# Patient Record
Sex: FEMALE | Race: WHITE | NOT HISPANIC OR LATINO | Employment: FULL TIME | URBAN - METROPOLITAN AREA
[De-identification: names, ages, dates, MRNs, and addresses within clinical notes are randomized per-mention and may not be internally consistent; named-entity substitution may affect disease eponyms.]

---

## 2017-04-24 DIAGNOSIS — F41.9 ANXIETY: ICD-10-CM

## 2017-04-24 RX ORDER — FLUOXETINE 40 MG/1
40 CAPSULE ORAL DAILY
Qty: 90 CAPSULE | Refills: 3 | Status: SHIPPED | OUTPATIENT
Start: 2017-04-24 | End: 2018-02-23

## 2017-04-24 NOTE — TELEPHONE ENCOUNTER
fluoxetin  Last Written Prescription Date:  4/8/16  Last Fill Quantity: 90,   # refills: 3  Last Office Visit : 12/23/16  Future Office visit:  4/28/17    Routing refill request to provider for review/approval because:  Drug not on the [facility], [facility] or [facility] refill protocol

## 2017-04-28 ENCOUNTER — OFFICE VISIT (OUTPATIENT)
Dept: ENDOCRINOLOGY | Facility: CLINIC | Age: 42
End: 2017-04-28

## 2017-04-28 VITALS
BODY MASS INDEX: 29.48 KG/M2 | SYSTOLIC BLOOD PRESSURE: 125 MMHG | DIASTOLIC BLOOD PRESSURE: 81 MMHG | HEIGHT: 62 IN | HEART RATE: 76 BPM | WEIGHT: 160.2 LBS

## 2017-04-28 DIAGNOSIS — E10.9 TYPE 1 DIABETES MELLITUS WITHOUT COMPLICATION (H): ICD-10-CM

## 2017-04-28 LAB — HBA1C MFR BLD: 6.8 % (ref 4.3–6)

## 2017-04-28 RX ORDER — INSULIN GLARGINE 100 [IU]/ML
INJECTION, SOLUTION SUBCUTANEOUS
Qty: 10 ML | Refills: 1 | Status: SHIPPED | OUTPATIENT
Start: 2017-04-28 | End: 2019-03-29

## 2017-04-28 ASSESSMENT — PAIN SCALES - GENERAL: PAINLEVEL: NO PAIN (0)

## 2017-04-28 NOTE — LETTER
4/28/2017     RE: Shannon Sow  [address] [address]     Dear Colleague,    Thank you for referring your patient, Shannon Sow, to the [facility] ENDOCRINOLOGY at [facility]. Please see a copy of my visit note below.    #1 type 1 DM  Diagnosed in 1992.. On lantus and humalog since approximately 2000. Lantus split to b.i.d. dosing in 2008.. The patient has started the insulin pump since May of 2009. April 2008 HgbA1c is 7.5. July 2008 is 8.3. October 2008 HgbA1c of 7.0. February 2009 HgBa1c of 7.8. May 2009 hemoglobin A1c of 7.5 Her A1C 4 days ago was 6.8%, and was 7.5% 5/09, 7.8% 2/09, 7.0%. January 2010 hemoglobin A1c of 6.8. March 2010 hemoglobin A1c of 6.0. July 2010 hemoglobin A1c of 6.7. December 2010 hemoglobin A1c of 7.2. April 2011 hemoglobin A1c of 6.7. October 2011 hemoglobin A1c is 6.9. February 2012 hemoglobin A1c of 7.1. May 2012 hemoglobin A1c of 6.9. August 2012 hemoglobin A1c of 7.9. October 2012 hemoglobin A1c of 7.3. November 2012 hemoglobin A1c of 7.7. February 2013 hemoglobin A1c of 7.4. May 2013 hemoglobin A1c is 7.4. July 2013 hemoglobin A1c is 7.5. November 2013 hemoglobin A1c of 7.5. March 2014 hemoglobin A1c of 7.4.June 2014 hemoglobin A1c of 7.0. September 2014 hemoglobin A1c of 7.0. Feb 2015 Hgba1c of 6.8. November 2015 hemoglobin A1c of 6.4.August 2016 hemoglobin A1c of 7.4.december 2016 hemoglobin A1c of 6.4.    She was started on insulin pump in May 2009.  As of November 2013, she is on the new Medtronic pump with CGMS and low glucose suspend.       Review of systems related to diabetes  CV: none  Eye:  Has seen ophthalmology in July 2016, mild nonproliferative diabetic retinopathy appreciated bilaterally   Neuro: none  Neph: negative in April 2016  Infections:none  Immunization: flu shot in 2014, tetanus within 10 years, pneumovax 2 to 3 yars ago, patient declined flu shot today in 2016  The patient has a  "Lantus prescription as of 2017 April 2016 TSH, ferritin, TTG were normal    Interval history:    April 2017 hemoglobin A1c of 6.8. She does not check her blood sugar regularly.  She reports that she has been doing reasonably well.  Reviewing her CGMS which she wore for three days prior to return visit, she does have some drops in her blood sugar overnight as well as higher blood sugars in the late morning to early afternoon as well as early evening.    Bolus: 1 unit per 8.0 g carbohydrate from 8 AM to 9:30 PM, 1 unit per 12 g carbohydrate otherwise     Basal:  11:30 PM to 8 AM at 0.75 units per hour  8 AM to 1 PM increased to 1.1 units per hour  1 PM to 3:30 PM increased to 1.6units per hour  3:30 PM to 7 PM decreased to 0.7units per hour  7 PM to 11:30 PM increased to 1.5 units per hour     Insulin sensitivity:  1 per 50 above goal 120 from 6 AM to 9 PM, 1 unit per 60 g carbohydrate from 9 PM to 6 AM  Active insulin time to 4 hours  #2 Considering pregnancy  Not interested in pregnancy currently     Interval history: Does not anticipate having future pregnancy    #3 history of vitamin D deficiency  In February 2015, vitamin D was slightly low at 25.  She continues on vitamin D intermittently.April 2016 vitamin D was reasonable at 21.  Patient continues on vitamin D at 1000 international units daily     Interval history: not discuss the current visit    #4 weight gain   The patient reports that her lifestyle continues to be quite busy.  Her weight has been stable    Past medical history  Type 1 diabetes  Delivery of child in July 2010    Family history  Mother was diagnosed with primary biliary cirrhosis    Social history  Continues to be busy at work and at home,     Physical Exam   Vital signs:  Vital signs:   /81  Pulse 76  Ht 1.575 m (5' 2\")  Wt 72.7 kg (160 lb 3.2 oz)  BMI 29.3 kg/m2  Estimated body mass index is 29.3 kg/(m^2) as calculated from the following:    Height as of this encounter: 1.575 m " "(5' 2\").    Weight as of this encounter: 72.7 kg (160 lb 3.2 oz).    General appearance:    GENERAL APPEARANCE: Alert and no distress  NECK: No lymphadenopathy appreciated  Thyroid: No obvious nodules palpated , eevaluation with floor ultrasound in April 2017 does not demonstrate any nodules.  There is a smooth heterogeneous appearance.  CV: RRR without M/R/G  Lungs: CTA bilaterally  Abdomen: Soft, Nontender, non distended, positive bowel sounds   Neuro: no focal deficits, intact to monofilament bilaterally  Skin: No infection in feet   Mood: Normal   Lymph: neg in neck and supraclavicular area    No visits with results within 1 Week(s) from this visit.  Latest known visit with results is:    Office Visit on 12/23/2016   Component Date Value Ref Range Status     Hemoglobin A1C 12/23/2016 6.4* 4.3 - 6 % Final         Assessment   #1 type 1 DM with retinopathy noted in July 2016  Control continues to remain reasonable at 6.8.  She realistically does not check her blood sugars.  Reviewing her sensor results, she does have some low blood sugars overnight and in the later afternoon.  She also tends to have higher blood sugar in the late morning/early afternoon as well as during the early evening hours.  I will adjust her basal accordingly.    Bolus: 1 unit per 8.0 g carbohydrate from 8 AM to 9:30 PM, 1 unit per 12 g carbohydrate otherwise    Basal:  11:30 PM to 8 AM at 0.60 units per hour  8 AM to 11 AM increased to 1.1 units per hour  11 AM to 3:30 PM increased to 1.7 nits per hour  3:30 PM to 7 PM decreased to 0.6units per hour  7 PM to 11:30 PM increased to 1.6 units per hour    Insulin sensitivity:  1 per 50 above goal 120 from 6 AM to 9 PM, 1 unit per 60 g carbohydrate from 9 PM to 6 AM  Active insulin time to 4 hours    The patient is very interested in the possibility of the closed loop pump with Medtronic.  In the meantime, she will continue to wear her sensor intermittently.  Although she is vaguely interested " in the DEXCOM  sensor, she has many Medtronic sensors that she would like to use first.  This is reasonable.    The patient would prefer to recheck her annual labs when she returns in the summer.  She also understands the importance of checking her blood sugars more frequently if she does not wear the CGMS. we will also consider repeat examination at a return visit in July 2017    Prescription given for Lantus and glucagon case of pump failure    #2 considering pregnancy  Not interested in pregnancy currently.  Patient aware of need for closer diabetes management if pregnant.    #3 recent weight gain  Continues with slight weight gain, although she's been realistically stable for roughly 2 years.    Return visit in 4 months, sooner if needed.     25 minutes spent with patient of which 20 minutes was spent in face-to-face discussion, assessment, and coordination of care    Again, thank you for allowing me to participate in the care of your patient.      Sincerely,    Jemma Anderson MD

## 2017-04-28 NOTE — NURSING NOTE
Chief Complaint   Patient presents with     RECHECK     F/U TYPE I DM     Arminda Wilson, CMA  Endocrinology & Diabetes 3G    Capillary Fingerstick performed for an Hemoglobin A1C test

## 2017-04-28 NOTE — PROGRESS NOTES
#1 type 1 DM  Diagnosed in 1992.. On lantus and humalog since approximately 2000. Lantus split to b.i.d. dosing in 2008.. The patient has started the insulin pump since May of 2009. April 2008 HgbA1c is 7.5. July 2008 is 8.3. October 2008 HgbA1c of 7.0. February 2009 HgBa1c of 7.8. May 2009 hemoglobin A1c of 7.5 Her A1C 4 days ago was 6.8%, and was 7.5% 5/09, 7.8% 2/09, 7.0%. January 2010 hemoglobin A1c of 6.8. March 2010 hemoglobin A1c of 6.0. July 2010 hemoglobin A1c of 6.7. December 2010 hemoglobin A1c of 7.2. April 2011 hemoglobin A1c of 6.7. October 2011 hemoglobin A1c is 6.9. February 2012 hemoglobin A1c of 7.1. May 2012 hemoglobin A1c of 6.9. August 2012 hemoglobin A1c of 7.9. October 2012 hemoglobin A1c of 7.3. November 2012 hemoglobin A1c of 7.7. February 2013 hemoglobin A1c of 7.4. May 2013 hemoglobin A1c is 7.4. July 2013 hemoglobin A1c is 7.5. November 2013 hemoglobin A1c of 7.5. March 2014 hemoglobin A1c of 7.4.June 2014 hemoglobin A1c of 7.0. September 2014 hemoglobin A1c of 7.0. Feb 2015 Hgba1c of 6.8. November 2015 hemoglobin A1c of 6.4.August 2016 hemoglobin A1c of 7.4.december 2016 hemoglobin A1c of 6.4.    She was started on insulin pump in May 2009.  As of November 2013, she is on the new Medtronic pump with CGMS and low glucose suspend.       Review of systems related to diabetes  CV: none  Eye:  Has seen ophthalmology in July 2016, mild nonproliferative diabetic retinopathy appreciated bilaterally   Neuro: none  Neph: negative in April 2016  Infections:none  Immunization: flu shot in 2014, tetanus within 10 years, pneumovax 2 to 3 yars ago, patient declined flu shot today in 2016  The patient has a Lantus prescription as of 2017 April 2016 TSH, ferritin, TTG were normal    Interval history:    April 2017 hemoglobin A1c of 6.8. She does not check her blood sugar regularly.  She reports that she has been doing reasonably well.  Reviewing her CGMS which she wore for three days prior to return  "visit, she does have some drops in her blood sugar overnight as well as higher blood sugars in the late morning to early afternoon as well as early evening.    Bolus: 1 unit per 8.0 g carbohydrate from 8 AM to 9:30 PM, 1 unit per 12 g carbohydrate otherwise     Basal:  11:30 PM to 8 AM at 0.75 units per hour  8 AM to 1 PM increased to 1.1 units per hour  1 PM to 3:30 PM increased to 1.6units per hour  3:30 PM to 7 PM decreased to 0.7units per hour  7 PM to 11:30 PM increased to 1.5 units per hour     Insulin sensitivity:  1 per 50 above goal 120 from 6 AM to 9 PM, 1 unit per 60 g carbohydrate from 9 PM to 6 AM  Active insulin time to 4 hours  #2 Considering pregnancy  Not interested in pregnancy currently     Interval history: Does not anticipate having future pregnancy    #3 history of vitamin D deficiency  In February 2015, vitamin D was slightly low at 25.  She continues on vitamin D intermittently.April 2016 vitamin D was reasonable at 21.  Patient continues on vitamin D at 1000 international units daily     Interval history: not discuss the current visit    #4 weight gain   The patient reports that her lifestyle continues to be quite busy.  Her weight has been stable    Past medical history  Type 1 diabetes  Delivery of child in July 2010    Family history  Mother was diagnosed with primary biliary cirrhosis    Social history  Continues to be busy at work and at home,     Physical Exam   Vital signs:  Vital signs:   /81  Pulse 76  Ht 1.575 m (5' 2\")  Wt 72.7 kg (160 lb 3.2 oz)  BMI 29.3 kg/m2  Estimated body mass index is 29.3 kg/(m^2) as calculated from the following:    Height as of this encounter: 1.575 m (5' 2\").    Weight as of this encounter: 72.7 kg (160 lb 3.2 oz).    General appearance:    GENERAL APPEARANCE: Alert and no distress  NECK: No lymphadenopathy appreciated  Thyroid: No obvious nodules palpated , eevaluation with floor ultrasound in April 2017 does not demonstrate any nodules.  " There is a smooth heterogeneous appearance.  CV: RRR without M/R/G  Lungs: CTA bilaterally  Abdomen: Soft, Nontender, non distended, positive bowel sounds   Neuro: no focal deficits, intact to monofilament bilaterally  Skin: No infection in feet   Mood: Normal   Lymph: neg in neck and supraclavicular area    No visits with results within 1 Week(s) from this visit.  Latest known visit with results is:    Office Visit on 12/23/2016   Component Date Value Ref Range Status     Hemoglobin A1C 12/23/2016 6.4* 4.3 - 6 % Final         Assessment   #1 type 1 DM with retinopathy noted in July 2016  Control continues to remain reasonable at 6.8.  She realistically does not check her blood sugars.  Reviewing her sensor results, she does have some low blood sugars overnight and in the later afternoon.  She also tends to have higher blood sugar in the late morning/early afternoon as well as during the early evening hours.  I will adjust her basal accordingly.    Bolus: 1 unit per 8.0 g carbohydrate from 8 AM to 9:30 PM, 1 unit per 12 g carbohydrate otherwise    Basal:  11:30 PM to 8 AM at 0.60 units per hour  8 AM to 11 AM increased to 1.1 units per hour  11 AM to 3:30 PM increased to 1.7 nits per hour  3:30 PM to 7 PM decreased to 0.6units per hour  7 PM to 11:30 PM increased to 1.6 units per hour    Insulin sensitivity:  1 per 50 above goal 120 from 6 AM to 9 PM, 1 unit per 60 g carbohydrate from 9 PM to 6 AM  Active insulin time to 4 hours    The patient is very interested in the possibility of the closed loop pump with Medtronic.  In the meantime, she will continue to wear her sensor intermittently.  Although she is vaguely interested in the DEXCOM  sensor, she has many Medtronic sensors that she would like to use first.  This is reasonable.    The patient would prefer to recheck her annual labs when she returns in the summer.  She also understands the importance of checking her blood sugars more frequently if she does not  wear the CGMS. we will also consider repeat examination at a return visit in July 2017    Prescription given for Lantus and glucagon case of pump failure    #2 considering pregnancy  Not interested in pregnancy currently.  Patient aware of need for closer diabetes management if pregnant.    #3 recent weight gain  Continues with slight weight gain, although she's been realistically stable for roughly 2 years.    Return visit in 4 months, sooner if needed.     25 minutes spent with patient of which 20 minutes was spent in face-to-face discussion, assessment, and coordination of care

## 2017-04-28 NOTE — MR AVS SNAPSHOT
After Visit Summary   4/28/2017    Shannon Sow    MRN: [id number]           Patient Information     Date Of Birth          1975        Visit Information        Provider Department      4/28/2017 9:30 AM Jemma Anderson MD [facility]        Today's Diagnoses     Type 1 diabetes mellitus without complication (H)          Care Instructions    Consider circus juventus and adventures in cardboard        Follow-ups after your visit        Follow-up notes from your care team     Return in about 4 months (around 8/28/2017).      Your next 10 appointments already scheduled     Aug 25, 2017  9:30 AM CDT   (Arrive by 9:15 AM)   RETURN DIABETES with [facility] ([facility] [place])    [address] [place] [id number] [phone]              Who to contact     Please call your clinic at [phone] to:    Ask questions about your health    Make or cancel appointments    Discuss your medicines    Learn about your test results    Speak to your doctor   If you have compliments or concerns about an experience at your clinic, or if you wish to file a complaint, please contact [facility] Patient Relations at [phone] or email us at [email or web address]         Additional Information About Your Visit        MyChart Information     [employer] is an electronic gateway that provides easy, online access to your medical records. With [employer], you can request a clinic appointment, read your test results, renew a prescription or communicate with your care team.     To sign up for [employer] visit the website at [email or web address]   You will be asked to enter the access code listed below, as well as some personal information. Please follow the directions to create your username and password.     Your access code is: [email or web address]  Expires: 7/13/2017  6:31 AM     Your access  "code will  in 90 days. If you need help or a new code, please contact your [facility] or call [phone] for assistance.        Care EveryWhere ID     This is your Care EveryWhere ID. This could be used by other organizations to access your [place] medical records  [id number]        Your Vitals Were     Pulse Height BMI (Body Mass Index)             76 1.575 m (5' 2\") 29.3 kg/m2          Blood Pressure from Last 3 Encounters:   17 125/81   16 109/71   16 115/82    Weight from Last 3 Encounters:   17 72.7 kg (160 lb 3.2 oz)   16 71.3 kg (157 lb 3.2 oz)   16 72.6 kg (160 lb)              Today, you had the following     No orders found for display         Today's Medication Changes          These changes are accurate as of: 17 10:29 AM.  If you have any questions, ask your nurse or doctor.               Start taking these medicines.        Dose/Directions    glucagon 1 MG kit   Commonly known as:  GLUCAGON EMERGENCY   Used for:  Type 1 diabetes mellitus without complication (H)   Started by:  Jemma Anderson MD        Dose:  1 mg   Inject 1 mg into the muscle once for 1 dose   Quantity:  1 mg   Refills:  0            Where to get your medicines      Some of these will need a paper prescription and others can be bought over the counter.  Ask your nurse if you have questions.     Bring a paper prescription for each of these medications     glucagon 1 MG kit    insulin glargine 100 UNIT/ML injection                Primary Care Provider Office Phone # Fax #    Jemma Anderson [phone]       [address] [address]        Thank you!     Thank you for choosing [facility] ENDOCRINOLOGY  for your care. Our goal is always to provide you with excellent care. Hearing back from our patients is one way we can continue to improve our services. Please take a few minutes to complete the written " survey that you may receive in the mail after your visit with us. Thank you!             Your Updated Medication List - Protect others around you: Learn how to safely use, store and throw away your medicines at www.disposemymeds.org.          This list is accurate as of: 4/28/17 10:29 AM.  Always use your most recent med list.                   Brand Name Dispense Instructions for use    blood glucose monitoring test strip    no brand specified    6 Box    Test up to 6 times daily       cholecalciferol 1000 UNIT tablet    vitamin D    90 tablet    Take 1 tablet (1,000 Units) by mouth daily       FLUoxetine 40 MG capsule    PROZAC    90 capsule    Take 1 capsule (40 mg) by mouth daily       glucagon 1 MG kit    GLUCAGON EMERGENCY    1 mg    Inject 1 mg into the muscle once for 1 dose       insulin glargine 100 UNIT/ML injection    LANTUS VIAL    10 mL    Pt to take 20 units daily in case of pump failure       insulin lispro 100 UNIT/ML injection    humaLOG    80 mL    Pt takes 80 units daily       ipratropium 0.03 % spray    ATROVENT    2 Box    Spray 2 sprays into both nostrils every 12 hours       MULTIVITAMIN PO          triamcinolone 0.1 % ointment    KENALOG    250 g    Use as needed for rash - take twice daily       ZYRTEC ALLERGY 10 MG Caps   Generic drug:  cetirizine HCl

## 2017-07-29 ENCOUNTER — HEALTH MAINTENANCE LETTER (OUTPATIENT)
Age: 42
End: 2017-07-29

## 2017-08-25 ENCOUNTER — OFFICE VISIT (OUTPATIENT)
Dept: ENDOCRINOLOGY | Facility: CLINIC | Age: 42
End: 2017-08-25

## 2017-08-25 VITALS
HEIGHT: 62 IN | DIASTOLIC BLOOD PRESSURE: 74 MMHG | SYSTOLIC BLOOD PRESSURE: 126 MMHG | WEIGHT: 166.5 LBS | BODY MASS INDEX: 30.64 KG/M2 | HEART RATE: 83 BPM

## 2017-08-25 DIAGNOSIS — E10.9 TYPE 1 DIABETES MELLITUS WITHOUT COMPLICATION (H): ICD-10-CM

## 2017-08-25 DIAGNOSIS — E10.9 TYPE 1 DIABETES MELLITUS WITHOUT COMPLICATION (H): Primary | ICD-10-CM

## 2017-08-25 LAB
ANION GAP SERPL CALCULATED.3IONS-SCNC: 6 MMOL/L (ref 3–14)
BUN SERPL-MCNC: 12 MG/DL (ref 7–30)
CALCIUM SERPL-MCNC: 8.6 MG/DL (ref 8.5–10.1)
CHLORIDE SERPL-SCNC: 101 MMOL/L (ref 94–109)
CHOLEST SERPL-MCNC: 172 MG/DL
CO2 SERPL-SCNC: 29 MMOL/L (ref 20–32)
CREAT SERPL-MCNC: 0.62 MG/DL (ref 0.52–1.04)
CREAT UR-MCNC: 55 MG/DL
GFR SERPL CREATININE-BSD FRML MDRD: >90 ML/MIN/1.7M2
GLUCOSE SERPL-MCNC: 164 MG/DL (ref 70–99)
HBA1C MFR BLD: 7.2 % (ref 4.3–6)
HDLC SERPL-MCNC: 94 MG/DL
LDLC SERPL CALC-MCNC: 72 MG/DL
MICROALBUMIN UR-MCNC: <5 MG/L
MICROALBUMIN/CREAT UR: NORMAL MG/G CR (ref 0–25)
NONHDLC SERPL-MCNC: 79 MG/DL
POTASSIUM SERPL-SCNC: 4.1 MMOL/L (ref 3.4–5.3)
SODIUM SERPL-SCNC: 136 MMOL/L (ref 133–144)
TRIGL SERPL-MCNC: 35 MG/DL
TSH SERPL DL<=0.005 MIU/L-ACNC: 0.76 MU/L (ref 0.4–4)

## 2017-08-25 ASSESSMENT — PAIN SCALES - GENERAL: PAINLEVEL: NO PAIN (0)

## 2017-08-25 NOTE — PROGRESS NOTES
#1 type 1 DM  Diagnosed in 1992.. On lantus and humalog since approximately 2000. Lantus split to b.i.d. dosing in 2008.. The patient has started the insulin pump since May of 2009. April 2008 HgbA1c is 7.5. July 2008 is 8.3. October 2008 HgbA1c of 7.0. February 2009 HgBa1c of 7.8. May 2009 hemoglobin A1c of 7.5 Her A1C 4 days ago was 6.8%, and was 7.5% 5/09, 7.8% 2/09, 7.0%. January 2010 hemoglobin A1c of 6.8. March 2010 hemoglobin A1c of 6.0. July 2010 hemoglobin A1c of 6.7. December 2010 hemoglobin A1c of 7.2. April 2011 hemoglobin A1c of 6.7. October 2011 hemoglobin A1c is 6.9. February 2012 hemoglobin A1c of 7.1. May 2012 hemoglobin A1c of 6.9. August 2012 hemoglobin A1c of 7.9. October 2012 hemoglobin A1c of 7.3. November 2012 hemoglobin A1c of 7.7. February 2013 hemoglobin A1c of 7.4. May 2013 hemoglobin A1c is 7.4. July 2013 hemoglobin A1c is 7.5. November 2013 hemoglobin A1c of 7.5. March 2014 hemoglobin A1c of 7.4.June 2014 hemoglobin A1c of 7.0. September 2014 hemoglobin A1c of 7.0. Feb 2015 Hgba1c of 6.8. November 2015 hemoglobin A1c of 6.4.August 2016 hemoglobin A1c of 7.4.december 2016 hemoglobin A1c of 6.4.  April 2017 hemoglobin A1c of 6.8.    She was started on insulin pump in May 2009.  As of November 2013, she is on the new Medtronic pump with CGMS and low glucose suspend.       Review of systems related to diabetes  CV: none  Eye:  Has seen ophthalmology in July 2016, mild nonproliferative diabetic retinopathy appreciated bilaterally  Neuro: none  Neph: negative in April 2016  Infections: none  Immunization: flu shot in 2014, tetanus within 10 years, pneumovax 2 to 3 yars ago, patient declined flu shot today in 2016  The patient has a Lantus prescription as of 2017 April 2016 TSH, ferritin, TTG were normal    Interval history:    August 2017 hemoglobin A1c of 7.2. She does have a CGM but is not currently using it and so we were unable to review her any of her blood glucose history since her  last visit.  She reports that she has been doing well.  She states that she usually feels 3 to 4 hypoglycemic events per week at around 4 to 5:30 PM with blood sugar levels from 40 to 55.  During these events she feels very tired at which point she eats something and feels better.  She does not use her prescribed carbohydrate coverage for meals but rather approximates how much insulin she will need when she eats something.  She has adjusted her basal dose to try and limit her hypoglycemic events.       Bolus: 1 unit per 8.0 g carbohydrate from 8 AM to 9:30 PM, 1 unit per 12 g carbohydrate otherwise     Basal:  10 PM to 8 AM increased to 0.9 units per hour  11:30 AM to 3:30 PM decreased to 1.4 units per hourr  3:30 PM to 7 PM decreased to 0.6 units per hour  7 PM to 10 PM at 1.6 units per hour     Insulin sensitivity:  1 per 50 above goal 120 from 6 AM to 9 PM, 1 unit per 60 g carbohydrate from 9 PM to 6 AM  Active insulin time to 4 hours    #2 Considering pregnancy  Not interested in pregnancy currently.  No form of birth control     Interval history: Does not anticipate having future pregnancy    #3 history of vitamin D deficiency  In February 2015, vitamin D was slightly low at 25.  She continues on vitamin D intermittently.April 2016 vitamin D was reasonable at 21.  Patient continues on vitamin D at 1000 international units daily     Interval history: Continuing to take her prescribed dose of Vitamin D 1000 IU    #4 weight gain   The patient reports that her lifestyle continues to be quite busy.  Her weight has been stable    Interval history: She states that she is surprised to see her weight continue to increase as she has not markedly changed her diet and as increased her level of exercise.       Past medical history  Type 1 diabetes  Delivery of child in July 2010    Family history  Mother was diagnosed with primary biliary cirrhosis    Social history  Continues to be busy at work and at home,     Physical  "Exam   Vital signs:  Vital signs:   /74  Pulse 83  Ht 1.575 m (5' 2\")  Wt 75.5 kg (166 lb 8 oz)  BMI 30.45 kg/m2  Estimated body mass index is 30.45 kg/(m^2) as calculated from the following:    Height as of this encounter: 1.575 m (5' 2\").    Weight as of this encounter: 75.5 kg (166 lb 8 oz).    General appearance:    GENERAL APPEARANCE: Alert and no distress  NECK: No lymphadenopathy appreciated  Thyroid: No obvious nodules palpated , eevaluation with floor ultrasound in April 2017 does not demonstrate any nodules.  There is a smooth heterogeneous appearance.  CV: RRR without M/R/G  Lungs: CTA bilaterally  Abdomen: Soft, Nontender, non distended, positive bowel sounds   Neuro: no focal deficits, intact to monofilament bilaterally  Skin: No infection in feet   Mood: Normal   Lymph: neg in neck and supraclavicular area    No visits with results within 1 Week(s) from this visit.  Latest known visit with results is:    Office Visit on 12/23/2016   Component Date Value Ref Range Status     Hemoglobin A1C 12/23/2016 6.4* 4.3 - 6 % Final         Assessment   #1 type 1 DM with retinopathy noted in July 2016  Control worsening at 7.2.  She does not check her blood sugars and does not use her CGM.  Due to her late afternoon/ early evening hypoglycemic events, we have changed to timing of her afternoon dose and decreased the basal rate.  Because she has not record of her blood glucose levels, we are hesitant to make and drastic changes to her current regimen.  In order to get these blood glucose levels, she agreed to use a FreeStyle Azam for 14 days with a return date to clinic scheduled for 9/8 at which time we can more fully determine if any changes are needed to help her better manage her diabetes.  We discussed with her the possibility of using Metformin as this would make her more sensitive to the insulin and would help to reduce her weight.       Bolus: 1 unit per 8.0 g carbohydrate from 8 AM to 9:30 PM, 1 " unit per 12 g carbohydrate otherwise    Basal:  10 PM to 8 AM at 0.9 units per hour  11:30 AM to 2:00 PM at 1.4 units per hourr  2:00 PM to 7 PM decreased to 0.5 units per hour  7 PM to 10 PM at 1.6 units per hour    Insulin sensitivity:  1 per 50 above goal 120 from 6 AM to 9 PM, 1 unit per 60 g carbohydrate from 9 PM to 6 AM  Active insulin time to 4 hours      The patient will recheck her annual labs today which includes vitamin D, TTG , lipid profile, and random urine albumin.  We have also referred the patient to opthalmology for an eye exam.       #2 considering pregnancy  Not interested in pregnancy currently.  Patient aware of need for closer diabetes management if pregnant.    #3 recent weight gain  Discussed with the patient possibly checking a 24-hour urine cortisol level but she declined and we agree that the clinical suspicion for Cushing's Disease is very small.  Will check a TSH.    Return visit in 4 months, sooner if needed.     25 minutes spent with patient of which 20 minutes was spent in face-to-face discussion, assessment, and coordination of care    Avinash Schofield MS 4Scribed for Dr. Jemma Anderson. I, Dr. Jemma Anderson reviewed and edited the aforementioned note. Jemma BLACKWELL MD, I personally performed the entire clinical encounter documented in this note.

## 2017-08-25 NOTE — NURSING NOTE
Placed Azam Sensor per . Patient identified by name and . Patient tolerated placement well.  Sensor was placed on patient's RIGHT upper back part of arm. Patient was given an appointment to come back in 2 weeks for download and given instructions on how to care for the sensor.    AZAM PRO:  CODE: H76   SN:[id number]  EXP: 18  LOT: [id number]  NDC: [address]-22      Lyly Milner CMA

## 2017-08-25 NOTE — LETTER
8/25/2017       RE: Shannon Sow  [address] [facility] [address]     Dear Colleague,    Thank you for referring your patient, Shannon Sow, to the [facility] ENDOCRINOLOGY at [facility]. Please see a copy of my visit note below.    #1 type 1 DM  Diagnosed in 1992.. On lantus and humalog since approximately 2000. Lantus split to b.i.d. dosing in 2008.. The patient has started the insulin pump since May of 2009. April 2008 HgbA1c is 7.5. July 2008 is 8.3. October 2008 HgbA1c of 7.0. February 2009 HgBa1c of 7.8. May 2009 hemoglobin A1c of 7.5 Her A1C 4 days ago was 6.8%, and was 7.5% 5/09, 7.8% 2/09, 7.0%. January 2010 hemoglobin A1c of 6.8. March 2010 hemoglobin A1c of 6.0. July 2010 hemoglobin A1c of 6.7. December 2010 hemoglobin A1c of 7.2. April 2011 hemoglobin A1c of 6.7. October 2011 hemoglobin A1c is 6.9. February 2012 hemoglobin A1c of 7.1. May 2012 hemoglobin A1c of 6.9. August 2012 hemoglobin A1c of 7.9. October 2012 hemoglobin A1c of 7.3. November 2012 hemoglobin A1c of 7.7. February 2013 hemoglobin A1c of 7.4. May 2013 hemoglobin A1c is 7.4. July 2013 hemoglobin A1c is 7.5. November 2013 hemoglobin A1c of 7.5. March 2014 hemoglobin A1c of 7.4.June 2014 hemoglobin A1c of 7.0. September 2014 hemoglobin A1c of 7.0. Feb 2015 Hgba1c of 6.8. November 2015 hemoglobin A1c of 6.4.August 2016 hemoglobin A1c of 7.4.december 2016 hemoglobin A1c of 6.4.  April 2017 hemoglobin A1c of 6.8.    She was started on insulin pump in May 2009.  As of November 2013, she is on the new Medtronic pump with CGMS and low glucose suspend.       Review of systems related to diabetes  CV: none  Eye:  Has seen ophthalmology in July 2016, mild nonproliferative diabetic retinopathy appreciated bilaterally  Neuro: none  Neph: negative in April 2016  Infections: none  Immunization: flu shot in 2014, tetanus within 10 years, pneumovax 2 to 3 yars ago, patient declined flu shot  today in 2016  The patient has a Lantus prescription as of 2017 April 2016 TSH, ferritin, TTG were normal    Interval history:    August 2017 hemoglobin A1c of 7.2. She does have a CGM but is not currently using it and so we were unable to review her any of her blood glucose history since her last visit.  She reports that she has been doing well.  She states that she usually feels 3 to 4 hypoglycemic events per week at around 4 to 5:30 PM with blood sugar levels from 40 to 55.  During these events she feels very tired at which point she eats something and feels better.  She does not use her prescribed carbohydrate coverage for meals but rather approximates how much insulin she will need when she eats something.  She has adjusted her basal dose to try and limit her hypoglycemic events.       Bolus: 1 unit per 8.0 g carbohydrate from 8 AM to 9:30 PM, 1 unit per 12 g carbohydrate otherwise     Basal:  10 PM to 8 AM increased to 0.9 units per hour  11:30 AM to 3:30 PM decreased to 1.4 units per hourr  3:30 PM to 7 PM decreased to 0.6 units per hour  7 PM to 10 PM at 1.6 units per hour     Insulin sensitivity:  1 per 50 above goal 120 from 6 AM to 9 PM, 1 unit per 60 g carbohydrate from 9 PM to 6 AM  Active insulin time to 4 hours    #2 Considering pregnancy  Not interested in pregnancy currently.  No form of birth control     Interval history: Does not anticipate having future pregnancy    #3 history of vitamin D deficiency  In February 2015, vitamin D was slightly low at 25.  She continues on vitamin D intermittently.April 2016 vitamin D was reasonable at 21.  Patient continues on vitamin D at 1000 international units daily     Interval history: Continuing to take her prescribed dose of Vitamin D 1000 IU    #4 weight gain   The patient reports that her lifestyle continues to be quite busy.  Her weight has been stable    Interval history: She states that she is surprised to see her weight continue to increase as she  "has not markedly changed her diet and as increased her level of exercise.       Past medical history  Type 1 diabetes  Delivery of child in July 2010    Family history  Mother was diagnosed with primary biliary cirrhosis    Social history  Continues to be busy at work and at home,     Physical Exam   Vital signs:  Vital signs:   /74  Pulse 83  Ht 1.575 m (5' 2\")  Wt 75.5 kg (166 lb 8 oz)  BMI 30.45 kg/m2  Estimated body mass index is 30.45 kg/(m^2) as calculated from the following:    Height as of this encounter: 1.575 m (5' 2\").    Weight as of this encounter: 75.5 kg (166 lb 8 oz).    General appearance:    GENERAL APPEARANCE: Alert and no distress  NECK: No lymphadenopathy appreciated  Thyroid: No obvious nodules palpated , eevaluation with floor ultrasound in April 2017 does not demonstrate any nodules.  There is a smooth heterogeneous appearance.  CV: RRR without M/R/G  Lungs: CTA bilaterally  Abdomen: Soft, Nontender, non distended, positive bowel sounds   Neuro: no focal deficits, intact to monofilament bilaterally  Skin: No infection in feet   Mood: Normal   Lymph: neg in neck and supraclavicular area    No visits with results within 1 Week(s) from this visit.  Latest known visit with results is:    Office Visit on 12/23/2016   Component Date Value Ref Range Status     Hemoglobin A1C 12/23/2016 6.4* 4.3 - 6 % Final         Assessment   #1 type 1 DM with retinopathy noted in July 2016  Control worsening at 7.2.  She does not check her blood sugars and does not use her CGM.  Due to her late afternoon/ early evening hypoglycemic events, we have changed to timing of her afternoon dose and decreased the basal rate.  Because she has not record of her blood glucose levels, we are hesitant to make and drastic changes to her current regimen.  In order to get these blood glucose levels, she agreed to use a FreeStyle Azam for 14 days with a return date to clinic scheduled for 9/8 at which time we can more " fully determine if any changes are needed to help her better manage her diabetes.  We discussed with her the possibility of using Metformin as this would make her more sensitive to the insulin and would help to reduce her weight.       Bolus: 1 unit per 8.0 g carbohydrate from 8 AM to 9:30 PM, 1 unit per 12 g carbohydrate otherwise    Basal:  10 PM to 8 AM at 0.9 units per hour  11:30 AM to 2:00 PM at 1.4 units per hourr  2:00 PM to 7 PM decreased to 0.5 units per hour  7 PM to 10 PM at 1.6 units per hour    Insulin sensitivity:  1 per 50 above goal 120 from 6 AM to 9 PM, 1 unit per 60 g carbohydrate from 9 PM to 6 AM  Active insulin time to 4 hours      The patient will recheck her annual labs today which includes vitamin D, TTG , lipid profile, and random urine albumin.  We have also referred the patient to opthalmology for an eye exam.       #2 considering pregnancy  Not interested in pregnancy currently.  Patient aware of need for closer diabetes management if pregnant.    #3 recent weight gain  Discussed with the patient possibly checking a 24-hour urine cortisol level but she declined and we agree that the clinical suspicion for Cushing's Disease is very small.  Will check a TSH.    Return visit in 4 months, sooner if needed.     25 minutes spent with patient of which 20 minutes was spent in face-to-face discussion, assessment, and coordination of care    Avinash Schofield, MS 4Scribed for Dr. Jemma Anderson. I, Dr. Jemma Anderson reviewed and edited the aforementioned note. Jemma BLACKWELL MD, I personally performed the entire clinical encounter documented in this note.

## 2017-08-25 NOTE — MR AVS SNAPSHOT
After Visit Summary   8/25/2017    Shannon Sow    MRN: [id number]           Patient Information     Date Of Birth          1975        Visit Information        Provider Department      8/25/2017 9:30 AM Jemma Anderson MD [facility]        Today's Diagnoses     Type 1 diabetes mellitus without complication (H)    -  1       Follow-ups after your visit        Additional Services     OPHTHALMOLOGY ADULT REFERRAL       Pt has hx of diabetes                  Your next 10 appointments already scheduled     Aug 25, 2017 11:00 AM CDT   LAB with  LAB   [facility] ([facility])    [address] [place] [address]  1st [facility] [id number] [phone]           Patient must bring picture ID. Patient should be prepared to give a urine specimen  Please do not eat 10-12 hours before your appointment if you are coming in fasting for labs on lipids, cholesterol, or glucose (sugar). Pregnant women should follow their Care Team instructions. Water with medications is okay. Do not drink coffee or other fluids. If you have concerns about taking  your medications, please ask at office or if scheduling via [employer], send a message by clicking on Secure Messaging, Message Your Care Team.            Sep 08, 2017  2:30 PM CDT   (Arrive by 2:15 PM)   RETURN DIABETES with Jemma Anderson MD   [facility] Endocrinology ([facility])    [address] [place] [id number] [phone]            Dec 05, 2017  3:15 PM CST   RETURN RETINA with Angela Wolf MD   [facility] Clinic ([facility])    [facility]  [address] [facility]  [address]  [facility] [id number] [phone]              Future tests that were ordered for you today     Open Future Orders        Priority Expected Expires Ordered    25 Hydroxyvitamin D2 and D3 Routine  8/25/2018 8/25/2017    Basic metabolic panel Routine   "2018    Albumin Random Urine Quantitative with Creat Ratio Routine  2018    TSH Routine  2018    Tissue transglutaminase payal IgA and IgG Routine  2018    Lipid Profile Routine  2018            Who to contact     Please call your clinic at [phone] to:    Ask questions about your health    Make or cancel appointments    Discuss your medicines    Learn about your test results    Speak to your doctor   If you have compliments or concerns about an experience at your clinic, or if you wish to file a complaint, please contact [facility] Patient Relations at [phone] or email us at [email or web address]         Additional Information About Your Visit        [employer] Information     ISC8 is an electronic gateway that provides easy, online access to your medical records. With ISC8, you can request a clinic appointment, read your test results, renew a prescription or communicate with your care team.     To sign up for ISC8 visit the website at [email or web address]   You will be asked to enter the access code listed below, as well as some personal information. Please follow the directions to create your username and password.     Your access code is: [email or web address]  Expires: 2017  6:31 AM     Your access code will  in 90 days. If you need help or a new code, please contact your [facility] or call [phone] for assistance.        Care EveryWhere ID     This is your Care EveryWhere ID. This could be used by other organizations to access your [place] medical records  [id number]        Your Vitals Were     Pulse Height BMI (Body Mass Index)             83 1.575 m (5' 2\") 30.45 kg/m2          Blood Pressure from Last 3 Encounters:   17 126/74   17 125/81   16 109/71    Weight from Last 3 Encounters:   17 75.5 kg (166 lb 8 oz) "   04/28/17 72.7 kg (160 lb 3.2 oz)   12/23/16 71.3 kg (157 lb 3.2 oz)              We Performed the Following     OPHTHALMOLOGY ADULT REFERRAL        Primary Care Provider Office Phone # Fax #    Jemma Anderson [phone]       [address] [place] [id number]        Equal Access to Services     [facility]: Hadii aad ku hadasho Soomaali, waaxda luqadaha, qaybta kaalmada adeegyada, waxay idiin hayaan adeeg kharash la'aan ah. So [facility] [phone].    ATENCIÓN: Si habla español, tiene a abebe disposición servicios gratuitos de asistencia lingüística. Llame al [phone].    We comply with applicable federal civil rights laws and [place] laws. We do not discriminate on the basis of race, color, national origin, age, disability sex, sexual orientation or gender identity.            Thank you!     Thank you for choosing [facility] ENDOCRIN[facility]  for your care. Our goal is always to provide you with excellent care. Hearing back from our patients is one way we can continue to improve our services. Please take a few minutes to complete the written survey that you may receive in the mail after your visit with us. Thank you!             Your Updated Medication List - Protect others around you: Learn how to safely use, store and throw away your medicines at www.disposemymeds.org.          This list is accurate as of: 8/25/17 10:51 AM.  Always use your most recent med list.                   Brand Name Dispense Instructions for use Diagnosis    blood glucose monitoring test strip    no brand specified    6 Box    Test up to 6 times daily    DM type 1 (diabetes mellitus, type 1) (H)       cholecalciferol 1000 UNIT tablet    vitamin D    90 tablet    Take 1 tablet (1,000 Units) by mouth daily    Unspecified vitamin deficiency       FLUoxetine 40 MG capsule    PROZAC    90 capsule    Take 1 capsule (40 mg) by mouth daily    Anxiety       glucagon 1 MG kit    GLUCAGON EMERGENCY    1 mg    Inject 1  mg into the muscle once for 1 dose    Type 1 diabetes mellitus without complication (H)       insulin glargine 100 UNIT/ML injection    LANTUS VIAL    10 mL    Pt to take 20 units daily in case of pump failure    Type 1 diabetes mellitus without complication (H)       insulin lispro 100 UNIT/ML injection    humaLOG    80 mL    Pt takes 80 units daily    Type 1 diabetes mellitus without complication (H)       ipratropium 0.03 % spray    ATROVENT    2 Box    Spray 2 sprays into both nostrils every 12 hours    Seasonal allergic rhinitis       MULTIVITAMIN PO           triamcinolone 0.1 % ointment    KENALOG    250 g    Use as needed for rash - take twice daily    DM type 1 (diabetes mellitus, type 1) (H)       ZYRTEC ALLERGY 10 MG Caps   Generic drug:  cetirizine HCl

## 2017-08-25 NOTE — LETTER
Patient:  Shannon Sow  :   1975  MRN:     [id number]        Ms.Jessica Abby Sow  [address] [address]        2017    Dear Shannon    Here are your labs which look great. This includes a normal thyroid level, urine for protein, cholesterol, measure for celiac disease, and vitamin D.    If you have any questions, please feel free to contact my nurse at [phone] select option #3 for triage nurse  or  option #1 for scheduling related questions.    Regards    Jemma Anderson MD      Resulted Orders   25 Hydroxyvitamin D2 and D3   Result Value Ref Range    25 OH Vit D2 <5 ug/L    25 OH Vit D3 29 ug/L    25 OH Vit D total <34 20 - 75 ug/L      Comment:      Season, race, dietary intake, and treatment affect the concentration of   25-hydroxy-Vitamin D. Values may decrease during winter months and increase   during summer months. Values 20-29 ug/L may indicate Vitamin D insufficiency   and values <20 ug/L may indicate Vitamin D deficiency.  This test was developed and its performance characteristics determined by the   [facility],  Special Chemistry Laboratory. It has   not been cleared or approved by the FDA. The laboratory is regulated under   CLIA as qualified to perform high-complexity testing. This test is used for   clinical purposes. It should not be regarded as investigational or for   research.     Basic metabolic panel   Result Value Ref Range    Sodium 136 133 - 144 mmol/L    Potassium 4.1 3.4 - 5.3 mmol/L    Chloride 101 94 - 109 mmol/L    Carbon Dioxide 29 20 - 32 mmol/L    Anion Gap 6 3 - 14 mmol/L    Glucose 164 (H) 70 - 99 mg/dL    Urea Nitrogen 12 7 - 30 mg/dL    Creatinine 0.62 0.52 - 1.04 mg/dL    GFR Estimate >90 >60 mL/min/1.7m2      Comment:      Non  GFR Calc    GFR Estimate If Black >90 >60 mL/min/1.7m2      Comment:       GFR Calc    Calcium 8.6 8.5 - 10.1 mg/dL   Albumin Random Urine  Quantitative with Creat Ratio   Result Value Ref Range    Creatinine Urine 55 mg/dL    Albumin Urine mg/L <5 mg/L    Albumin Urine mg/g Cr Unable to calculate due to low value 0 - 25 mg/g Cr   TSH   Result Value Ref Range    TSH 0.76 0.40 - 4.00 mU/L   Tissue transglutaminase payal IgA and IgG   Result Value Ref Range    Tissue Transglutaminase Antibody IgA <1 <7 U/mL      Comment:      Negative  The tTG-IgA assay has limited utility for patients with decreased levels of   IgA. Screening for celiac disease should include IgA testing to rule out   selective IgA deficiency and to guide selection and interpretation of   serological testing. tTG-IgG testing may be positive in celiac disease   patients with IgA deficiency.      Tissue Transglutaminase Payal IgG <1 <7 U/mL      Comment:      Negative   Lipid Profile   Result Value Ref Range    Cholesterol 172 <200 mg/dL    Triglycerides 35 <150 mg/dL    HDL Cholesterol 94 >49 mg/dL    LDL Cholesterol Calculated 72 <100 mg/dL      Comment:      Desirable:       <100 mg/dl    Non HDL Cholesterol 79 <130 mg/dL       Lab

## 2017-08-28 ENCOUNTER — ALLIED HEALTH/NURSE VISIT (OUTPATIENT)
Dept: ENDOCRINOLOGY | Facility: CLINIC | Age: 42
End: 2017-08-28

## 2017-08-28 ENCOUNTER — TELEPHONE (OUTPATIENT)
Dept: ENDOCRINOLOGY | Facility: CLINIC | Age: 42
End: 2017-08-28

## 2017-08-28 DIAGNOSIS — E10.9 TYPE 1 DIABETES MELLITUS WITHOUT COMPLICATION (H): Primary | ICD-10-CM

## 2017-08-28 LAB
TTG IGA SER-ACNC: <1 U/ML
TTG IGG SER-ACNC: <1 U/ML

## 2017-08-28 NOTE — TELEPHONE ENCOUNTER
Pt says CGM fell off arm 8/27/17 afternoon, was on for several days, asking what she be done. Please advise at [phone]. Sent to [facility].

## 2017-08-28 NOTE — PATIENT INSTRUCTIONS
Today we placed the Azam sensor.  Getting the Azam wet in the shower or from sweat while working out is fine, however, please refrain from swimming or taking a bath (or any submerging of water) for the next two weeks.      Please use caution while dressing or doing any activity.      Should the Azam fall off before your return to clinic for removal and download, call us at [phone] to let us know in case we need to put on a new one.  Should it fall off, please keep the sensor as we can still download the data via bluetooth even if it is no longer on your body.    Please bring your Glucometer and Insulin pump (if applicable) to your sensor removal appointment so we can compare all sets of data.    Please don't hesitate to call with any questions!

## 2017-08-28 NOTE — NURSING NOTE
Placed Azam Sensor per Dr. BAINS. Patient identified by name and . Patient tolerated placement well.  Sensor was placed on patient's LEFT upper back part of arm. Patient was given an appointment to come back in 2 weeks for download and given instructions on how to care for the sensor.    AZAM PRO:  CODE: [id number]  SN:[id number]  EXP:18  LOT:[id number]  NDC: [address]-81

## 2017-08-28 NOTE — MR AVS SNAPSHOT
After Visit Summary   8/28/2017    Shannon Sow    MRN: [id number]           Patient Information     Date Of Birth          1975        Visit Information        Provider Department      8/28/2017 4:00 PM Nurse, [facility] Endocrinology        Care Instructions    Today we placed the Azam sensor.  Getting the Azam wet in the shower or from sweat while working out is fine, however, please refrain from swimming or taking a bath (or any submerging of water) for the next two weeks.      Please use caution while dressing or doing any activity.      Should the Azam fall off before your return to clinic for removal and download, call us at [phone] to let us know in case we need to put on a new one.  Should it fall off, please keep the sensor as we can still download the data via bluetooth even if it is no longer on your body.    Please bring your Glucometer and Insulin pump (if applicable) to your sensor removal appointment so we can compare all sets of data.    Please don't hesitate to call with any questions!          Follow-ups after your visit        Your next 10 appointments already scheduled     Aug 28, 2017  4:00 PM CDT   Nurse Visit with [facility] Nurse   [facility] Endocrinology ([facility])    [address] [address] [phone]            Sep 08, 2017  2:30 PM CDT   (Arrive by 2:15 PM)   RETURN DIABETES with Jemma Anderson MD   [facility] Endocrinology ([facility])    [address] [id number] [phone]            Dec 05, 2017  3:15 PM CST   RETURN RETINA with Angela Wolf MD   [facility] ([facility])    [address]  [address]  [facility] [id number] [phone]              Who to contact     Please call your clinic at [phone] to:    Ask questions about your health    Make or cancel  appointments    Discuss your medicines    Learn about your test results    Speak to your doctor   If you have compliments or concerns about an experience at your clinic, or if you wish to file a complaint, please contact [facility] Patient Relations at [phone] or email us at [email or web address]         Additional Information About Your Visit        [employer] Information     [employer] is an electronic gateway that provides easy, online access to your medical records. With [facility], you can request a clinic appointment, read your test results, renew a prescription or communicate with your care team.     To sign up for [facility] visit the website at [email or web address]   You will be asked to enter the access code listed below, as well as some personal information. Please follow the directions to create your username and password.     Your access code is: [email or web address]  Expires: 2017  6:31 AM     Your access code will  in 90 days. If you need help or a new code, please contact your [facility] or call [phone] for assistance.        Care EveryWhere ID     This is your Care EveryWhere ID. This could be used by other organizations to access your [place] medical records  [id number]         Blood Pressure from Last 3 Encounters:   17 126/74   17 125/81   16 109/71    Weight from Last 3 Encounters:   17 75.5 kg (166 lb 8 oz)   17 72.7 kg (160 lb 3.2 oz)   16 71.3 kg (157 lb 3.2 oz)              Today, you had the following     No orders found for display       Primary Care Provider Office Phone # Fax #    Jemma Anderson [phone]       [address] [place] [address]        Equal Access to Services     BESSY ROSAS : Alfa Arambula, shahla soliman, qaeboni arnoldaljeremías moreno. So [facility]  [phone].    ATENCIÓN: Si arnaud miramontes, tiene a abebe disposición servicios gratuitos de asistencia lingüística. Halina jones [phone].    We comply with applicable federal civil rights laws and [place] laws. We do not discriminate on the basis of race, color, national origin, age, disability sex, sexual orientation or gender identity.            Thank you!     Thank you for choosing [facility] ENDOCRINOLOGY  for your care. Our goal is always to provide you with excellent care. Hearing back from our patients is one way we can continue to improve our services. Please take a few minutes to complete the written survey that you may receive in the mail after your visit with us. Thank you!             Your Updated Medication List - Protect others around you: Learn how to safely use, store and throw away your medicines at www.disposemymeds.org.          This list is accurate as of: 8/28/17  3:52 PM.  Always use your most recent med list.                   Brand Name Dispense Instructions for use Diagnosis    blood glucose monitoring test strip    no brand specified    6 Box    Test up to 6 times daily    DM type 1 (diabetes mellitus, type 1) (H)       cholecalciferol 1000 UNIT tablet    vitamin D    90 tablet    Take 1 tablet (1,000 Units) by mouth daily    Unspecified vitamin deficiency       FLUoxetine 40 MG capsule    PROZAC    90 capsule    Take 1 capsule (40 mg) by mouth daily    Anxiety       glucagon 1 MG kit    GLUCAGON EMERGENCY    1 mg    Inject 1 mg into the muscle once for 1 dose    Type 1 diabetes mellitus without complication (H)       insulin glargine 100 UNIT/ML injection    LANTUS VIAL    10 mL    Pt to take 20 units daily in case of pump failure    Type 1 diabetes mellitus without complication (H)       insulin lispro 100 UNIT/ML injection    humaLOG    80 mL    Pt takes 80 units daily    Type 1 diabetes mellitus without complication (H)       ipratropium 0.03 % spray    ATROVENT    2 Box    Spray 2  sprays into both nostrils every 12 hours    Seasonal allergic rhinitis       MULTIVITAMIN PO           triamcinolone 0.1 % ointment    KENALOG    250 g    Use as needed for rash - take twice daily    DM type 1 (diabetes mellitus, type 1) (H)       ZYRTEC ALLERGY 10 MG Caps   Generic drug:  cetirizine HCl

## 2017-08-29 LAB
DEPRECATED CALCIDIOL+CALCIFEROL SERPL-MC: <34 UG/L (ref 20–75)
VITAMIN D2 SERPL-MCNC: <5 UG/L
VITAMIN D3 SERPL-MCNC: 29 UG/L

## 2017-08-30 NOTE — PROGRESS NOTES
Dear Shannon    Here are your labs which look great. This includes a normal thyroid level, urine for protein, cholesterol, measure for celiac disease, and vitamin D.    If you have any questions, please feel free to contact my nurse at [phone] select option #3 for triage nurse  or  option #1 for scheduling related questions.    Regards    Jemma Anderson MD

## 2017-09-08 ENCOUNTER — OFFICE VISIT (OUTPATIENT)
Dept: ENDOCRINOLOGY | Facility: CLINIC | Age: 42
End: 2017-09-08

## 2017-09-08 VITALS
HEIGHT: 62 IN | HEART RATE: 85 BPM | WEIGHT: 164.4 LBS | BODY MASS INDEX: 30.25 KG/M2 | SYSTOLIC BLOOD PRESSURE: 129 MMHG | DIASTOLIC BLOOD PRESSURE: 78 MMHG

## 2017-09-08 DIAGNOSIS — E10.9 TYPE 1 DIABETES MELLITUS WITHOUT COMPLICATION (H): Primary | ICD-10-CM

## 2017-09-08 NOTE — NURSING NOTE
"Chief Complaint   Patient presents with     RECHECK     DIABETES TYPE 1 F/U        Initial /78 (BP Location: Right arm, Patient Position: Sitting, Cuff Size: Adult Regular)  Pulse 85  Ht 1.575 m (5' 2\")  Wt 74.6 kg (164 lb 6.4 oz)  BMI 30.07 kg/m2 Estimated body mass index is 30.07 kg/(m^2) as calculated from the following:    Height as of this encounter: 1.575 m (5' 2\").    Weight as of this encounter: 74.6 kg (164 lb 6.4 oz).  Medication Reconciliation: complete     DOWNLOADED 2 RADHA SENSORS TODAY- PT. DID NOT HAVE GLUCOMETER AND DENIED PUMP DOWNLOAD DUE TO A PUMP MALFUNCTION.      Lyly Milner CMA     "

## 2017-09-08 NOTE — PROGRESS NOTES
#1 type 1 DM  Diagnosed in 1992.. On lantus and humalog since approximately 2000. Lantus split to b.i.d. dosing in 2008.. The patient has started the insulin pump since May of 2009. April 2008 HgbA1c is 7.5. July 2008 is 8.3. October 2008 HgbA1c of 7.0. February 2009 HgBa1c of 7.8. May 2009 hemoglobin A1c of 7.5 Her A1C 4 days ago was 6.8%, and was 7.5% 5/09, 7.8% 2/09, 7.0%. January 2010 hemoglobin A1c of 6.8. March 2010 hemoglobin A1c of 6.0. July 2010 hemoglobin A1c of 6.7. December 2010 hemoglobin A1c of 7.2. April 2011 hemoglobin A1c of 6.7. October 2011 hemoglobin A1c is 6.9. February 2012 hemoglobin A1c of 7.1. May 2012 hemoglobin A1c of 6.9. August 2012 hemoglobin A1c of 7.9. October 2012 hemoglobin A1c of 7.3. November 2012 hemoglobin A1c of 7.7. February 2013 hemoglobin A1c of 7.4. May 2013 hemoglobin A1c is 7.4. July 2013 hemoglobin A1c is 7.5. November 2013 hemoglobin A1c of 7.5. March 2014 hemoglobin A1c of 7.4.June 2014 hemoglobin A1c of 7.0. September 2014 hemoglobin A1c of 7.0. Feb 2015 Hgba1c of 6.8. November 2015 hemoglobin A1c of 6.4.August 2016 hemoglobin A1c of 7.4.december 2016 hemoglobin A1c of 6.4.  April 2017 hemoglobin A1c of 6.8.   August 2017 hemoglobin A1c of 7.2.    She was started on insulin pump in May 2009.  As of November 2013, she is on the new Medtronic pump with CGMS and low glucose suspend.       Review of systems related to diabetes  CV: none  Eye:  Has seen ophthalmology in July 2016, mild nonproliferative diabetic retinopathy appreciated bilaterally  Neuro: none  Neph: negative in August 2017  Infections: none  Immunization: flu shot in 2014, tetanus within 10 years, pneumovax 2 to 3 yars ago, patient declined flu shot today in 2016  The patient has a Lantus prescription as of 2017 August 2017 TSH and TTG were normal    Interval history:  The patient wore the FreeStyle flores.  She tolerated this device and wore it for approximately one week.  Review of her results shows  "prandial hyperglycemia as well as some hypoglycemia particularly in the evenings which she attributes to activity as well as alcohol use.  In addition, her blood sugars are fairly stable overnight.    Bolus: 1 unit per 8.0  carbohydrate from 8 AM to 9:30 PM (new dose) , 1 unit per 12 g carbohydrate otherwise    Basal:  Midnight to 8 AM at 0.6 units per hour  8 AM to 11:30 AM at 0.9 units per hour  11:30 AM to 2:00 PM at 1.4 units per hourr  2:00 PM to 7 PM decreased to 0.5 units per hour  7 PM to 10 PM at 1.6 units per hour  10 PM to midnight at 0.6 units per hour    Insulin sensitivity:  1 per 50 above goal 120 from 6 AM to 9 PM, 1 unit per 60 g carbohydrate from 9 PM to 6 AM  Active insulin time to 4 hours    #2 Considering pregnancy  Not interested in pregnancy currently.  No form of birth control     Interval history: Does not anticipate having future pregnancy    #3 history of vitamin D deficiency  In February 2015, vitamin D was slightly low at 25.  She continues on vitamin D intermittently.April 2016 vitamin D was reasonable at 21.  Patient continues on vitamin D at 1000 international units daily     Interval history: August 2017 vitamin D was normal    #4 weight gain   The patient reports that her lifestyle continues to be quite busy.  Her weight has been stable    Interval history: 2017 TSH was normal.  Dietary lifestyle changes recommended.      Past medical history  Type 1 diabetes  Delivery of child in July 2010    Family history  Mother was diagnosed with primary biliary cirrhosis    Social history  Continues to be busy at work and at home,     Physical Exam   Vital signs:  Vital signs:   /78 (BP Location: Right arm, Patient Position: Sitting, Cuff Size: Adult Regular)  Pulse 85  Ht 1.575 m (5' 2\")  Wt 74.6 kg (164 lb 6.4 oz)  BMI 30.07 kg/m2  Estimated body mass index is 30.07 kg/(m^2) as calculated from the following:    Height as of this encounter: 1.575 m (5' 2\").    Weight as of this " encounter: 74.6 kg (164 lb 6.4 oz).    General appearance:    GENERAL APPEARANCE: Alert and no distress    No visits with results within 1 Week(s) from this visit.  Latest known visit with results is:    Orders Only on 08/25/2017   Component Date Value Ref Range Status     25 OH Vit D2 08/25/2017 <5  ug/L Final     25 OH Vit D3 08/25/2017 29  ug/L Final     25 OH Vit D total 08/25/2017 <34  20 - 75 ug/L Final    Comment: Season, race, dietary intake, and treatment affect the concentration of   25-hydroxy-Vitamin D. Values may decrease during winter months and increase   during summer months. Values 20-29 ug/L may indicate Vitamin D insufficiency   and values <20 ug/L may indicate Vitamin D deficiency.  This test was developed and its performance characteristics determined by the   [facility],  Special Chemistry Laboratory. It has   not been cleared or approved by the FDA. The laboratory is regulated under   CLIA as qualified to perform high-complexity testing. This test is used for   clinical purposes. It should not be regarded as investigational or for   research.       Sodium 08/25/2017 136  133 - 144 mmol/L Final     Potassium 08/25/2017 4.1  3.4 - 5.3 mmol/L Final     Chloride 08/25/2017 101  94 - 109 mmol/L Final     Carbon Dioxide 08/25/2017 29  20 - 32 mmol/L Final     Anion Gap 08/25/2017 6  3 - 14 mmol/L Final     Glucose 08/25/2017 164* 70 - 99 mg/dL Final     Urea Nitrogen 08/25/2017 12  7 - 30 mg/dL Final     Creatinine 08/25/2017 0.62  0.52 - 1.04 mg/dL Final     GFR Estimate 08/25/2017 >90  >60 mL/min/1.7m2 Final    Non  GFR Calc     GFR Estimate If Black 08/25/2017 >90  >60 mL/min/1.7m2 Final    African American GFR Calc     Calcium 08/25/2017 8.6  8.5 - 10.1 mg/dL Final     Creatinine Urine 08/25/2017 55  mg/dL Final     Albumin Urine mg/L 08/25/2017 <5  mg/L Final     Albumin Urine mg/g Cr 08/25/2017 Unable to calculate due to low value  0 - 25 mg/g Cr  Final     TSH 08/25/2017 0.76  0.40 - 4.00 mU/L Final     Tissue Transglutaminase Antibody I* 08/25/2017 <1  <7 U/mL Final    Comment: Negative  The tTG-IgA assay has limited utility for patients with decreased levels of   IgA. Screening for celiac disease should include IgA testing to rule out   selective IgA deficiency and to guide selection and interpretation of   serological testing. tTG-IgG testing may be positive in celiac disease   patients with IgA deficiency.       Tissue Transglutaminase Britt IgG 08/25/2017 <1  <7 U/mL Final    Negative     Cholesterol 08/25/2017 172  <200 mg/dL Final     Triglycerides 08/25/2017 35  <150 mg/dL Final     HDL Cholesterol 08/25/2017 94  >49 mg/dL Final     LDL Cholesterol Calculated 08/25/2017 72  <100 mg/dL Final    Desirable:       <100 mg/dl     Non HDL Cholesterol 08/25/2017 79  <130 mg/dL Final       Assessment   #1 type 1 DM with retinopathy noted in July 2016 August 2017 worsening at 7.2.  Upon review of her FreeStyle flores, I think she needs more prandial coverage.  In addition, I think she has some hypoglycemia related to activity as well as alcohol use.  Recommended that the patient reduce her bolus program    Bolus: 1 unit per 7.0 g carbohydrate from 8 AM to 9:30 PM (new dose) , 1 unit per 12 g carbohydrate otherwise    Basal  Midnight to 8 AM at 0.65 units per hour  8 AM to 11:30 AM at 0.9 units per hour  11:30 AM to 2:00 PM at 1.4 units per hourr  2:00 PM to 7 PM decreased to 0.5 units per hour  7 PM to 10 PM at 1.7  units per hour  10 PM to midnight at 0.65  units per hour    Insulin pump changes noted in bold    Insulin sensitivity:  1 per 50 above goal 120 from 6 AM to 9 PM, 1 unit per 60 g carbohydrate from 9 PM to 6 AM  Active insulin time to 4 hours    Patient pending eye exam    #2 considering pregnancy  Not interested in pregnancy currently.  Patient aware of need for closer diabetes management if pregnant.    #3 recent weight gain  Patient had  previously declined 24 urine for cortisol.  August 2017 TSH is normal.    Return visit in 4 months, sooner if needed.  We will have patient to [facility] for two weeks prior to her visit.    I spent 15 minutes with this patient face to face and explained the conditions and plans (more than 50% of time was spent in discussion of diabetes management . The patient understood and is satisfied with today's visit.

## 2017-09-08 NOTE — LETTER
9/8/2017       RE: Shannon Sow  [address] [facility] [address]     Dear Colleague,    Thank you for referring your patient, Shannon Sow, to the [facility] ENDOCRINOLOGY at [facility]. Please see a copy of my visit note below.    #1 type 1 DM  Diagnosed in 1992.. On lantus and humalog since approximately 2000. Lantus split to b.i.d. dosing in 2008.. The patient has started the insulin pump since May of 2009. April 2008 HgbA1c is 7.5. July 2008 is 8.3. October 2008 HgbA1c of 7.0. February 2009 HgBa1c of 7.8. May 2009 hemoglobin A1c of 7.5 Her A1C 4 days ago was 6.8%, and was 7.5% 5/09, 7.8% 2/09, 7.0%. January 2010 hemoglobin A1c of 6.8. March 2010 hemoglobin A1c of 6.0. July 2010 hemoglobin A1c of 6.7. December 2010 hemoglobin A1c of 7.2. April 2011 hemoglobin A1c of 6.7. October 2011 hemoglobin A1c is 6.9. February 2012 hemoglobin A1c of 7.1. May 2012 hemoglobin A1c of 6.9. August 2012 hemoglobin A1c of 7.9. October 2012 hemoglobin A1c of 7.3. November 2012 hemoglobin A1c of 7.7. February 2013 hemoglobin A1c of 7.4. May 2013 hemoglobin A1c is 7.4. July 2013 hemoglobin A1c is 7.5. November 2013 hemoglobin A1c of 7.5. March 2014 hemoglobin A1c of 7.4.June 2014 hemoglobin A1c of 7.0. September 2014 hemoglobin A1c of 7.0. Feb 2015 Hgba1c of 6.8. November 2015 hemoglobin A1c of 6.4.August 2016 hemoglobin A1c of 7.4.december 2016 hemoglobin A1c of 6.4.  April 2017 hemoglobin A1c of 6.8.   August 2017 hemoglobin A1c of 7.2.    She was started on insulin pump in May 2009.  As of November 2013, she is on the new Medtronic pump with CGMS and low glucose suspend.       Review of systems related to diabetes  CV: none  Eye:  Has seen ophthalmology in July 2016, mild nonproliferative diabetic retinopathy appreciated bilaterally  Neuro: none  Neph: negative in August 2017  Infections: none  Immunization: flu shot in 2014, tetanus within 10 years, pneumovax 2 to 3  yars ago, patient declined flu shot today in 2016  The patient has a Lantus prescription as of 2017 August 2017 TSH and TTG were normal    Interval history:  The patient wore the FreeStyle flores.  She tolerated this device and wore it for approximately one week.  Review of her results shows prandial hyperglycemia as well as some hypoglycemia particularly in the evenings which she attributes to activity as well as alcohol use.  In addition, her blood sugars are fairly stable overnight.    Bolus: 1 unit per 8.0  carbohydrate from 8 AM to 9:30 PM (new dose) , 1 unit per 12 g carbohydrate otherwise    Basal:  Midnight to 8 AM at 0.6 units per hour  8 AM to 11:30 AM at 0.9 units per hour  11:30 AM to 2:00 PM at 1.4 units per hourr  2:00 PM to 7 PM decreased to 0.5 units per hour  7 PM to 10 PM at 1.6 units per hour  10 PM to midnight at 0.6 units per hour    Insulin sensitivity:  1 per 50 above goal 120 from 6 AM to 9 PM, 1 unit per 60 g carbohydrate from 9 PM to 6 AM  Active insulin time to 4 hours    #2 Considering pregnancy  Not interested in pregnancy currently.  No form of birth control     Interval history: Does not anticipate having future pregnancy    #3 history of vitamin D deficiency  In February 2015, vitamin D was slightly low at 25.  She continues on vitamin D intermittently.April 2016 vitamin D was reasonable at 21.  Patient continues on vitamin D at 1000 international units daily     Interval history: August 2017 vitamin D was normal    #4 weight gain   The patient reports that her lifestyle continues to be quite busy.  Her weight has been stable    Interval history: 2017 TSH was normal.  Dietary lifestyle changes recommended.      Past medical history  Type 1 diabetes  Delivery of child in July 2010    Family history  Mother was diagnosed with primary biliary cirrhosis    Social history  Continues to be busy at work and at home,     Physical Exam   Vital signs:  Vital signs:   /78 (BP Location:  "Right arm, Patient Position: Sitting, Cuff Size: Adult Regular)  Pulse 85  Ht 1.575 m (5' 2\")  Wt 74.6 kg (164 lb 6.4 oz)  BMI 30.07 kg/m2  Estimated body mass index is 30.07 kg/(m^2) as calculated from the following:    Height as of this encounter: 1.575 m (5' 2\").    Weight as of this encounter: 74.6 kg (164 lb 6.4 oz).    General appearance:    GENERAL APPEARANCE: Alert and no distress    No visits with results within 1 Week(s) from this visit.  Latest known visit with results is:    Orders Only on 08/25/2017   Component Date Value Ref Range Status     25 OH Vit D2 08/25/2017 <5  ug/L Final     25 OH Vit D3 08/25/2017 29  ug/L Final     25 OH Vit D total 08/25/2017 <34  20 - 75 ug/L Final    Comment: Season, race, dietary intake, and treatment affect the concentration of   25-hydroxy-Vitamin D. Values may decrease during winter months and increase   during summer months. Values 20-29 ug/L may indicate Vitamin D insufficiency   and values <20 ug/L may indicate Vitamin D deficiency.  This test was developed and its performance characteristics determined by the   [facility],  Special Chemistry Laboratory. It has   not been cleared or approved by the FDA. The laboratory is regulated under   CLIA as qualified to perform high-complexity testing. This test is used for   clinical purposes. It should not be regarded as investigational or for   research.       Sodium 08/25/2017 136  133 - 144 mmol/L Final     Potassium 08/25/2017 4.1  3.4 - 5.3 mmol/L Final     Chloride 08/25/2017 101  94 - 109 mmol/L Final     Carbon Dioxide 08/25/2017 29  20 - 32 mmol/L Final     Anion Gap 08/25/2017 6  3 - 14 mmol/L Final     Glucose 08/25/2017 164* 70 - 99 mg/dL Final     Urea Nitrogen 08/25/2017 12  7 - 30 mg/dL Final     Creatinine 08/25/2017 0.62  0.52 - 1.04 mg/dL Final     GFR Estimate 08/25/2017 >90  >60 mL/min/1.7m2 Final    Non  GFR Calc     GFR Estimate If Black 08/25/2017 >90  " >60 mL/min/1.7m2 Final    African American GFR Calc     Calcium 08/25/2017 8.6  8.5 - 10.1 mg/dL Final     Creatinine Urine 08/25/2017 55  mg/dL Final     Albumin Urine mg/L 08/25/2017 <5  mg/L Final     Albumin Urine mg/g Cr 08/25/2017 Unable to calculate due to low value  0 - 25 mg/g Cr Final     TSH 08/25/2017 0.76  0.40 - 4.00 mU/L Final     Tissue Transglutaminase Antibody I* 08/25/2017 <1  <7 U/mL Final    Comment: Negative  The tTG-IgA assay has limited utility for patients with decreased levels of   IgA. Screening for celiac disease should include IgA testing to rule out   selective IgA deficiency and to guide selection and interpretation of   serological testing. tTG-IgG testing may be positive in celiac disease   patients with IgA deficiency.       Tissue Transglutaminase Britt IgG 08/25/2017 <1  <7 U/mL Final    Negative     Cholesterol 08/25/2017 172  <200 mg/dL Final     Triglycerides 08/25/2017 35  <150 mg/dL Final     HDL Cholesterol 08/25/2017 94  >49 mg/dL Final     LDL Cholesterol Calculated 08/25/2017 72  <100 mg/dL Final    Desirable:       <100 mg/dl     Non HDL Cholesterol 08/25/2017 79  <130 mg/dL Final       Assessment   #1 type 1 DM with retinopathy noted in July 2016 August 2017 worsening at 7.2.  Upon review of her FreeStyle flores, I think she needs more prandial coverage.  In addition, I think she has some hypoglycemia related to activity as well as alcohol use.  Recommended that the patient reduce her bolus program    Bolus: 1 unit per 7.0 g carbohydrate from 8 AM to 9:30 PM (new dose) , 1 unit per 12 g carbohydrate otherwise    Basal  Midnight to 8 AM at 0.65 units per hour  8 AM to 11:30 AM at 0.9 units per hour  11:30 AM to 2:00 PM at 1.4 units per hourr  2:00 PM to 7 PM decreased to 0.5 units per hour  7 PM to 10 PM at 1.7  units per hour  10 PM to midnight at 0.65  units per hour    Insulin pump changes noted in bold    Insulin sensitivity:  1 per 50 above goal 120 from 6 AM to 9 PM,  1 unit per 60 g carbohydrate from 9 PM to 6 AM  Active insulin time to 4 hours    Patient pending eye exam    #2 considering pregnancy  Not interested in pregnancy currently.  Patient aware of need for closer diabetes management if pregnant.    #3 recent weight gain  Patient had previously declined 24 urine for cortisol.  August 2017 TSH is normal.    Return visit in 4 months, sooner if needed.  We will have patient to [employer] for two weeks prior to her visit.    I spent 15 minutes with this patient face to face and explained the conditions and plans (more than 50% of time was spent in discussion of diabetes management . The patient understood and is satisfied with today's visit.       Jemma Anderson MD

## 2017-09-08 NOTE — MR AVS SNAPSHOT
After Visit Summary   9/8/2017    Shannon Sow    MRN: [id number]           Patient Information     Date Of Birth          1975        Visit Information        Provider Department      9/8/2017 2:30 PM Jemma Anderson MD [facility]        Care Instructions    When exercise or drinking wine, reduce basal by 50% for the duration and continue for 1 hr after          Follow-ups after your visit        Follow-up notes from your care team     Return in about 4 months (around 1/8/2018).      Your next 10 appointments already scheduled     Dec 05, 2017  3:15 PM CST   RETURN RETINA with Angela Wolf MD   Eye Clinic ([facility])    [address]  [address] [facility]  [address] [address]  [facility] [id number] [phone]            Jan 19, 2018  9:30 AM CST   (Arrive by 9:15 AM)   RETURN DIABETES with Jemma Anderson MD   [facility] Endocrinology ([facility])    [address] [facility]  [address] [facility] [address] [phone]              Who to contact     Please call your clinic at [phone] to:    Ask questions about your health    Make or cancel appointments    Discuss your medicines    Learn about your test results    Speak to your doctor   If you have compliments or concerns about an experience at your clinic, or if you wish to file a complaint, please contact [facility] Patient Relations at [phone] or email us at [email or web address]         Additional Information About Your Visit        MyChart Information     Your Style Unzipped is an electronic gateway that provides easy, online access to your medical records. With Your Style Unzipped, you can request a clinic appointment, read your test results, renew a prescription or communicate with your care team.     To sign up for Kutendat visit the website at [email or web address]   You will be asked to enter the access code listed below, as  "well as some personal information. Please follow the directions to create your username and password.     Your access code is: [email or web address]  Expires: 2017  6:31 AM     Your access code will  in 90 days. If you need help or a new code, please contact your [facility] or call [phone] for assistance.        Care EveryWhere ID     This is your Care EveryWhere ID. This could be used by other organizations to access your [place] medical records  [id number]        Your Vitals Were     Pulse Height BMI (Body Mass Index)             85 1.575 m (5' 2\") 30.07 kg/m2          Blood Pressure from Last 3 Encounters:   17 129/78   17 126/74   17 125/81    Weight from Last 3 Encounters:   17 74.6 kg (164 lb 6.4 oz)   17 75.5 kg (166 lb 8 oz)   17 72.7 kg (160 lb 3.2 oz)              Today, you had the following     No orders found for display       Primary Care Provider Office Phone # Fax #    Jemma Anderson [phone]       [address] [place] [address]        Equal Access to Services     BESSY ROSAS : Hadii aad ku hadasho Soomaali, waaxda luqadaha, qaybta kaalmada adeegyada, waxay idiin haytaylorn erik garcia lajaneth . So [facility] [phone].    ATENCIÓN: Si habla español, tiene a abebe disposición servicios gratuitos de asistencia lingüística. Llame al [phone].    We comply with applicable federal civil rights laws and [place] laws. We do not discriminate on the basis of race, color, national origin, age, disability sex, sexual orientation or gender identity.            Thank you!     Thank you for choosing [facility]  for your care. Our goal is always to provide you with excellent care. Hearing back from our patients is one way we can continue to improve our services. Please take a few minutes to complete the written survey that you may receive in the mail after your visit with us. Thank you!      "        Your Updated Medication List - Protect others around you: Learn how to safely use, store and throw away your medicines at www.disposemymeds.org.          This list is accurate as of: 9/8/17  3:04 PM.  Always use your most recent med list.                   Brand Name Dispense Instructions for use Diagnosis    blood glucose monitoring test strip    no brand specified    6 Box    Test up to 6 times daily    DM type 1 (diabetes mellitus, type 1) (H)       cholecalciferol 1000 UNIT tablet    vitamin D    90 tablet    Take 1 tablet (1,000 Units) by mouth daily    Unspecified vitamin deficiency       FLUoxetine 40 MG capsule    PROZAC    90 capsule    Take 1 capsule (40 mg) by mouth daily    Anxiety       glucagon 1 MG kit    GLUCAGON EMERGENCY    1 mg    Inject 1 mg into the muscle once for 1 dose    Type 1 diabetes mellitus without complication (H)       insulin glargine 100 UNIT/ML injection    LANTUS VIAL    10 mL    Pt to take 20 units daily in case of pump failure    Type 1 diabetes mellitus without complication (H)       insulin lispro 100 UNIT/ML injection    humaLOG    80 mL    Pt takes 80 units daily    Type 1 diabetes mellitus without complication (H)       ipratropium 0.03 % spray    ATROVENT    2 Box    Spray 2 sprays into both nostrils every 12 hours    Seasonal allergic rhinitis       MULTIVITAMIN PO           triamcinolone 0.1 % ointment    KENALOG    250 g    Use as needed for rash - take twice daily    DM type 1 (diabetes mellitus, type 1) (H)       ZYRTEC ALLERGY 10 MG Caps   Generic drug:  cetirizine HCl

## 2017-09-19 RX ORDER — INSULIN ASPART 100 [IU]/ML
INJECTION, SOLUTION INTRAVENOUS; SUBCUTANEOUS
Status: CANCELLED | OUTPATIENT
Start: 2017-09-19

## 2017-12-05 ENCOUNTER — OFFICE VISIT (OUTPATIENT)
Dept: OPHTHALMOLOGY | Facility: CLINIC | Age: 42
End: 2017-12-05
Attending: OPHTHALMOLOGY
Payer: COMMERCIAL

## 2017-12-05 DIAGNOSIS — H25.013 CORTICAL AGE-RELATED CATARACT OF BOTH EYES: ICD-10-CM

## 2017-12-05 DIAGNOSIS — E10.3393 MODERATE NONPROLIFERATIVE DIABETIC RETINOPATHY OF BOTH EYES WITHOUT MACULAR EDEMA ASSOCIATED WITH TYPE 1 DIABETES MELLITUS (H): ICD-10-CM

## 2017-12-05 PROCEDURE — 92235 FLUORESCEIN ANGRPH MLTIFRAME: CPT | Mod: ZF | Performed by: OPHTHALMOLOGY

## 2017-12-05 PROCEDURE — 99213 OFFICE O/P EST LOW 20 MIN: CPT | Mod: 25,ZF

## 2017-12-05 PROCEDURE — 92134 CPTRZ OPH DX IMG PST SGM RTA: CPT | Mod: ZF | Performed by: OPHTHALMOLOGY

## 2017-12-05 PROCEDURE — 92015 DETERMINE REFRACTIVE STATE: CPT | Mod: ZF

## 2017-12-05 ASSESSMENT — SLIT LAMP EXAM - LIDS
COMMENTS: NORMAL
COMMENTS: NORMAL

## 2017-12-05 ASSESSMENT — CUP TO DISC RATIO
OD_RATIO: 0.3
OS_RATIO: 0.3

## 2017-12-05 ASSESSMENT — EXTERNAL EXAM - RIGHT EYE: OD_EXAM: NORMAL

## 2017-12-05 ASSESSMENT — TONOMETRY
OD_IOP_MMHG: 18
OS_IOP_MMHG: 17
IOP_METHOD: TONOPEN

## 2017-12-05 ASSESSMENT — CONF VISUAL FIELD
OD_NORMAL: 1
OS_NORMAL: 1
METHOD: COUNTING FINGERS

## 2017-12-05 ASSESSMENT — REFRACTION_WEARINGRX
OS_CYLINDER: SPHERE
SPECS_TYPE: SVL
OD_SPHERE: -1.25
OS_SPHERE: -1.00
OD_CYLINDER: SPHERE

## 2017-12-05 ASSESSMENT — VISUAL ACUITY
OS_CC+: -1
CORRECTION_TYPE: GLASSES
OS_CC: J1
OS_CC: 20/20
OD_CC: J1
METHOD: SNELLEN - LINEAR
OD_CC+: -1
OD_CC: 20/20

## 2017-12-05 ASSESSMENT — REFRACTION_MANIFEST
OS_ADD: +1.25
OS_SPHERE: -0.75
OD_ADD: +1.25
OD_SPHERE: -1.00

## 2017-12-05 ASSESSMENT — EXTERNAL EXAM - LEFT EYE: OS_EXAM: NORMAL

## 2017-12-05 NOTE — NURSING NOTE
Chief Complaints and History of Present Illnesses   Patient presents with     Follow Up For     Type 1 diabetes mellitus with background diabetic retinopathy (H) - Both Eyes      HPI    Last Eye Exam:  7/29/16   Affected eye(s):  Both   Symptoms:        Unknown duration    Frequency:  Constant       Do you have eye pain now?:  No      Comments:  She is here today for a follow up of Type 1 diabetes mellitus with background diabetic retinopathy (H) - Both Eyes   She says small print is getting harder to read. She also says her eyes get dry from time to time.   Denies floaters or flashes.   HX of diabetes.     Last A1C 7.3 in Sept  BS today 110    Kemar Mike COT 3:45 PM December 5, 2017

## 2017-12-05 NOTE — PROGRESS NOTES
CC: NPDR    INTERVAL HISTORY -   No visual changes since OZ. Eyes are comfortable without pain. Good glycemic control with insulin.       HPI: 43 YO F here for DM eye exam. Diagnosed with DMI 1993. Last hgA1c 7.3 (9/2017). Last eye exam here in 7/8/2016.    PAST OCULAR SURGERY  None      RETINAL IMAGING  OCT 12-5-17  OD - Retina normal, no fluid, PHF attached  OS - large microaneurysm parafoveal, mild IR cysts with mild exudates      FA 12/05/17:  OD - 1+ macular MAs, 1-2+ increased MARY, 2+ peripheral MAs and mild peripheral ischemia  OS -  1+ macular MAs, single larege MA parafoveal,  1-2+ increased MARY, 2+ peripheral MAs and mild peripheral ischemia      ASSESSMENT & PLAN:      1) Mild/mod NPDR OU with DM I mild DME   - Type I DM x 25 years (age 17)   - mild ischemia on FA 12/2017   - plan to repeat FA in 3-5 years      2.  Trace DME OS > OD   - very prominent microaneurysm OS   - observe    3.  Mild cataracts, both eyes   - observe      4.   Dry eyes, both eyes   - AT PRN.     RTC in 1 year or earlier if needed. ,  OCT OU    Merly Quiroga MD  Ophthalmology PGY3    ATTESTATION     Attending Physician Attestation:      Complete documentation of historical and exam elements from today's encounter can be found in the full encounter summary report (not reduplicated in this progress note).  I personally obtained the chief complaint(s) and history of present illness.  I confirmed and edited as necessary the review of systems, past medical/surgical history, family history, social history, and examination findings as documented by others; and I examined the patient myself.  I personally reviewed the relevant tests, images, and reports as documented above.  I personally reviewed the ophthalmic test(s) associated with this encounter, agree with the interpretation(s) as documented by the resident/fellow, and have edited the corresponding report(s) as necessary.   I formulated and edited as necessary the assessment and plan and  discussed the findings and management plan with the patient and family    Angela Wolf MD, PhD  , Vitreoretinal Surgery  Department of Ophthalmology  [facility]

## 2017-12-05 NOTE — MR AVS SNAPSHOT
After Visit Summary   12/5/2017    Shannon Sow    MRN: [id number]           Patient Information     Date Of Birth          1975        Visit Information        Provider Department      12/5/2017 3:15 PM Angela Wolf MD [facility]        Today's Diagnoses     Moderate nonproliferative diabetic retinopathy of both eyes without macular edema associated with type 1 diabetes mellitus (H)        Cortical age-related cataract of both eyes           Follow-ups after your visit        Follow-up notes from your care team     Return in about 1 year (around 12/5/2018) for OCT OU.      Your next 10 appointments already scheduled     Feb 23, 2018  9:00 AM CST   (Arrive by 8:45 AM)   RETURN DIABETES with Jemma Anderson MD   [facility] Endocrinology ([facility])    [address] [place] [address] [phone]              Future tests that were ordered for you today     Open Future Orders        Priority Expected Expires Ordered    OCT Retina Spectralis OU (both eyes) Routine  6/8/2019 12/5/2017            Who to contact     Please call your clinic at [phone] to:    Ask questions about your health    Make or cancel appointments    Discuss your medicines    Learn about your test results    Speak to your doctor   If you have compliments or concerns about an experience at your clinic, or if you wish to file a complaint, please contact [facility] Patient Relations at [phone] or email us at [email or web address]         Additional Information About Your Visit        MyChart Information     Soevolvedt is an electronic gateway that provides easy, online access to your medical records. With Opez, you can request a clinic appointment, read your test results, renew a prescription or communicate with your care team.     To sign up for Soevolvedt visit the website at [email or web address]   You  will be asked to enter the access code listed below, as well as some personal information. Please follow the directions to create your username and password.     Your access code is: [email or web address]  Expires: 2018  6:31 AM     Your access code will  in 90 days. If you need help or a new code, please contact your [facility] or call [phone] for assistance.        Care EveryWhere ID     This is your Care EveryWhere ID. This could be used by other organizations to access your [place] medical records  [id number]         Blood Pressure from Last 3 Encounters:   17 129/78   17 126/74   17 125/81    Weight from Last 3 Encounters:   17 74.6 kg (164 lb 6.4 oz)   17 75.5 kg (166 lb 8 oz)   17 72.7 kg (160 lb 3.2 oz)              We Performed the Following     Fluorescein Angiography OU (both eyes)     OCT Retina Spectralis OU (both eyes)          Today's Medication Changes          These changes are accurate as of: 17  8:06 PM.  If you have any questions, ask your nurse or doctor.               Stop taking these medicines if you haven't already. Please contact your care team if you have questions.     ZYRTEC ALLERGY 10 MG Caps   Generic drug:  cetirizine HCl                    Primary Care Provider Office Phone # Fax #    Jemma Lorraine Anderson [phone]       [address] [place] [id number]        Equal Access to Services     EBSSY ROSAS AH: Hadii aad ku hadasho Soomaali, waaxda luqadaha, qaybta kaalmada elmer, jeremías wade. So [facility] [phone].    ATENCIÓN: Si habla español, tiene a abebe disposición servicios gratuitos de asistencia lingüística. Llame al [phone].    We comply with applicable federal civil rights laws and [place] laws. We do not discriminate on the basis of race, color, national origin, age, disability, sex, sexual orientation, or gender identity.             Thank you!     Thank you for choosing EYE CLINIC  for your care. Our goal is always to provide you with excellent care. Hearing back from our patients is one way we can continue to improve our services. Please take a few minutes to complete the written survey that you may receive in the mail after your visit with us. Thank you!             Your Updated Medication List - Protect others around you: Learn how to safely use, store and throw away your medicines at www.disposemymeds.org.          This list is accurate as of: 12/5/17  8:06 PM.  Always use your most recent med list.                   Brand Name Dispense Instructions for use Diagnosis    blood glucose monitoring test strip    no brand specified    6 Box    Test up to 6 times daily    DM type 1 (diabetes mellitus, type 1) (H)       cholecalciferol 1000 UNIT tablet    vitamin D3    90 tablet    Take 1 tablet (1,000 Units) by mouth daily    Unspecified vitamin deficiency       FLUoxetine 40 MG capsule    PROZAC    90 capsule    Take 1 capsule (40 mg) by mouth daily    Anxiety       glucagon 1 MG kit    GLUCAGON EMERGENCY    1 mg    Inject 1 mg into the muscle once for 1 dose    Type 1 diabetes mellitus without complication (H)       insulin glargine 100 UNIT/ML injection    LANTUS VIAL    10 mL    Pt to take 20 units daily in case of pump failure    Type 1 diabetes mellitus without complication (H)       insulin lispro 100 UNIT/ML injection    humaLOG    80 mL    Pt takes 80 units daily    Type 1 diabetes mellitus without complication (H)       ipratropium 0.03 % spray    ATROVENT    2 Box    Spray 2 sprays into both nostrils every 12 hours    Seasonal allergic rhinitis       MULTIVITAMIN PO           triamcinolone 0.1 % ointment    KENALOG    250 g    Use as needed for rash - take twice daily    DM type 1 (diabetes mellitus, type 1) (H)

## 2018-02-14 ENCOUNTER — ALLIED HEALTH/NURSE VISIT (OUTPATIENT)
Dept: ENDOCRINOLOGY | Facility: CLINIC | Age: 43
End: 2018-02-14
Payer: COMMERCIAL

## 2018-02-14 DIAGNOSIS — E10.9 TYPE 1 DIABETES MELLITUS WITHOUT COMPLICATION (H): Primary | ICD-10-CM

## 2018-02-14 NOTE — MR AVS SNAPSHOT
After Visit Summary   2018    Shannon Sow    MRN: [id number]           Patient Information     Date Of Birth          1975        Visit Information        Provider Department      2018 9:20 AM Nurse, Ayala GUTIERREZ [facility]        Today's Diagnoses     Type 1 diabetes mellitus without complication (H)    -  1       Follow-ups after your visit        Your next 10 appointments already scheduled     Ja 15, 2018  9:00 AM CDT   (Arrive by 8:45 AM)   RETURN DIABETES with MD MATT Jacobo [facility] ([facility])    [address] [place] [id number] [phone]              Who to contact     Please call your clinic at [phone] to:    Ask questions about your health    Make or cancel appointments    Discuss your medicines    Learn about your test results    Speak to your doctor            Additional Information About Your Visit        MyChart Information     Elephanti is an electronic gateway that provides easy, online access to your medical records. With Elephanti, you can request a clinic appointment, read your test results, renew a prescription or communicate with your care team.     To sign up for Pett[employer] visit the website at [email or web address]   You will be asked to enter the access code listed below, as well as some personal information. Please follow the directions to create your username and password.     Your access code is: [email or web address]  Expires: 2018 11:07 AM     Your access code will  in 90 days. If you need help or a new code, please contact your [facility] or call [phone] for assistance.        Care EveryWhere ID     This is your Care EveryWhere ID. This could be used by other organizations to access your [place] medical records  [id number]         Blood Pressure from Last 3 Encounters:   No data found for BP    Weight from Last 3  Encounters:   No data found for Wt              Today, you had the following     No orders found for display       Primary Care Provider Office Phone # Fax #    Jemma Anderson [phone]       [address] [place] [id number]        Equal Access to Services     JULIO CKERMIT RALPH : Hadii aad ku hadeddyo Soomaali, waaxda luqadaha, qaybta kaalmada adeegyada, waxluis gilbertin portern adeoscar garcia torri wade. So [facility] [phone].    ATENCIÓN: Si habla español, tiene a abebe disposición servicios gratuitos de asistencia lingüística. Llame al [phone].    We comply with applicable federal civil rights laws and [place] laws. We do not discriminate on the basis of race, color, national origin, age, disability, sex, sexual orientation, or gender identity.            Thank you!     Thank you for choosing [facility] ENDOCRINOLOGY  for your care. Our goal is always to provide you with excellent care. Hearing back from our patients is one way we can continue to improve our services. Please take a few minutes to complete the written survey that you may receive in the mail after your visit with us. Thank you!             Your Updated Medication List - Protect others around you: Learn how to safely use, store and throw away your medicines at www.disposemymeds.org.          This list is accurate as of 2/14/18 11:59 PM.  Always use your most recent med list.                   Brand Name Dispense Instructions for use Diagnosis    blood glucose monitoring test strip    no brand specified    6 Box    Test up to 6 times daily    DM type 1 (diabetes mellitus, type 1) (H)       cholecalciferol 1000 UNIT tablet    vitamin D3    90 tablet    Take 1 tablet (1,000 Units) by mouth daily    Unspecified vitamin deficiency       insulin glargine 100 UNIT/ML injection    LANTUS VIAL    10 mL    Pt to take 20 units daily in case of pump failure    Type 1 diabetes mellitus without complication (H)       ipratropium 0.03 % spray     ATROVENT    2 Box    Spray 2 sprays into both nostrils every 12 hours    Seasonal allergic rhinitis       MULTIVITAMIN PO           triamcinolone 0.1 % ointment    KENALOG    250 g    Use as needed for rash - take twice daily    DM type 1 (diabetes mellitus, type 1) (H)

## 2018-02-23 ENCOUNTER — OFFICE VISIT (OUTPATIENT)
Dept: ENDOCRINOLOGY | Facility: CLINIC | Age: 43
End: 2018-02-23
Payer: COMMERCIAL

## 2018-02-23 VITALS
WEIGHT: 161.6 LBS | DIASTOLIC BLOOD PRESSURE: 60 MMHG | HEIGHT: 62 IN | HEART RATE: 80 BPM | BODY MASS INDEX: 29.74 KG/M2 | SYSTOLIC BLOOD PRESSURE: 116 MMHG

## 2018-02-23 DIAGNOSIS — E10.9 TYPE 1 DIABETES MELLITUS WITHOUT COMPLICATION (H): ICD-10-CM

## 2018-02-23 DIAGNOSIS — E10.8 TYPE 1 DIABETES MELLITUS WITH COMPLICATIONS (H): Primary | ICD-10-CM

## 2018-02-23 DIAGNOSIS — E10.9 DIABETES MELLITUS TYPE 1 (H): Primary | ICD-10-CM

## 2018-02-23 DIAGNOSIS — F41.9 ANXIETY: ICD-10-CM

## 2018-02-23 LAB — HBA1C MFR BLD: 6.8 % (ref 4.3–6)

## 2018-02-23 RX ORDER — INSULIN LISPRO 100 [IU]/ML
INJECTION, SOLUTION INTRAVENOUS; SUBCUTANEOUS
Qty: 15 ML | Refills: 0 | Status: SHIPPED | OUTPATIENT
Start: 2018-02-23 | End: 2018-03-01

## 2018-02-23 RX ORDER — INSULIN GLARGINE 100 [IU]/ML
INJECTION, SOLUTION SUBCUTANEOUS
Qty: 15 ML | Refills: 0 | Status: SHIPPED | OUTPATIENT
Start: 2018-02-23 | End: 2018-05-06

## 2018-02-23 RX ORDER — FLASH GLUCOSE SENSOR
1 KIT MISCELLANEOUS PRN
Qty: 1 DEVICE | Refills: 1 | Status: SHIPPED | OUTPATIENT
Start: 2018-02-23 | End: 2019-11-15

## 2018-02-23 RX ORDER — FLASH GLUCOSE SENSOR
KIT MISCELLANEOUS
Qty: 3 EACH | Refills: 11 | Status: SHIPPED | OUTPATIENT
Start: 2018-02-23 | End: 2019-11-15

## 2018-02-23 RX ORDER — FLUOXETINE 40 MG/1
40 CAPSULE ORAL DAILY
Qty: 90 CAPSULE | Refills: 3 | Status: SHIPPED | OUTPATIENT
Start: 2018-02-23 | End: 2019-03-29

## 2018-02-23 RX ORDER — INSULIN ASPART 100 [IU]/ML
INJECTION, SOLUTION INTRAVENOUS; SUBCUTANEOUS
Qty: 15 ML | Refills: 3 | Status: SHIPPED | OUTPATIENT
Start: 2018-02-23 | End: 2019-07-12

## 2018-02-23 NOTE — LETTER
Patient:  Shannon Sow  :   1975  MRN:     [id number]        Ms.Jessica Abby Sow  [address] [place] [address]        2018    To Whom It May Concern    I am the endocrinologist caring for this patient.  This patient has diabetes and has to carry insulin (including pen needles, pump and supplies) which is medically indicated. Please call [phone] if you have any questions.     Regards,   Jemma Anderson MD

## 2018-02-23 NOTE — LETTER
2/23/2018       RE: Shannon Sow  [address] [place] [address]     Dear Colleague,    Thank you for referring your patient, Shannon Sow, to the [facility] ENDOCRINOLOGY at [facility]. Please see a copy of my visit note below.    #1 type 1 DM  Diagnosed in 1992.. On lantus and humalog since approximately 2000. Lantus split to b.i.d. dosing in 2008.. The patient has started the insulin pump since May of 2009. April 2008 HgbA1c is 7.5. July 2008 is 8.3. October 2008 HgbA1c of 7.0. February 2009 HgBa1c of 7.8. May 2009 hemoglobin A1c of 7.5 Her A1C 4 days ago was 6.8%, and was 7.5% 5/09, 7.8% 2/09, 7.0%. January 2010 hemoglobin A1c of 6.8. March 2010 hemoglobin A1c of 6.0. July 2010 hemoglobin A1c of 6.7. December 2010 hemoglobin A1c of 7.2. April 2011 hemoglobin A1c of 6.7. October 2011 hemoglobin A1c is 6.9. February 2012 hemoglobin A1c of 7.1. May 2012 hemoglobin A1c of 6.9. August 2012 hemoglobin A1c of 7.9. October 2012 hemoglobin A1c of 7.3. November 2012 hemoglobin A1c of 7.7. February 2013 hemoglobin A1c of 7.4. May 2013 hemoglobin A1c is 7.4. July 2013 hemoglobin A1c is 7.5. November 2013 hemoglobin A1c of 7.5. March 2014 hemoglobin A1c of 7.4.June 2014 hemoglobin A1c of 7.0. September 2014 hemoglobin A1c of 7.0. Feb 2015 Hgba1c of 6.8. November 2015 hemoglobin A1c of 6.4.August 2016 hemoglobin A1c of 7.4.december 2016 hemoglobin A1c of 6.4.  April 2017 hemoglobin A1c of 6.8.   August 2017 hemoglobin A1c of 7.2.    She was started on insulin pump in May 2009.  As of November 2013, she is on the new Medtronic pump with CGMS and low glucose suspend.       Review of systems related to diabetes  CV: none  Eye:  Has seen ophthalmology in July 2016, mild nonproliferative diabetic retinopathy appreciated bilaterally.  Per patient report, she was seen in October 2017 and was still noted to have persistent, however unchanged, retinopathy.  Neuro:  "none  Neph: negative in August 2017  Infections: none  Immunization: flu shot in 2014, tetanus within 10 years, pneumovax 2 to 3 yars ago, patient declined flu shot today in 2016  The patient has a Lantus prescription as of 2017 August 2017 TSH and TTG were normal    Interval history:  The patient wore the FreeStyle flores.  She tolerated this device and wore it for approximately one week.  Review of her flores results still show prandial hyperglycemia.  She also tends to have some hypoglycemia in the evening when her pump rate is around 1.7 U/h. In addition, she plans on an upcoming international trip in roughly 2 weeks.    Bolus: 1 unit per 7.0 carbohydrate from 8 AM to 9:30 PM (new dose) , 1 unit per 12 g carbohydrate otherwise    Basal  Midnight to 8 AM at 0.65 units per hour  8 AM to 11:30 AM at 0.9 units per hour  11:30 AM to 2:00 PM at 1.4 units per hourr  2:00 PM to 7 PM decreased to 0.85 units per hour  7 PM to 10 PM at 1.7  units per hour  10 PM to midnight at 0.65  units per hour    Insulin sensitivity:  1 per 50 above goal 120  Active insulin time at 2 hours    #2 Considering pregnancy  Not interested in pregnancy currently.  No form of birth control     Interval history: Does not anticipate having future pregnancy    #3 history of vitamin D deficiency  In February 2015, vitamin D was slightly low at 25.  She continues on vitamin D intermittently.April 2016 vitamin D was reasonable at 21.  Patient continues on vitamin D at 1000 international units daily     Interval history: August 2017 vitamin D was normal    #4 weight gain   The patient reports that her lifestyle continues to be quite busy.     Interval history: 2017 TSH was normal.  Dietary lifestyle changes recommended.  Per patient report, her weight has not changed but her clothes \"fit better.\"  She has been active with dietary changes.  She is actually lost 3 pounds compared with September 2017.      Past medical history  Type 1 diabetes  Delivery " "of child in July 2010    Family history  Mother was diagnosed with primary biliary cirrhosis and is  in 2018, with breast cancer.    Social history  Continues to be busy at work and at home,     Physical Exam   Vital signs:  Vital signs:   /60 (BP Location: Right arm, Patient Position: Sitting, Cuff Size: Adult Regular)  Pulse 80  Ht 1.585 m (5' 2.4\")  Wt 73.3 kg (161 lb 9.6 oz)  BMI 29.18 kg/m2  Estimated body mass index is 29.18 kg/(m^2) as calculated from the following:    Height as of this encounter: 1.585 m (5' 2.4\").    Weight as of this encounter: 73.3 kg (161 lb 9.6 oz).    General appearance:    GENERAL APPEARANCE: Alert and no distress    Office Visit on 02/23/2018   Component Date Value Ref Range Status     Hemoglobin A1C 02/23/2018 6.8* 4.3 - 6 % Final           Assessment   #1 type 1 DM with retinopathy noted in July 2016  Hemoglobin A1c has somewhat improved to 6.8.  Reviewing her Freestyle flores, I think we need to reduce her basal in the evening, increase her basal overnight, and increase her bolus coverage.  She is also interested in a personal Freestyle flores -  prescription has been sent.  Also going on an international trip.  Prescription given for glargine, Humalog pens as well as instructions on travel with  pump    Bolus: 1 unit per 6.0  carbohydrate from 8 AM to 9:30 PM (new dose) , 1 unit per 12 g carbohydrate otherwise    Basal  Midnight to 8 AM at 0.70 units per hour  8 AM to 11:30 AM at 0.9 units per hour  11:30 AM to 2:00 PM at 1.4 units per hourr  2:00 PM to 7 PM at 0.85 units per hour  7 PM to 10 PM at 1.5  units per hour  10 PM to midnight at 0.65  units per hour    Insulin sensitivity:  1 per 50 above goal 120  Active insulin time at 4 hours    #2 considering pregnancy  Not interested in pregnancy currently.     #3 recent weight gain  Patient had previously declined 24 urine for cortisol.  August 2017 TSH is normal.  She has made dietary changes.  She has lost 3 pounds in " September 2017.  Congratulated patient.\    #4  general health maintenance  Refills given for prozac-patient understands potential interaction of Prozac with pregnancy..  She does not feel that she will be pregnant in the near future.      Return visit in 4 months, sooner if needed.      I spent 25 minutes with this patient face to face and explained the conditions and plans (more than 50% of time was spent in discussion and management of diabetes) . The patient understood and is satisfied with today's visit.     Sincerely,    Jemma Anderson MD

## 2018-02-23 NOTE — PATIENT INSTRUCTIONS
Please read notes below...Thanks       Ernestina MARS   Pt to take basalglar at 22 units daily if pump fails      Let me know if cost of flores is high    Hgba1c is 6.8

## 2018-02-23 NOTE — LETTER
"Patient:  Shannon Sow  :   1975  MRN:     [id number]        Ms.Jessica Abby Sow  [address] [place] [address]        2018    For your situation, I would recommend that you do a temp basal to reduce the rate by 10% (ie. You will be at 90% of your usual basal rate) while traveling. Once you get to your final destination, update the time on your pump and resume your regular program.     As you travel through different time zones, you should remember to update the time on your insulin pump and blood glucose (BG) meter.    You will want to pay extra attention to updating the time on your insulin pump: Make sure to check your BG more frequently while traveling, especially when a time change is involved.    It is important that you test your blood glucose (BG) more frequently while you are traveling. The routine hassle of travel, including stress, changes in time zones, schedules and activity levels, meal times and types of food, can all affect your diabetes control. Be extra attentive to monitoring your BG frequently, and be prepared to respond if needed.      Insulin Pumps and Blood Glucose Meters  When on an airplane, you should go to Utilities > Connect Devices > Meters pump screen, select OFF, and press ACT to unlink your meter from your insulin pump. Manually test your glucose levels using a blood glucose meter.    Personal CGM  If you wear a CGM device, it is safe for use on [place] commercial airlines. If questioned by airline personnel about the use of your device, please show them your Airport Information Card. If they still request that you turn off your CGM device, you must comply.  If you are asked to turn off your CGM device, you will have a \"data gap\" when uploading data into [employer], where information is missing from the period of time when your CGM system was turned off.      Travel Checklist  Note: Note: Make sure you have more than " enough insulin pump supplies for your trip. Keep in mind that depending on your insurance and quantity of supplies, it may take up to 14 days to get your supplies refilled. Place an order when you have your trip planned to make sure you get your supplies in time to pack.    Use the following checklist as a guideline to remind you of important items to take on your trip. All of these items may not apply.    Extra insulin with a current prescription  Insulin pump reservoirs  Insulin pump infusion sets  Insertion device for infusion sets  CGM transmitter  CGM   Glucose sensors  Insertion device for sensors  Tapes and adhesives  AAA batteries (Energizer  for optimal performance)  Blood glucose meter  Test strips and lancets  Glucose tablets or fast-acting sugar  Snacks  Ketone strips  Medical ID  Airport Information Card ([email or web address]/customer-support/traveling-with-an-insulin-pump-or-device)  Document with current pump settings  Insulin syringes for emergency injections and dosing instructions from your doctor    It is important that you test your blood glucose (BG) more frequently while you are traveling. The routine hassle of travel, including stress, changes in time zones, schedules and activity levels, meal times and types of food, can all affect your diabetes control. Be extra attentive to monitoring your BG frequently, and be prepared to respond if needed.    -Med[employer]onic Support Outside the [place]  Be prepared when you travel internationally with the following information:  For calls from outside the [place]: [phone] (also on the back of your pump)  A list of [employer]wide Sales Offices may be able to help you source extra insulin pump supplies or CGM supplies should something unexpected happen.

## 2018-02-23 NOTE — MR AVS SNAPSHOT
After Visit Summary   2018    Shannon Sow    MRN: [id number]           Patient Information     Date Of Birth          1975        Visit Information        Provider Department      2018 9:00 AM Jemma Anderson MD [facility]        Today's Diagnoses     Type 1 diabetes mellitus with complications (H)    -  1    Anxiety        Type 1 diabetes mellitus without complication (H)          Care Instructions    Pt to take basalglar at 22 units daily if pump fails      Let me know if cost of flores is high    Hgba1c is 6.8          Follow-ups after your visit        Follow-up notes from your care team     Return in about 4 months (around 2018).      Your next 10 appointments already scheduled     Ja 15, 2018  9:00 AM CDT   (Arrive by 8:45 AM)   RETURN DIABETES with MD MATT [facility] ([facility])    [address] [place] [id number] [phone]              Who to contact     Please call your clinic at [phone] to:    Ask questions about your health    Make or cancel appointments    Discuss your medicines    Learn about your test results    Speak to your doctor            Additional Information About Your Visit        MyChart Information     Wave - Private Location Appt is an electronic gateway that provides easy, online access to your medical records. With Letsdecco, you can request a clinic appointment, read your test results, renew a prescription or communicate with your care team.     To sign up for Wave - Private Location Appt visit the website at [email or web address]   You will be asked to enter the access code listed below, as well as some personal information. Please follow the directions to create your username and password.     Your access code is: [email or web address]  Expires: 2018 10:07 AM     Your access code will  in 90 days. If you need help or a new code, please contact your [facility] or call [phone] for assistance.        Care EveryWhere ID     This is your Care EveryWhere ID. This could be used by other organizations to access your [place] medical records  [id number]        Your Vitals Were     Pulse Height BMI (Body Mass Index)             80 1.585 m (5' 2.4\") 29.18 kg/m2          Blood Pressure from Last 3 Encounters:   02/23/18 116/60   09/08/17 129/78   08/25/17 126/74    Weight from Last 3 Encounters:   02/23/18 73.3 kg (161 lb 9.6 oz)   09/08/17 74.6 kg (164 lb 6.4 oz)   08/25/17 75.5 kg (166 lb 8 oz)              We Performed the Following     Hemoglobin A1c POCT          Today's Medication Changes          These changes are accurate as of 2/23/18 10:07 AM.  If you have any questions, ask your nurse or doctor.               Start taking these medicines.        Dose/Directions    continuous blood glucose monitoring sensor   Used for:  Type 1 diabetes mellitus with complications (H)   Started by:  Jemma Anderson MD        For use with Freestyle Azam Flash  for continuous monitioring of blood glucose levels. Replace sensor every 10 days.   Quantity:  3 each   Refills:  11       FREESTYLE AZAM READER Atiya   Used for:  Type 1 diabetes mellitus with complications (H)   Started by:  Jemma Anderson MD        Dose:  1 Application   1 Application as needed   Quantity:  1 Device   Refills:  1         These medicines have changed or have updated prescriptions.        Dose/Directions    * HumaLOG KWIKpen 100 UNIT/ML injection   This may have changed:  You were already taking a medication with the same name, and this prescription was added. Make sure you understand how and when to take each.   Used for:  Type 1 diabetes mellitus with complications (H)   Generic drug:  insulin lispro   Changed by:  Jemma Anderson MD        Use as needed - takes about 30 units per day   Quantity:  15 mL   Refills:  0       * insulin lispro 100 UNIT/ML injection   Commonly " known as:  humaLOG   This may have changed:  additional instructions   Used for:  Type 1 diabetes mellitus without complication (H)   Changed by:  Jemma Anderson MD        Pt takes 80 units daily in pump - pt also needs humalog pens for travel   Quantity:  80 mL   Refills:  3       * insulin glargine 100 UNIT/ML injection   Commonly known as:  LANTUS VIAL   This may have changed:  Another medication with the same name was added. Make sure you understand how and when to take each.   Used for:  Type 1 diabetes mellitus without complication (H)   Changed by:  Jemma Anderson MD        Pt to take 20 units daily in case of pump failure   Quantity:  10 mL   Refills:  1       * BASAGLAR 100 UNIT/ML injection   This may have changed:  You were already taking a medication with the same name, and this prescription was added. Make sure you understand how and when to take each.   Used for:  Type 1 diabetes mellitus with complications (H)   Changed by:  Jemma Anderson MD        Pt to take 22 units daily   Quantity:  15 mL   Refills:  0       * Notice:  This list has 4 medication(s) that are the same as other medications prescribed for you. Read the directions carefully, and ask your doctor or other care provider to review them with you.         Where to get your medicines      These medications were sent to [place] [address] [place] [address] [address]     Phone:  [phone]     BASAGLAR 100 UNIT/ML injection    continuous blood glucose monitoring sensor    FLUoxetine 40 MG capsule    FREESTYLE RADHA READER Atiya    glucagon 1 MG kit    HumaLOG KWIKpen 100 UNIT/ML injection    insulin lispro 100 UNIT/ML injection                Primary Care Provider Office Phone # Fax #    Jemma Anderson [phone]       [address] [place] [address]  [facility] [address]        Equal Access to Services     BESSY ROSAS AH: shahla Paris,  sindi carmona luis enriquejeremías dowling ah. So [facility] [phone].    ATENCIÓN: Si arnaud miramontes, tiene a abebe disposición servicios gratuitos de asistencia lingüística. Halina al [phone].    We comply with applicable federal civil rights laws and [place] laws. We do not discriminate on the basis of race, color, national origin, age, disability, sex, sexual orientation, or gender identity.            Thank you!     Thank you for choosing [facility] ENDOCRIN[facility]  for your care. Our goal is always to provide you with excellent care. Hearing back from our patients is one way we can continue to improve our services. Please take a few minutes to complete the written survey that you may receive in the mail after your visit with us. Thank you!             Your Updated Medication List - Protect others around you: Learn how to safely use, store and throw away your medicines at www.disposemymeds.org.          This list is accurate as of 2/23/18 10:07 AM.  Always use your most recent med list.                   Brand Name Dispense Instructions for use Diagnosis    blood glucose monitoring test strip    no brand specified    6 Box    Test up to 6 times daily    DM type 1 (diabetes mellitus, type 1) (H)       cholecalciferol 1000 UNIT tablet    vitamin D3    90 tablet    Take 1 tablet (1,000 Units) by mouth daily    Unspecified vitamin deficiency       continuous blood glucose monitoring sensor     3 each    For use with Freestyle Azam Flash  for continuous monitioring of blood glucose levels. Replace sensor every 10 days.    Type 1 diabetes mellitus with complications (H)       FLUoxetine 40 MG capsule    PROZAC    90 capsule    Take 1 capsule (40 mg) by mouth daily    Anxiety       FREESTYLE AZAM READER Atiya     1 Device    1 Application as needed    Type 1 diabetes mellitus with complications (H)       glucagon 1 MG kit    GLUCAGON EMERGENCY    1 mg    Inject 1 mg into the muscle once for  1 dose    Type 1 diabetes mellitus without complication (H)       * HumaLOG KWIKpen 100 UNIT/ML injection   Generic drug:  insulin lispro     15 mL    Use as needed - takes about 30 units per day    Type 1 diabetes mellitus with complications (H)       * insulin lispro 100 UNIT/ML injection    humaLOG    80 mL    Pt takes 80 units daily in pump - pt also needs humalog pens for travel    Type 1 diabetes mellitus without complication (H)       * insulin glargine 100 UNIT/ML injection    LANTUS VIAL    10 mL    Pt to take 20 units daily in case of pump failure    Type 1 diabetes mellitus without complication (H)       * BASAGLAR 100 UNIT/ML injection     15 mL    Pt to take 22 units daily    Type 1 diabetes mellitus with complications (H)       ipratropium 0.03 % spray    ATROVENT    2 Box    Spray 2 sprays into both nostrils every 12 hours    Seasonal allergic rhinitis       MULTIVITAMIN PO           triamcinolone 0.1 % ointment    KENALOG    250 g    Use as needed for rash - take twice daily    DM type 1 (diabetes mellitus, type 1) (H)       * Notice:  This list has 4 medication(s) that are the same as other medications prescribed for you. Read the directions carefully, and ask your doctor or other care provider to review them with you.

## 2018-02-23 NOTE — PROGRESS NOTES
#1 type 1 DM  Diagnosed in 1992.. On lantus and humalog since approximately 2000. Lantus split to b.i.d. dosing in 2008.. The patient has started the insulin pump since May of 2009. April 2008 HgbA1c is 7.5. July 2008 is 8.3. October 2008 HgbA1c of 7.0. February 2009 HgBa1c of 7.8. May 2009 hemoglobin A1c of 7.5 Her A1C 4 days ago was 6.8%, and was 7.5% 5/09, 7.8% 2/09, 7.0%. January 2010 hemoglobin A1c of 6.8. March 2010 hemoglobin A1c of 6.0. July 2010 hemoglobin A1c of 6.7. December 2010 hemoglobin A1c of 7.2. April 2011 hemoglobin A1c of 6.7. October 2011 hemoglobin A1c is 6.9. February 2012 hemoglobin A1c of 7.1. May 2012 hemoglobin A1c of 6.9. August 2012 hemoglobin A1c of 7.9. October 2012 hemoglobin A1c of 7.3. November 2012 hemoglobin A1c of 7.7. February 2013 hemoglobin A1c of 7.4. May 2013 hemoglobin A1c is 7.4. July 2013 hemoglobin A1c is 7.5. November 2013 hemoglobin A1c of 7.5. March 2014 hemoglobin A1c of 7.4.June 2014 hemoglobin A1c of 7.0. September 2014 hemoglobin A1c of 7.0. Feb 2015 Hgba1c of 6.8. November 2015 hemoglobin A1c of 6.4.August 2016 hemoglobin A1c of 7.4.december 2016 hemoglobin A1c of 6.4.  April 2017 hemoglobin A1c of 6.8.   August 2017 hemoglobin A1c of 7.2.    She was started on insulin pump in May 2009.  As of November 2013, she is on the new Medtronic pump with CGMS and low glucose suspend.       Review of systems related to diabetes  CV: none  Eye:  Has seen ophthalmology in July 2016, mild nonproliferative diabetic retinopathy appreciated bilaterally.  Per patient report, she was seen in October 2017 and was still noted to have persistent, however unchanged, retinopathy.  Neuro: none  Neph: negative in August 2017  Infections: none  Immunization: flu shot in 2014, tetanus within 10 years, pneumovax 2 to 3 yars ago, patient declined flu shot today in 2016  The patient has a Lantus prescription as of 2017 August 2017 TSH and TTG were normal    Interval history:  The patient  "wore the FreeStyle flores.  She tolerated this device and wore it for approximately one week.  Review of her flores results still show prandial hyperglycemia.  She also tends to have some hypoglycemia in the evening when her pump rate is around 1.7 U/h. In addition, she plans on an upcoming international trip in roughly 2 weeks.    Bolus: 1 unit per 7.0 carbohydrate from 8 AM to 9:30 PM (new dose) , 1 unit per 12 g carbohydrate otherwise    Basal  Midnight to 8 AM at 0.65 units per hour  8 AM to 11:30 AM at 0.9 units per hour  11:30 AM to 2:00 PM at 1.4 units per hourr  2:00 PM to 7 PM decreased to 0.85 units per hour  7 PM to 10 PM at 1.7  units per hour  10 PM to midnight at 0.65  units per hour    Insulin sensitivity:  1 per 50 above goal 120  Active insulin time at 2 hours    #2 Considering pregnancy  Not interested in pregnancy currently.  No form of birth control     Interval history: Does not anticipate having future pregnancy    #3 history of vitamin D deficiency  In February 2015, vitamin D was slightly low at 25.  She continues on vitamin D intermittently.April 2016 vitamin D was reasonable at 21.  Patient continues on vitamin D at 1000 international units daily     Interval history: August 2017 vitamin D was normal    #4 weight gain   The patient reports that her lifestyle continues to be quite busy.     Interval history: 2017 TSH was normal.  Dietary lifestyle changes recommended.  Per patient report, her weight has not changed but her clothes \"fit better.\"  She has been active with dietary changes.  She is actually lost 3 pounds compared with September 2017.      Past medical history  Type 1 diabetes  Delivery of child in July 2010    Family history  Mother was diagnosed with primary biliary cirrhosis and is  in 2018, with breast cancer.    Social history  Continues to be busy at work and at home,     Physical Exam   Vital signs:  Vital signs:   /60 (BP Location: Right arm, Patient Position: " "Sitting, Cuff Size: Adult Regular)  Pulse 80  Ht 1.585 m (5' 2.4\")  Wt 73.3 kg (161 lb 9.6 oz)  BMI 29.18 kg/m2  Estimated body mass index is 29.18 kg/(m^2) as calculated from the following:    Height as of this encounter: 1.585 m (5' 2.4\").    Weight as of this encounter: 73.3 kg (161 lb 9.6 oz).    General appearance:    GENERAL APPEARANCE: Alert and no distress    Office Visit on 02/23/2018   Component Date Value Ref Range Status     Hemoglobin A1C 02/23/2018 6.8* 4.3 - 6 % Final           Assessment   #1 type 1 DM with retinopathy noted in July 2016  Hemoglobin A1c has somewhat improved to 6.8.  Reviewing her Freestyle flores, I think we need to reduce her basal in the evening, increase her basal overnight, and increase her bolus coverage.  She is also interested in a personal Freestyle flores -  prescription has been sent.  Also going on an international trip.  Prescription given for glargine, Humalog pens as well as instructions on travel with  pump    Bolus: 1 unit per 6.0  carbohydrate from 8 AM to 9:30 PM (new dose) , 1 unit per 12 g carbohydrate otherwise    Basal  Midnight to 8 AM at 0.70 units per hour  8 AM to 11:30 AM at 0.9 units per hour  11:30 AM to 2:00 PM at 1.4 units per hourr  2:00 PM to 7 PM at 0.85 units per hour  7 PM to 10 PM at 1.5  units per hour  10 PM to midnight at 0.65  units per hour    Insulin sensitivity:  1 per 50 above goal 120  Active insulin time at 4 hours    #2 considering pregnancy  Not interested in pregnancy currently.     #3 recent weight gain  Patient had previously declined 24 urine for cortisol.  August 2017 TSH is normal.  She has made dietary changes.  She has lost 3 pounds in September 2017.  Congratulated patient.\    #4  general health maintenance  Refills given for prozac-patient understands potential interaction of Prozac with pregnancy..  She does not feel that she will be pregnant in the near future.      Return visit in 4 months, sooner if needed.      I " spent 25 minutes with this patient face to face and explained the conditions and plans (more than 50% of time was spent in discussion and management of diabetes) . The patient understood and is satisfied with today's visit.

## 2018-03-05 DIAGNOSIS — E10.9 TYPE 1 DIABETES MELLITUS (H): Primary | ICD-10-CM

## 2018-05-06 DIAGNOSIS — E10.8 TYPE 1 DIABETES MELLITUS WITH COMPLICATIONS (H): ICD-10-CM

## 2018-05-07 RX ORDER — INSULIN GLARGINE 100 [IU]/ML
INJECTION, SOLUTION SUBCUTANEOUS
Qty: 30 ML | Refills: 3 | Status: SHIPPED | OUTPATIENT
Start: 2018-05-07 | End: 2019-07-12

## 2018-06-14 NOTE — PROGRESS NOTES
[facility]  Endocrinology  Jemma Anderson MD  06/15/2018      Chief Complaint:   Diabetes    History of Present Illness:   Shannon Sow is a 42 year old female who presents for follow up of type 1 diabetes.    #1 Type 1 diabetes mellitus   Diagnosed in 1992.  On Lantus and Humalog from approximately 2000 - May 2009 when she started on an insulin pump.  As of November 2013, she started on a newer Medtronic pump low glucose suspend.  Freestyle Azam was prescribed February 2018.  Her A1c levels in the past 5 years have generally been in the high 6% - mid 7% range.      Since February 2018, she has been using a freestyle azam intermittently.  She likes the Azam and the fact that she can check her blood sugar whenever she wants.  The only issues she has had [place]  The adhesive has not been an issue for her which has been an issue for her with other sensors.  She sometimes goes a week or so without the sensor on but does wear it for the full 10 days when she uses it.  Cost is reasonable for her.  June 2018 hemoglobin A1c is 6.9%. She tends to feel low around 10 am but in looking at her Azam printout she feels this actually because her sugar is dropping after eating, not that she is actually low.  Dinner is her largest meal of the day and is often quite late in the evening around 9 pm.    Blood Glucose Monitoring:  We reviewed glucometer and CGM data together.  This is most significant for an average glucose of 176, and hyperglycemia about 46% of the time.  She particularly has hyperglycemia with meals.  Blood sugars 1 fasting are generally between .  Postprandially, her blood sugars can range anywhere from 180-50.    Current Insulin Pump Settings:  Type of Pump: MedstiQRd Minimed: Model 530G  BASAL RATES and times:  12   AM (midnight): 0.65 units/hour    8     AM: 0.9 units/hour   11:30   AM: 1.4 units/hour   2    PM: 0.85 units/hour   7    PM: 1.4  units/hour      10   PM: 0.65 units/hour      Carb ratio:   CARB RATIO and times:  12   AM (midnight): 1 unit per 12 g carbs  7     AM:  1 unit per 8 g carbs  9:30    PM:  1 unit per 12 carbs    Correction Factor (Sensitivity) and times:  12   AM (midnight): 1 unit per 50 mg/dL above goal    Target BG Ranges:   BLOOD GLUCOSE TARGET and times:  12   AM (midnight): 120 - 130    Amount of Time Insulin is Active:  4 hrs    Diabetes monitoring and complications:  CAD: No  Last eye exam results: 12/05/2017, bilateral mild/moderate nonproliferative diabetic retinopathy, trace diabetic macular edema left > right   Microalbuminuria: negative 8/2017  HTN: No  On Statin: No  On Aspirin: No  Depression: No    #2 History of vitamin D deficiency  Vitamin D was slightly low at 25 in February 2015.  Repeat level August 2017 was normal.  She is on 1000 units of Vitamin D daily.    #3 Women's Health  She is not considering pregnancy.  She has regular menstrual periods although her cycle is now slightly longer.  She had an abnormal mammogram however on repeat imaging this was found to be just cysts.     Review of Systems:   Pertinent items are noted in HPI.  All other systems are negative.    Active Medications:      BASAGLAR 100 UNIT/ML injection, INJECT 22 UNITS SUBCUTANEOUSLY DAILY, Disp: 30 mL, Rfl: 3     cholecalciferol (VITAMIN D) 1000 UNIT tablet, Take 1 tablet (1,000 Units) by mouth daily, Disp: 90 tablet, Rfl: 3     FLUoxetine (PROZAC) 40 MG capsule, Take 1 capsule (40 mg) by mouth daily, Disp: 90 capsule, Rfl: 3     glucagon  EMERGENCY) 1 MG kit, Inject 1 mg into the muscle once for 1 dose, Disp: 1 mg, Rfl: 0     insulin aspart (NOVOLOG VIAL) 100 UNITS/ML injection, Pt takes 80 units daily in pump - pt also needs Humalog pens for travel, Disp-80, Disp: 80 vial, Rfl: 3     insulin pen needle (B-D U/F) 31G X 5 MM, Use if pump fails 4-5 times daily, Disp: 100 each, Rfl: 1     ipratropium (ATROVENT) 0.03 % nasal spray, Spray 2  "sprays into both nostrils every 12 hours, Disp: 2 Box, Rfl: 1     Multiple Vitamins-Minerals (MULTIVITAMIN OR), , Disp: , Rfl:      NOVOLOG FLEXPEN 100 UNIT/ML soln, Use as needed - takes about 30 units per day, Disp: 15 mL, Rfl: 3     triamcinolone (KENALOG) 0.1 % ointment, Use as needed for rash - take twice daily, Disp: 250 g, Rfl: 1      Allergies:   No known drug allergies.       Past Medical History:  Type 1 diabetes mellitus   Vitamin deficiency     Past Surgical History:  History reviewed. No pertinent past surgical history.     Family History:   Breast cancer - mother      Social History:   Marital Status: single  Presents to clinic alone  Tobacco Use: No previous or current tobacco use.   Alcohol Use: No alcohol use.   PCP: Jemma Anderson      Physical Exam:   /77  Pulse 80  Ht 1.585 m (5' 2.4\")  Wt 74.4 kg (164 lb 1.6 oz)  BMI 29.63 kg/m2     Wt Readings from Last 4 Encounters:   06/15/18 74.4 kg (164 lb 1.6 oz)   02/23/18 73.3 kg (161 lb 9.6 oz)   09/08/17 74.6 kg (164 lb 6.4 oz)   08/25/17 75.5 kg (166 lb 8 oz)       GENERAL APPEARANCE: Alert and no distress  NECK: No lymphadenopathy appreciated  Thyroid: No obvious nodules palpated   CV: RRR without M/R/G  Lungs: CTA bilaterally  Abdomen: Soft, Nontender, non distended, positive bowel sounds   Neuro: normal reflexes, no focal deficits  Skin: No infection in feet   Mood: Normal   Lymph: neg in neck and supraclavicular area   Foot exam: Normal 2+ pedal pulses, good cap refill, no toenail changes or ulcerations, sensation intact to monofilament       Data:  Lab Results   Component Value Date     08/25/2017    POTASSIUM 4.1 08/25/2017    CHLORIDE 101 08/25/2017    CO2 29 08/25/2017    ANIONGAP 6 08/25/2017     (H) 08/25/2017    BUN 12 08/25/2017    CR 0.62 08/25/2017    OMERO 8.6 08/25/2017     Lab Results   Component Value Date    GFRESTIMATED >90 08/25/2017    GFRESTIMATED >90  Non  GFR Calc   04/08/2016    " GFRESTIMATED >90  Non  GFR Calc   02/06/2015    GFRESTBLACK >90 08/25/2017    GFRESTBLACK >90   GFR Calc   04/08/2016    GFRESTBLACK >90   GFR Calc   02/06/2015      Lab Results   Component Value Date    MICROL <5 08/25/2017    UMALCR Unable to calculate due to low value 08/25/2017      Lab Results   Component Value Date    A1C 7.5 (H) 11/01/2013    A1C 7.5 (A) 07/26/2013    A1C 7.4 (A) 03/01/2013    A1C 7.7 (A) 11/30/2012    A1C 7.3 (A) 10/05/2012    HEMOGLOBINA1 6.8 (A) 02/23/2018    HEMOGLOBINA1 7.2 (A) 08/25/2017    HEMOGLOBINA1 6.8 (A) 04/28/2017    HEMOGLOBINA1 6.4 (A) 12/23/2016    HEMOGLOBINA1 7.4 (A) 08/13/2016     No results found for: CPEPT, GADAB, ISCAB    Lab Results   Component Value Date    CHOL 172 08/25/2017    CHOL 180 04/08/2016    TRIG 35 08/25/2017    TRIG 44 04/08/2016    HDL 94 08/25/2017    HDL 85 04/08/2016    LDL 72 08/25/2017    LDL 86 04/08/2016    NHDL 79 08/25/2017    NHDL 95 04/08/2016     Assessment and Plan:  Type 1 diabetes mellitus without complication (H)  Hemoglobin A1c acceptable at 6.9%.  In reviewing her Azam data, it appears she is going high after eating in the morning however then will drop more quickly afterwards therefore will adjust her daytime carbohydrate ratio and sensitivity.  She will continue using the Azam system which is working well for her.  Will plan for routine labs at her next appointment.  I did talk to her about the possibility of the Dexcom G6.  She is less interested in this possibility.  Overall, she is quite happy with the azam.  In the interim, we will increase her carbohydrate coverage with meals as well as her correction factor as noted below.    Current Settings: New Settings (changes bolded):   BASAL RATES and times:  12   AM (midnight): 0.65 units/hour    8     AM: 0.9 units/hour   11:30   AM: 1.4 units/hour   2    PM: 0.85 units/hour   7    PM: 1.4 units/hour      10   PM: 0.65 units/hour  BASAL  RATES and times:  12   AM (midnight): 0.65 units/hour    8     AM: 0.9 units/hour   11:30   AM: 1.4 units/hour   2    PM: 0.85 units/hour   7    PM: 1.4 units/hour      10   PM: 0.65 units/hour        CARB RATIO and times:  12   AM (midnight): 1 unit per 12 g carbs  7     AM:  1 unit per 8 g carbs  9:30    PM:  1 unit per 12 carbs   CARB RATIO and times:  12   AM (midnight): 1 unit per 12 g carbs  7     AM:  1 unit per 7 g carbs  10:30   PM:  1 unit per 12 g carbs     Correction Factor (Sensitivity) and times:  12   AM (midnight): 50 mg/dL       Correction Factor (Sensitivity) and times:  12   AM (midnight): 50 mg/dL  7     AM: 40 mg/dL  10   PM: 50 mg/dL     BLOOD GLUCOSE TARGET and times:  12   AM (midnight): 120 - 130 BLOOD GLUCOSE TARGET and times:  12   AM (midnight): 120 - 130        Before her return visit in 4-6 months, we will check the following:  - 25 Hydroxyvitamin D2 and D3  - Basic metabolic panel  - TSH  - Tissue transglutaminase payal IgA and IgG  - Lipid Profile  - Albumin Random Urine Quantitative with Creat Ratio  - Hemoglobin A1c  - Hemoglobin A1c POCT          Follow-up: Return in about 4 months (around 10/15/2018).     >50% of 30 minute visit spent in face to face counseling, education and coordination of care related to options for better glycemic control as well as preventing, detecting, and treating hypoglycemia.         Scribe Disclosure:   I, Jazmyn Sow, am serving as a scribe to document services personally performed by Jemma Anderson MD at this visit, based upon the provider's statements to me. All documentation has been reviewed by the aforementioned provider prior to being entered into the official medical record.     Portions of this medical record were completed by a scribe. UPON MY REVIEW AND AUTHENTICATION BY ELECTRONIC SIGNATURE, this confirms (a) I performed the applicable clinical services, and (b) the record is accurate.

## 2018-06-15 ENCOUNTER — OFFICE VISIT (OUTPATIENT)
Dept: ENDOCRINOLOGY | Facility: CLINIC | Age: 43
End: 2018-06-15
Payer: COMMERCIAL

## 2018-06-15 VITALS
DIASTOLIC BLOOD PRESSURE: 77 MMHG | BODY MASS INDEX: 30.2 KG/M2 | SYSTOLIC BLOOD PRESSURE: 116 MMHG | WEIGHT: 164.1 LBS | HEIGHT: 62 IN | HEART RATE: 80 BPM

## 2018-06-15 DIAGNOSIS — E10.9 TYPE 1 DIABETES MELLITUS WITHOUT COMPLICATION (H): Primary | ICD-10-CM

## 2018-06-15 LAB — HBA1C MFR BLD: 6.9 % (ref 4.3–6)

## 2018-06-15 ASSESSMENT — PAIN SCALES - GENERAL: PAINLEVEL: NO PAIN (0)

## 2018-06-15 NOTE — LETTER
6/15/2018       RE: Shannon Sow  [address] [place] [address]     Dear Colleague,    Thank you for referring your patient, Shannon Sow, to the [facility] ENDOCRINOLOGY at [facility]. Please see a copy of my visit note below.    [facility]  Endocrinology  Jemma Anderson MD  06/15/2018      Chief Complaint:   Diabetes    History of Present Illness:   Shannon Sow is a 42 year old female who presents for follow up of type 1 diabetes.    #1 Type 1 diabetes mellitus   Diagnosed in 1992.  On Lantus and Humalog from approximately 2000 - May 2009 when she started on an insulin pump.  As of November 2013, she started on a newer Medtronic pump low glucose suspend.  Freestyle Azam was prescribed February 2018.  Her A1c levels in the past 5 years have generally been in the high 6% - mid 7% range.      Since February 2018, she has been using a freestyle azam intermittently.  She likes the Azam and the fact that she can check her blood sugar whenever she wants.  The only issues she has had [place]  The adhesive has not been an issue for her which has been an issue for her with other sensors.  She sometimes goes a week or so without the sensor on but does wear it for the full 10 days when she uses it.  Cost is reasonable for her.  June 2018 hemoglobin A1c is 6.9%. She tends to feel low around 10 am but in looking at her Azam printout she feels this actually because her sugar is dropping after eating, not that she is actually low.  Dinner is her largest meal of the day and is often quite late in the evening around 9 pm.    Blood Glucose Monitoring:  We reviewed glucometer and CGM data together.  This is most significant for an average glucose of 176, and hyperglycemia about 46% of the time.  She particularly has hyperglycemia with meals.  Blood sugars 1 fasting are generally between .   Postprandially, her blood sugars can range anywhere from 180-50.    Current Insulin Pump Settings:  Type of Pump: Medtronic Minimed: Model 530G  BASAL RATES and times:  12   AM (midnight): 0.65 units/hour    8     AM: 0.9 units/hour   11:30   AM: 1.4 units/hour   2    PM: 0.85 units/hour   7    PM: 1.4 units/hour      10   PM: 0.65 units/hour      Carb ratio:   CARB RATIO and times:  12   AM (midnight): 1 unit per 12 g carbs  7     AM:  1 unit per 8 g carbs  9:30    PM:  1 unit per 12 carbs    Correction Factor (Sensitivity) and times:  12   AM (midnight): 1 unit per 50 mg/dL above goal    Target BG Ranges:   BLOOD GLUCOSE TARGET and times:  12   AM (midnight): 120 - 130    Amount of Time Insulin is Active:  4 hrs    Diabetes monitoring and complications:  CAD: No  Last eye exam results: 12/05/2017, bilateral mild/moderate nonproliferative diabetic retinopathy, trace diabetic macular edema left > right   Microalbuminuria: negative 8/2017  HTN: No  On Statin: No  On Aspirin: No  Depression: No    #2 History of vitamin D deficiency  Vitamin D was slightly low at 25 in February 2015.  Repeat level August 2017 was normal.  She is on 1000 units of Vitamin D daily.    #3 Women's Health  She is not considering pregnancy.  She has regular menstrual periods although her cycle is now slightly longer.  She had an abnormal mammogram however on repeat imaging this was found to be just cysts.     Review of Systems:   Pertinent items are noted in HPI.  All other systems are negative.    Active Medications:      BASAGLAR 100 UNIT/ML injection, INJECT 22 UNITS SUBCUTANEOUSLY DAILY, Disp: 30 mL, Rfl: 3     cholecalciferol (VITAMIN D) 1000 UNIT tablet, Take 1 tablet (1,000 Units) by mouth daily, Disp: 90 tablet, Rfl: 3     FLUoxetine (PROZAC) 40 MG capsule, Take 1 capsule (40 mg) by mouth daily, Disp: 90 capsule, Rfl: 3     glucagon  EMERGENCY) 1 MG kit, Inject 1 mg into the muscle once for 1 dose, Disp: 1 mg, Rfl: 0     insulin  "aspart (NOVOLOG VIAL) 100 UNITS/ML injection, Pt takes 80 units daily in pump - pt also needs Humalog pens for travel, Disp-80, Disp: 80 vial, Rfl: 3     insulin pen needle (B-D U/F) 31G X 5 MM, Use if pump fails 4-5 times daily, Disp: 100 each, Rfl: 1     ipratropium (ATROVENT) 0.03 % nasal spray, Spray 2 sprays into both nostrils every 12 hours, Disp: 2 Box, Rfl: 1     Multiple Vitamins-Minerals (MULTIVITAMIN OR), , Disp: , Rfl:      NOVOLOG FLEXPEN 100 UNIT/ML soln, Use as needed - takes about 30 units per day, Disp: 15 mL, Rfl: 3     triamcinolone (KENALOG) 0.1 % ointment, Use as needed for rash - take twice daily, Disp: 250 g, Rfl: 1      Allergies:   No known drug allergies.       Past Medical History:  Type 1 diabetes mellitus   Vitamin deficiency     Past Surgical History:  History reviewed. No pertinent past surgical history.     Family History:   Breast cancer - mother      Social History:   Marital Status: single  Presents to clinic alone  Tobacco Use: No previous or current tobacco use.   Alcohol Use: No alcohol use.   PCP: Jemma Anderson      Physical Exam:   /77  Pulse 80  Ht 1.585 m (5' 2.4\")  Wt 74.4 kg (164 lb 1.6 oz)  BMI 29.63 kg/m2     Wt Readings from Last 4 Encounters:   06/15/18 74.4 kg (164 lb 1.6 oz)   02/23/18 73.3 kg (161 lb 9.6 oz)   09/08/17 74.6 kg (164 lb 6.4 oz)   08/25/17 75.5 kg (166 lb 8 oz)       GENERAL APPEARANCE: Alert and no distress  NECK: No lymphadenopathy appreciated  Thyroid: No obvious nodules palpated   CV: RRR without M/R/G  Lungs: CTA bilaterally  Abdomen: Soft, Nontender, non distended, positive bowel sounds   Neuro: normal reflexes, no focal deficits  Skin: No infection in feet   Mood: Normal   Lymph: neg in neck and supraclavicular area   Foot exam: Normal 2+ pedal pulses, good cap refill, no toenail changes or ulcerations, sensation intact to monofilament       Data:  Lab Results   Component Value Date     08/25/2017    POTASSIUM 4.1 08/25/2017    " CHLORIDE 101 08/25/2017    CO2 29 08/25/2017    ANIONGAP 6 08/25/2017     (H) 08/25/2017    BUN 12 08/25/2017    CR 0.62 08/25/2017    OMERO 8.6 08/25/2017     Lab Results   Component Value Date    GFRESTIMATED >90 08/25/2017    GFRESTIMATED >90  Non  GFR Calc   04/08/2016    GFRESTIMATED >90  Non  GFR Calc   02/06/2015    GFRESTBLACK >90 08/25/2017    GFRESTBLACK >90   GFR Calc   04/08/2016    GFRESTBLACK >90   GFR Calc   02/06/2015      Lab Results   Component Value Date    MICROL <5 08/25/2017    UMALCR Unable to calculate due to low value 08/25/2017      Lab Results   Component Value Date    A1C 7.5 (H) 11/01/2013    A1C 7.5 (A) 07/26/2013    A1C 7.4 (A) 03/01/2013    A1C 7.7 (A) 11/30/2012    A1C 7.3 (A) 10/05/2012    HEMOGLOBINA1 6.8 (A) 02/23/2018    HEMOGLOBINA1 7.2 (A) 08/25/2017    HEMOGLOBINA1 6.8 (A) 04/28/2017    HEMOGLOBINA1 6.4 (A) 12/23/2016    HEMOGLOBINA1 7.4 (A) 08/13/2016     No results found for: CPEPT, GADAB, ISCAB    Lab Results   Component Value Date    CHOL 172 08/25/2017    CHOL 180 04/08/2016    TRIG 35 08/25/2017    TRIG 44 04/08/2016    HDL 94 08/25/2017    HDL 85 04/08/2016    LDL 72 08/25/2017    LDL 86 04/08/2016    NHDL 79 08/25/2017    NHDL 95 04/08/2016     Assessment and Plan:  Type 1 diabetes mellitus without complication (H)  Hemoglobin A1c acceptable at 6.9%.  In reviewing her Azam data, it appears she is going high after eating in the morning however then will drop more quickly afterwards therefore will adjust her daytime carbohydrate ratio and sensitivity.  She will continue using the Azam system which is working well for her.  Will plan for routine labs at her next appointment.  I did talk to her about the possibility of the Dexcom G6.  She is less interested in this possibility.  Overall, she is quite happy with the azam.  In the interim, we will increase her carbohydrate coverage with meals as well as  her correction factor as noted below.    Current Settings: New Settings (changes bolded):   BASAL RATES and times:  12   AM (midnight): 0.65 units/hour    8     AM: 0.9 units/hour   11:30   AM: 1.4 units/hour   2    PM: 0.85 units/hour   7    PM: 1.4 units/hour      10   PM: 0.65 units/hour  BASAL RATES and times:  12   AM (midnight): 0.65 units/hour    8     AM: 0.9 units/hour   11:30   AM: 1.4 units/hour   2    PM: 0.85 units/hour   7    PM: 1.4 units/hour      10   PM: 0.65 units/hour        CARB RATIO and times:  12   AM (midnight): 1 unit per 12 g carbs  7     AM:  1 unit per 8 g carbs  9:30    PM:  1 unit per 12 carbs   CARB RATIO and times:  12   AM (midnight): 1 unit per 12 g carbs  7     AM:  1 unit per 7 g carbs  10:30   PM:  1 unit per 12 g carbs     Correction Factor (Sensitivity) and times:  12   AM (midnight): 50 mg/dL       Correction Factor (Sensitivity) and times:  12   AM (midnight): 50 mg/dL  7     AM: 40 mg/dL  10   PM: 50 mg/dL     BLOOD GLUCOSE TARGET and times:  12   AM (midnight): 120 - 130 BLOOD GLUCOSE TARGET and times:  12   AM (midnight): 120 - 130      Before her return visit in 4-6 months, we will check the following:  - 25 Hydroxyvitamin D2 and D3  - Basic metabolic panel  - TSH  - Tissue transglutaminase payal IgA and IgG  - Lipid Profile  - Albumin Random Urine Quantitative with Creat Ratio  - Hemoglobin A1c  - Hemoglobin A1c POCT     Follow-up: Return in about 4 months (around 10/15/2018).     >50% of 30 minute visit spent in face to face counseling, education and coordination of care related to options for better glycemic control as well as preventing, detecting, and treating hypoglycemia.       Scribe Disclosure:   I, Jazmyn Sow, am serving as a scribe to document services personally performed by Jemma Anderson MD at this visit, based upon the provider's statements to me. All documentation has been reviewed by the aforementioned provider prior to being entered into the official  medical record.     Portions of this medical record were completed by a scribe. UPON MY REVIEW AND AUTHENTICATION BY ELECTRONIC SIGNATURE, this confirms (a) I performed the applicable clinical services, and (b) the record is accurate.       Again, thank you for allowing me to participate in the care of your patient.      Sincerely,    Jemma Anderson MD

## 2018-06-15 NOTE — PATIENT INSTRUCTIONS
To expedite your medication refill(s), please contact your pharmacy and have them fax a refill request to: [phone].  *Please allow 3 business days for routine medication refills.  *Please allow 5 business days for controlled substance medication refills.  --------------------  For scheduling appointments (including lab work), please request an appointment through [employer], or call: [phone].    For questions for your provider or the endocrine nurse, please send a [employer] message.  For after-hours urgent issues, please dial [phone], and ask to speak with the Endocrinologist On-Call.  --------------------  Please Note: If you are active on [employer], all future test results will be sent by [employer] message only and will no longer be sent by mail. You may also receive communication directly from your physician.

## 2018-06-15 NOTE — MR AVS SNAPSHOT
After Visit Summary   6/15/2018    Shannon Sow    MRN: [id number]           Patient Information     Date Of Birth          1975        Visit Information        Provider Department      6/15/2018 9:00 AM Jemma Anderson MD [facility]        Today's Diagnoses     Type 1 diabetes mellitus without complication (H)    -  1      Care Instructions    To expedite your medication refill(s), please contact your pharmacy and have them fax a refill request to: [phone].  *Please allow 3 business days for routine medication refills.  *Please allow 5 business days for controlled substance medication refills.  --------------------  For scheduling appointments (including lab work), please request an appointment through [employer], or call: [phone].    For questions for your provider or the endocrine nurse, please send a [employer] message.  For after-hours urgent issues, please dial [phone], and ask to speak with the Endocrinologist On-Call.  --------------------  Please Note: If you are active on [employer], all future test results will be sent by [employer] message only and will no longer be sent by mail. You may also receive communication directly from your physician.            Follow-ups after your visit        Follow-up notes from your care team     Return in about 4 months (around 10/15/2018).      Your next 10 appointments already scheduled     Nov 09, 2018 10:00 AM CST   LAB with [facility] ([facility])    [address] [place] [address] [phone]           Please do not eat 10-12 hours before your appointment if you are coming in fasting for labs on lipids, cholesterol, or glucose (sugar). This does not apply to pregnant women. Water, hot tea and black coffee (with nothing added) are okay. Do not drink other fluids, diet soda or chew gum.            Nov 09, 2018 11:00 AM CST   (Arrive by 10:45 AM)    RETURN DIABETES with Jemma Anderson MD   [facility] ([facility])    [address]  [address] [facility] [address] [phone]              Future tests that were ordered for you today     Open Future Orders        Priority Expected Expires Ordered    25 Hydroxyvitamin D2 and D3 Routine 2018 2018 6/15/2018    Basic metabolic panel Routine 2018 2018 6/15/2018    TSH Routine 2018 2018 6/15/2018    Tissue transglutaminase payal IgA and IgG Routine 2018 2018 6/15/2018    Lipid Profile Routine 2018 2018 6/15/2018    Albumin Random Urine Quantitative with Creat Ratio Routine 2018 2018 6/15/2018    Hemoglobin A1c Routine 2018 2018 6/15/2018            Who to contact     Please call your clinic at [phone] to:    Ask questions about your health    Make or cancel appointments    Discuss your medicines    Learn about your test results    Speak to your doctor            Additional Information About Your Visit        [employer] Information     [employer] is an electronic gateway that provides easy, online access to your medical records. With [employer], you can request a clinic appointment, read your test results, renew a prescription or communicate with your care team.     To sign up for [employer] visit the website at [email or web address]   You will be asked to enter the access code listed below, as well as some personal information. Please follow the directions to create your username and password.     Your access code is: [email or web address]  Expires: 2018  6:30 AM     Your access code will  in 90 days. If you need help or a new code, please contact your [facility] or call [phone] for assistance.        Care EveryWhere ID     This is your Care EveryWhere ID. This could be used by other organizations to access your [place] medical records  [id number]        Your Vitals  "Were     Pulse Height BMI (Body Mass Index)             80 1.585 m (5' 2.4\") 29.63 kg/m2          Blood Pressure from Last 3 Encounters:   06/15/18 116/77   02/23/18 116/60   09/08/17 129/78    Weight from Last 3 Encounters:   06/15/18 74.4 kg (164 lb 1.6 oz)   02/23/18 73.3 kg (161 lb 9.6 oz)   09/08/17 74.6 kg (164 lb 6.4 oz)              We Performed the Following     Hemoglobin A1c POCT        Primary Care Provider Office Phone # Fax #    Jemma Anderson [phone]       [address] [place] [address]        Equal Access to Services     BESSY ROSAS : Hadii aad ku hadasho Sodickson, waaxda luqadaha, qaybta kaalmada adeegyada, jeremías wade. [facility] [place] [phone].    ATENCIÓN: Si habla español, tiene a abebe disposición servicios gratuitos de asistencia lingüística. [facility] [phone].    We comply with applicable federal civil rights laws and [place] laws. We do not discriminate on the basis of race, color, national origin, age, disability, sex, sexual orientation, or gender identity.            Thank you!     Thank you for choosing [facility] ENDOCRINOLOG[facility]  for your care. Our goal is always to provide you with excellent care. Hearing back from our patients is one way we can continue to improve our services. Please take a few minutes to complete the written survey that you may receive in the mail after your visit with us. Thank you!             Your Updated Medication List - Protect others around you: Learn how to safely use, store and throw away your medicines at www.disposemymeds.org.          This list is accurate as of 6/15/18  9:44 AM.  Always use your most recent med list.                   Brand Name Dispense Instructions for use Diagnosis    BASAGLAR 100 UNIT/ML injection     30 mL    INJECT 22 UNITS SUBCUTANEOUSLY DAILY    Type 1 diabetes mellitus with complications (H)       blood glucose monitoring test strip    no brand specified    6 Box    " Test up to 6 times daily    DM type 1 (diabetes mellitus, type 1) (H)       cholecalciferol 1000 UNIT tablet    vitamin D3    90 tablet    Take 1 tablet (1,000 Units) by mouth daily    Unspecified vitamin deficiency       continuous blood glucose monitoring sensor     3 each    For use with Freestyle Azam Flash  for continuous monitioring of blood glucose levels. Replace sensor every 10 days.    Type 1 diabetes mellitus with complications (H)       FLUoxetine 40 MG capsule    PROZAC    90 capsule    Take 1 capsule (40 mg) by mouth daily    Anxiety       FREESTYLE AZAM READER Atiya     1 Device    1 Application as needed    Type 1 diabetes mellitus with complications (H)       glucagon 1 MG kit    GLUCAGON EMERGENCY    1 mg    Inject 1 mg into the muscle once for 1 dose    Type 1 diabetes mellitus without complication (H)       insulin pen needle 31G X 5 MM    B-D U/F    100 each    Use if pump fails 4-5 times daily    Type 1 diabetes mellitus (H)       ipratropium 0.03 % spray    ATROVENT    2 Box    Spray 2 sprays into both nostrils every 12 hours    Seasonal allergic rhinitis       MULTIVITAMIN PO           * NovoLOG FLEXPEN 100 UNIT/ML injection   Generic drug:  insulin aspart     15 mL    Use as needed - takes about 30 units per day    Diabetes mellitus type 1 (H)       * insulin aspart 100 UNITS/ML injection    NovoLOG VIAL    80 vial    Pt takes 80 units daily in pump - pt also needs humalog pens for travel, Disp-80    Diabetes mellitus type 1 (H)       triamcinolone 0.1 % ointment    KENALOG    250 g    Use as needed for rash - take twice daily    DM type 1 (diabetes mellitus, type 1) (H)       * Notice:  This list has 2 medication(s) that are the same as other medications prescribed for you. Read the directions carefully, and ask your doctor or other care provider to review them with you.

## 2018-08-02 ENCOUNTER — TRANSFERRED RECORDS (OUTPATIENT)
Dept: HEALTH INFORMATION MANAGEMENT | Facility: CLINIC | Age: 43
End: 2018-08-02

## 2018-08-03 ENCOUNTER — TRANSFERRED RECORDS (OUTPATIENT)
Dept: HEALTH INFORMATION MANAGEMENT | Facility: CLINIC | Age: 43
End: 2018-08-03

## 2018-08-04 ENCOUNTER — TRANSFERRED RECORDS (OUTPATIENT)
Dept: HEALTH INFORMATION MANAGEMENT | Facility: CLINIC | Age: 43
End: 2018-08-04

## 2018-08-07 ENCOUNTER — TRANSFERRED RECORDS (OUTPATIENT)
Dept: HEALTH INFORMATION MANAGEMENT | Facility: CLINIC | Age: 43
End: 2018-08-07

## 2018-08-08 ENCOUNTER — TRANSFERRED RECORDS (OUTPATIENT)
Dept: HEALTH INFORMATION MANAGEMENT | Facility: CLINIC | Age: 43
End: 2018-08-08

## 2018-08-10 ENCOUNTER — TRANSFERRED RECORDS (OUTPATIENT)
Dept: HEALTH INFORMATION MANAGEMENT | Facility: CLINIC | Age: 43
End: 2018-08-10

## 2018-08-10 ENCOUNTER — TELEPHONE (OUTPATIENT)
Dept: ENDOCRINOLOGY | Facility: CLINIC | Age: 43
End: 2018-08-10

## 2018-08-10 DIAGNOSIS — I60.9 SAH (SUBARACHNOID HEMORRHAGE) (H): Primary | ICD-10-CM

## 2018-08-10 NOTE — TELEPHONE ENCOUNTER
"Called pt - last week- pt reports - \"thunderclap headache\" - er eval  - SA[facility] -pt meet with neurology here for 2nd opinion.   "

## 2018-11-07 ENCOUNTER — PATIENT OUTREACH (OUTPATIENT)
Dept: CARE COORDINATION | Facility: CLINIC | Age: 43
End: 2018-11-07

## 2018-11-08 NOTE — PROGRESS NOTES
[facility]  Endocrinology  Jemma Anderson MD  11/09/2018      Chief Complaint:   Diabetes    History of Present Illness:   Shannon Sow is a 43 year old female who presents for follow up of diabetes.    #1 Type 1 diabetes mellitus  The patient was diagnosed with diabetes in 1992.  She was on Lantus and Humalog from approximately 2000 - May 2009 when she started on an insulin pump.  As of November 2013, she has been on a newer Medtronic pump with low glucose suspend.  She started using the Freestyle Azam CGMS system in February 2018.  Her A1c level in the past 5 years have generally been in the high 6% - mid 7% range.  June 2018 hemoglobin A1c was 6.9%.    Interval history:  She likes the Freestyle Azam but wishes she could put it somewhere besides her arm as it sometimes gets pulled off when her arm brushes against something like a doorframe in her house.  She does really like being able to see the trend of her blood sugars.  She tends to spike after meals and then will dip in the afternoons.  Her breakfast is probably her heaviest carb meal, being typically yogurt and toast.  She does eat late at night with dinner being 7 - 9 pm with meal heavy on protein and vegetables.      Blood Glucose Monitoring:  We reviewed CGM data together.  Average glucose 161 with standard deviation of 59.6.  46% above range, 48% in range, and 6% below range.  Her CG MS is most significant for prandial hyperglycemia as well as hypoglycemia particularly in the later afternoons as well as overnight hyperglycemia.    Current Insulin Pump Settings:  Type of Pump: Medtronic Minimed: Model 530G    BASAL RATES and times:  12   AM (midnight): 0.65 units/hour    8     AM: 0.9 units/hour   11:30  AM: 1.4 units/hour   2:30    PM: 0.85 units/hour   7    PM: 1.4 units/hour      10   PM: 0.65 units/hour     Carb ratio:   CARB RATIO and times:  12   AM (midnight): 1 unit per 12 g carbohydrates  7     AM:  1 unit per 7 g carbohydrates    10:30 PM:  1 unit per 12 g carbohydrates     Correction Factor (Sensitivity) and times:  12   AM (midnight): 50 mg/dL  7     AM: 40 mg/dL  10   PM: 50 mg/dL    Target BG Ranges:   BLOOD GLUCOSE TARGET and times:  12   AM (midnight): 120 - 130    Amount of Time Insulin is Active:  4 hrs    Diabetes monitoring and complications:  CAD: No  Last eye exam results: 12/05/2017, bilateral mild/moderate nonproliferative diabetic retinopathy, trace diabetic macular edema left > right  Microalbuminuria: negative 11/2018  HTN: No  On Statin: No  On Aspirin: No  Depression: Yes, on Fluoxetine    #2 Subarachnoid hemorrhage   She was hospitalized at [facility] 8/2 - 8/9/18 for a subarachnoid hemorrhage after presenting with a sudden onset headache, nausea, and vomiting.  She had no complications.  She just saw Dr. Jacob in neurology this morning who discussed with her that she is at low risk for any recurrent issues (2 - 5 % of recurrence).  She has no history of hypertension.  She used to go to yoga weekly however has not in the past 6 months since it has not fit in her schedule.  She does want to get back to this or walking.  She is going to see Dr. Regan in neurosurgery later this month just to make sure he does not see anything different on the imaging she had while in the hospital.      Review of Systems:   Pertinent items are noted in HPI.  All other systems are negative.    Active Medications:      BASAGLAR 100 UNIT/ML injection, INJECT 22 UNITS SUBCUTANEOUSLY DAILY, Disp: 30 mL, Rfl: 3     cholecalciferol (VITAMIN D) 1000 UNIT tablet, Take 1 tablet (1,000 Units) by mouth daily, Disp: 90 tablet, Rfl: 3     FLUoxetine (PROZAC) 40 MG capsule, Take 1 capsule (40 mg) by mouth daily, Disp: 90 capsule, Rfl: 3     glucagon (GLUCAGON EMERGENCY) 1 MG kit, Inject 1 mg into the muscle once for 1 dose, Disp: 1 mg, Rfl: 0     insulin aspart (NOVOLOG VIAL) 100 UNITS/ML injection, Pt takes 80 units daily in pump - pt also  "needs humalog pens for travel, Disp-80, Disp: 80 vial, Rfl: 3     insulin pen needle (B-D U/F) 31G X 5 MM, Use if pump fails 4-5 times daily, Disp: 100 each, Rfl: 1     Multiple Vitamins-Minerals (MULTIVITAMIN OR), Take by mouth daily , Disp: , Rfl:      NOVOLOG FLEXPEN 100 UNIT/ML soln, Use as needed - takes about 30 units per day, Disp: 15 mL, Rfl: 3     triamcinolone (KENALOG) 0.1 % ointment, Use as needed for rash - take twice daily, Disp: 250 g, Rfl: 1     ipratropium (ATROVENT) 0.03 % nasal spray, Spray 2 sprays into both nostrils every 12 hours (Patient not taking: Reported on 11/9/2018), Disp: 2 Box, Rfl: 1      Allergies:   No known drug allergies.       Past Medical History:  Type 1 diabetes mellitus  Vitamin deficiency  Subarachnoid hemorrhage   Cerebral salt-wasting syndrome   Recurrent major depression, in partial remission     Past Surgical History:  History reviewed. No pertinent past surgical history.     Family History:   Breast cancer - mother      Social History:   Single   Presents to clinic alone.  Tobacco Use: No previous or current tobacco use.   Alcohol Use: No alcohol use.   PCP: Jemma Anderson      Physical Exam:   /72  Pulse 72  SpO2 98%  Height 1.588 m (5' 2.5\") \ Weight 75.2 kg (165 lb 12.8 oz)     Wt Readings from Last 4 Encounters:   11/09/18 75.2 kg (165 lb 12.8 oz)   06/15/18 74.4 kg (164 lb 1.6 oz)   02/23/18 73.3 kg (161 lb 9.6 oz)   09/08/17 74.6 kg (164 lb 6.4 oz)       GENERAL APPEARANCE: Alert and no distress  NECK: No lymphadenopathy appreciated  Thyroid: No obvious nodules palpated   CV: RRR without M/R/G  Lungs: CTA bilaterally  Abdomen: Soft, Nontender, non distended, positive bowel sounds   Neuro: no focal deficits  Skin: No infection in feet   Diabetic foot exam: normal DP and PT pulses, no trophic changes or ulcerative lesions and normal monofilament exam   Mood: Normal   Lymph: neg in neck and supraclavicular area       Data:  Lab Results   Component Value " Date     11/09/2018    POTASSIUM 4.1 11/09/2018    CHLORIDE 102 11/09/2018    CO2 27 11/09/2018    ANIONGAP 6 11/09/2018     (H) 11/09/2018    BUN 13 11/09/2018    CR 0.80 11/09/2018    OMERO 8.7 11/09/2018     Lab Results   Component Value Date    GFRESTIMATED 79 11/09/2018    GFRESTIMATED >90 08/25/2017    GFRESTIMATED >90  Non  GFR Calc   04/08/2016    GFRESTBLACK >90 11/09/2018    GFRESTBLACK >90 08/25/2017    GFRESTBLACK >90   GFR Calc   04/08/2016      Lab Results   Component Value Date    MICROL <5 11/09/2018    UMALCR Unable to calculate due to low value 11/09/2018      Lab Results   Component Value Date    A1C 7.5 (H) 11/01/2013    A1C 7.5 (A) 07/26/2013    A1C 7.4 (A) 03/01/2013    A1C 7.7 (A) 11/30/2012    A1C 7.3 (A) 10/05/2012    HEMOGLOBINA1 6.9 (A) 06/15/2018    HEMOGLOBINA1 6.8 (A) 02/23/2018    HEMOGLOBINA1 7.2 (A) 08/25/2017    HEMOGLOBINA1 6.8 (A) 04/28/2017    HEMOGLOBINA1 6.4 (A) 12/23/2016     Lab Results   Component Value Date    CHOL 183 11/09/2018    CHOL 172 08/25/2017    TRIG 36 11/09/2018    TRIG 35 08/25/2017    HDL 83 11/09/2018    HDL 94 08/25/2017    LDL 93 11/09/2018    LDL 72 08/25/2017    NHDL 100 11/09/2018    NHDL 79 08/25/2017       Assessment and Plan:  #1 Type 1 diabetes mellitus without complication (H)  Hemoglobin A1c is pending however her recent blood sugars are generally good.  She is still having some postprandial hyperglycemia therefore we adjusted her carbohydrate ratio during the day for better coverage.  Also decreased her basal rate in the afternoon as she tends to go slightly low at that time.  Reviewed resources to help with sensor adhesion.    - OPHTHALMOLOGY ADULT REFERRAL     Current Settings: New Settings (changes bolded):   BASAL RATES and times:  12   AM (midnight): 0.65 units/hour    8     AM: 0.9 units/hour   11:30  AM: 1.4 units/hour   2:30    PM: 0.85 units/hour   7    PM: 1.4 units/hour      10   PM: 0.65  units/hour    BASAL RATES and times:  12   AM (midnight): 0.7 units/hour    8     AM: 0.9 units/hour   11:30  AM: 1.4 units/hour   2:30    PM: 0.7 units/hour   7    PM: 1.4 units/hour      10   PM: 0.7 units/hour    Carb ratio:   CARB RATIO and times:  12   AM (midnight): 1 unit per 12 g carbohydrates  7     AM:  1 unit per 7 g carbohydrates   10:30 PM:  1 unit per 12 g carbohydrates  Carb ratio:   CARB RATIO and times:  12   AM (midnight): 1 unit per 12 g carbohydrates  7     AM:  1 unit per 6.5 g carbohydrates   10:30 PM:  1 unit per 12 g carbohydrates      Correction Factor (Sensitivity) and times:  12   AM (midnight): 50 mg/dL  7     AM: 40 mg/dL  10   PM: 50 mg/dL   Correction Factor (Sensitivity) and times:  12   AM (midnight): 50 mg/dL  7     AM: 40 mg/dL  10   PM: 50 mg/dL     Target BG Ranges:   BLOOD GLUCOSE TARGET and times:  12   AM (midnight): 120 - 130   Target BG Ranges:   BLOOD GLUCOSE TARGET and times:  12   AM (midnight): 120 - 130      #2 History of subarachnoid hemorrhage  Patient seen in neurology and neurosurgery.  Dietary lifestyle recommendations.  Recommended that patient work on reducing weight by considering intermittent fasting, reducing caffeine intake,, and increasing her exercise (particularly yoga)     Follow-up: Return in about 4 months (around 3/9/2019).     >50% of 30 minute visit spent in face to face counseling, education and coordination of care related to options for better glycemic control as well as preventing, detecting, and treating hypoglycemia.         Scribe Disclosure:   I, Jazmyn Sow, am serving as a scribe to document services personally performed by Jemma Anderson MD at this visit, based upon the provider's statements to me. All documentation has been reviewed by the aforementioned provider prior to being entered into the official medical record.     Portions of this medical record were completed by a scribe. UPON MY REVIEW AND AUTHENTICATION BY ELECTRONIC  SIGNATURE, this confirms (a) I performed the applicable clinical services, and (b) the record is accurate.       --    Orders Only on 11/09/2018   Component Date Value Ref Range Status     Sodium 11/09/2018 135  133 - 144 mmol/L Final     Potassium 11/09/2018 4.1  3.4 - 5.3 mmol/L Final     Chloride 11/09/2018 102  94 - 109 mmol/L Final     Carbon Dioxide 11/09/2018 27  20 - 32 mmol/L Final     Anion Gap 11/09/2018 6  3 - 14 mmol/L Final     Glucose 11/09/2018 336* 70 - 99 mg/dL Final     Urea Nitrogen 11/09/2018 13  7 - 30 mg/dL Final     Creatinine 11/09/2018 0.80  0.52 - 1.04 mg/dL Final     GFR Estimate 11/09/2018 79  >60 mL/min/1.7m2 Final    Non  GFR Calc     GFR Estimate If Black 11/09/2018 >90  >60 mL/min/1.7m2 Final    African American GFR Calc     Calcium 11/09/2018 8.7  8.5 - 10.1 mg/dL Final     TSH 11/09/2018 0.57  0.40 - 4.00 mU/L Final     Cholesterol 11/09/2018 183  <200 mg/dL Final     Triglycerides 11/09/2018 36  <150 mg/dL Final     HDL Cholesterol 11/09/2018 83  >49 mg/dL Final     LDL Cholesterol Calculated 11/09/2018 93  <100 mg/dL Final    Desirable:       <100 mg/dl     Non HDL Cholesterol 11/09/2018 100  <130 mg/dL Final     Creatinine Urine 11/09/2018 32  mg/dL Final     Albumin Urine mg/L 11/09/2018 <5  mg/L Final     Albumin Urine mg/g Cr 11/09/2018 Unable to calculate due to low value  0 - 25 mg/g Cr Final     Hemoglobin A1C 11/09/2018 7.2* 0 - 5.6 % Final    Comment: Normal <5.7% Prediabetes 5.7-6.4%  Diabetes 6.5% or higher - adopted from ADA   consensus guidelines.

## 2018-11-09 ENCOUNTER — OFFICE VISIT (OUTPATIENT)
Dept: ENDOCRINOLOGY | Facility: CLINIC | Age: 43
End: 2018-11-09
Payer: COMMERCIAL

## 2018-11-09 ENCOUNTER — OFFICE VISIT (OUTPATIENT)
Dept: NEUROLOGY | Facility: CLINIC | Age: 43
End: 2018-11-09
Payer: COMMERCIAL

## 2018-11-09 VITALS
OXYGEN SATURATION: 98 % | HEART RATE: 72 BPM | BODY MASS INDEX: 29.38 KG/M2 | HEIGHT: 63 IN | WEIGHT: 165.8 LBS | SYSTOLIC BLOOD PRESSURE: 115 MMHG | DIASTOLIC BLOOD PRESSURE: 72 MMHG

## 2018-11-09 DIAGNOSIS — E10.9 TYPE 1 DIABETES MELLITUS WITHOUT COMPLICATION (H): Primary | ICD-10-CM

## 2018-11-09 DIAGNOSIS — I60.9 SAH (SUBARACHNOID HEMORRHAGE) (H): Primary | ICD-10-CM

## 2018-11-09 DIAGNOSIS — E10.9 TYPE 1 DIABETES MELLITUS WITHOUT COMPLICATION (H): ICD-10-CM

## 2018-11-09 PROBLEM — Z96.41 INSULIN PUMP IN PLACE: Status: ACTIVE | Noted: 2018-08-07

## 2018-11-09 PROBLEM — F33.41 RECURRENT MAJOR DEPRESSIVE DISORDER, IN PARTIAL REMISSION (H): Status: ACTIVE | Noted: 2018-08-07

## 2018-11-09 PROBLEM — I10 HYPERTENSIVE DISEASE: Status: ACTIVE | Noted: 2018-08-07

## 2018-11-09 PROBLEM — E87.1 CEREBRAL SALT-WASTING SYNDROME: Status: ACTIVE | Noted: 2018-08-07

## 2018-11-09 LAB
ANION GAP SERPL CALCULATED.3IONS-SCNC: 6 MMOL/L (ref 3–14)
BUN SERPL-MCNC: 13 MG/DL (ref 7–30)
CALCIUM SERPL-MCNC: 8.7 MG/DL (ref 8.5–10.1)
CHLORIDE SERPL-SCNC: 102 MMOL/L (ref 94–109)
CHOLEST SERPL-MCNC: 183 MG/DL
CO2 SERPL-SCNC: 27 MMOL/L (ref 20–32)
CREAT SERPL-MCNC: 0.8 MG/DL (ref 0.52–1.04)
CREAT UR-MCNC: 32 MG/DL
GFR SERPL CREATININE-BSD FRML MDRD: 79 ML/MIN/1.7M2
GLUCOSE SERPL-MCNC: 336 MG/DL (ref 70–99)
HBA1C MFR BLD: 7.2 % (ref 0–5.6)
HDLC SERPL-MCNC: 83 MG/DL
LDLC SERPL CALC-MCNC: 93 MG/DL
MICROALBUMIN UR-MCNC: <5 MG/L
MICROALBUMIN/CREAT UR: NORMAL MG/G CR (ref 0–25)
NONHDLC SERPL-MCNC: 100 MG/DL
POTASSIUM SERPL-SCNC: 4.1 MMOL/L (ref 3.4–5.3)
SODIUM SERPL-SCNC: 135 MMOL/L (ref 133–144)
TRIGL SERPL-MCNC: 36 MG/DL
TSH SERPL DL<=0.005 MIU/L-ACNC: 0.57 MU/L (ref 0.4–4)

## 2018-11-09 ASSESSMENT — PAIN SCALES - GENERAL: PAINLEVEL: NO PAIN (0)

## 2018-11-09 ASSESSMENT — ENCOUNTER SYMPTOMS
HEADACHES: 1
MUSCLE WEAKNESS: 0
SINUS PAIN: 1
SORE THROAT: 0
ARTHRALGIAS: 0
PARALYSIS: 0
NUMBNESS: 0
TREMORS: 0
LOSS OF CONSCIOUSNESS: 0
TASTE DISTURBANCE: 0
SMELL DISTURBANCE: 0
MEMORY LOSS: 0
NECK PAIN: 1
WEAKNESS: 0
TROUBLE SWALLOWING: 0
MYALGIAS: 1
SEIZURES: 0
SINUS CONGESTION: 1
JOINT SWELLING: 0
DIZZINESS: 0
DISTURBANCES IN COORDINATION: 0
TINGLING: 0
HOARSE VOICE: 0
STIFFNESS: 0
MUSCLE CRAMPS: 1
NECK MASS: 0
BACK PAIN: 1
SPEECH CHANGE: 0

## 2018-11-09 NOTE — PROGRESS NOTES
Dear Shannon    Here are your lab results.  Your thyroid, cholesterol, and urine for protein were all normal.  Your hemoglobin A1c is 7.2.  We will continue to work on improving your blood sugar control.    If you have any questions, please feel free to contact my nurse at [phone] select option #3 for triage nurse  or  option #1 for scheduling related questions.    Regards    Jemma Anderson MD

## 2018-11-09 NOTE — LETTER
Patient:  Shannon Sow  :   1975  MRN:     [id number]        Ms.Jessica Abby Sow  [address] [place] [address]        2018    Dear Shannon    Here are your lab results.  Your thyroid, cholesterol, and urine for protein were all normal.  Your hemoglobin A1c is 7.2.  We will continue to work on improving your blood sugar control.    If you have any questions, please feel free to contact my nurse at [phone] select option #3 for triage nurse  or  option #1 for scheduling related questions.    Regards    Jemma Anderson MD        Resulted Orders   Basic metabolic panel   Result Value Ref Range    Sodium 135 133 - 144 mmol/L    Potassium 4.1 3.4 - 5.3 mmol/L    Chloride 102 94 - 109 mmol/L    Carbon Dioxide 27 20 - 32 mmol/L    Anion Gap 6 3 - 14 mmol/L    Glucose 336 (H) 70 - 99 mg/dL    Urea Nitrogen 13 7 - 30 mg/dL    Creatinine 0.80 0.52 - 1.04 mg/dL    GFR Estimate 79 >60 mL/min/1.7m2      Comment:      Non  GFR Calc    GFR Estimate If Black >90 >60 mL/min/1.7m2      Comment:       GFR Calc    Calcium 8.7 8.5 - 10.1 mg/dL   TSH   Result Value Ref Range    TSH 0.57 0.40 - 4.00 mU/L   Lipid Profile   Result Value Ref Range    Cholesterol 183 <200 mg/dL    Triglycerides 36 <150 mg/dL    HDL Cholesterol 83 >49 mg/dL    LDL Cholesterol Calculated 93 <100 mg/dL      Comment:      Desirable:       <100 mg/dl    Non HDL Cholesterol 100 <130 mg/dL   Albumin Random Urine Quantitative with Creat Ratio   Result Value Ref Range    Creatinine Urine 32 mg/dL    Albumin Urine mg/L <5 mg/L    Albumin Urine mg/g Cr Unable to calculate due to low value 0 - 25 mg/g Cr   Hemoglobin A1c   Result Value Ref Range    Hemoglobin A1C 7.2 (H) 0 - 5.6 %      Comment:      Normal <5.7% Prediabetes 5.7-6.4%  Diabetes 6.5% or higher - adopted from ADA   consensus guidelines.         Lab

## 2018-11-09 NOTE — LETTER
Patient:  Shannon Sow  :   1975  MRN:     [id number]        Ms.Jessica Abby Sow  [address] [place] [address]        2018    Dear Shannon    Here are your labs.  We will working on improving your blood sugar control.  Everything else including thyroid, cholesterol, urine for protein, vitamin D, celiac disease were all normal.    If you have any questions, please feel free to contact my nurse at [phone] select option #3 for triage nurse  or  option #1 for scheduling related questions.    Regards    Jemma Anderson MD        Resulted Orders   25 Hydroxyvitamin D2 and D3   Result Value Ref Range    25 OH Vit D2 <5 ug/L    25 OH Vit D3 32 ug/L    25 OH Vit D total <37 20 - 75 ug/L      Comment:      Season, race, dietary intake, and treatment affect the concentration of   25-hydroxy-Vitamin D. Values may decrease during winter months and increase   during summer months. Values 20-29 ug/L may indicate Vitamin D insufficiency   and values <20 ug/L may indicate Vitamin D deficiency.  This test was developed and its performance characteristics determined by the   [facility],  Special Chemistry Laboratory. It has   not been cleared or approved by the FDA. The laboratory is regulated under   CLIA as qualified to perform high-complexity testing. This test is used for   clinical purposes. It should not be regarded as investigational or for   research.     Basic metabolic panel   Result Value Ref Range    Sodium 135 133 - 144 mmol/L    Potassium 4.1 3.4 - 5.3 mmol/L    Chloride 102 94 - 109 mmol/L    Carbon Dioxide 27 20 - 32 mmol/L    Anion Gap 6 3 - 14 mmol/L    Glucose 336 (H) 70 - 99 mg/dL    Urea Nitrogen 13 7 - 30 mg/dL    Creatinine 0.80 0.52 - 1.04 mg/dL    GFR Estimate 79 >60 mL/min/1.7m2      Comment:      Non  GFR Calc    GFR Estimate If Black >90 >60 mL/min/1.7m2      Comment:       GFR Calc    Calcium 8.7  8.5 - 10.1 mg/dL   TSH   Result Value Ref Range    TSH 0.57 0.40 - 4.00 mU/L   Tissue transglutaminase payal IgA and IgG   Result Value Ref Range    Tissue Transglutaminase Antibody IgA <1 <7 U/mL      Comment:      Negative  The tTG-IgA assay has limited utility for patients with decreased levels of   IgA. Screening for celiac disease should include IgA testing to rule out   selective IgA deficiency and to guide selection and interpretation of   serological testing. tTG-IgG testing may be positive in celiac disease   patients with IgA deficiency.      Tissue Transglutaminase Payal IgG <1 <7 U/mL      Comment:      Negative   Lipid Profile   Result Value Ref Range    Cholesterol 183 <200 mg/dL    Triglycerides 36 <150 mg/dL    HDL Cholesterol 83 >49 mg/dL    LDL Cholesterol Calculated 93 <100 mg/dL      Comment:      Desirable:       <100 mg/dl    Non HDL Cholesterol 100 <130 mg/dL   Albumin Random Urine Quantitative with Creat Ratio   Result Value Ref Range    Creatinine Urine 32 mg/dL    Albumin Urine mg/L <5 mg/L    Albumin Urine mg/g Cr Unable to calculate due to low value 0 - 25 mg/g Cr   Hemoglobin A1c   Result Value Ref Range    Hemoglobin A1C 7.2 (H) 0 - 5.6 %      Comment:      Normal <5.7% Prediabetes 5.7-6.4%  Diabetes 6.5% or higher - adopted from ADA   consensus guidelines.         Lab

## 2018-11-09 NOTE — MR AVS SNAPSHOT
After Visit Summary   11/9/2018    Shannon Sow    MRN: [id number]           Patient Information     Date Of Birth          1975        Visit Information        Provider Department      11/9/2018 8:30 AM Reji Jacob MD [facility] Neurology        Today's Diagnoses     SAH (subarachnoid hemorrhage) (H)    -  1       Follow-ups after your visit        Additional Services     NEUROSURGERY REFERRAL       Your provider has referred you to: [facility]: Neurosurgery Clinic [facility] [phone]   [email or web address]    Please be aware that coverage of these services is subject to the terms and limitations of your health insurance plan.  Call member services at your health plan with any benefit or coverage questions.      Please bring the following with you to your appointment:    (1) Any X-Rays, CTs or MRIs which have been performed.  Contact the facility where they were done to arrange for  prior to your scheduled appointment.   (2) List of current medications  (3) This referral request   (4) Any documents/labs given to you for this referral                  Your next 10 appointments already scheduled     Nov 09, 2018 10:00 AM CST   LAB with [facility] ([facility])    [address] [place] [address] [phone]           Please do not eat 10-12 hours before your appointment if you are coming in fasting for labs on lipids, cholesterol, or glucose (sugar). This does not apply to pregnant women. Water, hot tea and black coffee (with nothing added) are okay. Do not drink other fluids, diet soda or chew gum.            Nov 09, 2018 11:00 AM CST   (Arrive by 10:45 AM)   RETURN DIABETES with Jemma Anderson MD   [facility] Endocrinology ([facility])    [address] [place] [address] [phone]            Nov 20, 2018  " 2:00 PM CST   (Arrive by 1:45 PM)   New Patient Visit with Job Regan MD   [facility] Neurosurgery ([facility])    [address] [facility] [facility]  [address] [facility] [address] [phone]              Who to contact     Please call your clinic at [phone] to:    Ask questions about your health    Make or cancel appointments    Discuss your medicines    Learn about your test results    Speak to your doctor            Additional Information About Your Visit        MyChart Information     [employer] is an electronic gateway that provides easy, online access to your medical records. With [employer], you can request a clinic appointment, read your test results, renew a prescription or communicate with your care team.     To sign up for [employer] visit the website at [email or web address]   You will be asked to enter the access code listed below, as well as some personal information. Please follow the directions to create your username and password.     Your access code is: [email or web address]  Expires: 2019  5:30 AM     Your access code will  in 90 days. If you need help or a new code, please contact your [facility] or call [phone] for assistance.        Care EveryWhere ID     This is your Care EveryWhere ID. This could be used by other organizations to access your [facility] medical records  [id number]        Your Vitals Were     Pulse Height Pulse Oximetry BMI (Body Mass Index)          72 1.588 m (5' 2.5\") 98% 29.84 kg/m2         Blood Pressure from Last 3 Encounters:   18 115/72   06/15/18 116/77   18 116/60    Weight from Last 3 Encounters:   18 75.2 kg (165 lb 12.8 oz)   06/15/18 74.4 kg (164 lb 1.6 oz)   18 73.3 kg (161 lb 9.6 oz)              We Performed the Following     NEUROSURGERY REFERRAL        Primary Care Provider Office Phone # Fax #    Jemma Anderson [phone]       [address] [place] [address]" [facility] [address]  [facility] [address]        Equal Access to Services     BESSY ROSAS : Hadii aad ku hadeddyzenia Carieali, wasallyda luqadaha, qaybta kaaricjeremías mckenna. So [facility] [phone].    ATENCIÓN: Si habla español, tiene a abebe disposición servicios gratuitos de asistencia lingüística. Pastoraame al [phone].    We comply with applicable federal civil rights laws and [place] laws. We do not discriminate on the basis of race, color, national origin, age, disability, sex, sexual orientation, or gender identity.            Thank you!     Thank you for choosing [facility] NEUROLOGY  for your care. Our goal is always to provide you with excellent care. Hearing back from our patients is one way we can continue to improve our services. Please take a few minutes to complete the written survey that you may receive in the mail after your visit with us. Thank you!             Your Updated Medication List - Protect others around you: Learn how to safely use, store and throw away your medicines at www.disposemymeds.org.          This list is accurate as of 11/9/18  9:59 AM.  Always use your most recent med list.                   Brand Name Dispense Instructions for use Diagnosis    BASAGLAR 100 UNIT/ML injection     30 mL    INJECT 22 UNITS SUBCUTANEOUSLY DAILY    Type 1 diabetes mellitus with complications (H)       blood glucose monitoring test strip    no brand specified    6 Box    Test up to 6 times daily    DM type 1 (diabetes mellitus, type 1) (H)       cholecalciferol 1000 UNIT tablet    vitamin D3    90 tablet    Take 1 tablet (1,000 Units) by mouth daily    Unspecified vitamin deficiency       continuous blood glucose monitoring sensor     3 each    For use with Freestyle Azam Flash  for continuous monitioring of blood glucose levels. Replace sensor every 10 days.    Type 1 diabetes mellitus with complications (H)       FLUoxetine 40 MG capsule    PROZAC    90 capsule    Take  1 capsule (40 mg) by mouth daily    Anxiety       FREESTYLE RADHA READER Atiya     1 Device    1 Application as needed    Type 1 diabetes mellitus with complications (H)       glucagon 1 MG kit    GLUCAGON EMERGENCY    1 mg    Inject 1 mg into the muscle once for 1 dose    Type 1 diabetes mellitus without complication (H)       insulin pen needle 31G X 5 MM    B-D U/F    100 each    Use if pump fails 4-5 times daily    Type 1 diabetes mellitus (H)       ipratropium 0.03 % spray    ATROVENT    2 Box    Spray 2 sprays into both nostrils every 12 hours    Seasonal allergic rhinitis       MULTIVITAMIN PO      Take by mouth daily        * NovoLOG FLEXPEN 100 UNIT/ML injection   Generic drug:  insulin aspart     15 mL    Use as needed - takes about 30 units per day    Diabetes mellitus type 1 (H)       * insulin aspart 100 UNITS/ML injection    NovoLOG VIAL    80 vial    Pt takes 80 units daily in pump - pt also needs humalog pens for travel, Disp-80    Diabetes mellitus type 1 (H)       triamcinolone 0.1 % ointment    KENALOG    250 g    Use as needed for rash - take twice daily    DM type 1 (diabetes mellitus, type 1) (H)       * Notice:  This list has 2 medication(s) that are the same as other medications prescribed for you. Read the directions carefully, and ask your doctor or other care provider to review them with you.

## 2018-11-09 NOTE — NURSING NOTE
Chief Complaint   Patient presents with     Consult     [facility] NEW - SUBDURAL HEMATOMA       Greg Holliday, EMT

## 2018-11-09 NOTE — MR AVS SNAPSHOT
After Visit Summary   11/9/2018    Shannon Sow    MRN: [id number]           Patient Information     Date Of Birth          1975        Visit Information        Provider Department      11/9/2018 11:00 AM Jemma Anderson MD [facility] Endocrinology        Today's Diagnoses     Type 1 diabetes mellitus without complication (H)    -  1      Care Instructions    Read about intermittent fasting - try once per week - just run on basal, may need to reduce basal if needed    Work on yoga    Work on reducing caffeine          Follow-ups after your visit        Additional Services     OPHTHALMOLOGY ADULT REFERRAL       Pt has diabetes                  Follow-up notes from your care team     Return in about 4 months (around 3/9/2019).      Your next 10 appointments already scheduled     Nov 20, 2018  2:00 PM CST   (Arrive by 1:45 PM)   New Patient Visit with Job Regan MD   [facility] Neurosurgery ([facility])    [address] [place] [address] [phone]            Mar 29, 2019 12:30 PM CDT   (Arrive by 12:15 PM)   RETURN DIABETES with Jemma Anderson MD   [facility] Endocrinology ([facility])    [address] [place] [address] [phone]              Who to contact     Please call your clinic at [phone] to:    Ask questions about your health    Make or cancel appointments    Discuss your medicines    Learn about your test results    Speak to your doctor            Additional Information About Your Visit        MyChart Information     [employer] is an electronic gateway that provides easy, online access to your medical records. With [employer], you can request a clinic appointment, read your test results, renew a prescription or communicate with your care team.     To sign up for [employer] visit the website at [email or web address]   You will be asked to enter the access  code listed below, as well as some personal information. Please follow the directions to create your username and password.     Your access code is: [email or web address]  Expires: 2019  5:30 AM     Your access code will  in 90 days. If you need help or a new code, please contact your [facility] or call [phone] for assistance.        Care EveryWhere ID     This is your Care EveryWhere ID. This could be used by other organizations to access your [place] [facility] medical records  [id number]         Blood Pressure from Last 3 Encounters:   18 115/72   06/15/18 116/77   18 116/60    Weight from Last 3 Encounters:   18 75.2 kg (165 lb 12.8 oz)   06/15/18 74.4 kg (164 lb 1.6 oz)   18 73.3 kg (161 lb 9.6 oz)              We Performed the Following     OPHTHALMOLOGY ADULT REFERRAL          Today's Medication Changes          These changes are accurate as of 18 11:45 AM.  If you have any questions, ask your nurse or doctor.               Stop taking these medicines if you haven't already. Please contact your care team if you have questions.     ipratropium 0.03 % spray   Commonly known as:  ATROVENT   Stopped by:  Jemma Anderson MD           triamcinolone 0.1 % ointment   Commonly known as:  KENALOG   Stopped by:  Jemma Anderson MD                    Primary Care Provider Office Phone # Fax #    Jemma Anderson [phone]       [address] [place] [address]        Equal Access to Services     [facility]: Hadii tee harper hadasho Soomaali, waaxda luqadaha, qaybta kaalmada jeremías payne . So [facility] [phone].    ATENCIÓN: Si habla español, tiene a abebe disposición servicios gratuitos de asistencia lingüística. Llame al [phone].    We comply with applicable federal civil rights laws and [place] laws. We do not discriminate on the basis of race, color, national origin, age, disability,  sex, sexual orientation, or gender identity.            Thank you!     Thank you for choosing [facility] ENDOCRINOLOGY  for your care. Our goal is always to provide you with excellent care. Hearing back from our patients is one way we can continue to improve our services. Please take a few minutes to complete the written survey that you may receive in the mail after your visit with us. Thank you!             Your Updated Medication List - Protect others around you: Learn how to safely use, store and throw away your medicines at www.disposemymeds.org.          This list is accurate as of 11/9/18 11:45 AM.  Always use your most recent med list.                   Brand Name Dispense Instructions for use Diagnosis    BASAGLAR 100 UNIT/ML injection     30 mL    INJECT 22 UNITS SUBCUTANEOUSLY DAILY    Type 1 diabetes mellitus with complications (H)       blood glucose monitoring test strip    no brand specified    6 Box    Test up to 6 times daily    DM type 1 (diabetes mellitus, type 1) (H)       cholecalciferol 1000 UNIT tablet    vitamin D3    90 tablet    Take 1 tablet (1,000 Units) by mouth daily    Unspecified vitamin deficiency       continuous blood glucose monitoring sensor     3 each    For use with Freestyle Azam Flash  for continuous monitioring of blood glucose levels. Replace sensor every 10 days.    Type 1 diabetes mellitus with complications (H)       FLUoxetine 40 MG capsule    PROZAC    90 capsule    Take 1 capsule (40 mg) by mouth daily    Anxiety       FREESTYLE AZAM READER Atiya     1 Device    1 Application as needed    Type 1 diabetes mellitus with complications (H)       glucagon 1 MG kit    GLUCAGON EMERGENCY    1 mg    Inject 1 mg into the muscle once for 1 dose    Type 1 diabetes mellitus without complication (H)       insulin pen needle 31G X 5 MM    B-D U/F    100 each    Use if pump fails 4-5 times daily    Type 1 diabetes mellitus (H)       MULTIVITAMIN PO      Take by mouth daily         * NovoLOG FLEXPEN 100 UNIT/ML injection   Generic drug:  insulin aspart     15 mL    Use as needed - takes about 30 units per day    Diabetes mellitus type 1 (H)       * insulin aspart 100 UNITS/ML injection    NovoLOG VIAL    80 vial    Pt takes 80 units daily in pump - pt also needs humalog pens for travel, Disp-80    Diabetes mellitus type 1 (H)       * Notice:  This list has 2 medication(s) that are the same as other medications prescribed for you. Read the directions carefully, and ask your doctor or other care provider to review them with you.

## 2018-11-09 NOTE — LETTER
11/9/2018       RE: Shannon Sow  [address] [place] [address]     Dear Colleague,    Thank you for referring your patient, Shannon Sow, to the [facility] ENDOCRINOLOGY at [facility]. Please see a copy of my visit note below.    [facility]  Endocrinology  Jemma Anderson MD  11/09/2018      Chief Complaint:   Diabetes    History of Present Illness:   Shannon Sow is a 43 year old female who presents for follow up of diabetes.    #1 Type 1 diabetes mellitus  The patient was diagnosed with diabetes in 1992.  She was on Lantus and Humalog from approximately 2000 - May 2009 when she started on an insulin pump.  As of November 2013, she has been on a newer Medtronic pump with low glucose suspend.  She started using the Freestyle Azam CGMS system in February 2018.  Her A1c level in the past 5 years have generally been in the high 6% - mid 7% range.  June 2018 hemoglobin A1c was 6.9%.    Interval history:  She likes the Freestyle Azam but wishes she could put it somewhere besides her arm as it sometimes gets pulled off when her arm brushes against something like a doorframe in her house.  She does really like being able to see the trend of her blood sugars.  She tends to spike after meals and then will dip in the afternoons.  Her breakfast is probably her heaviest carb meal, being typically yogurt and toast.  She does eat late at night with dinner being 7 - 9 pm with meal heavy on protein and vegetables.      Blood Glucose Monitoring:  We reviewed CGM data together.  Average glucose 161 with standard deviation of 59.6.  46% above range, 48% in range, and 6% below range.  Her CG MS is most significant for prandial hyperglycemia as well as hypoglycemia particularly in the later afternoons as well as overnight hyperglycemia.    Current Insulin Pump Settings:  Type of Pump: Medtronic Minimed: Model 530G    BASAL RATES and times:  12   AM  (midnight): 0.65 units/hour    8     AM: 0.9 units/hour   11:30  AM: 1.4 units/hour   2:30    PM: 0.85 units/hour   7    PM: 1.4 units/hour      10   PM: 0.65 units/hour     Carb ratio:   CARB RATIO and times:  12   AM (midnight): 1 unit per 12 g carbohydrates  7     AM:  1 unit per 7 g carbohydrates   10:30 PM:  1 unit per 12 g carbohydrates     Correction Factor (Sensitivity) and times:  12   AM (midnight): 50 mg/dL  7     AM: 40 mg/dL  10   PM: 50 mg/dL    Target BG Ranges:   BLOOD GLUCOSE TARGET and times:  12   AM (midnight): 120 - 130    Amount of Time Insulin is Active:  4 hrs    Diabetes monitoring and complications:  CAD: No  Last eye exam results: 12/05/2017, bilateral mild/moderate nonproliferative diabetic retinopathy, trace diabetic macular edema left > right  Microalbuminuria: negative 11/2018  HTN: No  On Statin: No  On Aspirin: No  Depression: Yes, on Fluoxetine    #2 Subarachnoid hemorrhage   She was hospitalized at [facility] 8/2 - 8/9/18 for a subarachnoid hemorrhage after presenting with a sudden onset headache, nausea, and vomiting.  She had no complications.  She just saw Dr. Jacob in neurology this morning who discussed with her that she is at low risk for any recurrent issues (2 - 5 % of recurrence).  She has no history of hypertension.  She used to go to yoga weekly however has not in the past 6 months since it has not fit in her schedule.  She does want to get back to this or walking.  She is going to see Dr. Regan in neurosurgery later this month just to make sure he does not see anything different on the imaging she had while in the hospital.      Review of Systems:   Pertinent items are noted in HPI.  All other systems are negative.    Active Medications:      BASAGLAR 100 UNIT/ML injection, INJECT 22 UNITS SUBCUTANEOUSLY DAILY, Disp: 30 mL, Rfl: 3     cholecalciferol (VITAMIN D) 1000 UNIT tablet, Take 1 tablet (1,000 Units) by mouth daily, Disp: 90 tablet, Rfl: 3      "FLUoxetine (PROZAC) 40 MG capsule, Take 1 capsule (40 mg) by mouth daily, Disp: 90 capsule, Rfl: 3     glucagon (GLUCAGON EMERGENCY) 1 MG kit, Inject 1 mg into the muscle once for 1 dose, Disp: 1 mg, Rfl: 0     insulin aspart (NOVOLOG VIAL) 100 UNITS/ML injection, Pt takes 80 units daily in pump - pt also needs humalog pens for travel, Disp-80, Disp: 80 vial, Rfl: 3     insulin pen needle (B-D U/F) 31G X 5 MM, Use if pump fails 4-5 times daily, Disp: 100 each, Rfl: 1     Multiple Vitamins-Minerals (MULTIVITAMIN OR), Take by mouth daily , Disp: , Rfl:      NOVOLOG FLEXPEN 100 UNIT/ML soln, Use as needed - takes about 30 units per day, Disp: 15 mL, Rfl: 3     triamcinolone (KENALOG) 0.1 % ointment, Use as needed for rash - take twice daily, Disp: 250 g, Rfl: 1     ipratropium (ATROVENT) 0.03 % nasal spray, Spray 2 sprays into both nostrils every 12 hours (Patient not taking: Reported on 11/9/2018), Disp: 2 Box, Rfl: 1      Allergies:   No known drug allergies.       Past Medical History:  Type 1 diabetes mellitus  Vitamin deficiency  Subarachnoid hemorrhage   Cerebral salt-wasting syndrome   Recurrent major depression, in partial remission     Past Surgical History:  History reviewed. No pertinent past surgical history.     Family History:   Breast cancer - mother      Social History:   Single   Presents to clinic alone.  Tobacco Use: No previous or current tobacco use.   Alcohol Use: No alcohol use.   PCP: Jemma Anderson      Physical Exam:   /72  Pulse 72  SpO2 98%  Height 1.588 m (5' 2.5\") \ Weight 75.2 kg (165 lb 12.8 oz)     Wt Readings from Last 4 Encounters:   11/09/18 75.2 kg (165 lb 12.8 oz)   06/15/18 74.4 kg (164 lb 1.6 oz)   02/23/18 73.3 kg (161 lb 9.6 oz)   09/08/17 74.6 kg (164 lb 6.4 oz)       GENERAL APPEARANCE: Alert and no distress  NECK: No lymphadenopathy appreciated  Thyroid: No obvious nodules palpated   CV: RRR without M/R/G  Lungs: CTA bilaterally  Abdomen: Soft, Nontender, non " distended, positive bowel sounds   Neuro: no focal deficits  Skin: No infection in feet   Diabetic foot exam: normal DP and PT pulses, no trophic changes or ulcerative lesions and normal monofilament exam   Mood: Normal   Lymph: neg in neck and supraclavicular area       Data:  Lab Results   Component Value Date     11/09/2018    POTASSIUM 4.1 11/09/2018    CHLORIDE 102 11/09/2018    CO2 27 11/09/2018    ANIONGAP 6 11/09/2018     (H) 11/09/2018    BUN 13 11/09/2018    CR 0.80 11/09/2018    OMERO 8.7 11/09/2018     Lab Results   Component Value Date    GFRESTIMATED 79 11/09/2018    GFRESTIMATED >90 08/25/2017    GFRESTIMATED >90  Non  GFR Calc   04/08/2016    GFRESTBLACK >90 11/09/2018    GFRESTBLACK >90 08/25/2017    GFRESTBLACK >90   GFR Calc   04/08/2016      Lab Results   Component Value Date    MICROL <5 11/09/2018    UMALCR Unable to calculate due to low value 11/09/2018      Lab Results   Component Value Date    A1C 7.5 (H) 11/01/2013    A1C 7.5 (A) 07/26/2013    A1C 7.4 (A) 03/01/2013    A1C 7.7 (A) 11/30/2012    A1C 7.3 (A) 10/05/2012    HEMOGLOBINA1 6.9 (A) 06/15/2018    HEMOGLOBINA1 6.8 (A) 02/23/2018    HEMOGLOBINA1 7.2 (A) 08/25/2017    HEMOGLOBINA1 6.8 (A) 04/28/2017    HEMOGLOBINA1 6.4 (A) 12/23/2016     Lab Results   Component Value Date    CHOL 183 11/09/2018    CHOL 172 08/25/2017    TRIG 36 11/09/2018    TRIG 35 08/25/2017    HDL 83 11/09/2018    HDL 94 08/25/2017    LDL 93 11/09/2018    LDL 72 08/25/2017    NHDL 100 11/09/2018    NHDL 79 08/25/2017       Assessment and Plan:  #1 Type 1 diabetes mellitus without complication (H)  Hemoglobin A1c is pending however her recent blood sugars are generally good.  She is still having some postprandial hyperglycemia therefore we adjusted her carbohydrate ratio during the day for better coverage.  Also decreased her basal rate in the afternoon as she tends to go slightly low at that time.  Reviewed resources to  help with sensor adhesion.    - OPHTHALMOLOGY ADULT REFERRAL     Current Settings: New Settings (changes bolded):   BASAL RATES and times:  12   AM (midnight): 0.65 units/hour    8     AM: 0.9 units/hour   11:30  AM: 1.4 units/hour   2:30    PM: 0.85 units/hour   7    PM: 1.4 units/hour      10   PM: 0.65 units/hour    BASAL RATES and times:  12   AM (midnight): 0.7 units/hour    8     AM: 0.9 units/hour   11:30  AM: 1.4 units/hour   2:30    PM: 0.7 units/hour   7    PM: 1.4 units/hour      10   PM: 0.7 units/hour    Carb ratio:   CARB RATIO and times:  12   AM (midnight): 1 unit per 12 g carbohydrates  7     AM:  1 unit per 7 g carbohydrates   10:30 PM:  1 unit per 12 g carbohydrates  Carb ratio:   CARB RATIO and times:  12   AM (midnight): 1 unit per 12 g carbohydrates  7     AM:  1 unit per 6.5 g carbohydrates   10:30 PM:  1 unit per 12 g carbohydrates      Correction Factor (Sensitivity) and times:  12   AM (midnight): 50 mg/dL  7     AM: 40 mg/dL  10   PM: 50 mg/dL   Correction Factor (Sensitivity) and times:  12   AM (midnight): 50 mg/dL  7     AM: 40 mg/dL  10   PM: 50 mg/dL     Target BG Ranges:   BLOOD GLUCOSE TARGET and times:  12   AM (midnight): 120 - 130   Target BG Ranges:   BLOOD GLUCOSE TARGET and times:  12   AM (midnight): 120 - 130      #2 History of subarachnoid hemorrhage  Patient seen in neurology and neurosurgery.  Dietary lifestyle recommendations.  Recommended that patient work on reducing weight by considering intermittent fasting, reducing caffeine intake,, and increasing her exercise (particularly yoga)     Follow-up: Return in about 4 months (around 3/9/2019).     >50% of 30 minute visit spent in face to face counseling, education and coordination of care related to options for better glycemic control as well as preventing, detecting, and treating hypoglycemia.         Scribe Disclosure:   I, Jazmyn Sow, am serving as a scribe to document services personally performed by Jemma Peters  MD Justin at this visit, based upon the provider's statements to me. All documentation has been reviewed by the aforementioned provider prior to being entered into the official medical record.     Portions of this medical record were completed by a scribe. UPON MY REVIEW AND AUTHENTICATION BY ELECTRONIC SIGNATURE, this confirms (a) I performed the applicable clinical services, and (b) the record is accurate.       --    Orders Only on 11/09/2018   Component Date Value Ref Range Status     Sodium 11/09/2018 135  133 - 144 mmol/L Final     Potassium 11/09/2018 4.1  3.4 - 5.3 mmol/L Final     Chloride 11/09/2018 102  94 - 109 mmol/L Final     Carbon Dioxide 11/09/2018 27  20 - 32 mmol/L Final     Anion Gap 11/09/2018 6  3 - 14 mmol/L Final     Glucose 11/09/2018 336* 70 - 99 mg/dL Final     Urea Nitrogen 11/09/2018 13  7 - 30 mg/dL Final     Creatinine 11/09/2018 0.80  0.52 - 1.04 mg/dL Final     GFR Estimate 11/09/2018 79  >60 mL/min/1.7m2 Final    Non  GFR Calc     GFR Estimate If Black 11/09/2018 >90  >60 mL/min/1.7m2 Final    African American GFR Calc     Calcium 11/09/2018 8.7  8.5 - 10.1 mg/dL Final     TSH 11/09/2018 0.57  0.40 - 4.00 mU/L Final     Cholesterol 11/09/2018 183  <200 mg/dL Final     Triglycerides 11/09/2018 36  <150 mg/dL Final     HDL Cholesterol 11/09/2018 83  >49 mg/dL Final     LDL Cholesterol Calculated 11/09/2018 93  <100 mg/dL Final    Desirable:       <100 mg/dl     Non HDL Cholesterol 11/09/2018 100  <130 mg/dL Final     Creatinine Urine 11/09/2018 32  mg/dL Final     Albumin Urine mg/L 11/09/2018 <5  mg/L Final     Albumin Urine mg/g Cr 11/09/2018 Unable to calculate due to low value  0 - 25 mg/g Cr Final     Hemoglobin A1C 11/09/2018 7.2* 0 - 5.6 % Final    Comment: Normal <5.7% Prediabetes 5.7-6.4%  Diabetes 6.5% or higher - adopted from ADA   consensus guidelines.             Jemma Anderson MD

## 2018-11-09 NOTE — PATIENT INSTRUCTIONS
Read about intermittent fasting - try once per week - just run on basal, may need to reduce basal if needed    Work on yoga    Work on reducing caffeine

## 2018-11-09 NOTE — LETTER
"2018       RE: Shannon Sow  [address] [place] [address]     Dear Colleague,    Thank you for referring your patient, Shannon Sow, to the [facility] NEUROLOGY at [facility]. Please see a copy of my visit note below.    Service Date: 2018      Jemma Anderson MD   [facility]    [address], [facility] [address]   [place] [address]      RE: Shannon Sow   MRN: [id number]   : 1975      Dear Dr. Anderson:      Thank you for referring Shannon Sow for neurologic consultation on 2018.  The patient is a 43-year-old woman your kind enough to refer with the chief complaint of a subarachnoid hemorrhage.      The patient said that she was just starting her school year.  This was on 2018.  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  She was just starting a new student lecture.  She was looking down and she said this was stressless.  She developed an instant severe headache.  This started in the central low occipital area and then rapidly became holocephalic.  She said that the pain was terrible, but she did continue to try to talk.  She noted that if she tried to move her neck, her neck was stiff.  She did end up going home and then went to the emergency room and was admitted.  This was at [facility].  She was there from  to 2018.  I was able to review with her through the [employer] website the hospitalization.  The patient was found there to have had a subarachnoid hemorrhage.  She saw a number of neurologists during that visit.  She did note that she gradually had improvement in her headache, but still has a tender area involving the occipital part of her scalp where her headache started.  She described this as a soreness and typically it is about 1/10.  She said it is just more noticeable now and she said sometimes it is up to a \"3-4/10\".  She said this " comes with overexertion/overstimulation.  She described this as going to a movie with a large screen or being exposed to loud noises such as her son's videogames.  She has not had recurrent headache, though with coughing, sneezing, straining, bending, lifting or intercourse.  The patient was found to be hypertensive in the hospital.  She was treated for this and this included nimodipine.  She was on it for 3 weeks.  She has continued her other medications and this includes vitamin D, insulin for longstanding type 1 diabetes, Prozac 40 mg for what she has been on for the last 17 years, multivitamin and p.r.n. Atrovent.  She had a number of scans done.  These included CT scans and CT angiograms.  The CT scans of the head did show a small amount of prepontine cisternal blood.  There were the same findings on MRI scan.  She did have negative CT angiogram on 08/02/2018 and then what appears to be 2 follow up intracranial angiograms.  One was done on 08/03/2018 and one on 08/0/2018.  She also had a negative transcranial Doppler on 08/05 and 08/07/2018 and had no evidence of vasospasm.  Her INR was 1.2 and her PTT 36.2.  Her platelet count was 229,000.  She had a normal complete blood count and did have a number of repeat complete blood counts that were unremarkable as well as chemistry profiles and she had negative troponins.  The patient did not recall any previous history of head trauma and she did not have any type of obvious inciting factors that she could recall regarding the headache and subarachnoid hemorrhage.  She does not recall any type of unusual activity that day.  She does drink some red wine, but not to excess.  The patient has not been subject to headaches in the past.  She did not recall unusual issues such as decreased sleep, stress, glare, or change in caffeine use.  She drinks 20 ounces of Diet Coke and some green tea per day.  She did note that during the hospital, she did have a period starting.  She  has had some headaches in the past with her period, but not what she would describe as migraine.  The patient did note that she had nausea and vomiting the first day, but not after that.      The patient was diagnosed with diabetes mellitus in .  She has a Medtronic pump.  She has had some type of insulin pump since .  The patient has also had some bouts of depression in the past but it is well controlled with Prozac.  She has also a history of vitamin D deficiency.  She is not planning on having further children.  The patient has 1 healthy 10-year-old.  She had a .  She had no other surgical history.  She did note that her mother is well at 67 and has had a lumpectomy and radiation treatment for breast cancer with no recurrence.  There is no family history of migraine or neurologic issues.  The patient had not been on any aspirin or any antiplatelet therapy at the time of the event.  In terms of her diabetes, she said she is well controlled and she has no history of renal involvement nor neuropathy.  She has had some mild to moderate nonproliferative diabetic retinopathy but has not required any laser treatment, and she said her vision is good.  She has never had any type of visual aura, loss of vision, diplopia, focal or generalized weakness, paresthesias, nor dysequilibrium.  She does not smoke.  She has never used any illicit drugs including marijuana.  She drinks 1-3 glasses of wine per day typically.  The patient has not drunk to excess.  She has no listed drug allergies.  Her labs were recorded in your last visit on 06/15/2018.  I went over these with her.      Neurologic examination revealed a pleasant woman.  Her blood pressure was 115/72 with a pulse of 72.  Gait, station, cerebellar testing, muscle stretch reflexes, plantar stimulation, strength, cranial nerve examination, superficial cortical sensory testing are unremarkable.  She had a supple neck.  She had normal range of motion of her  neck.  She did not have any occipital notch tenderness.  She had no decreased sensation in the bicipital sensory distributions.      IMPRESSION:   1.  Subarachnoid hemorrhage.   2.  Mild residual headaches since.      The patient did come with incomplete medical records.  She had none of her scans nor her angiograms present today.  She is going to obtain these from [facility].  I have asked that these formally be reviewed by Dr. Regan in our Neurosurgical Department.  I have referred her to Dr. Regan, who is an expert in subarachnoid hemorrhages.  Her headache is minimal and does not require any type of treatment at this time.  I did ask that she have neurologic follow up with me once she sees Dr. Regan to go over all of the studies that were done in the hospital and to see if he would suggest anything further in terms of evaluation.  I did talk with her about how excessive wine could lead to headache as well as caffeine and did caution her about caffeinated beverages and red wine and foods in general that can lead to vascular headaches.      I did caution the patient on extreme heavy lifting.  I also reviewed with her any type of activities that could lead to increase in intracranial pressure and to avoid severe constipation or obstipation.      I spent 50 minutes with the patient today.  Over 50% of the time this involved counseling and coordination of care.  A complete review of medical systems was done and the positive review is listed in the report above.         D: 2018   T: 2018   MT: AKA      Name:     LINO POWER   MRN:      [id number]        Account:      [id number]   :      1975           Service Date: 2018      Document: [id number]       Again, thank you for allowing me to participate in the care of your patient.      Sincerely,    Reji Jacob MD

## 2018-11-12 LAB
DEPRECATED CALCIDIOL+CALCIFEROL SERPL-MC: <37 UG/L (ref 20–75)
TTG IGA SER-ACNC: <1 U/ML
TTG IGG SER-ACNC: <1 U/ML
VITAMIN D2 SERPL-MCNC: <5 UG/L
VITAMIN D3 SERPL-MCNC: 32 UG/L

## 2018-11-13 NOTE — PROGRESS NOTES
"Service Date: 2018      Jemma Anderson MD   [facility]    [address], [facility] [address]   [place] [address]      RE: Shannon Sow   MRN: [id number]   : 1975      Dear Dr. Anderson:      Thank you for referring Shannon Sow for neurologic consultation on 2018.  The patient is a 43-year-old woman your kind enough to refer with the chief complaint of a subarachnoid hemorrhage.      The patient said that she was just starting her school year.  This was on 2018.  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  She was looking down and she said this was stressless.  She developed an instant severe headache.  This started in the central low occipital area and then rapidly became holocephalic.  She said that the pain was terrible, but she did continue to try to talk.  She noted that if she tried to move her neck, her neck was stiff.  She did end up going home and then went to the emergency room and was admitted.  This was at [facility].  She was there from  to 2018.  I was able to review with her through the [employer] website the hospitalization.  The patient was found there to have had a subarachnoid hemorrhage.  She saw a number of neurologists during that visit.  She did note that she gradually had improvement in her headache, but still has a tender area involving the occipital part of her scalp where her headache started.  She described this as a soreness and typically it is about 1/10.  She said it is just more noticeable now and she said sometimes it is up to a \"3-4/10\".  She said this comes with overexertion/overstimulation.  She described this as going to a movie with a large screen or being exposed to loud noises such as her son's videogames.  She has not had recurrent headache, though with coughing, sneezing, straining, bending, lifting or intercourse.  The patient was " found to be hypertensive in the hospital.  She was treated for this and this included nimodipine.  She was on it for 3 weeks.  She has continued her other medications and this includes vitamin D, insulin for longstanding type 1 diabetes, Prozac 40 mg for what she has been on for the last 17 years, multivitamin and p.r.n. Atrovent.  She had a number of scans done.  These included CT scans and CT angiograms.  The CT scans of the head did show a small amount of prepontine cisternal blood.  There were the same findings on MRI scan.  She did have negative CT angiogram on 08/02/2018 and then what appears to be 2 follow up intracranial angiograms.  One was done on 08/03/2018 and one on 08/0/2018.  She also had a negative transcranial Doppler on 08/05 and 08/07/2018 and had no evidence of vasospasm.  Her INR was 1.2 and her PTT 36.2.  Her platelet count was 229,000.  She had a normal complete blood count and did have a number of repeat complete blood counts that were unremarkable as well as chemistry profiles and she had negative troponins.  The patient did not recall any previous history of head trauma and she did not have any type of obvious inciting factors that she could recall regarding the headache and subarachnoid hemorrhage.  She does not recall any type of unusual activity that day.  She does drink some red wine, but not to excess.  The patient has not been subject to headaches in the past.  She did not recall unusual issues such as decreased sleep, stress, glare, or change in caffeine use.  She drinks 20 ounces of Diet Coke and some green tea per day.  She did note that during the hospital, she did have a period starting.  She has had some headaches in the past with her period, but not what she would describe as migraine.  The patient did note that she had nausea and vomiting the first day, but not after that.      The patient was diagnosed with diabetes mellitus in 1992.  She has a Medtronic pump.  She has had  some type of insulin pump since .  The patient has also had some bouts of depression in the past but it is well controlled with Prozac.  She has also a history of vitamin D deficiency.  She is not planning on having further children.  The patient has 1 healthy 10-year-old.  She had a .  She had no other surgical history.  She did note that her mother is well at 67 and has had a lumpectomy and radiation treatment for breast cancer with no recurrence.  There is no family history of migraine or neurologic issues.  The patient had not been on any aspirin or any antiplatelet therapy at the time of the event.  In terms of her diabetes, she said she is well controlled and she has no history of renal involvement nor neuropathy.  She has had some mild to moderate nonproliferative diabetic retinopathy but has not required any laser treatment, and she said her vision is good.  She has never had any type of visual aura, loss of vision, diplopia, focal or generalized weakness, paresthesias, nor dysequilibrium.  She does not smoke.  She has never used any illicit drugs including marijuana.  She drinks 1-3 glasses of wine per day typically.  The patient has not drunk to excess.  She has no listed drug allergies.  Her labs were recorded in your last visit on 06/15/2018.  I went over these with her.      Neurologic examination revealed a pleasant woman.  Her blood pressure was 115/72 with a pulse of 72.  Gait, station, cerebellar testing, muscle stretch reflexes, plantar stimulation, strength, cranial nerve examination, superficial cortical sensory testing are unremarkable.  She had a supple neck.  She had normal range of motion of her neck.  She did not have any occipital notch tenderness.  She had no decreased sensation in the bicipital sensory distributions.      IMPRESSION:   1.  Subarachnoid hemorrhage.   2.  Mild residual headaches since.      The patient did come with incomplete medical records.  She had none of  her scans nor her angiograms present today.  She is going to obtain these from [facility].  I have asked that these formally be reviewed by Dr. Regan in our Neurosurgical Department.  I have referred her to Dr. Regan, who is an expert in subarachnoid hemorrhages.  Her headache is minimal and does not require any type of treatment at this time.  I did ask that she have neurologic follow up with me once she sees Dr. Regan to go over all of the studies that were done in the hospital and to see if he would suggest anything further in terms of evaluation.  I did talk with her about how excessive wine could lead to headache as well as caffeine and did caution her about caffeinated beverages and red wine and foods in general that can lead to vascular headaches.      I did caution the patient on extreme heavy lifting.  I also reviewed with her any type of activities that could lead to increase in intracranial pressure and to avoid severe constipation or obstipation.      Sincerely yours,       Lukas Jacob MD      I spent 50 minutes with the patient today.  Over 50% of the time this involved counseling and coordination of care.  A complete review of medical systems was done and the positive review is listed in the report above.         LUKAS JACBO MD             D: 2018   T: 2018   MT: AKA      Name:     LINO POWER   MRN:      [id number]        Account:      [id number]   :      1975           Service Date: 2018      Document: [id number]

## 2018-11-13 NOTE — PROGRESS NOTES
Dear Shannon    Here are your labs.  We will working on improving your blood sugar control.  Everything else including thyroid, cholesterol, urine for protein, vitamin D, celiac disease were all normal.    If you have any questions, please feel free to contact my nurse at [phone] select option #3 for triage nurse  or  option #1 for scheduling related questions.    Regards    Jemma Anderson MD

## 2018-11-20 ENCOUNTER — OFFICE VISIT (OUTPATIENT)
Dept: NEUROSURGERY | Facility: CLINIC | Age: 43
End: 2018-11-20
Payer: COMMERCIAL

## 2018-11-20 VITALS
OXYGEN SATURATION: 98 % | DIASTOLIC BLOOD PRESSURE: 73 MMHG | BODY MASS INDEX: 29.41 KG/M2 | HEIGHT: 63 IN | HEART RATE: 91 BPM | WEIGHT: 166 LBS | SYSTOLIC BLOOD PRESSURE: 118 MMHG

## 2018-11-20 DIAGNOSIS — I60.9 SAH (SUBARACHNOID HEMORRHAGE) (H): Primary | ICD-10-CM

## 2018-11-20 ASSESSMENT — ENCOUNTER SYMPTOMS
HEADACHES: 1
MYALGIAS: 1
MUSCLE CRAMPS: 1
SINUS PAIN: 1
BACK PAIN: 1
NECK PAIN: 1
SINUS CONGESTION: 1

## 2018-11-20 ASSESSMENT — PAIN SCALES - GENERAL: PAINLEVEL: MILD PAIN (2)

## 2018-11-20 NOTE — PATIENT INSTRUCTIONS
Thank you for choosing [facility] for your care.      F/u with Dr. Regan as needed. Please call Jia Castañeda RN at [phone] if you have new or worsening symptoms and/or questions or concerns.       Thank you for trusting us with your care.

## 2018-11-20 NOTE — LETTER
11/20/2018       RE: Shannon Sow  [address] [place] [address]     Dear Colleague,    Thank you for referring your patient, Shannon Sow, to the [facility] NEUROSURGERY at [facility]. Please see a copy of my visit note below.    Dear Dr. Jacob,     Thank you for the opportunity to see Ms. Shannon Sow in neurosurgery clinic today.  As you know, the patient was recently hospitalized at [facility] for concern of subarachnoid hemorrhage after developing severe headache.  The subarachnoid hemorrhage was located in her pre-pontine cistern.  For this reason, the patient was treated with nimodipine for 21 days, and underwent to cerebral angiograms dated August 3, 2018 and August 8, 2018.  Both angiograms were stated to be negative for vascular abnormalities.  On review of systems today, the patient states no neurologic concerns at this time.    The patient's medical history is most notable for diabetes requiring insulin pump.  She also has a history of depression and vitamin D deficiency.  Patient's family history is notable for breast cancer.  The patient does not smoke.  She drinks alcohol occasionally.  With respect to social history, the patient's  is the owner of [employer] who is himself the son of the owners of [employer] and [employer].  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     On physical examination, the patient is awake and alert and in no acute distress.  Her cranial nerves are symmetric.  Her extraocular muscles are intact.  The patient has no dysarthria.  She has no aphasia.  The patient has 5 out of 5 strength in bilateral upper and lower extremities.  She has no pronator drift evident.  The patient is able to ambulate without difficulty.    Her cerebral angiogram from August 8, 2018 was reviewed with her.  The images were available and exceptions.  There is no evidence of aneurysm  "on these images.    Overall, given the extensive workup performed at [facility] and her overall clinical status today, her sub-arachnoid hemorrhage is suspected to be non-aneurysmal in origin.  She should follow up in our clinic on an as needed basis.     Patient was seen and discussed with Job Regan MD.     Juanjose \"Nate\" MD Dorcas   Neurosurgery, PGY-2        Again, thank you for allowing me to participate in the care of your patient.      Sincerely,    Job Regan MD      "

## 2018-11-20 NOTE — MR AVS SNAPSHOT
After Visit Summary   11/20/2018    Shannon Sow    MRN: [id number]           Patient Information     Date Of Birth          1975        Visit Information        Provider Department      11/20/2018 2:00 PM Job Regan MD [facility] Neurosurgery        Today's Diagnoses     SAH (subarachnoid hemorrhage) (H)    -  1      Care Instructions    Thank you for choosing [facility] for your care.      F/u with Dr. Regan as needed. Please call Jia Castañeda RN at [phone] if you have new or worsening symptoms and/or questions or concerns.       Thank you for trusting us with your care.             Follow-ups after your visit        Follow-up notes from your care team     Return if symptoms worsen or fail to improve.      Your next 10 appointments already scheduled     Dec 04, 2018  2:00 PM CST   (Arrive by 1:45 PM)   NEW GENERAL with Job Gonzalez OD   [facility] Ophthalmology ([facility])    [address] [facility]  [address] [facility] [address] [phone]            Mar 29, 2019 12:30 PM CDT   (Arrive by 12:15 PM)   RETURN DIABETES with Jemma Anderson MD   [facility] Endocrinology ([facility])    [address] [place] [address]  [address] [facility] [address] [phone]              Who to contact     Please call your clinic at [phone] to:    Ask questions about your health    Make or cancel appointments    Discuss your medicines    Learn about your test results    Speak to your doctor            Additional Information About Your Visit        MyChart Information     [employer] is an electronic gateway that provides easy, online access to your medical records. With [email or web address], you can request a clinic appointment, read your test results, renew a prescription or communicate with your care team.     To sign up for [employer] visit the website at [email or web address]   You will be asked to enter the access code listed below, as  "well as some personal information. Please follow the directions to create your username and password.     Your access code is: [email or web address]  Expires: 2019  5:30 AM     Your access code will  in 90 days. If you need help or a new code, please contact your [facility] or call [phone] for assistance.        Care EveryWhere ID     This is your Care EveryWhere ID. This could be used by other organizations to access your [facility] medical records  [id number]        Your Vitals Were     Pulse Height Last Period Pulse Oximetry BMI (Body Mass Index)       91 1.588 m (5' 2.5\") 2018 98% 29.88 kg/m2        Blood Pressure from Last 3 Encounters:   18 118/73   18 115/72   06/15/18 116/77    Weight from Last 3 Encounters:   18 75.3 kg (166 lb)   18 75.2 kg (165 lb 12.8 oz)   06/15/18 74.4 kg (164 lb 1.6 oz)              Today, you had the following     No orders found for display       Primary Care Provider Office Phone # Fax #    Jemma Anderson [phone]       [address] [place] [address]        Equal Access to Services     BESSY ROSAS AH: Hadii tee ku hadasho Soomaali, waaxda luqadaha, qaybta kaalmada adeegyada, jeremías traylor hayfredo wild . So [facility] [phone].    ATENCIÓN: Si habla español, tiene a abebe disposición servicios gratuitos de asistencia lingüística. Halina al [phone].    We comply with applicable federal civil rights laws and [place] laws. We do not discriminate on the basis of race, color, national origin, age, disability, sex, sexual orientation, or gender identity.            Thank you!     Thank you for choosing [facility]  for your care. Our goal is always to provide you with excellent care. Hearing back from our patients is one way we can continue to improve our services. Please take a few minutes to complete the written survey that you may receive in the mail after " your visit with us. Thank you!             Your Updated Medication List - Protect others around you: Learn how to safely use, store and throw away your medicines at www.disposemymeds.org.          This list is accurate as of 11/20/18 11:59 PM.  Always use your most recent med list.                   Brand Name Dispense Instructions for use Diagnosis    blood glucose monitoring test strip    no brand specified    6 Box    Test up to 6 times daily    DM type 1 (diabetes mellitus, type 1) (H)       cholecalciferol 1000 UNIT tablet    vitamin D3    90 tablet    Take 1 tablet (1,000 Units) by mouth daily    Unspecified vitamin deficiency       continuous blood glucose monitoring sensor     3 each    For use with Freestyle Azam Flash  for continuous monitioring of blood glucose levels. Replace sensor every 10 days.    Type 1 diabetes mellitus with complications (H)       FLUoxetine 40 MG capsule    PROZAC    90 capsule    Take 1 capsule (40 mg) by mouth daily    Anxiety       FREESTYLE AZAM READER Atiya     1 Device    1 Application as needed    Type 1 diabetes mellitus with complications (H)       glucagon 1 MG kit    GLUCAGON EMERGENCY    1 mg    Inject 1 mg into the muscle once for 1 dose    Type 1 diabetes mellitus without complication (H)       insulin glargine 100 UNIT/ML pen     30 mL    INJECT 22 UNITS SUBCUTANEOUSLY DAILY    Type 1 diabetes mellitus with complications (H)       insulin pen needle 31G X 5 MM miscellaneous    B-D U/F    100 each    Use if pump fails 4-5 times daily    Type 1 diabetes mellitus (H)       MULTIVITAMIN PO      Take by mouth daily        * NovoLOG FLEXPEN 100 UNIT/ML pen   Generic drug:  insulin aspart     15 mL    Use as needed - takes about 30 units per day    Diabetes mellitus type 1 (H)       * insulin aspart 100 UNITS/ML vial    NovoLOG VIAL    80 vial    Pt takes 80 units daily in pump - pt also needs humalog pens for travel, Disp-80    Diabetes mellitus type 1 (H)        * Notice:  This list has 2 medication(s) that are the same as other medications prescribed for you. Read the directions carefully, and ask your doctor or other care provider to review them with you.

## 2018-11-20 NOTE — NURSING NOTE
Chief Complaint   Patient presents with     RECHECK     [facility]       Tanvi Virk MA

## 2018-11-22 NOTE — PROGRESS NOTES
Dear Dr. Jacob,     Thank you for the opportunity to see Ms. Shannon Sow in neurosurgery clinic today.  As you know, the patient was recently hospitalized at [facility] for concern of subarachnoid hemorrhage after developing severe headache.  The subarachnoid hemorrhage was located in her pre-pontine cistern.  For this reason, the patient was treated with nimodipine for 21 days, and underwent to cerebral angiograms dated August 3, 2018 and August 8, 2018.  Both angiograms were stated to be negative for vascular abnormalities.  On review of systems today, the patient states no neurologic concerns at this time.    The patient's medical history is most notable for diabetes requiring insulin pump.  She also has a history of depression and vitamin D deficiency.  Patient's family history is notable for breast cancer.  The patient does not smoke.  She drinks alcohol occasionally.  With respect to social history, the patient's  is the owner of [employer] who is himself the son of the owners of [employer] and [employer].  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     On physical examination, the patient is awake and alert and in no acute distress.  Her cranial nerves are symmetric.  Her extraocular muscles are intact.  The patient has no dysarthria.  She has no aphasia.  The patient has 5 out of 5 strength in bilateral upper and lower extremities.  She has no pronator drift evident.  The patient is able to ambulate without difficulty.    Her cerebral angiogram from August 8, 2018 was reviewed with her.  The images were available and exceptions.  There is no evidence of aneurysm on these images.    Overall, given the extensive workup performed at [facility] and her overall clinical status today, her sub-arachnoid hemorrhage is suspected to be non-aneurysmal in origin.  She should follow up in our clinic on an as needed basis.     Patient was seen and discussed with  "Job Regan MD.     Juanjose \"Nate\" MD Dorcas   Neurosurgery, PGY-2      "

## 2018-12-04 ENCOUNTER — OFFICE VISIT (OUTPATIENT)
Dept: OPHTHALMOLOGY | Facility: CLINIC | Age: 43
End: 2018-12-04
Payer: COMMERCIAL

## 2018-12-04 DIAGNOSIS — H26.9 CORTICAL CATARACT OF BOTH EYES: ICD-10-CM

## 2018-12-04 DIAGNOSIS — H52.13 MYOPIA OF BOTH EYES: ICD-10-CM

## 2018-12-04 DIAGNOSIS — E11.3293 MILD NONPROLIFERATIVE DIABETIC RETINOPATHY OF BOTH EYES WITHOUT MACULAR EDEMA ASSOCIATED WITH TYPE 2 DIABETES MELLITUS (H): Primary | ICD-10-CM

## 2018-12-04 ASSESSMENT — VISUAL ACUITY
METHOD: SNELLEN - LINEAR
OD_CC: J2
CORRECTION_TYPE: GLASSES
OD_CC: 20/20
OS_CC: 20/20
OS_CC: J2

## 2018-12-04 ASSESSMENT — REFRACTION_WEARINGRX
OD_SPHERE: -1.25
OS_CYLINDER: SPHERE
SPECS_TYPE: SVL
OS_SPHERE: -1.00
OD_CYLINDER: SPHERE

## 2018-12-04 ASSESSMENT — EXTERNAL EXAM - RIGHT EYE: OD_EXAM: NORMAL

## 2018-12-04 ASSESSMENT — REFRACTION_MANIFEST
OD_ADD: +1.25
OS_SPHERE: -0.50
OD_AXIS: 150
OS_CYLINDER: SPHERE
OD_CYLINDER: +0.25
OS_ADD: +1.25
OD_SPHERE: -1.00

## 2018-12-04 ASSESSMENT — CONF VISUAL FIELD
OD_NORMAL: 1
METHOD: COUNTING FINGERS
OS_NORMAL: 1

## 2018-12-04 ASSESSMENT — CUP TO DISC RATIO
OS_RATIO: 0.45
OD_RATIO: 0.45

## 2018-12-04 ASSESSMENT — SLIT LAMP EXAM - LIDS
COMMENTS: NORMAL
COMMENTS: NORMAL

## 2018-12-04 ASSESSMENT — TONOMETRY
OS_IOP_MMHG: 16
IOP_METHOD: ICARE
OD_IOP_MMHG: 15

## 2018-12-04 ASSESSMENT — EXTERNAL EXAM - LEFT EYE: OS_EXAM: NORMAL

## 2018-12-04 NOTE — PROGRESS NOTES
Assessment/Plan  (E11.6573) Mild nonproliferative diabetic retinopathy of both eyes without macular edema associated with type 2 diabetes mellitus (H)  (primary encounter diagnosis)  Comment: Mild NPDR with no CSME OU  Plan: OCT Retina Spectralis OU (both eyes)         Educated patient on clinical findings and the importance of continued management with primary care physician. Continue management as directed and return to clinic in 1 year for dilated exam, or sooner, as needed.    (H52.13) Myopia of both eyes  Comment: Myopia both eyes with early presbyopia  Plan: REFRACTION [80643]         Dispensed spectacle prescription for full time wear. Educated patient on possibility of adaptation period, if symptoms do not improve return to clinic for further testing.    (H26.9) Cortical cataract of both eyes  Comment: Not visually significant  Plan:  No treatment indicated. Monitor annually.    Return to clinic in 1 year for comprehensive eye exam.    Complete documentation of historical and exam elements from today's encounter can  be found in the full encounter summary report (not reduplicated in this progress  note). I personally obtained the chief complaint(s) and history of present illness. I  confirmed and edited as necessary the review of systems, past medical/surgical  history, family history, social history, and examination findings as documented by  others; and I examined the patient myself. I personally reviewed the relevant tests,  images, and reports as documented above. I formulated and edited as necessary the  assessment and plan and discussed the findings and management plan with the  patient and family.    Job Gonzalez, OD, FAAO

## 2018-12-04 NOTE — NURSING NOTE
Chief Complaints and History of Present Illnesses   Patient presents with     Annual Eye Exam     HPI    Affected eye(s):  Both   Symptoms:     No blurred vision      Frequency:  Constant       Do you have eye pain now?:  No      Comments:  Patient states vision has been stable since last eye exam, both eyes. Denies eye pain or irritation. Does not take eye drops.       in AM  Lab Results       Component                Value               Date                       A1C                      7.2                 11/09/2018                 A1C                      7.5                 11/01/2013                 A1C                      7.5                 07/26/2013                 A1C                      7.4                 03/01/2013                 A1C                      7.7                 11/30/2012                Jeniffer Marshall COT 1:36 PM December 4, 2018

## 2019-02-26 ENCOUNTER — TELEPHONE (OUTPATIENT)
Dept: ENDOCRINOLOGY | Facility: CLINIC | Age: 44
End: 2019-02-26

## 2019-02-26 NOTE — TELEPHONE ENCOUNTER
Forms received from: [employer]     Forms were for pump supplies and diabetic supplies    Faxed Date:3/4/19

## 2019-03-01 ENCOUNTER — MEDICAL CORRESPONDENCE (OUTPATIENT)
Dept: HEALTH INFORMATION MANAGEMENT | Facility: CLINIC | Age: 44
End: 2019-03-01

## 2019-03-04 DIAGNOSIS — E10.9 DIABETES MELLITUS TYPE 1 (H): ICD-10-CM

## 2019-03-04 NOTE — TELEPHONE ENCOUNTER
NOVOLOG VIAL 100 UNIT/ML soln      Last Written Prescription Date:  2-23-18  Last Fill Quantity: 15 ml,   # refills: 3  Last Office Visit : 11-9-18  Future Office visit:  3-29-19    Routing refill request to provider for review/approval because:  Insulin - refilled per clinic      Kathleen M Doege RN

## 2019-03-28 ENCOUNTER — TELEPHONE (OUTPATIENT)
Dept: ENDOCRINOLOGY | Facility: CLINIC | Age: 44
End: 2019-03-28

## 2019-03-28 DIAGNOSIS — E10.9 TYPE 1 DIABETES MELLITUS WITHOUT COMPLICATION (H): Primary | ICD-10-CM

## 2019-03-29 ENCOUNTER — OFFICE VISIT (OUTPATIENT)
Dept: ENDOCRINOLOGY | Facility: CLINIC | Age: 44
End: 2019-03-29
Payer: COMMERCIAL

## 2019-03-29 VITALS
HEIGHT: 63 IN | DIASTOLIC BLOOD PRESSURE: 79 MMHG | SYSTOLIC BLOOD PRESSURE: 117 MMHG | BODY MASS INDEX: 29.02 KG/M2 | HEART RATE: 76 BPM | WEIGHT: 163.8 LBS

## 2019-03-29 DIAGNOSIS — F41.9 ANXIETY: ICD-10-CM

## 2019-03-29 DIAGNOSIS — E10.319 TYPE 1 DIABETES MELLITUS WITH RETINOPATHY, MACULAR EDEMA PRESENCE UNSPECIFIED, UNSPECIFIED LATERALITY, UNSPECIFIED RETINOPATHY SEVERITY (H): Primary | ICD-10-CM

## 2019-03-29 LAB — HBA1C MFR BLD: 7 % (ref 4.3–6)

## 2019-03-29 RX ORDER — FLUOXETINE 40 MG/1
40 CAPSULE ORAL DAILY
Qty: 90 CAPSULE | Refills: 3 | Status: SHIPPED | OUTPATIENT
Start: 2019-03-29 | End: 2020-04-29

## 2019-03-29 ASSESSMENT — MIFFLIN-ST. JEOR: SCORE: 1369.18

## 2019-03-29 ASSESSMENT — PAIN SCALES - GENERAL: PAINLEVEL: NO PAIN (0)

## 2019-03-29 NOTE — LETTER
Patient:  Shannon Sow  :   1975  MRN:     [id number]        Ms.Jessica Abby Sow  [address] [place] [address]        2019    Dear Shannon    Here is your hemoglobin A1c result from today.  It was a pleasure to see you.    If you have any questions, please feel free to contact my nurse at [phone] select option #3 for triage nurse  or  option #1 for scheduling related questions.    Regards    Jemma Anderson MD        Resulted Orders   Hemoglobin A1c POCT   Result Value Ref Range    Hemoglobin A1C 7.0 (A) 4.3 - 6 %       Jemma Anderson MD

## 2019-03-29 NOTE — LETTER
3/29/2019       RE: Shannon Sow  [address] [place] [address]     Dear Colleague,    Thank you for referring your patient, Shannon Sow, to the [facility] ENDOCRINOLOGY at [facility]. Please see a copy of my visit note below.    [facility]  Endocrinology  Jemma Anderson MD  03/29/2019      Chief Complaint:   Diabetes    History of Present Illness:   Shannon Sow is a 43 year old female who presents for diabetes follow-up.    #1: Type 1 diabetes mellitus with noted bilateral retinopathy in December 2018  The patient was diagnosed with diabetes in 1992.  She was on Lantus and Humalog from approximately 2000 - May 2009 when she started on an insulin pump.  As of November 2013, she has been on a newer Medtronic pump with low glucose suspend.  She started using the Freestyle Azam CGMS system in February 2018.  Her A1c level in the past 5 years have generally been in the high 6% - mid 7% range.  June 2018 hemoglobin A1c was 6.9%. As of 11/9/2018 She tended to spike after meals and dip again in the afternoons. She noted that breakfast was he heaviest carb meal, and that she eats dinner, heavy on protein and vegetables, late at night, around 7-9pm. Following this appointment she changed her basal rate 12 12am to 0.7 units/hour, 2:30pm to 0.7 units/hour, and 10pm to 0.7 units/hour. She also changed her carb ratio at 7am to 1 unit per 6.5 g carbohydrates.     Interval history  March 2019 Hemoglobin A1c is 7%.  Shannon reports that she is doing well today. She just returned from a trip to [place], and notes that her diabetes management with the pump went well while there. She did purposefully maintain slightly higher sugars than normal while there to avoid hypoglycemia. Shannon is not wearing her sensor today, although she reports that she does like it and is comfortable using it. She was previously experiencing some discomfort with it, but she  has reduced this by applying patches over it. She has noticed itching around the area which is bothersome. Shannon reports that she has not had any Diet Coke for 11 days after learning that it increases risk for stroke. She does miss it, but is happy with her decision. She does note that she feels a lot better since this change. Shannon notes that when she does experience low sugars, she may over correct and then goes high.     Shannon is due for an insulin pump upgrade. She would like to discuss options for her upgrade today. She would also like to discuss the pros and cons of using a CGMS and insulin pump from different brands.  Of note, she reports feelings of hypoglycemia particularly in the early afternoons.    Blood Glucose Monitoring:  He is only been intermittently checking her blood sugars.  Not using her flores currently.  A few times she checks her blood sugar, they have ranged from 122-273.  She  checks 1-2 times per week.    Current Insulin Pump Settings:  Type of Pump: Medtronic Minimed: Model 530G  BASAL RATES and times:  12   AM (midnight): 0.7 units/hour    8     AM: 0.9 units/hour   11:30   AM: 1.4 units/hour   2:30    PM: 0.7 units/hour  7 PM: 1.4 units/hour   10   PM: 0.7 units/hour      Carb ratio:   CARB RATIO and times:  12   AM (midnight): 1 unit per 12 g carbohydrates  7     AM:  1 unit per 6.5 g carbohydrates  10:30   PM:  1 unit per 12 g carbohydrates    Correction Factor (Sensitivity) and times:  12   AM (midnight): 50 mg/dL  7     AM: 40 mg/dL  10   PM: 50 mg/dL    Target BG Ranges:   BLOOD GLUCOSE TARGET and times:  12   AM (midnight): 120 - 130  Amount of Time Insulin is Active:  4 hrs     Diabetes monitoring and complications:  CAD: No  Last eye exam results: 12/04/2018, evidence of early retinopathy  Microalbuminuria: 11/09/2018, results normal  HTN: No  On Statin: No  On Aspirin: No  Depression: Yes, on Fluoxetine    #2: Subarachnoid hemorrhage  She was hospitalized at [facility] 8/2 - 8/9/18 for a subarachnoid hemorrhage after presenting with a sudden onset headache, nausea, and vomiting.  She had no complications. She saw Dr. Jacob in Neurology on 11/9/2018 who discussed with her that she is at low risk for any recurrent issues (2 - 5 % of recurrence).  She has no history of hypertension. She followed with Dr. Regan in Neurology on 11/20/2018 who concluded that her hemorrhage was likely non-aneurysmal in origin and found no other signs of ongoing symptoms. She was also interested in getting back into exercises such as yoga and walking.     #3 interest in weight loss  The patient reports changing her diet and increasing her exercise.  She has lost 2 pounds compared with November 2018.    Review of Systems:   Pertinent items are noted in HPI.  All other systems are negative.    Active Medications:      cholecalciferol (VITAMIN D) 1000 UNIT tablet, Take 1 tablet (1,000 Units) by mouth daily, Disp: 90 tablet, Rfl: 3     FLUoxetine (PROZAC) 40 MG capsule, Take 1 capsule (40 mg) by mouth daily, Disp: 90 capsule, Rfl: 3     insulin aspart (NOVOLOG VIAL) 100 UNITS/ML vial, USE UP TO 80 UNITS DAILY IN PUMP, Disp: 80 mL, Rfl: 3     insulin pen needle (B-D U/F) 31G X 5 MM, Use if pump fails 4-5 times daily, Disp: 100 each, Rfl: 1     Multiple Vitamins-Minerals (MULTIVITAMIN OR), Take by mouth daily , Disp: , Rfl:      NOVOLOG FLEXPEN 100 UNIT/ML soln, Use as needed - takes about 30 units per day, Disp: 15 mL, Rfl: 3     BASAGLAR 100 UNIT/ML injection, INJECT 22 UNITS SUBCUTANEOUSLY DAILY (Patient not taking: Reported on 3/29/2019), Disp: 30 mL, Rfl: 3     STATIN NOT PRESCRIBED (INTENTIONAL), Please choose reason not prescribed, below (Patient not taking: Reported on 3/29/2019), Disp: , Rfl:       Allergies:   Nkda [no known drug allergies] and Seasonal allergies      Past Medical History:  Diabetes mellitus, type 1  Insulin pump in place  Recurrent major depressive disorder, in partial  "remission  Cerebral salt-wasting syndrome  Subarachnoid hemorrhage  Upper respiratory infection  Hypertensive disease  Vitamin deficiency     Past Surgical History:  The patient does not have any past pertinent surgical history.    Family History:   Mother: Breast cancer     Social History:   Marital Status: Single  Presents to the clinic alone  Tobacco Use: Never smoker, never used  Alcohol Use: Yes, \"20 drinks/week\"  PCP: Jemma Anderson    Physical Exam:   /79   Pulse 76   Ht 1.588 m (5' 2.5\")   Wt 74.3 kg (163 lb 12.8 oz)   BMI 29.48 kg/m      GENERAL APPEARANCE: Alert and no distress  NECK: No lymphadenopathy appreciated  Thyroid: No obvious nodules palpated   CV: RRR without M/R/G  Lungs: CTA bilaterally  Abdomen: Soft, Nontender, non distended, positive bowel sounds   Neuro: normal reflexes, no focal deficits  Skin: No infection in feet  Mood: Normal   Lymph: neg in neck and supraclavicular area  Diabetic foot exam: normal DP and PT pulses, no trophic changes or ulcerative lesions and normal sensory exam    Wt Readings from Last 10 Encounters:   03/29/19 74.3 kg (163 lb 12.8 oz)   11/20/18 75.3 kg (166 lb)   11/09/18 75.2 kg (165 lb 12.8 oz)   06/15/18 74.4 kg (164 lb 1.6 oz)   02/23/18 73.3 kg (161 lb 9.6 oz)   09/08/17 74.6 kg (164 lb 6.4 oz)   08/25/17 75.5 kg (166 lb 8 oz)   04/28/17 72.7 kg (160 lb 3.2 oz)   12/23/16 71.3 kg (157 lb 3.2 oz)   08/12/16 72.6 kg (160 lb)        Data:  Lab Results   Component Value Date     11/09/2018    POTASSIUM 4.1 11/09/2018    CHLORIDE 102 11/09/2018    CO2 27 11/09/2018    ANIONGAP 6 11/09/2018     (H) 11/09/2018    BUN 13 11/09/2018    CR 0.80 11/09/2018    OMERO 8.7 11/09/2018     Lab Results   Component Value Date    GFRESTIMATED 79 11/09/2018    GFRESTIMATED >90 08/25/2017    GFRESTIMATED >90  Non  GFR Calc   04/08/2016    GFRESTBLACK >90 11/09/2018    GFRESTBLACK >90 08/25/2017    GFRESTBLACK >90   GFR Calc   " 04/08/2016      Lab Results   Component Value Date    MICROL <5 11/09/2018    UMALCR Unable to calculate due to low value 11/09/2018        Lab Results   Component Value Date    A1C 7.2 (H) 11/09/2018    A1C 7.5 (H) 11/01/2013    A1C 7.5 (A) 07/26/2013    A1C 7.4 (A) 03/01/2013    A1C 7.7 (A) 11/30/2012    HEMOGLOBINA1 7.0 (A) 03/29/2019    HEMOGLOBINA1 6.9 (A) 06/15/2018    HEMOGLOBINA1 6.8 (A) 02/23/2018    HEMOGLOBINA1 7.2 (A) 08/25/2017    HEMOGLOBINA1 6.8 (A) 04/28/2017     No results found for: CPEPT, GADAB, ISCAB    Lab Results   Component Value Date    CHOL 183 11/09/2018    CHOL 172 08/25/2017    TRIG 36 11/09/2018    TRIG 35 08/25/2017    HDL 83 11/09/2018    HDL 94 08/25/2017    LDL 93 11/09/2018    LDL 72 08/25/2017    NHDL 100 11/09/2018    NHDL 79 08/25/2017       Assessment and Plan:  #1 DM type 1 (diabetes mellitus, type 1) (H) with noted bilateral in December 2018  Domonique diabetes is managed well with her insulin pump and CGMS. Her recent blood sugars and other lab results are generally good. I changed her basal rate at 2:30 (0.7 units/hour) to 0.6 units/hour at 12:30 to better counteract hypoglycemia after lunch. She will meet with diabetes educators to talk about options for a pump upgrade.     She understands that better glycemic control will require more frequent blood sugar checks.  She will also look at CG MS options when she meet with our diabetes educator.  - Hemoglobin A1c POCT  - DIABETES EDUCATOR REFERRAL    Current Settings: New Settings (changes bolded):   BASAL RATES and times:  12   AM (midnight): 0.7 units/hour    8     AM: 0.9 units/hour   11:30  AM: 1.4 units/hour   2:30    PM: 0.7 units/hour   7    PM: 1.4 units/hour      10   PM: 0.7 units/hour     BASAL RATES and times:  12   AM (midnight): 0.7 units/hour    8     AM: 0.9 units/hour   11:30  AM: 1.4 units/hour    12:30    PM: 0.6 units/hour   7    PM: 1.4 units/hour      10   PM: 0.7 units/hour    Carb ratio:   CARB RATIO and  times:  12   AM (midnight): 1 unit per 12 g carbohydrates  7     AM:  1 unit per 6.5 g carbohydrates   10:30 PM:  1 unit per 12 g carbohydrates  Carb ratio:   CARB RATIO and times:  12   AM (midnight): 1 unit per 12 g carbohydrates  7     AM:  1 unit per 6.5 g carbohydrates   10:30 PM:  1 unit per 12 g carbohydrates       Correction Factor (Sensitivity) and times:  12   AM (midnight): 50 mg/dL  7     AM: 40 mg/dL  10   PM: 50 mg/dL    Correction Factor (Sensitivity) and times:  12   AM (midnight): 50 mg/dL  7     AM: 40 mg/dL  10   PM: 50 mg/dL      Target BG Ranges:   BLOOD GLUCOSE TARGET and times:  12   AM (midnight): 120 - 130    Target BG Ranges:   BLOOD GLUCOSE TARGET and times:  12   AM (midnight): 120 - 130     #2: Subarachnoid hemorrhage  Observe for now.    #3 interest in weight loss  Patient has cut our her diet Coke.  She is working on dietary lifestyle changes.  Encourage patient.     Follow-up: Return in about 4 months (around 7/29/2019).     >50% of 30 minute visit spent in face to face counseling, education and coordination of care related to options for better glycemic control as well as preventing, detecting, and treating hypoglycemia.         Scribe Disclosure:   I, Chanelle Rizzo, am serving as a scribe to document services personally performed by Jemma Anderson MD at this visit, based upon the provider's statements to me. All documentation has been reviewed by the aforementioned provider prior to being entered into the official medical record.    Scribe Disclosure:   I, Jazmyn Sow, am serving as a scribe to document services personally performed by Jemma Anderson MD at this visit, based upon the provider's statements to me. All documentation has been reviewed by the aforementioned provider prior to being entered into the official medical record.      Portions of this medical record were completed by a scribe. UPON MY REVIEW AND AUTHENTICATION BY ELECTRONIC SIGNATURE, this confirms (a) I  performed the applicable clinical services, and (b) the record is accurate.        Jemma Anderson MD

## 2019-03-29 NOTE — PROGRESS NOTES
[facility]  Endocrinology  Jemma Anderson MD  03/29/2019      Chief Complaint:   Diabetes    History of Present Illness:   Shannon Sow is a 43 year old female who presents for diabetes follow-up.    #1: Type 1 diabetes mellitus with noted bilateral retinopathy in December 2018  The patient was diagnosed with diabetes in 1992.  She was on Lantus and Humalog from approximately 2000 - May 2009 when she started on an insulin pump.  As of November 2013, she has been on a newer Medtronic pump with low glucose suspend.  She started using the Freestyle [employer]am CGMS system in February 2018.  Her A1c level in the past 5 years have generally been in the high 6% - mid 7% range.  June 2018 hemoglobin A1c was 6.9%. As of 11/9/2018 She tended to spike after meals and dip again in the afternoons. She noted that breakfast was he heaviest carb meal, and that she eats dinner, heavy on protein and vegetables, late at night, around 7-9pm. Following this appointment she changed her basal rate 12 12am to 0.7 units/hour, 2:30pm to 0.7 units/hour, and 10pm to 0.7 units/hour. She also changed her carb ratio at 7am to 1 unit per 6.5 g carbohydrates.     Interval history  March 2019 Hemoglobin A1c is 7%.  Shannon reports that she is doing well today. She just returned from a trip to [place], and notes that her diabetes management with the pump went well while there. She did purposefully maintain slightly higher sugars than normal while there to avoid hypoglycemia. Shannon is not wearing her sensor today, although she reports that she does like it and is comfortable using it. She was previously experiencing some discomfort with it, but she has reduced this by applying patches over it. She has noticed itching around the area which is bothersome. Shannon reports that she has not had any Diet Coke for 11 days after learning that it increases risk for stroke. She does miss it, but is happy with her decision. She does note that she feels a  lot better since this change. Shannon notes that when she does experience low sugars, she may over correct and then goes high.     Shannon is due for an insulin pump upgrade. She would like to discuss options for her upgrade today. She would also like to discuss the pros and cons of using a CGMS and insulin pump from different brands.  Of note, she reports feelings of hypoglycemia particularly in the early afternoons.    Blood Glucose Monitoring:  He is only been intermittently checking her blood sugars.  Not using her flores currently.  A few times she checks her blood sugar, they have ranged from 122-273.  She  checks 1-2 times per week.    Current Insulin Pump Settings:  Type of Pump: Medtronic Minimed: Model 530G  BASAL RATES and times:  12   AM (midnight): 0.7 units/hour    8     AM: 0.9 units/hour   11:30   AM: 1.4 units/hour   2:30    PM: 0.7 units/hour  7 PM: 1.4 units/hour   10   PM: 0.7 units/hour      Carb ratio:   CARB RATIO and times:  12   AM (midnight): 1 unit per 12 g carbohydrates  7     AM:  1 unit per 6.5 g carbohydrates  10:30   PM:  1 unit per 12 g carbohydrates    Correction Factor (Sensitivity) and times:  12   AM (midnight): 50 mg/dL  7     AM: 40 mg/dL  10   PM: 50 mg/dL    Target BG Ranges:   BLOOD GLUCOSE TARGET and times:  12   AM (midnight): 120 - 130  Amount of Time Insulin is Active:  4 hrs     Diabetes monitoring and complications:  CAD: No  Last eye exam results: 12/04/2018, evidence of early retinopathy  Microalbuminuria: 11/09/2018, results normal  HTN: No  On Statin: No  On Aspirin: No  Depression: Yes, on Fluoxetine    #2: Subarachnoid hemorrhage  She was hospitalized at [facility] 8/2 - 8/9/18 for a subarachnoid hemorrhage after presenting with a sudden onset headache, nausea, and vomiting.  She had no complications. She saw Dr. Jacob in Neurology on 11/9/2018 who discussed with her that she is at low risk for any recurrent issues (2 - 5 % of recurrence).  She has no  history of hypertension. She followed with Dr. Regan in Neurology on 11/20/2018 who concluded that her hemorrhage was likely non-aneurysmal in origin and found no other signs of ongoing symptoms. She was also interested in getting back into exercises such as yoga and walking.     #3 interest in weight loss  The patient reports changing her diet and increasing her exercise.  She has lost 2 pounds compared with November 2018.    Review of Systems:   Pertinent items are noted in HPI.  All other systems are negative.    Active Medications:      cholecalciferol (VITAMIN D) 1000 UNIT tablet, Take 1 tablet (1,000 Units) by mouth daily, Disp: 90 tablet, Rfl: 3     FLUoxetine (PROZAC) 40 MG capsule, Take 1 capsule (40 mg) by mouth daily, Disp: 90 capsule, Rfl: 3     insulin aspart (NOVOLOG VIAL) 100 UNITS/ML vial, USE UP TO 80 UNITS DAILY IN PUMP, Disp: 80 mL, Rfl: 3     insulin pen needle (B-D U/F) 31G X 5 MM, Use if pump fails 4-5 times daily, Disp: 100 each, Rfl: 1     Multiple Vitamins-Minerals (MULTIVITAMIN OR), Take by mouth daily , Disp: , Rfl:      NOVOLOG FLEXPEN 100 UNIT/ML soln, Use as needed - takes about 30 units per day, Disp: 15 mL, Rfl: 3     BASAGLAR 100 UNIT/ML injection, INJECT 22 UNITS SUBCUTANEOUSLY DAILY (Patient not taking: Reported on 3/29/2019), Disp: 30 mL, Rfl: 3     STATIN NOT PRESCRIBED (INTENTIONAL), Please choose reason not prescribed, below (Patient not taking: Reported on 3/29/2019), Disp: , Rfl:       Allergies:   Nkda [no known drug allergies] and Seasonal allergies      Past Medical History:  Diabetes mellitus, type 1  Insulin pump in place  Recurrent major depressive disorder, in partial remission  Cerebral salt-wasting syndrome  Subarachnoid hemorrhage  Upper respiratory infection  Hypertensive disease  Vitamin deficiency     Past Surgical History:  The patient does not have any past pertinent surgical history.    Family History:   Mother: Breast cancer     Social History:   Marital  "Status: Single  Presents to the clinic alone  Tobacco Use: Never smoker, never used  Alcohol Use: Yes, \"20 drinks/week\"  PCP: Jemma Anderson    Physical Exam:   /79   Pulse 76   Ht 1.588 m (5' 2.5\")   Wt 74.3 kg (163 lb 12.8 oz)   BMI 29.48 kg/m     GENERAL APPEARANCE: Alert and no distress  NECK: No lymphadenopathy appreciated  Thyroid: No obvious nodules palpated   CV: RRR without M/R/G  Lungs: CTA bilaterally  Abdomen: Soft, Nontender, non distended, positive bowel sounds   Neuro: normal reflexes, no focal deficits  Skin: No infection in feet  Mood: Normal   Lymph: neg in neck and supraclavicular area  Diabetic foot exam: normal DP and PT pulses, no trophic changes or ulcerative lesions and normal sensory exam    Wt Readings from Last 10 Encounters:   03/29/19 74.3 kg (163 lb 12.8 oz)   11/20/18 75.3 kg (166 lb)   11/09/18 75.2 kg (165 lb 12.8 oz)   06/15/18 74.4 kg (164 lb 1.6 oz)   02/23/18 73.3 kg (161 lb 9.6 oz)   09/08/17 74.6 kg (164 lb 6.4 oz)   08/25/17 75.5 kg (166 lb 8 oz)   04/28/17 72.7 kg (160 lb 3.2 oz)   12/23/16 71.3 kg (157 lb 3.2 oz)   08/12/16 72.6 kg (160 lb)        Data:  Lab Results   Component Value Date     11/09/2018    POTASSIUM 4.1 11/09/2018    CHLORIDE 102 11/09/2018    CO2 27 11/09/2018    ANIONGAP 6 11/09/2018     (H) 11/09/2018    BUN 13 11/09/2018    CR 0.80 11/09/2018    OMERO 8.7 11/09/2018     Lab Results   Component Value Date    GFRESTIMATED 79 11/09/2018    GFRESTIMATED >90 08/25/2017    GFRESTIMATED >90  Non  GFR Calc   04/08/2016    GFRESTBLACK >90 11/09/2018    GFRESTBLACK >90 08/25/2017    GFRESTBLACK >90   GFR Calc   04/08/2016      Lab Results   Component Value Date    MICROL <5 11/09/2018    UMALCR Unable to calculate due to low value 11/09/2018        Lab Results   Component Value Date    A1C 7.2 (H) 11/09/2018    A1C 7.5 (H) 11/01/2013    A1C 7.5 (A) 07/26/2013    A1C 7.4 (A) 03/01/2013    A1C 7.7 (A) 11/30/2012 "    HEMOGLOBINA1 7.0 (A) 03/29/2019    HEMOGLOBINA1 6.9 (A) 06/15/2018    HEMOGLOBINA1 6.8 (A) 02/23/2018    HEMOGLOBINA1 7.2 (A) 08/25/2017    HEMOGLOBINA1 6.8 (A) 04/28/2017     No results found for: CPEPT, GADAB, ISCAB    Lab Results   Component Value Date    CHOL 183 11/09/2018    CHOL 172 08/25/2017    TRIG 36 11/09/2018    TRIG 35 08/25/2017    HDL 83 11/09/2018    HDL 94 08/25/2017    LDL 93 11/09/2018    LDL 72 08/25/2017    NHDL 100 11/09/2018    NHDL 79 08/25/2017       Assessment and Plan:  #1 DM type 1 (diabetes mellitus, type 1) (H) with noted bilateral in December 2018  Domonique diabetes is managed well with her insulin pump and CGMS. Her recent blood sugars and other lab results are generally good. I changed her basal rate at 2:30 (0.7 units/hour) to 0.6 units/hour at 12:30 to better counteract hypoglycemia after lunch. She will meet with diabetes educators to talk about options for a pump upgrade.     She understands that better glycemic control will require more frequent blood sugar checks.  She will also look at CG MS options when she meet with our diabetes educator.  - Hemoglobin A1c POCT  - DIABETES EDUCATOR REFERRAL    Current Settings: New Settings (changes bolded):   BASAL RATES and times:  12   AM (midnight): 0.7 units/hour    8     AM: 0.9 units/hour   11:30  AM: 1.4 units/hour   2:30    PM: 0.7 units/hour   7    PM: 1.4 units/hour      10   PM: 0.7 units/hour     BASAL RATES and times:  12   AM (midnight): 0.7 units/hour    8     AM: 0.9 units/hour   11:30  AM: 1.4 units/hour   12:30    PM: 0.6 units/hour   7    PM: 1.4 units/hour      10   PM: 0.7 units/hour    Carb ratio:   CARB RATIO and times:  12   AM (midnight): 1 unit per 12 g carbohydrates  7     AM:  1 unit per 6.5 g carbohydrates   10:30 PM:  1 unit per 12 g carbohydrates  Carb ratio:   CARB RATIO and times:  12   AM (midnight): 1 unit per 12 g carbohydrates  7     AM:  1 unit per 6.5 g carbohydrates   10:30 PM:  1 unit per 12 g  carbohydrates       Correction Factor (Sensitivity) and times:  12   AM (midnight): 50 mg/dL  7     AM: 40 mg/dL  10   PM: 50 mg/dL    Correction Factor (Sensitivity) and times:  12   AM (midnight): 50 mg/dL  7     AM: 40 mg/dL  10   PM: 50 mg/dL      Target BG Ranges:   BLOOD GLUCOSE TARGET and times:  12   AM (midnight): 120 - 130    Target BG Ranges:   BLOOD GLUCOSE TARGET and times:  12   AM (midnight): 120 - 130     #2: Subarachnoid hemorrhage  Observe for now.    #3 interest in weight loss  Patient has cut our her diet Coke.  She is working on dietary lifestyle changes.  Encourage patient.     Follow-up: Return in about 4 months (around 7/29/2019).     >50% of 30 minute visit spent in face to face counseling, education and coordination of care related to options for better glycemic control as well as preventing, detecting, and treating hypoglycemia.         Scribe Disclosure:   I, Chanelle Rizzo, am serving as a scribe to document services personally performed by Jemma Anderson MD at this visit, based upon the provider's statements to me. All documentation has been reviewed by the aforementioned provider prior to being entered into the official medical record.    Scribe Disclosure:   IJazmyn, am serving as a scribe to document services personally performed by Jemma Anderson MD at this visit, based upon the provider's statements to me. All documentation has been reviewed by the aforementioned provider prior to being entered into the official medical record.      Portions of this medical record were completed by a scribe. UPON MY REVIEW AND AUTHENTICATION BY ELECTRONIC SIGNATURE, this confirms (a) I performed the applicable clinical services, and (b) the record is accurate.

## 2019-03-29 NOTE — NURSING NOTE
Chief Complaint   Patient presents with     RECHECK     Type 1 Diabetes     Capillary puncture performed for Hemoglobin A1C test. Patient tolerated well.    Shannon Walden MA

## 2019-07-12 ENCOUNTER — OFFICE VISIT (OUTPATIENT)
Dept: ENDOCRINOLOGY | Facility: CLINIC | Age: 44
End: 2019-07-12
Payer: COMMERCIAL

## 2019-07-12 VITALS
SYSTOLIC BLOOD PRESSURE: 119 MMHG | DIASTOLIC BLOOD PRESSURE: 73 MMHG | HEIGHT: 63 IN | WEIGHT: 164.5 LBS | BODY MASS INDEX: 29.15 KG/M2 | HEART RATE: 89 BPM

## 2019-07-12 DIAGNOSIS — E10.319 TYPE 1 DIABETES MELLITUS WITH RETINOPATHY, MACULAR EDEMA PRESENCE UNSPECIFIED, UNSPECIFIED LATERALITY, UNSPECIFIED RETINOPATHY SEVERITY (H): Primary | ICD-10-CM

## 2019-07-12 DIAGNOSIS — E10.8 TYPE 1 DIABETES MELLITUS WITH COMPLICATIONS (H): ICD-10-CM

## 2019-07-12 LAB — HBA1C MFR BLD: 7.2 % (ref 4.3–6)

## 2019-07-12 RX ORDER — INSULIN GLARGINE 100 [IU]/ML
INJECTION, SOLUTION SUBCUTANEOUS
Qty: 30 ML | Refills: 3 | Status: SHIPPED | OUTPATIENT
Start: 2019-07-12 | End: 2021-08-13

## 2019-07-12 ASSESSMENT — PAIN SCALES - GENERAL: PAINLEVEL: NO PAIN (0)

## 2019-07-12 ASSESSMENT — MIFFLIN-ST. JEOR: SCORE: 1372.36

## 2019-07-12 NOTE — PROGRESS NOTES
[facility]  Endocrinology  Jemma Anderson MD  07/12/2019      Chief Complaint:   Diabetes    History of Present Illness:   Shannon Sow is a 43 year old female with a history of type 1 diabetes who presents for follow up of diabetes.    #1 Type 1 diabetes mellitus  The patient was diagnosed with diabetes in 1992.  She was on Lantus and Humalog from approximately 2000 - May 2009 when she started on an insulin pump.  As of November 2013, she has been on a newer Medtronic pump with low glucose suspend.  She started using the Freestyle Azam CGMS system in February 2018.  Her A1c level in the past 5 years have generally been in the high 6% - mid 7% range.  March 2019 hemoglobin A1c was 7%    Interval history: Her hemoglobin A1c is 7.2% in July 2019. She describes that she is not wearing the Aazm CGM, and states that she has not been checking her sugars often. The Azam on her arm is bothersome as it will fall off when she hits her arm against a wall. Further, when she is consistently testing her blood sugar, it ends up making her sugars more variable. It makes her more anxious and feeling like she has to do a bunch of corrections.      She has been experiencing some low blood sugars which are associated with physical activity for her. She does know how to reduce her basal rate to treat before hand. However, it is difficult as she can do this when she plans to exercise, however is unable to account for this with unexpected activities like carrying boxes up stairs.     She is meeting with the diabetes educator on 7/15/2019 to discuss getting a new insulin pump. Of note, she denies any tingling in her hands or feet.  She denies any chest pain, shortness of breath, and has regular periods.     Current Insulin Pump Settings:  Type of Pump: Medtronic Minimed: Model 530G     BASAL RATES and times:  12   AM (midnight): 0.7 units/hour    8     AM: 0.9 units/hour   11:30  AM: 1.4 units/hour   12:30    PM: 0.6  units/hour   7    PM: 1.4 units/hour      10   PM: 0.7 units/hour      Carb ratio:   CARB RATIO and times:  12   AM (midnight): 1 unit per 12 g carbohydrates  7     AM:  1 unit per 6.5 g carbohydrates   10:30 PM:  1 unit per 12 g carbohydrates      Correction Factor (Sensitivity) and times:  12   AM (midnight): 50 mg/dL  7     AM: 40 mg/dL  10   PM: 50 mg/dL     Target BG Ranges:   BLOOD GLUCOSE TARGET and times:  12   AM (midnight): 120 - 130     Amount of Time Insulin is Active:  4 hrs    Diabetes monitoring and complications:  CAD: No  Last eye exam results: 12/04/2018, evidence of early retinopathy - does not need referral  Microalbuminuria: 11/09/2018, results normal  HTN: No  On Statin: No  On Aspirin: No  Depression: Yes, on Fluoxetine     #2: Subarachnoid hemorrhage  She was hospitalized at [facility] 8/2 - 8/9/18 for a subarachnoid hemorrhage after presenting with a sudden onset headache, nausea, and vomiting.  She had no complications. She saw Dr. Jacob in Neurology on 11/9/2018 who discussed with her that she is at low risk for any recurrent issues (2 - 5 % of recurrence).  She has no history of hypertension. She followed with Dr. Regan in Neurology on 11/20/2018 who concluded that her hemorrhage was likely non-aneurysmal in origin and found no other signs of ongoing symptoms. She was also interested in getting back into exercises such as yoga and walking.    Interval history: She reports intermittent headaches. She is unable to do yoga or exercise like she use to as it causes pain in her head, and thus causes stress for her. Overall, it has been more emotionally draining for her than physically.     #3 interest in weight loss  At the last visit the patient reported changing her diet and increasing her exercise.  She had lost 2 pounds compared with November 2018.    Interval history: She has not been drinking diet coke since March, and no longer loves it as much as she use to. She has been  "eating healthier at lunch and eating more vegetables overall. However, it can be difficult when she goes out to eat for dinner. She describes that if she knows they are trying to go out for dinner then she will try to eat before hand to eat less out. She is not interested in any medications at this time.     Review of Systems:   Pertinent items are noted in HPI.  All other systems are negative.    Active Medications:      BASAGLAR 100 UNIT/ML injection, INJECT 22 UNITS SUBCUTANEOUSLY DAILY (Patient not taking: Reported on 3/29/2019), Disp: 30 mL, Rfl: 3     cholecalciferol (VITAMIN D) 1000 UNIT tablet, Take 1 tablet (1,000 Units) by mouth daily, Disp: 90 tablet, Rfl: 3     FLUoxetine (PROZAC) 40 MG capsule, Take 1 capsule (40 mg) by mouth daily, Disp: 90 capsule, Rfl: 3     insulin aspart (NOVOLOG VIAL) 100 UNITS/ML vial, USE UP TO 80 UNITS DAILY IN PUMP, Disp: 80 mL, Rfl: 3     Multiple Vitamins-Minerals (MULTIVITAMIN OR), Take by mouth daily , Disp: , Rfl:      NOVOLOG FLEXPEN 100 UNIT/ML soln, Use as needed - takes about 30 units per day, Disp: 15 mL, Rfl: 3     STATIN NOT PRESCRIBED (INTENTIONAL), Please choose reason not prescribed, below (Patient not taking: Reported on 3/29/2019), Disp: , Rfl:       Allergies:   Nkda [no known drug allergies] and Seasonal allergies      Past Medical History:  Type 1 diabetes  Vitamin deficiency  Upper respiratory infection  Cerebral salt wasting syndrome  Hypertensive disease  Insulin pump in place  Depression  Subarachnoid hemorrhage      Past Surgical History:  No pertinent past surgical history.     Family History:   Breast cancer - mother       Social History:   This patient presented alone.   Smoking status: never  Smokeless tobacco: never  Alcohol use: 20 drinks/week  Drug use: no     Physical Exam:   /73   Pulse 89   Ht 1.588 m (5' 2.5\")   Wt 74.6 kg (164 lb 8 oz)   BMI 29.61 kg/m       Wt Readings from Last 10 Encounters:   07/12/19 74.6 kg (164 lb 8 oz) "   03/29/19 74.3 kg (163 lb 12.8 oz)   11/20/18 75.3 kg (166 lb)   11/09/18 75.2 kg (165 lb 12.8 oz)   06/15/18 74.4 kg (164 lb 1.6 oz)   02/23/18 73.3 kg (161 lb 9.6 oz)   09/08/17 74.6 kg (164 lb 6.4 oz)   08/25/17 75.5 kg (166 lb 8 oz)   04/28/17 72.7 kg (160 lb 3.2 oz)   12/23/16 71.3 kg (157 lb 3.2 oz)      GENERAL APPEARANCE: Alert and no distress  NECK: No lymphadenopathy appreciated  Thyroid: No obvious nodules palpated   CV: RRR without M/R/G  Lungs: CTA bilaterally  Abdomen: Soft, Nontender, non distended, positive bowel sounds   Neuro: no focal deficits  Skin: No infection in feet   Diabetic foot exam: normal DP and PT pulses, no trophic changes or ulcerative lesions and normal monofilament exam   Mood: Normal   Lymph: neg in neck and supraclavicular area       Data:  Lab Results   Component Value Date     11/09/2018    POTASSIUM 4.1 11/09/2018    CHLORIDE 102 11/09/2018    CO2 27 11/09/2018    ANIONGAP 6 11/09/2018     (H) 11/09/2018    BUN 13 11/09/2018    CR 0.80 11/09/2018    OMERO 8.7 11/09/2018     Lab Results   Component Value Date    GFRESTIMATED 79 11/09/2018    GFRESTIMATED >90 08/25/2017    GFRESTIMATED >90  Non  GFR Calc   04/08/2016    GFRESTBLACK >90 11/09/2018    GFRESTBLACK >90 08/25/2017    GFRESTBLACK >90   GFR Calc   04/08/2016      Lab Results   Component Value Date    MICROL <5 11/09/2018    UMALCR Unable to calculate due to low value 11/09/2018        Lab Results   Component Value Date    A1C 7.2 (H) 11/09/2018    A1C 7.5 (H) 11/01/2013    A1C 7.5 (A) 07/26/2013    A1C 7.4 (A) 03/01/2013    A1C 7.7 (A) 11/30/2012    HEMOGLOBINA1 7.2 (A) 07/12/2019    HEMOGLOBINA1 7.0 (A) 03/29/2019    HEMOGLOBINA1 6.9 (A) 06/15/2018    HEMOGLOBINA1 6.8 (A) 02/23/2018    HEMOGLOBINA1 7.2 (A) 08/25/2017     No results found for: CPEPT, GADAB, ISCAB    Lab Results   Component Value Date    CHOL 183 11/09/2018    CHOL 172 08/25/2017    TRIG 36 11/09/2018    TRIG  35 08/25/2017    HDL 83 11/09/2018    HDL 94 08/25/2017    LDL 93 11/09/2018    LDL 72 08/25/2017    NHDL 100 11/09/2018    NHDL 79 08/25/2017       Assessment and Plan:  #1 Type 1 diabetes mellitus with retinopathy, macular edema presence unspecified, unspecified laterality, unspecified retinopathy severity (H)  Shannon is hesitant to use her CGM or measure her sugars regularly. Discussed what might make check her blood sugars more, including using a Azam attached to the abdomen or consideration of the dexcom G6. However, she is not interested in this currently, as she feels checking her sugars makes her more stressed.  Discussed with patient the importance of checking her blood sugars regularly.  She will think about this.  She will discuss CGM S options as well as new pump options (pump is currently out of warranty and is Medtronic 530) with diabetes education next week.  She does understand that it is hard to improve her glycemic control she does not check her blood sugar regularly.  That being said, her hemoglobin A1c is reasonable at 7.2 in July 2019.  I also spoke with the patient the possibility of reducing her basal rate by roughly 20% if she finds that she is exercising to avoid hypoglycemia with exertion.    ADDENDUM: The patient understands the critical importance to test blood sugars at least 4-5 times per day, given her blood sugar variability, and variable lifestyle/eating events.  We will see if she might qualify for a Dexcom G6.    - Hemoglobin A1c POCT  - insulin glargine (BASAGLAR KWIKPEN) 100 UNIT/ML pen  Dispense: 30 mL; Refill: 3  - insulin aspart (NOVOLOG VIAL) 100 UNITS/ML vial  Dispense: 80 mL; Refill: 3     #2 History of subarachnoid hemorrhage  Encouraged her to keep walking.     #3 interest in weight loss  Extensive discussion with patient.  Dietary lifestyle recommendations made.  After the possibility of metformin or GLP-1 agonist.  Discussed with patient that metformin may be helpful.   But she has declined at this time.    Follow-up: Return in about 4 months (around 11/12/2019).     >50% of 30 minute visit spent in face to face counseling, education and coordination of care related to options for better glycemic control as well as preventing, detecting, and treating hypoglycemia.         Scribe Disclosure:  I, Peggy Mon, am serving as a scribe to document services personally performed by Jemma Anderson MD at this visit, based upon the provider's statements to me. All documentation has been reviewed by the aforementioned provider prior to being entered into the official medical record.     Portions of this medical record were completed by a scribe. UPON MY REVIEW AND AUTHENTICATION BY ELECTRONIC SIGNATURE, this confirms (a) I performed the applicable clinical services, and (b) the record is accurate.

## 2019-07-12 NOTE — LETTER
7/12/2019       RE: Shannon Sow  [address] [place] [address]     Dear Colleague,    Thank you for referring your patient, Shannon Sow, to the [facility] ENDOCRINOLOGY at [facility]. Please see a copy of my visit note below.    [facility]  Endocrinology  Jemma Anderson MD  07/12/2019      Chief Complaint:   Diabetes    History of Present Illness:   Shannon Sow is a 43 year old female with a history of type 1 diabetes who presents for follow up of diabetes.    #1 Type 1 diabetes mellitus  The patient was diagnosed with diabetes in 1992.  She was on Lantus and Humalog from approximately 2000 - May 2009 when she started on an insulin pump.  As of November 2013, she has been on a newer Medtronic pump with low glucose suspend.  She started using the Freestyle Azam CGMS system in February 2018.  Her A1c level in the past 5 years have generally been in the high 6% - mid 7% range.   March 2019 hemoglobin A1c was 7%    Interval history: Her hemoglobin A1c is 7.2% in July 2019. She describes that she is not wearing the Azam CGM, and states that she has not been checking her sugars often. The Azam on her arm is bothersome as it will fall off when she hits her arm against a wall. Further, when she is consistently testing her blood sugar, it ends up making her sugars more variable. It makes her more anxious and feeling like she has to do a bunch of corrections.      She has been experiencing some low blood sugars which are associated with physical activity for her. She does know how to reduce her basal rate to treat before hand. However, it is difficult as she can do this when she plans to exercise, however is unable to account for this with unexpected activities like carrying boxes up stairs.     She is meeting with the diabetes educator on 7/15/2019 to discuss getting a new insulin pump. Of note, she denies any tingling in her hands or  feet.  She denies any chest pain, shortness of breath, and has regular periods.     Current Insulin Pump Settings:  Type of Pump: Medtronic Minimed: Model 530G     BASAL RATES and times:  12   AM (midnight): 0.7 units/hour    8     AM: 0.9 units/hour   11:30  AM: 1.4 units/hour    12:30    PM: 0.6 units/hour   7    PM: 1.4 units/hour      10   PM: 0.7 units/hour      Carb ratio:   CARB RATIO and times:  12   AM (midnight): 1 unit per 12 g carbohydrates  7     AM:  1 unit per  6.5 g carbohydrates   10:30 PM:  1 unit per 12 g carbohydrates      Correction Factor (Sensitivity) and times:  12   AM (midnight): 50 mg/dL  7     AM: 40 mg/dL  10   PM: 50 mg/dL     Target BG Ranges:   BLOOD GLUCOSE TARGET and times:  12   AM (midnight): 120 - 130     Amount of Time Insulin is Active:  4 hrs    Diabetes monitoring and complications:  CAD: No  Last eye exam results: 12/04/2018, evidence of early retinopathy - does not need referral  Microalbuminuria: 11/09/2018, results normal  HTN: No  On Statin: No  On Aspirin: No  Depression: Yes, on Fluoxetine     #2: Subarachnoid hemorrhage  She was hospitalized at [facility] 8/2 - 8/9/18 for a subarachnoid hemorrhage after presenting with a sudden onset headache, nausea, and vomiting.  She had no complications. She saw Dr. Jacob in Neurology on 11/9/2018 who discussed with her that she is at low risk for any recurrent issues (2 - 5 % of recurrence).  She has no history of hypertension. She followed with Dr. Regan in Neurology on 11/20/2018 who concluded that her hemorrhage was likely non-aneurysmal in origin and found no other signs of ongoing symptoms. She was also interested in getting back into exercises such as yoga and walking.    Interval history: She reports intermittent headaches. She is unable to do yoga or exercise like she use to as it causes pain in her head, and thus causes stress for her. Overall, it has been more emotionally draining for her than physically.      #3 interest in weight loss  At the last visit the patient reported changing her diet and increasing her exercise.  She had lost 2 pounds compared with November 2018.    Interval history: She has not been drinking diet coke since March, and no longer loves it as much as she use to. She has been eating healthier at lunch and eating more vegetables overall. However, it can be difficult when she goes out to eat for dinner. She describes that if she knows they are trying to go out for dinner then she will try to eat before hand to eat less out. She is not interested in any medications at this time.     Review of Systems:   Pertinent items are noted in HPI.  All other systems are negative.    Active Medications:      BASAGLAR 100 UNIT/ML injection, INJECT 22 UNITS SUBCUTANEOUSLY DAILY (Patient not taking: Reported on 3/29/2019), Disp: 30 mL, Rfl: 3     cholecalciferol (VITAMIN D) 1000 UNIT tablet, Take 1 tablet (1,000 Units) by mouth daily, Disp: 90 tablet, Rfl: 3     FLUoxetine (PROZAC) 40 MG capsule, Take 1 capsule (40 mg) by mouth daily, Disp: 90 capsule, Rfl: 3     insulin aspart (NOVOLOG VIAL) 100 UNITS/ML vial, USE UP TO 80 UNITS DAILY IN PUMP, Disp: 80 mL, Rfl: 3     Multiple Vitamins-Minerals (MULTIVITAMIN OR), Take by mouth daily , Disp: , Rfl:      NOVOLOG FLEXPEN 100 UNIT/ML soln, Use as needed - takes about 30 units per day, Disp: 15 mL, Rfl: 3     STATIN NOT PRESCRIBED (INTENTIONAL), Please choose reason not prescribed, below (Patient not taking: Reported on 3/29/2019), Disp: , Rfl:       Allergies:   Nkda [no known drug allergies] and Seasonal allergies      Past Medical History:  Type 1 diabetes  Vitamin deficiency  Upper respiratory infection  Cerebral salt wasting syndrome  Hypertensive disease  Insulin pump in place  Depression  Subarachnoid hemorrhage      Past Surgical History:  No pertinent past surgical history.     Family History:   Breast cancer - mother       Social History:   This patient  "presented alone.   Smoking status: never  Smokeless tobacco: never  Alcohol use: 20 drinks/week  Drug use: no     Physical Exam:   /73   Pulse 89   Ht 1.588 m (5' 2.5\")   Wt 74.6 kg (164 lb 8 oz)   BMI 29.61 kg/m        Wt Readings from Last 10 Encounters:   07/12/19 74.6 kg (164 lb 8 oz)   03/29/19 74.3 kg (163 lb 12.8 oz)   11/20/18 75.3 kg (166 lb)   11/09/18 75.2 kg (165 lb 12.8 oz)   06/15/18 74.4 kg (164 lb 1.6 oz)   02/23/18 73.3 kg (161 lb 9.6 oz)   09/08/17 74.6 kg (164 lb 6.4 oz)   08/25/17 75.5 kg (166 lb 8 oz)   04/28/17 72.7 kg (160 lb 3.2 oz)   12/23/16 71.3 kg (157 lb 3.2 oz)      GENERAL APPEARANCE: Alert and no distress  NECK: No lymphadenopathy appreciated  Thyroid: No obvious nodules palpated   CV: RRR without M/R/G  Lungs: CTA bilaterally  Abdomen: Soft, Nontender, non distended, positive bowel sounds   Neuro: no focal deficits  Skin: No infection in feet   Diabetic foot exam: normal DP and PT pulses, no trophic changes or ulcerative lesions and normal monofilament exam   Mood: Normal   Lymph: neg in neck and supraclavicular area       Data:  Lab Results   Component Value Date     11/09/2018    POTASSIUM 4.1 11/09/2018    CHLORIDE 102 11/09/2018    CO2 27 11/09/2018    ANIONGAP 6 11/09/2018     (H) 11/09/2018    BUN 13 11/09/2018    CR 0.80 11/09/2018    MOERO 8.7 11/09/2018     Lab Results   Component Value Date    GFRESTIMATED 79 11/09/2018    GFRESTIMATED >90 08/25/2017    GFRESTIMATED >90  Non  GFR Calc   04/08/2016    GFRESTBLACK >90 11/09/2018    GFRESTBLACK >90 08/25/2017    GFRESTBLACK >90   GFR Calc   04/08/2016      Lab Results   Component Value Date    MICROL <5 11/09/2018    UMALCR Unable to calculate due to low value 11/09/2018        Lab Results   Component Value Date    A1C 7.2 (H) 11/09/2018    A1C 7.5 (H) 11/01/2013    A1C 7.5 (A) 07/26/2013    A1C 7.4 (A) 03/01/2013    A1C 7.7 (A) 11/30/2012    HEMOGLOBINA1 7.2 (A) 07/12/2019 "    HEMOGLOBINA1 7.0 (A) 03/29/2019    HEMOGLOBINA1 6.9 (A) 06/15/2018    HEMOGLOBINA1 6.8 (A) 02/23/2018    HEMOGLOBINA1 7.2 (A) 08/25/2017     No results found for: CPEPT, GADAB, ISCAB    Lab Results   Component Value Date    CHOL 183 11/09/2018    CHOL 172 08/25/2017    TRIG 36 11/09/2018    TRIG 35 08/25/2017    HDL 83 11/09/2018    HDL 94 08/25/2017    LDL 93 11/09/2018    LDL 72 08/25/2017    NHDL 100 11/09/2018    NHDL 79 08/25/2017       Assessment and Plan:  #1  Type 1 diabetes mellitus with retinopathy, macular edema presence unspecified, unspecified laterality, unspecified retinopathy severity (H)  Shannon is hesitant to use her CGM or measure her sugars regularly. Discussed what might make check her blood sugars more, including using a Azam attached to the abdomen or consideration of the dexcom G6. However, she is not interested in this currently, as she feels checking her sugars makes her more stressed.  Discussed with patient the importance of checking her blood sugars regularly.  She will think about this.  She will discuss CGM S options as well as new pump options (pump is currently out of warranty and is Medtronic 530) with diabetes education next week.  She does understand that it is hard to improve her glycemic control she does not check her blood sugar regularly.  That being said, her hemoglobin A1c is reasonable at 7.2 in July 2019.  I also spoke with the patient the possibility of reducing her basal rate by roughly 20% if she finds that she is exercising to avoid hypoglycemia with exertion.    - Hemoglobin A1c POCT  - insulin glargine (BASAGLAR KWIKPEN) 100 UNIT/ML pen  Dispense: 30 mL; Refill: 3  - insulin aspart (NOVOLOG VIAL) 100 UNITS/ML vial  Dispense: 80 mL; Refill: 3     #2 History of subarachnoid hemorrhage  Encouraged her to keep walking.     #3 interest in weight loss  Extensive discussion with patient.  Dietary lifestyle recommendations made.  After the possibility of metformin or  GLP-1 agonist.  Discussed with patient that metformin may be helpful.  But she has declined at this time.    Follow-up: Return in about 4 months (around 11/12/2019).     >50% of 30 minute visit spent in face to face counseling, education and coordination of care related to options for better glycemic control as well as preventing, detecting, and treating hypoglycemia.         Scribe Disclosure:  I, Peggy Mon, am serving as a scribe to document services personally performed by Jemma Anderson MD at this visit, based upon the provider's statements to me. All documentation has been reviewed by the aforementioned provider prior to being entered into the official medical record.     Portions of this medical record were completed by a scribe. UPON MY REVIEW AND AUTHENTICATION BY ELECTRONIC SIGNATURE, this confirms (a) I performed the applicable clinical services, and (b) the record is accurate.        Again, thank you for allowing me to participate in the care of your patient.      Sincerely,    Jemma Anderson MD

## 2019-07-15 ENCOUNTER — ALLIED HEALTH/NURSE VISIT (OUTPATIENT)
Dept: EDUCATION SERVICES | Facility: CLINIC | Age: 44
End: 2019-07-15
Attending: INTERNAL MEDICINE
Payer: COMMERCIAL

## 2019-07-15 DIAGNOSIS — E10.9 DM TYPE 1 (DIABETES MELLITUS, TYPE 1) (H): Primary | ICD-10-CM

## 2019-07-15 NOTE — PROGRESS NOTES
"Diabetes Self-Management Education & Support    Diabetes Education Self Management & Training    SUBJECTIVE/OBJECTIVE:     Cultural Influences/Ethnic Background:  American  Type 1 diabetes since age 14-15.   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     Diabetes Symptoms & Complications      Patient Problem List and Family Medical History reviewed for relevant medical history, current medical status, and diabetes risk factors.    Vitals:  There were no vitals taken for this visit.  Estimated body mass index is 29.61 kg/m  as calculated from the following:    Height as of 7/12/19: 1.588 m (5' 2.5\").    Weight as of 7/12/19: 74.6 kg (164 lb 8 oz).   Last 3 BP:   BP Readings from Last 3 Encounters:   07/12/19 119/73   03/29/19 117/79   11/20/18 118/73       History   Smoking Status     Never Smoker   Smokeless Tobacco     Never Used       Labs:  Lab Results   Component Value Date    A1C 7.2 11/09/2018     Lab Results   Component Value Date     11/09/2018     Lab Results   Component Value Date    LDL 93 11/09/2018     HDL Cholesterol   Date Value Ref Range Status   11/09/2018 83 >49 mg/dL Final   ]  GFR Estimate   Date Value Ref Range Status   11/09/2018 79 >60 mL/min/1.7m2 Final     Comment:     Non  GFR Calc     GFR Estimate If Black   Date Value Ref Range Status   11/09/2018 >90 >60 mL/min/1.7m2 Final     Comment:      GFR Calc     Lab Results   Component Value Date    CR 0.80 11/09/2018       Taking Medications  Diabetes Medication(s)     Insulin       insulin aspart (NOVOLOG VIAL) 100 UNITS/ML vial    USE UP TO 80 UNITS DAILY IN PUMP     insulin glargine (BASAGLAR KWIKPEN) 100 UNIT/ML pen    Take 20 units if pump fails      Patient's most recent   Lab Results   Component Value Date    A1C 7.2 11/09/2018    is not meeting goal of <7.0    INTERVENTION:   Diabetes knowledge and skills assessment:     Patient is knowledgeable in diabetes " "management concepts related to: Healthy Eating, Being Active, Monitoring, Taking Medication, Problem Solving, Reducing Risks and Healthy Coping    Patient needs further education on the following diabetes management concepts:  Here today to look at insulin pump options.     Based on learning assessment above, most appropriate setting for further diabetes education would be: Individual setting.    Education provided today on:     Reviewed and demonstrated operation of the following pumps:  The Omnipod Dash, Medtronic 670G with Guardian 3 sensor, and Tandem X2 with Dexcom G6 sensor.     Discussed unique features of each pump and what qualities are most important to patient.  Discussion included the operation of the 670G Hybrid Closed Loop system and the particular behaviors around that pump that need to be adhered to, including using the bolus calculator, counting carbohydrates accurately, calibrating the sensor appropriately.  Discussed that the results seen in the studies of the system that were done were a result of staying in \"auto\" mode > 85% of the time.      Explained the upgrade process, which includes making sure that warranty has  on the current pump, completing the CMN, processing through the pump company and approval by insurance company.  Also reviewed training that would be necessary to ensure safe operation of the pump.    Opportunities for ongoing education and support in diabetes-self management were discussed.    Pt verbalized understanding of concepts discussed and recommendations provided today.       Education Materials Provided:  2018 [employer] Insulin Pump Comparison Guide and 2018 [email or web address] CGM Comparison Guide.   She is going to consider her options and will get back to me about which direction she would like to go.     PLAN:  See Patient Instructions for co-developed, patient-stated behavior change goals.  AVS printed and provided to patient today. See Follow-Up section for " recommended follow-up.  Time Spent: 30 minutes  Encounter Type: Individual    Any diabetes medication dose changes were made via the CDE Protocol and Collaborative Practice Agreement with the patient's referring provider. A copy of this encounter was shared with the provider.

## 2019-07-26 ENCOUNTER — TELEPHONE (OUTPATIENT)
Dept: ENDOCRINOLOGY | Facility: CLINIC | Age: 44
End: 2019-07-26

## 2019-07-26 NOTE — TELEPHONE ENCOUNTER
Forms received from: [employer] and [employer]    Forms were for CMN and visit notes     Faxed Date:7/26/19

## 2019-08-30 ENCOUNTER — MEDICAL CORRESPONDENCE (OUTPATIENT)
Dept: HEALTH INFORMATION MANAGEMENT | Facility: CLINIC | Age: 44
End: 2019-08-30

## 2019-08-30 NOTE — TELEPHONE ENCOUNTER
Forms re-faxed 8/30/2019. Received this message via e-mail from [employer] rep;    For Shannon, we need a new SMN (attached) for insurance. They request for the out of warranty and malfunction to be noted on the SMN for claim purposes and if you can send back the notes I will be able to place her order ?? I left a vm and sent her an email so we can go over her  benefits.         Thank you!!         Courtney Gonzalez

## 2019-10-09 ENCOUNTER — ALLIED HEALTH/NURSE VISIT (OUTPATIENT)
Dept: EDUCATION SERVICES | Facility: CLINIC | Age: 44
End: 2019-10-09
Payer: COMMERCIAL

## 2019-10-09 DIAGNOSIS — E10.9 DM TYPE 1 (DIABETES MELLITUS, TYPE 1) (H): Primary | ICD-10-CM

## 2019-10-11 NOTE — PROGRESS NOTES
Diabetes Self-Management Education & Support    Diabetes Self Management Training: Insulin Pump Start    SUBJECTIVE:  Shannon Sow presents for an insulin pump start.   Pump Type:Tandem X2 with basal IQ  Pump serial number: [id number]  OBJECTIVE:  Transitioning from the Medtronic Paradigm insulin pump.   She has not received her Dexcom G6 yet, but it is expected to arrive       ASSESSMENT:    Homework completed: Completed online training and Reviewed user guide  EDUCATION PROVIDED:  Training for both the Tandem T-Slim G4 done in accordance with the company training checklist, which has been scanned into the EMR.     Infusion set type for pump start:  Auto Soft XC,  9 mm,  23 inch tubing    Problem Solving:   Unexplained high blood glucoses on an insulin pump  Managing hypoglycemia on an insulin pump  Alerts, alarms and when to call the company help line    when to order supplies  how to order supplies      PUMP SETTINGS     Start time Basal Rate U/hr ICR  1 unit/gm Correction Factor Target BG    Midnight 0.7 12 50 120   08:00am 0.9 6.5 40 120   11:30am 1.4 6.5 40 120   12:30pm 0.6 6.5 40 120   07:00pm 1.4 6.5 40 120   10:00pm 0.7 12 50 120   Active Insulin Time: 4 hours  Maximum Bolus:  10 units  [place] units    DEXCOM CGM SETTINGS:  Not applicable.  She does not have the sensor yet.   Basal IQ is turned ON.       PLAN AND FOLLOW-UP:  Reviewed how to pair her Dexcom G6 with her insulin pump and discussed how it works.  Reviewed appropriate settings, basal IQ function.      Time Spent: 90  Visit Type: Individual    Scanned documents: Insulin pump initiation settings with MD signature, Insulin pump start checklist.

## 2019-11-13 NOTE — PROGRESS NOTES
"[facility]  Endocrinology  Jemma Anderson MD  11/15/2019      Chief Complaint:   Diabetes    History of Present Illness:   Shannon Sow is a 44 year old female with a history of type I diabetes who presents for follow up.    #1 Type 1 diabetes mellitus  The patient was diagnosed with diabetes in 1992.  She was on Lantus and Humalog from approximately 2000 - May 2009 when she started on an insulin pump.  As of November 2013, she has been on a newer Medtronic pump with low glucose suspend.  She started using the Freestyle Azam CGMS system in February 2018.  Her A1c level in the past 5 years have generally been in the high 6% - mid 7% range.  March 2019 hemoglobin A1c was 7%. July 2019 A1c was 7.2%.    Interval history: November 2019 A1c 7.4%.  She has started her new tandem X2 in October 2019.  She does not have a Dexcom G6 yet as she has not been told how much it will cost her. She has started exercising twice a week, Mondays and Wednesdays 6:45-7:45PM while her son is at soccer. When she exercises she does the elliptical and weight training. She has one soda 2-3x per week at work. She does eat dinner later at night with her family. She has regular periods. She has noticed she feels tired when she has lows.  She reports recent hypoglycemia in the afternoons for the last several days.    Blood Glucose Monitoring:  We reviewed glucometer data together.  Yesterday she notes having low blood sugar during the afternoon. She thinks her correction may be too high. She is not always consistent with the times she takes her BG. She usually checks when she is feeling \"off\". She rarely wakes up with lows.    Average: 155 mg/dl with standard deviation 150  Highest value: 409 mg/dl  Lowest value: 39 mg/dl  Values per day: 1.6    Above goal: 37% (19 values)  Within range: 35% (18 values)  Below goal: 28% (14 values)      Diabetes monitoring and complications:  CAD: No  Last eye exam results: 12/04/2018, evidence of " early retinopathy   Microalbuminuria: Negative 11/2018  Neuropathy: No  HTN: No  On Statin: No  On Aspirin: No  Depression: Yes    #2 Subarachnoid hemorrhage  She was hospitalized at [facility] 8/2 - 8/9/18 for a subarachnoid hemorrhage after presenting with a sudden onset headache, nausea, and vomiting.  She had no complications. She saw Dr. Jacob in Neurology on 11/9/2018 who discussed with her that she is at low risk for any recurrent issues (2 - 5 % of recurrence).  She has no history of hypertension. She followed with Dr. Regan in Neurology on 11/20/2018 who concluded that her hemorrhage was likely non-aneurysmal in origin and found no other signs of ongoing symptoms. She was also interested in getting back into exercises such as yoga and walking.    Interval history: She continues to have some depression and stress related to her recent subarachnoid hemorrhage.  Is currently on Prozac.  Working with therapist.    #3 interest in weight loss  At the last visit the patient reported changing her diet and increasing her exercise.  She had lost 2 pounds compared with November 2018. She stopped drinking diet coke in March 2019, and no longer loves it as much as she use to. In July 2019 she had been eating healthier at lunch and eating more vegetables overall. She was not interested in any medications.     Interval history:She has lost some weight. She does not like soda as much as she used to. She eats late dinners with her family. She has been meeting with a therapist which has been going well for her. She is on Prozac and has started exercising 1-2x weekly. While her son is at soccer, she does the elliptical and some weight training.  She has lost about 2 pounds compared with her previous visit.  At her previous visit in July 2019 I mentioned the possibility of metformin as well as GLP-1 agonist which she had declined.    Review of Systems:   Pertinent items are noted in HPI.  All other systems are  "negative.    Active Medications:      cholecalciferol (VITAMIN D) 1000 UNIT tablet, Take 1 tablet (1,000 Units) by mouth daily, Disp: 90 tablet, Rfl: 3     FLUoxetine (PROZAC) 40 MG capsule, Take 1 capsule (40 mg) by mouth daily, Disp: 90 capsule, Rfl: 3     insulin aspart (NOVOLOG VIAL) 100 UNITS/ML vial, USE UP TO 80 UNITS DAILY IN PUMP, Disp: 80 mL, Rfl: 3     insulin glargine (BASAGLAR KWIKPEN) 100 UNIT/ML pen, Take 20 units if pump fails, Disp: 30 mL, Rfl: 3     Multiple Vitamins-Minerals (MULTIVITAMIN OR), Take by mouth daily , Disp: , Rfl:      STATIN NOT PRESCRIBED (INTENTIONAL), Please choose reason not prescribed, below (Patient not taking: Reported on 3/29/2019), Disp: , Rfl:       Allergies:   Nkda [no known drug allergies]   Seasonal allergies      Past Medical History:  Type I diabetes mellitus   Vitamin deficiency   Upper respiratory infection  Cerebral salt-wasting syndrome  Hypertensive disease  Recurrent major depressive disorder   Subarachnoid hemorrhage      Past Surgical History:  History reviewed. No pertinent past surgical history.      Family History:   Breast cancer - mother      Social History:   The patient reports reducing her alcohol intake.  No history of smoking.     Physical Exam:   /80   Pulse 91   Ht 1.588 m (5' 2.5\")   Wt 73.8 kg (162 lb 9.6 oz)   BMI 29.27 kg/m       Wt Readings from Last 10 Encounters:   11/15/19 73.8 kg (162 lb 9.6 oz)   07/12/19 74.6 kg (164 lb 8 oz)   03/29/19 74.3 kg (163 lb 12.8 oz)   11/20/18 75.3 kg (166 lb)   11/09/18 75.2 kg (165 lb 12.8 oz)   06/15/18 74.4 kg (164 lb 1.6 oz)   02/23/18 73.3 kg (161 lb 9.6 oz)   09/08/17 74.6 kg (164 lb 6.4 oz)   08/25/17 75.5 kg (166 lb 8 oz)   04/28/17 72.7 kg (160 lb 3.2 oz)        GENERAL APPEARANCE: Alert and no distress  NECK: No lymphadenopathy appreciated  Thyroid: No obvious nodules palpated   CV: RRR without M/R/G  Lungs: CTA bilaterally  Abdomen: Soft, Nontender, non distended, positive bowel sounds "   Neuro:  no focal deficits  Skin: No infection in feet, sensation in tact to monofilament bilaterally  Mood: Normal   Lymph: neg in neck and supraclavicular area       Data:  Lab Results   Component Value Date     11/09/2018    POTASSIUM 4.1 11/09/2018    CHLORIDE 102 11/09/2018    CO2 27 11/09/2018    ANIONGAP 6 11/09/2018     (H) 11/09/2018    BUN 13 11/09/2018    CR 0.80 11/09/2018    OMERO 8.7 11/09/2018     Lab Results   Component Value Date    GFRESTIMATED 79 11/09/2018    GFRESTIMATED >90 08/25/2017    GFRESTIMATED >90  Non  GFR Calc   04/08/2016    GFRESTBLACK >90 11/09/2018    GFRESTBLACK >90 08/25/2017    GFRESTBLACK >90   GFR Calc   04/08/2016      Lab Results   Component Value Date    MICROL <5 11/09/2018    UMALCR Unable to calculate due to low value 11/09/2018        Lab Results   Component Value Date    A1C 7.2 (H) 11/09/2018    A1C 7.5 (H) 11/01/2013    A1C 7.5 (A) 07/26/2013    A1C 7.4 (A) 03/01/2013    A1C 7.7 (A) 11/30/2012    HEMOGLOBINA1 7.4 (A) 11/15/2019    HEMOGLOBINA1 7.2 (A) 07/12/2019    HEMOGLOBINA1 7.0 (A) 03/29/2019    HEMOGLOBINA1 6.9 (A) 06/15/2018    HEMOGLOBINA1 6.8 (A) 02/23/2018     No results found for: CPEPT, GADAB, ISCAB    Lab Results   Component Value Date    CHOL 183 11/09/2018    CHOL 172 08/25/2017    TRIG 36 11/09/2018    TRIG 35 08/25/2017    HDL 83 11/09/2018    HDL 94 08/25/2017    LDL 93 11/09/2018    LDL 72 08/25/2017    NHDL 100 11/09/2018    NHDL 79 08/25/2017       Assessment and Plan:  #1 Type 1 diabetes mellitus  November 2019 A1c 7.4%.  I do think she would benefit from Dexcom CGM S and this is been sent to her pharmacy.  She will call and ask about price in a few days. Recommended she use her Azam in the interim.     Given her noted hypoglycemia during the day, I made the following adjustments to her insulin program on her pump: . Discussed goal of BG>100 before bed as well to avoid hypoglycemia overnight.  Current  Settings: New Settings (changes bolded):   BASAL RATES and times:  12   AM (midnight): 0.7 units/hour    9     AM: 0.9 units/hour   11:30   AM: 1.4 units/hour   12:30  PM (noon): 0.6 units/hour   7    PM: 1.4 units/hour      10   PM: 0.7 units/hour   BASAL RATES and times:  12   AM (midnight): 0.7 units/hour    9     AM: 0.9 units/hour   11:30   AM: 1.4 units/hour   12:30  PM (noon): 0.4 units/hour   7    PM: 1.4 units/hour      10   PM: 0.7 units/hour     CARB RATIO and times:  12   AM (midnight): 12  9     AM:  6.5  10   PM:  12 CARB RATIO and times:  12   AM (midnight): 12  9     AM:  6.5  10   PM:  12   Corection Factor (Sensitivity) and times:  12   AM (midnight): 50 mg/dL  9     AM: 40 mg/dL  10   PM: 50 mg/dL Corection Factor (Sensitivity) and times:  12   AM (midnight): 50 mg/dL  9     AM: 45 mg/dL  10   PM: 50 mg/dL   BLOOD GLUCOSE TARGET and times:  12   AM (midnight): 120 BLOOD GLUCOSE TARGET and times:  12   AM (midnight): 120       We will check standard labs including urine for protein, lipid profile, thyroid function, TTG, basic metabolic panel, vitamin D, and ferritin.  Referral made for ophthalmology.    Will inquire if patient is interested in nasal glucagon at future visit.  Patient does have a prescription for Lantus in case of pump failure.    She will let us know if she has any issues with the cost of the Dexcom CGMS or linking the tandem basal IQ with the Dexcom CGMS.    #2 Subarachnoid hemorrhage  Not discussed.      #3 Interest in weight loss  She has lost weight. Encouraged her to continue making healthy lifestyle choices.       Flu shot today.      Follow-up: Return in about 3 months (around 2/15/2020).     >50% of 30 minute visit spent in face to face counseling, education and coordination of care related to options for better glycemic control as well as preventing, detecting, and treating hypoglycemia.         Scribe Disclosure:  Inés BLACKWELL, am serving as a scribe to document  services personally performed by Jemma Anderson MD at this visit, based upon the provider's statements to me. All documentation has been reviewed by the aforementioned provider prior to being entered into the official medical record.     Portions of this medical record were completed by a scribe. UPON MY REVIEW AND AUTHENTICATION BY ELECTRONIC SIGNATURE, this confirms (a) I performed the applicable clinical services, and (b) the record is accurate.

## 2019-11-15 ENCOUNTER — OFFICE VISIT (OUTPATIENT)
Dept: ENDOCRINOLOGY | Facility: CLINIC | Age: 44
End: 2019-11-15
Payer: COMMERCIAL

## 2019-11-15 VITALS
WEIGHT: 162.6 LBS | DIASTOLIC BLOOD PRESSURE: 80 MMHG | HEART RATE: 91 BPM | HEIGHT: 63 IN | BODY MASS INDEX: 28.81 KG/M2 | SYSTOLIC BLOOD PRESSURE: 126 MMHG

## 2019-11-15 DIAGNOSIS — E10.9 DIABETES MELLITUS TYPE 1 (H): ICD-10-CM

## 2019-11-15 DIAGNOSIS — E10.9 TYPE 1 DIABETES MELLITUS WITHOUT COMPLICATION (H): Primary | ICD-10-CM

## 2019-11-15 LAB
ANION GAP SERPL CALCULATED.3IONS-SCNC: 2 MMOL/L (ref 3–14)
BUN SERPL-MCNC: 12 MG/DL (ref 7–30)
CALCIUM SERPL-MCNC: 9 MG/DL (ref 8.5–10.1)
CHLORIDE SERPL-SCNC: 104 MMOL/L (ref 94–109)
CHOLEST SERPL-MCNC: 194 MG/DL
CO2 SERPL-SCNC: 30 MMOL/L (ref 20–32)
CREAT SERPL-MCNC: 0.67 MG/DL (ref 0.52–1.04)
CREAT UR-MCNC: 123 MG/DL
FERRITIN SERPL-MCNC: 50 NG/ML (ref 12–150)
GFR SERPL CREATININE-BSD FRML MDRD: >90 ML/MIN/{1.73_M2}
GLUCOSE SERPL-MCNC: 106 MG/DL (ref 70–99)
HBA1C MFR BLD: 7.4 % (ref 4.3–6)
HDLC SERPL-MCNC: 82 MG/DL
LDLC SERPL CALC-MCNC: 99 MG/DL
MICROALBUMIN UR-MCNC: 6 MG/L
MICROALBUMIN/CREAT UR: 5.18 MG/G CR (ref 0–25)
NONHDLC SERPL-MCNC: 112 MG/DL
POTASSIUM SERPL-SCNC: 4.2 MMOL/L (ref 3.4–5.3)
SODIUM SERPL-SCNC: 137 MMOL/L (ref 133–144)
T4 FREE SERPL-MCNC: 0.89 NG/DL (ref 0.76–1.46)
TRIGL SERPL-MCNC: 64 MG/DL
TSH SERPL DL<=0.005 MIU/L-ACNC: 0.61 MU/L (ref 0.4–4)

## 2019-11-15 RX ORDER — PROCHLORPERAZINE 25 MG/1
1 SUPPOSITORY RECTAL PRN
Qty: 1 EACH | Refills: 1 | Status: SHIPPED | OUTPATIENT
Start: 2019-11-15 | End: 2019-11-25

## 2019-11-15 RX ORDER — PROCHLORPERAZINE 25 MG/1
3 SUPPOSITORY RECTAL PRN
Qty: 3 EACH | Refills: 1 | Status: SHIPPED | OUTPATIENT
Start: 2019-11-15 | End: 2019-11-22

## 2019-11-15 ASSESSMENT — PAIN SCALES - GENERAL: PAINLEVEL: NO PAIN (0)

## 2019-11-15 ASSESSMENT — PATIENT HEALTH QUESTIONNAIRE - PHQ9: SUM OF ALL RESPONSES TO PHQ QUESTIONS 1-9: 5

## 2019-11-15 ASSESSMENT — MIFFLIN-ST. JEOR: SCORE: 1358.74

## 2019-11-15 NOTE — LETTER
Patient:  Shannon Sow  :   1975  MRN:     [id number]        Ms.Jessica Abby Sow  [address] [place] [address]        2019    Dear Shannon    Here is your urine which is negative.    If you have any questions, please feel free to contact my nurse at [phone] select option #3 for triage nurse  or  option #1 for scheduling related questions.    Regards    Jemma Anderson MD      Resulted Orders   Hemoglobin A1c POCT   Result Value Ref Range    Hemoglobin A1C 7.4 (A) 4.3 - 6 %   Albumin Random Urine Quantitative with Creat Ratio   Result Value Ref Range    Creatinine Urine 123 mg/dL    Albumin Urine mg/L 6 mg/L    Albumin Urine mg/g Cr 5.18 0 - 25 mg/g Cr       Jemma Anderson MD

## 2019-11-15 NOTE — LETTER
"11/15/2019       RE: Shannon Sow  [address] [place] [address]     Dear Colleague,    Thank you for referring your patient, Shannon Sow, to the [facility] ENDOCRINOLOGY at [facility]. Please see a copy of my visit note below.    [facility]  Endocrinology  Jemma Anderson MD  11/15/2019      Chief Complaint:   Diabetes    History of Present Illness:   Shannon Sow is a 44 year old female with a history of type I diabetes who presents for follow up.    #1 Type 1 diabetes mellitus  The patient was diagnosed with diabetes in 1992.  She was on Lantus and Humalog from approximately 2000 - May 2009 when she started on an insulin pump.  As of November 2013, she has been on a newer Medtronic pump with low glucose suspend.  She started using the Freestyle Azam CGMS system in February 2018.  Her A1c level in the past 5 years have generally been in the high 6% - mid 7% range.  March 2019 hemoglobin A1c was 7%. July 2019 A1c was 7.2%.    Interval history: November 2019 A1c 7.4%.  She has started her new tandem X2 in October 2019.  She does not have a Dexcom G6 yet as she has not been told how much it will cost her. She has started exercising twice a week, Mondays and Wednesdays 6:45-7:45PM while her son is at soccer. When she exercises she does the elliptical and weight training. She has one soda 2-3x per week at work. She does eat dinner later at night with her family. She has regular periods. She has noticed she feels tired when she has lows.  She reports recent hypoglycemia in the afternoons for the last several days.    Blood Glucose Monitoring:  We reviewed glucometer data together.  Yesterday she notes having low blood sugar during the afternoon. She thinks her correction may be too high. She is not always consistent with the times she takes her BG. She usually checks when she is feeling \"off\". She rarely wakes up with lows.    Average: 155 " mg/dl with standard deviation 150  Highest value: 409 mg/dl  Lowest value: 39 mg/dl  Values per day: 1.6    Above goal: 37% (19 values)  Within range: 35% (18 values)  Below goal: 28% (14 values)      Diabetes monitoring and complications:  CAD: No  Last eye exam results: 12/04/2018, evidence of early retinopathy   Microalbuminuria: Negative 11/2018  Neuropathy: No  HTN: No  On Statin: No  On Aspirin: No  Depression: Yes    #2 Subarachnoid hemorrhage  She was hospitalized at [facility] 8/2 - 8/9/18 for a subarachnoid hemorrhage after presenting with a sudden onset headache, nausea, and vomiting.  She had no complications. She saw Dr. Jacob in Neurology on 11/9/2018 who discussed with her that she is at low risk for any recurrent issues (2 - 5 % of recurrence).  She has no history of hypertension. She followed with Dr. Regan in Neurology on 11/20/2018 who concluded that her hemorrhage was likely non-aneurysmal in origin and found no other signs of ongoing symptoms. She was also interested in getting back into exercises such as yoga and walking.    Interval history: She continues to have some depression and stress related to her recent subarachnoid hemorrhage.  Is currently on Prozac.  Working with therapist.    #3 interest in weight loss  At the last visit the patient reported changing her diet and increasing her exercise.  She had lost 2 pounds compared with November 2018. She stopped drinking diet coke in March 2019, and no longer loves it as much as she use to. In July 2019 she had been eating healthier at lunch and eating more vegetables overall. She was not interested in any medications.     Interval history:She has lost some weight. She does not like soda as much as she used to. She eats late dinners with her family. She has been meeting with a therapist which has been going well for her. She is on Prozac and has started exercising 1-2x weekly. While her son is at soccer, she does the elliptical and  "some weight training.  She has lost about 2 pounds compared with her previous visit.  At her previous visit in July 2019 I mentioned the possibility of metformin as well as GLP-1 agonist which she had declined.    Review of Systems:   Pertinent items are noted in HPI.  All other systems are negative.    Active Medications:      cholecalciferol (VITAMIN D) 1000 UNIT tablet, Take 1 tablet (1,000 Units) by mouth daily, Disp: 90 tablet, Rfl: 3     FLUoxetine (PROZAC) 40 MG capsule, Take 1 capsule (40 mg) by mouth daily, Disp: 90 capsule, Rfl: 3     insulin aspart (NOVOLOG VIAL) 100 UNITS/ML vial, USE UP TO 80 UNITS DAILY IN PUMP, Disp: 80 mL, Rfl: 3     insulin glargine (BASAGLAR KWIKPEN) 100 UNIT/ML pen, Take 20 units if pump fails, Disp: 30 mL, Rfl: 3     Multiple Vitamins-Minerals (MULTIVITAMIN OR), Take by mouth daily , Disp: , Rfl:      STATIN NOT PRESCRIBED (INTENTIONAL), Please choose reason not prescribed, below (Patient not taking: Reported on 3/29/2019), Disp: , Rfl:       Allergies:   Nkda [no known drug allergies]   Seasonal allergies      Past Medical History:  Type I diabetes mellitus   Vitamin deficiency   Upper respiratory infection  Cerebral salt-wasting syndrome  Hypertensive disease  Recurrent major depressive disorder   Subarachnoid hemorrhage      Past Surgical History:  History reviewed. No pertinent past surgical history.      Family History:   Breast cancer - mother      Social History:   The patient reports reducing her alcohol intake.  No history of smoking.     Physical Exam:   /80   Pulse 91   Ht 1.588 m (5' 2.5\")   Wt 73.8 kg (162 lb 9.6 oz)   BMI 29.27 kg/m        Wt Readings from Last 10 Encounters:   11/15/19 73.8 kg (162 lb 9.6 oz)   07/12/19 74.6 kg (164 lb 8 oz)   03/29/19 74.3 kg (163 lb 12.8 oz)   11/20/18 75.3 kg (166 lb)   11/09/18 75.2 kg (165 lb 12.8 oz)   06/15/18 74.4 kg (164 lb 1.6 oz)   02/23/18 73.3 kg (161 lb 9.6 oz)   09/08/17 74.6 kg (164 lb 6.4 oz)   08/25/17 " 75.5 kg (166 lb 8 oz)   04/28/17 72.7 kg (160 lb 3.2 oz)        GENERAL APPEARANCE: Alert and no distress  NECK: No lymphadenopathy appreciated  Thyroid: No obvious nodules palpated   CV: RRR without M/R/G  Lungs: CTA bilaterally  Abdomen: Soft, Nontender, non distended, positive bowel sounds   Neuro:  no focal deficits  Skin: No infection in feet, sensation in tact to monofilament bilaterally  Mood: Normal   Lymph: neg in neck and supraclavicular area       Data:  Lab Results   Component Value Date     11/09/2018    POTASSIUM 4.1 11/09/2018    CHLORIDE 102 11/09/2018    CO2 27 11/09/2018    ANIONGAP 6 11/09/2018     (H) 11/09/2018    BUN 13 11/09/2018    CR 0.80 11/09/2018    OMERO 8.7 11/09/2018     Lab Results   Component Value Date    GFRESTIMATED 79 11/09/2018    GFRESTIMATED >90 08/25/2017    GFRESTIMATED >90  Non  GFR Calc   04/08/2016    GFRESTBLACK >90 11/09/2018    GFRESTBLACK >90 08/25/2017    GFRESTBLACK >90   GFR Calc   04/08/2016      Lab Results   Component Value Date    MICROL <5 11/09/2018    UMALCR Unable to calculate due to low value 11/09/2018        Lab Results   Component Value Date    A1C 7.2 (H) 11/09/2018    A1C 7.5 (H) 11/01/2013    A1C 7.5 (A) 07/26/2013    A1C 7.4 (A) 03/01/2013    A1C 7.7 (A) 11/30/2012    HEMOGLOBINA1 7.4 (A) 11/15/2019    HEMOGLOBINA1 7.2 (A) 07/12/2019    HEMOGLOBINA1 7.0 (A) 03/29/2019    HEMOGLOBINA1 6.9 (A) 06/15/2018    HEMOGLOBINA1 6.8 (A) 02/23/2018     No results found for: CPEPT, GADAB, ISCAB    Lab Results   Component Value Date    CHOL 183 11/09/2018    CHOL 172 08/25/2017    TRIG 36 11/09/2018    TRIG 35 08/25/2017    HDL 83 11/09/2018    HDL 94 08/25/2017    LDL 93 11/09/2018    LDL 72 08/25/2017    NHDL 100 11/09/2018    NHDL 79 08/25/2017       Assessment and Plan:  #1 Type 1 diabetes mellitus  November 2019 A1c 7.4%.  I do think she would benefit from Dexcom CGM S and this is been sent to her pharmacy.  She  will call and ask about price in a few days. Recommended she use her Azam in the interim.     Given her noted hypoglycemia during the day, I made the following adjustments to her insulin program on her pump: . Discussed goal of BG>100 before bed as well to avoid hypoglycemia overnight.  Current Settings: New Settings (changes bolded):   BASAL RATES and times:  12   AM (midnight): 0.7 units/hour    9     AM: 0.9 units/hour   11:30   AM: 1.4 units/hour   12:30  PM (noon): 0.6 units/hour   7    PM: 1.4 units/hour      10   PM: 0.7 units/hour   BASAL RATES and times:  12   AM (midnight): 0.7 units/hour    9     AM: 0.9 units/hour   11:30   AM: 1.4 units/hour   12:30  PM (noon): 0.4 units/hour   7    PM: 1.4 units/hour      10   PM: 0.7 units/hour     CARB RATIO and times:  12   AM (midnight): 12  9     AM:  6.5  10   PM:  12 CARB RATIO and times:  12   AM (midnight): 12  9     AM:  6.5  10   PM:  12   Corection Factor (Sensitivity) and times:  12   AM (midnight): 50 mg/dL  9     AM: 40 mg/dL  10   PM: 50 mg/dL Corection Factor (Sensitivity) and times:  12   AM (midnight): 50 mg/dL  9     AM: 45 mg/dL  10   PM: 50 mg/dL   BLOOD GLUCOSE TARGET and times:  12   AM (midnight): 120 BLOOD GLUCOSE TARGET and times:  12   AM (midnight): 120       We will check standard labs including urine for protein, lipid profile, thyroid function, TTG, basic metabolic panel, vitamin D, and ferritin.  Referral made for ophthalmology.    Will inquire if patient is interested in nasal glucagon at future visit.  Patient does have a prescription for Lantus in case of pump failure.    She will let us know if she has any issues with the cost of the Dexcom CGMS or linking the tandem basal IQ with the Dexcom CGMS.    #2 Subarachnoid hemorrhage  Not discussed.      #3 Interest in weight loss  She has lost weight. Encouraged her to continue making healthy lifestyle choices.       Flu shot today.      Follow-up: Return in about 3 months (around  2/15/2020).     >50% of 30 minute visit spent in face to face counseling, education and coordination of care related to options for better glycemic control as well as preventing, detecting, and treating hypoglycemia.         Scribe Disclosure:  I, Inés Jesus Manuel, am serving as a scribe to document services personally performed by Jemma Anderson MD at this visit, based upon the provider's statements to me. All documentation has been reviewed by the aforementioned provider prior to being entered into the official medical record.     Portions of this medical record were completed by a scribe. UPON MY REVIEW AND AUTHENTICATION BY ELECTRONIC SIGNATURE, this confirms (a) I performed the applicable clinical services, and (b) the record is accurate.       Again, thank you for allowing me to participate in the care of your patient.      Sincerely,    Jemma Anderson MD

## 2019-11-17 LAB
TTG IGA SER-ACNC: 1 U/ML
TTG IGG SER-ACNC: <1 U/ML

## 2019-11-20 ENCOUNTER — OFFICE VISIT (OUTPATIENT)
Dept: OPHTHALMOLOGY | Facility: CLINIC | Age: 44
End: 2019-11-20
Payer: COMMERCIAL

## 2019-11-20 DIAGNOSIS — H52.13 MYOPIA OF BOTH EYES: ICD-10-CM

## 2019-11-20 DIAGNOSIS — H26.9 CORTICAL CATARACT OF BOTH EYES: ICD-10-CM

## 2019-11-20 DIAGNOSIS — E11.3293 MILD NONPROLIFERATIVE DIABETIC RETINOPATHY OF BOTH EYES WITHOUT MACULAR EDEMA ASSOCIATED WITH TYPE 2 DIABETES MELLITUS (H): Primary | ICD-10-CM

## 2019-11-20 LAB
DEPRECATED CALCIDIOL+CALCIFEROL SERPL-MC: <39 UG/L (ref 20–75)
VITAMIN D2 SERPL-MCNC: <5 UG/L
VITAMIN D3 SERPL-MCNC: 34 UG/L

## 2019-11-20 ASSESSMENT — VISUAL ACUITY
OS_CC: 20/30
OD_CC+: -1
CORRECTION_TYPE: GLASSES
OD_CC: 20/25
METHOD: SNELLEN - LINEAR

## 2019-11-20 ASSESSMENT — CUP TO DISC RATIO
OS_RATIO: 0.45
OD_RATIO: 0.45

## 2019-11-20 ASSESSMENT — CONF VISUAL FIELD
OS_NORMAL: 1
OD_NORMAL: 1

## 2019-11-20 ASSESSMENT — REFRACTION_MANIFEST
OD_CYLINDER: +0.25
OS_SPHERE: -0.75
OD_AXIS: 150
OS_CYLINDER: SPHERE
OD_ADD: +1.25
OD_SPHERE: -1.00
OS_ADD: +1.25

## 2019-11-20 ASSESSMENT — SLIT LAMP EXAM - LIDS
COMMENTS: NORMAL
COMMENTS: NORMAL

## 2019-11-20 ASSESSMENT — TONOMETRY
OS_IOP_MMHG: 18
OD_IOP_MMHG: 17
IOP_METHOD: ICARE

## 2019-11-20 ASSESSMENT — EXTERNAL EXAM - RIGHT EYE: OD_EXAM: NORMAL

## 2019-11-20 ASSESSMENT — EXTERNAL EXAM - LEFT EYE: OS_EXAM: NORMAL

## 2019-11-20 NOTE — PROGRESS NOTES
History  HPI     Diabetic Eye Exam     Associated symptoms include blurred vision.  Negative for redness, itching, flashes and floaters.  Diabetes characteristics include Type 1.  Pain was noted as 0/10.              Comments     Patient denies flashes or floater, VA is good with gls.     Last BGL: 058 today  Last A1C: 7.4 last Friday.   Lab Results       Component                Value               Date                       A1C                      7.2                 11/09/2018                 A1C                      7.5                 11/01/2013                 A1C                      7.5                 07/26/2013                 A1C                      7.4                 03/01/2013                 A1C                      7.7                 11/30/2012                      Last edited by Lucretia Novak on 11/20/2019  2:51 PM. (History)          Assessment/Plan  (E11.4736) Mild nonproliferative diabetic retinopathy of both eyes without macular edema associated with type 2 diabetes mellitus (H)  (primary encounter diagnosis)  Comment: Mild NPDR without CSME both eyes   Plan:  Educated patient on clinical findings and the importance of continued management with primary care physician. Continue management as directed and return to clinic in 1 year for dilated exam, or sooner, as needed. Copy of chart sent to Dr. Anderson.    (H52.13) Myopia of both eyes  Comment: Myopia both eyes   Plan: REFRACTION         Educated patient on condition and clinical findings. Dispensed spectacle prescription for full time wear. Educated patient on possibility of adaptation period, if symptoms do not improve return to clinic for further testing.    (H26.9) Cortical cataract of both eyes  Comment: Not visually significant  Plan:  No treatment indicated at this time. Monitor annually.    Return to clinic in 1 year for comprehensive eye exam.    Complete documentation of historical and exam elements from today's encounter can  be  found in the full encounter summary report (not reduplicated in this progress  note). I personally obtained the chief complaint(s) and history of present illness. I  confirmed and edited as necessary the review of systems, past medical/surgical  history, family history, social history, and examination findings as documented by  others; and I examined the patient myself. I personally reviewed the relevant tests,  images, and reports as documented above. I formulated and edited as necessary the  assessment and plan and discussed the findings and management plan with the  patient and family.    Job Gonzalez OD, FAAO

## 2019-11-20 NOTE — Clinical Note
Thank you for the referral.Mild diabetic retinopathy noted in each eye, stable to previous exam.Recommended repeat evaluation in 1 year.Please contact me with any questions.Job Gonzalez, OD on 11/21/2019 at 8:29 AM

## 2019-11-20 NOTE — NURSING NOTE
Chief Complaints and History of Present Illnesses   Patient presents with     Diabetic Eye Exam     Chief Complaint(s) and History of Present Illness(es)     Diabetic Eye Exam     Associated symptoms: blurred vision.  Negative for redness, itching, flashes and floaters    Diabetes Type: Type 1    Pain scale: 0/10              Comments     Patient denies flashes or floater, VA is good with gls.     Last BGL: 058 today  Last A1C: 7.4 last Friday.   Lab Results       Component                Value               Date                       A1C                      7.2                 11/09/2018                 A1C                      7.5                 11/01/2013                 A1C                      7.5                 07/26/2013                 A1C                      7.4                 03/01/2013                 A1C                      7.7                 11/30/2012

## 2019-11-22 DIAGNOSIS — E10.9 TYPE 1 DIABETES MELLITUS WITHOUT COMPLICATION (H): ICD-10-CM

## 2019-11-22 RX ORDER — PROCHLORPERAZINE 25 MG/1
1 SUPPOSITORY RECTAL SEE ADMIN INSTRUCTIONS
Qty: 4 EACH | Refills: 11 | Status: SHIPPED | OUTPATIENT
Start: 2019-11-22 | End: 2019-11-25

## 2019-11-22 NOTE — TELEPHONE ENCOUNTER
Pharmacy [facility] faxes CGM requires supply changes in order per Medicare.   Lorraine Farr RN on 11/22/2019 at 11:25 AM

## 2019-11-25 ENCOUNTER — TELEPHONE (OUTPATIENT)
Dept: ENDOCRINOLOGY | Facility: CLINIC | Age: 44
End: 2019-11-25

## 2019-11-25 DIAGNOSIS — E10.9 TYPE 1 DIABETES MELLITUS WITHOUT COMPLICATION (H): ICD-10-CM

## 2019-11-25 RX ORDER — PROCHLORPERAZINE 25 MG/1
1 SUPPOSITORY RECTAL
Qty: 1 EACH | Refills: 1 | Status: SHIPPED | OUTPATIENT
Start: 2019-11-25 | End: 2020-02-20

## 2019-11-25 RX ORDER — PROCHLORPERAZINE 25 MG/1
1 SUPPOSITORY RECTAL
Qty: 4 EACH | Refills: 11 | Status: SHIPPED | OUTPATIENT
Start: 2019-11-25 | End: 2020-02-20

## 2019-11-26 NOTE — TELEPHONE ENCOUNTER
Patient states Dexcom is not covered and will be 450 dollars she will stay with the Azam she will give us a call tomorrow to give us the share code to review her number with the Azam.

## 2019-11-26 NOTE — TELEPHONE ENCOUNTER
----- Message from Eileen Garcia RN sent at 11/25/2019  4:42 PM CST -----  Regarding: bs data  Please call fro BS  control and cost of DEXCOM  to patient.   ----- Message -----  From: Jemma Anderson MD  Sent: 11/25/2019  To: [facility]    How is diabetes doing for pt? How much is dexcom?

## 2020-01-24 ENCOUNTER — TELEPHONE (OUTPATIENT)
Dept: ENDOCRINOLOGY | Facility: CLINIC | Age: 45
End: 2020-01-24

## 2020-01-24 DIAGNOSIS — E10.9 TYPE 1 DIABETES MELLITUS WITHOUT COMPLICATION (H): Primary | ICD-10-CM

## 2020-01-24 RX ORDER — FLASH GLUCOSE SCANNING READER
1 EACH MISCELLANEOUS SEE ADMIN INSTRUCTIONS
Qty: 1 DEVICE | Refills: 1 | Status: SHIPPED | OUTPATIENT
Start: 2020-01-24 | End: 2021-10-26

## 2020-01-24 RX ORDER — FLASH GLUCOSE SENSOR
1 KIT MISCELLANEOUS SEE ADMIN INSTRUCTIONS
Qty: 2 EACH | Refills: 11 | Status: SHIPPED | OUTPATIENT
Start: 2020-01-24 | End: 2020-12-18

## 2020-01-24 NOTE — TELEPHONE ENCOUNTER
Zuleika Nuñez MA  [facility] 50 minutes ago (12:22 PM)      Dexcom is no longer covered patient would like to switch back to Azam     Routing comment

## 2020-01-24 NOTE — TELEPHONE ENCOUNTER
[facility] Call Center    Phone Message    May a detailed message be left on voicemail: yes    Reason for Call: Medication Question or concern regarding medication   Prescription Clarification  Name of Medication: Continuous Blood Gluc Transmit (DEXCOM G6 TRANSMITTER) MISC  Prescribing Provider: Dr. Jemma Anderson   Pharmacy: [id number] - [place], [place] - [address]   What on the order needs clarification? Insurance wont cover Dexcom sensor, pt wants to go back to free style. Pharmacy upgraded from 10 day to 14 day. Please call pt with any questions. Pharmacy also sent request to clinic          Action Taken: Message routed to:  [facility] T[place]

## 2020-02-20 ENCOUNTER — OFFICE VISIT (OUTPATIENT)
Dept: ENDOCRINOLOGY | Facility: CLINIC | Age: 45
End: 2020-02-20
Payer: COMMERCIAL

## 2020-02-20 VITALS
DIASTOLIC BLOOD PRESSURE: 79 MMHG | BODY MASS INDEX: 28.42 KG/M2 | SYSTOLIC BLOOD PRESSURE: 122 MMHG | HEART RATE: 97 BPM | WEIGHT: 157.9 LBS

## 2020-02-20 DIAGNOSIS — E10.9 TYPE 1 DIABETES MELLITUS WITHOUT COMPLICATION (H): Primary | ICD-10-CM

## 2020-02-20 ASSESSMENT — PAIN SCALES - GENERAL: PAINLEVEL: NO PAIN (0)

## 2020-02-20 NOTE — LETTER
"2/20/2020       RE: Shannon Sow  [address] [place] [address]     Dear Colleague,    Thank you for referring your patient, Shannon Sow, to the [facility] ENDOCRINOLOGY at [facility]. Please see a copy of my visit note below.    [facility]  Endocrinology  Jemma Anderson MD  02/20/2020      Chief Complaint:   Diabetes    History of Present Illness:   Shannon Sow is a 44 year old female with a history of diabetes who presents for follow up of diabetes.    #1 Type 1 diabetes mellitus  The patient was diagnosed with diabetes in 1992.  She was on Lantus and Humalog from approximately 2000 - May 2009 when she started on an insulin pump.  As of November 2013, she has been on a newer Medtronic pump with low glucose suspend.  She started using the Freestyle Azam CGMS system in February 2018.  Her A1c level in the past 5 years have generally been in the high 6% - mid 7% range.  March 2019 hemoglobin A1c was 7%. July 2019 A1c was 7.2%. November 2019 A1c 7.4%.     Interval history: February 2020 A1c 7.0%. The dexcom was going to cost her $500 per month. She has religiously been using her Azam sensor. She has not had any issues with getting the sensor to stick. She does feel her sensor is helping her to avoid lows because she is able to check when she feels like her sugars are dropping. She notes loading the Tslim is \"clunkier\". The touch screen has been a bit problematic for her as well because it is quite sensitive. She has a prescription for Basaglar in case her pump fails. She will be traveling to [place] soon and has enough insulin and supplies as she has traveled in the past.     Blood Glucose Monitoring:  We reviewed glucometer and CGM data together. She has been having lower readings in the afternoons as well as hyperglycemia with meals.    Current Insulin Pump Settings:  Type of Pump: Tslim  BASAL RATES and times:  12   AM " (midnight): 0.70 units/hour    9     AM: 0.900 units/hour   11:30   AM: 1.200 units/hour  2    PM: 0.400 units/hour   7    PM: 1.400 units/hour      10   PM: 0.700 units/hour      Carb ratio:   CARB RATIO and times:  12   AM (midnight): 12.0  9     AM:  6.5   10   PM:  12.0    Corection Factor (Sensitivity) and times:  12   AM (midnight): 50 mg/dL  9     AM: 40 mg/dL  2    PM: 45 mg/dL   10   PM: 50 mg/dL    Target BG Ranges:   BLOOD GLUCOSE TARGET and times:  12   AM (midnight): 120 mg/dl  Amount of Time Insulin is Active:  4.0  hrs     Diabetes monitoring and complications:  CAD: No  Last eye exam results: 11/20/2019, mild nonproliferative retinopathy  Microalbuminuria: 5.18 mg/g Cr  Neuropathy: No  HTN: No  On Statin: No  On Aspirin: No  Depression: Yes    #2 Subarachnoid hemorrhage  She was hospitalized at [facility] 8/2 - 8/9/18 for a subarachnoid hemorrhage after presenting with a sudden onset headache, nausea, and vomiting.  She had no complications. She saw Dr. Jacob in Neurology on 11/9/2018 who discussed with her that she is at low risk for any recurrent issues (2 - 5 % of recurrence).  She has no history of hypertension. She followed with Dr. Regan in Neurology on 11/20/2018 who concluded that her hemorrhage was likely non-aneurysmal in origin and found no other signs of ongoing symptoms. She was also interested in getting back into exercises such as yoga and walking.    Interval history: not discussed     #3 interest in weight loss  At previous visit the patient reported changing her diet and increasing her exercise.  She had lost 2 pounds compared with November 2018. She stopped drinking diet coke in March 2019, and no longer loves it as much as she use to. In July 2019 she had been eating healthier at lunch and eating more vegetables overall. She was not interested in any medications.    Interval history: She has been going to the gym about twice a week and thinks she has lost weight.   Reviewing her chart, she has lost about 6 pounds compared with November 2019.    Review of Systems:   Pertinent items are noted in HPI.  All other systems are negative.    Active Medications:      blood glucose test strip, Test up to 6 times daily, Disp: 6 Box, Rfl: 4     cholecalciferol (VITAMIN D) 1000 UNIT tablet, Take 1 tablet (1,000 Units) by mouth daily, Disp: 90 tablet, Rfl: 3     Continuous Blood Gluc  (FREESTYLE RADHA 14 DAY READER) NAILA, 1 each See Admin Instructions To use to read blood sugars as manufacture's instructions., Disp: 1 Device, Rfl: 1     Continuous Blood Gluc Sensor (FREESTYLE RADHA 14 DAY SENSOR) MISC, 1 each See Admin Instructions Change every 14 days., Disp: 2 each, Rfl: 11     FLUoxetine (PROZAC) 40 MG capsule, Take 1 capsule (40 mg) by mouth daily, Disp: 90 capsule, Rfl: 3     insulin aspart (NOVOLOG VIAL) 100 UNITS/ML SC vial, USE UP TO 80 UNITS DAILY IN PUMP, Disp: 80 mL, Rfl: 3     insulin glargine (BASAGLAR KWIKPEN) 100 UNIT/ML pen, Take 20 units if pump fails, Disp: 30 mL, Rfl: 3     insulin pen needle (B-D U/F) 31G X 5 MM, Use if pump fails 4-5 times daily, Disp: 100 each, Rfl: 1     Multiple Vitamins-Minerals (MULTIVITAMIN OR), Take by mouth daily , Disp: , Rfl:      STATIN NOT PRESCRIBED (INTENTIONAL), Please choose reason not prescribed, below, Disp: , Rfl:       Allergies:   Nkda [no known drug allergies]   Seasonal allergies      Past Medical History:  Type I diabetes mellitus   Vitamin deficiency  Upper respiratory infection   Cerebral salt-wasting syndrome  Hypertensive disease\  Recurrent major depressive disorder  Subarachnoid hemorrhage      Past Surgical History:  History reviewed. No pertinent past surgical history.      Family History:   Breast cancer - mother      Social History:   The patient is single, a nonsmoker, and does consume alcohol (20 drinks per week).        Physical Exam:   /79   Pulse 97   Wt 71.6 kg (157 lb 14.4 oz)   BMI 28.42 kg/m         Wt Readings from Last 10 Encounters:   02/20/20 71.6 kg (157 lb 14.4 oz)   11/15/19 73.8 kg (162 lb 9.6 oz)   07/12/19 74.6 kg (164 lb 8 oz)   03/29/19 74.3 kg (163 lb 12.8 oz)   11/20/18 75.3 kg (166 lb)   11/09/18 75.2 kg (165 lb 12.8 oz)   06/15/18 74.4 kg (164 lb 1.6 oz)   02/23/18 73.3 kg (161 lb 9.6 oz)   09/08/17 74.6 kg (164 lb 6.4 oz)   08/25/17 75.5 kg (166 lb 8 oz)        GENERAL APPEARANCE: Alert and no distress  NECK: No lymphadenopathy appreciated  Thyroid: No obvious nodules palpated   CV: RRR without M/R/G  Lungs: CTA bilaterally  Abdomen: Soft, Nontender, non distended, positive bowel sounds   Neuro:  no focal deficits  Skin: No infection in feet, sensation in tact to monofilament bilaterally  Mood: Normal   Lymph: neg in neck and supraclavicular area       Data:  Lab Results   Component Value Date     11/15/2019    POTASSIUM 4.2 11/15/2019    CHLORIDE 104 11/15/2019    CO2 30 11/15/2019    ANIONGAP 2 (L) 11/15/2019     (H) 11/15/2019    BUN 12 11/15/2019    CR 0.67 11/15/2019    OMERO 9.0 11/15/2019     Lab Results   Component Value Date    GFRESTIMATED >90 11/15/2019    GFRESTIMATED 79 11/09/2018    GFRESTIMATED >90 08/25/2017    GFRESTBLACK >90 11/15/2019    GFRESTBLACK >90 11/09/2018    GFRESTBLACK >90 08/25/2017      Lab Results   Component Value Date    MICROL 6 11/15/2019    UMALCR 5.18 11/15/2019        Lab Results   Component Value Date    A1C 7.2 (H) 11/09/2018    A1C 7.5 (H) 11/01/2013    A1C 7.5 (A) 07/26/2013    A1C 7.4 (A) 03/01/2013    A1C 7.7 (A) 11/30/2012    HEMOGLOBINA1 7.4 (A) 11/15/2019    HEMOGLOBINA1 7.2 (A) 07/12/2019    HEMOGLOBINA1 7.0 (A) 03/29/2019    HEMOGLOBINA1 6.9 (A) 06/15/2018    HEMOGLOBINA1 6.8 (A) 02/23/2018     No results found for: CPEPT, GADAB, ISCAB    Lab Results   Component Value Date    CHOL 194 11/15/2019    CHOL 183 11/09/2018    TRIG 64 11/15/2019    TRIG 36 11/09/2018    HDL 82 11/15/2019    HDL 83 11/09/2018    LDL 99 11/15/2019    LDL  93 11/09/2018    [facility] 112 11/15/2019    [facility] 100 11/09/2018       Assessment and Plan:  #1 Type 1 diabetes mellitus   February 2020 A1c 7.0%.  She is currently on the T slim pump.  Unfortunately, the Dexcom is not well covered by her insurance so she cannot change over to the control IQ program.  She continues on the freestyle flores.  Given her noted hyperglycemia with meals as well as her hypoglycemia in the early afternoon, her program was adjusted as noted below:     Overall, I think the patient is doing quite well.  She will let me know if she is willing to consider the control IQ/DEXCOM option. (patient given information about [facility]).  Otherwise continue with current progress.    Patient reports she has sufficient supplies and information for overseas travel in the next month.    - Hemoglobin A1c POCT  - insulin aspart (NOVOLOG VIAL) 100 UNITS/ML SC vial  Dispense: 80 mL; Refill: 3    Current Settings: New Settings (changes bolded):   BASAL RATES and times:  12   AM (midnight): 0.700 units/hour    9     AM: 0.900 units/hour   11:30   AM: 1.200 units/hour   2    PM: 0.400 units/hour   7    PM: 1.400 units/hour      10   PM: 0.700 units/hour   BASAL RATES and times:  12   AM (midnight): 0.700 units/hour    9     AM:  0.0900 units/hour   11:30   AM: 1.100 units/hour   2    PM: 0.400 units/hour   7    PM: 1.400 units/hour      10   PM: 0.700 units/hour     CARB RATIO and times:  12   AM (midnight): 12.0  9     AM:  6.5  10   PM:  12.0 CARB RATIO and times:  12   AM (midnight): 12.0  6     AM: 6.0  10   PM:  12.0   Corection Factor (Sensitivity) and times:  12   AM (midnight): 50 mg/dL  9     AM: 40 mg/dL  2    PM: 45 mg/dL   10   PM: 50 mg/dL Corection Factor (Sensitivity) and times:  12   AM (midnight): 50 mg/dL  9     AM: 40 mg/dL  2    PM: 50 mg/dL   10   PM: 50 mg/dL   BLOOD GLUCOSE TARGET and times:  12   AM (midnight): 120 mg/dl BLOOD GLUCOSE TARGET and times:  12   AM (midnight): 120 mg/dl     #2  Subarachnoid hemorrhage  Not discussed.     #3 interest in weight loss  Congratulated patient on continued weight loss and healthy lifestyle choices.     Follow-up: Return in about 6 months (around 8/20/2020).     >50% of 30 minute visit spent in face to face counseling, education and coordination of care related to options for better glycemic control as well as preventing, detecting, and treating hypoglycemia.         Scribe Disclosure:  I, Inés Ken, am serving as a scribe to document services personally performed by Jemma Anderson MD at this visit, based upon the provider's statements to me. All documentation has been reviewed by the aforementioned provider prior to being entered into the official medical record.     Portions of this medical record were completed by a scribe. UPON MY REVIEW AND AUTHENTICATION BY ELECTRONIC SIGNATURE, this confirms (a) I performed the applicable clinical services, and (b) the record is accurate.        Again, thank you for allowing me to participate in the care of your patient.      Sincerely,    Jemma Anderson MD

## 2020-02-20 NOTE — LETTER
Patient:  Shannon Sow  :   1975  MRN:     [id number]        Ms.Jessica Abby Sow  [address] [place] [address]        2020    To Whom It May Concern    I am the endocrinologist caring for this patient.  This patient has diabetes and has to carry insulin (including pen needles, pump) which is medically indicated. Please call [phone] if you have any questions.     Regards,   Jemma Anderson MD

## 2020-02-20 NOTE — PROGRESS NOTES
"[facility]  Endocrinology  Jemma Anderson MD  02/20/2020      Chief Complaint:   Diabetes    History of Present Illness:   Shannon Sow is a 44 year old female with a history of diabetes who presents for follow up of diabetes.    #1 Type 1 diabetes mellitus  The patient was diagnosed with diabetes in 1992.  She was on Lantus and Humalog from approximately 2000 - May 2009 when she started on an insulin pump.  As of November 2013, she has been on a newer Medtronic pump with low glucose suspend.  She started using the Freestyle Azam CGMS system in February 2018.  Her A1c level in the past 5 years have generally been in the high 6% - mid 7% range.  March 2019 hemoglobin A1c was 7%. July 2019 A1c was 7.2%. November 2019 A1c 7.4%.     Interval history: February 2020 A1c 7.0%. The dexcom was going to cost her $500 per month. She has religiously been using her Azam sensor. She has not had any issues with getting the sensor to stick. She does feel her sensor is helping her to avoid lows because she is able to check when she feels like her sugars are dropping. She notes loading the Tslim is \"clunkier\". The touch screen has been a bit problematic for her as well because it is quite sensitive. She has a prescription for Basaglar in case her pump fails. She will be traveling to [place] soon and has enough insulin and supplies as she has traveled in the past.     Blood Glucose Monitoring:  We reviewed glucometer and CGM data together. She has been having lower readings in the afternoons as well as hyperglycemia with meals.    Current Insulin Pump Settings:  Type of Pump: Tslim  BASAL RATES and times:  12   AM (midnight): 0.70 units/hour    9     AM: 0.900 units/hour   11:30   AM: 1.200 units/hour  2    PM: 0.400 units/hour   7    PM: 1.400 units/hour      10   PM: 0.700 units/hour      Carb ratio:   CARB RATIO and times:  12   AM (midnight): 12.0  9     AM:  6.5   10   PM:  12.0    Corection Factor " (Sensitivity) and times:  12   AM (midnight): 50 mg/dL  9     AM: 40 mg/dL  2    PM: 45 mg/dL   10   PM: 50 mg/dL    Target BG Ranges:   BLOOD GLUCOSE TARGET and times:  12   AM (midnight): 120 mg/dl  Amount of Time Insulin is Active:  4.0  hrs     Diabetes monitoring and complications:  CAD: No  Last eye exam results: 11/20/2019, mild nonproliferative retinopathy  Microalbuminuria: 5.18 mg/g Cr  Neuropathy: No  HTN: No  On Statin: No  On Aspirin: No  Depression: Yes    #2 Subarachnoid hemorrhage  She was hospitalized at [facility] 8/2 - 8/9/18 for a subarachnoid hemorrhage after presenting with a sudden onset headache, nausea, and vomiting.  She had no complications. She saw Dr. Jacob in Neurology on 11/9/2018 who discussed with her that she is at low risk for any recurrent issues (2 - 5 % of recurrence).  She has no history of hypertension. She followed with Dr. Regan in Neurology on 11/20/2018 who concluded that her hemorrhage was likely non-aneurysmal in origin and found no other signs of ongoing symptoms. She was also interested in getting back into exercises such as yoga and walking.    Interval history: not discussed     #3 interest in weight loss  At previous visit the patient reported changing her diet and increasing her exercise.  She had lost 2 pounds compared with November 2018. She stopped drinking diet coke in March 2019, and no longer loves it as much as she use to. In July 2019 she had been eating healthier at lunch and eating more vegetables overall. She was not interested in any medications.    Interval history: She has been going to the gym about twice a week and thinks she has lost weight.  Reviewing her chart, she has lost about 6 pounds compared with November 2019.    Review of Systems:   Pertinent items are noted in HPI.  All other systems are negative.    Active Medications:      blood glucose test strip, Test up to 6 times daily, Disp: 6 Box, Rfl: 4     cholecalciferol (VITAMIN D)  1000 UNIT tablet, Take 1 tablet (1,000 Units) by mouth daily, Disp: 90 tablet, Rfl: 3     Continuous Blood Gluc  (FREESTYLE RADHA 14 DAY READER) NAILA, 1 each See Admin Instructions To use to read blood sugars as manufacture's instructions., Disp: 1 Device, Rfl: 1     Continuous Blood Gluc Sensor (FREESTYLE RADHA 14 DAY SENSOR) MISC, 1 each See Admin Instructions Change every 14 days., Disp: 2 each, Rfl: 11     FLUoxetine (PROZAC) 40 MG capsule, Take 1 capsule (40 mg) by mouth daily, Disp: 90 capsule, Rfl: 3     insulin aspart (NOVOLOG VIAL) 100 UNITS/ML SC vial, USE UP TO 80 UNITS DAILY IN PUMP, Disp: 80 mL, Rfl: 3     insulin glargine (BASAGLAR KWIKPEN) 100 UNIT/ML pen, Take 20 units if pump fails, Disp: 30 mL, Rfl: 3     insulin pen needle (B-D U/F) 31G X 5 MM, Use if pump fails 4-5 times daily, Disp: 100 each, Rfl: 1     Multiple Vitamins-Minerals (MULTIVITAMIN OR), Take by mouth daily , Disp: , Rfl:      STATIN NOT PRESCRIBED (INTENTIONAL), Please choose reason not prescribed, below, Disp: , Rfl:       Allergies:   Nkda [no known drug allergies]   Seasonal allergies      Past Medical History:  Type I diabetes mellitus   Vitamin deficiency  Upper respiratory infection   Cerebral salt-wasting syndrome  Hypertensive disease\  Recurrent major depressive disorder  Subarachnoid hemorrhage      Past Surgical History:  History reviewed. No pertinent past surgical history.      Family History:   Breast cancer - mother      Social History:   The patient is single, a nonsmoker, and does consume alcohol (20 drinks per week).        Physical Exam:   /79   Pulse 97   Wt 71.6 kg (157 lb 14.4 oz)   BMI 28.42 kg/m       Wt Readings from Last 10 Encounters:   02/20/20 71.6 kg (157 lb 14.4 oz)   11/15/19 73.8 kg (162 lb 9.6 oz)   07/12/19 74.6 kg (164 lb 8 oz)   03/29/19 74.3 kg (163 lb 12.8 oz)   11/20/18 75.3 kg (166 lb)   11/09/18 75.2 kg (165 lb 12.8 oz)   06/15/18 74.4 kg (164 lb 1.6 oz)   02/23/18 73.3 kg  (161 lb 9.6 oz)   09/08/17 74.6 kg (164 lb 6.4 oz)   08/25/17 75.5 kg (166 lb 8 oz)        GENERAL APPEARANCE: Alert and no distress  NECK: No lymphadenopathy appreciated  Thyroid: No obvious nodules palpated   CV: RRR without M/R/G  Lungs: CTA bilaterally  Abdomen: Soft, Nontender, non distended, positive bowel sounds   Neuro:  no focal deficits  Skin: No infection in feet, sensation in tact to monofilament bilaterally  Mood: Normal   Lymph: neg in neck and supraclavicular area       Data:  Lab Results   Component Value Date     11/15/2019    POTASSIUM 4.2 11/15/2019    CHLORIDE 104 11/15/2019    CO2 30 11/15/2019    ANIONGAP 2 (L) 11/15/2019     (H) 11/15/2019    BUN 12 11/15/2019    CR 0.67 11/15/2019    OMERO 9.0 11/15/2019     Lab Results   Component Value Date    GFRESTIMATED >90 11/15/2019    GFRESTIMATED 79 11/09/2018    GFRESTIMATED >90 08/25/2017    GFRESTBLACK >90 11/15/2019    GFRESTBLACK >90 11/09/2018    GFRESTBLACK >90 08/25/2017      Lab Results   Component Value Date    MICROL 6 11/15/2019    UMALCR 5.18 11/15/2019        Lab Results   Component Value Date    A1C 7.2 (H) 11/09/2018    A1C 7.5 (H) 11/01/2013    A1C 7.5 (A) 07/26/2013    A1C 7.4 (A) 03/01/2013    A1C 7.7 (A) 11/30/2012    HEMOGLOBINA1 7.4 (A) 11/15/2019    HEMOGLOBINA1 7.2 (A) 07/12/2019    HEMOGLOBINA1 7.0 (A) 03/29/2019    HEMOGLOBINA1 6.9 (A) 06/15/2018    HEMOGLOBINA1 6.8 (A) 02/23/2018     No results found for: CPEPT, GADAB, ISCAB    Lab Results   Component Value Date    CHOL 194 11/15/2019    CHOL 183 11/09/2018    TRIG 64 11/15/2019    TRIG 36 11/09/2018    HDL 82 11/15/2019    HDL 83 11/09/2018    LDL 99 11/15/2019    LDL 93 11/09/2018    NHDL 112 11/15/2019    NHDL 100 11/09/2018       Assessment and Plan:  #1 Type 1 diabetes mellitus   February 2020 A1c 7.0%.  She is currently on the T slim pump.  Unfortunately, the Dexcom is not well covered by her insurance so she cannot change over to the control IQ program.   She continues on the freestyle flores.  Given her noted hyperglycemia with meals as well as her hypoglycemia in the early afternoon, her program was adjusted as noted below:     Overall, I think the patient is doing quite well.  She will let me know if she is willing to consider the control IQ/DEXCOM option. (patient given information about [facility]).  Otherwise continue with current progress.    Patient reports she has sufficient supplies and information for overseas travel in the next month.    - Hemoglobin A1c POCT  - insulin aspart (NOVOLOG VIAL) 100 UNITS/ML SC vial  Dispense: 80 mL; Refill: 3    Current Settings: New Settings (changes bolded):   BASAL RATES and times:  12   AM (midnight): 0.700 units/hour    9     AM: 0.900 units/hour   11:30   AM: 1.200 units/hour   2    PM: 0.400 units/hour   7    PM: 1.400 units/hour      10   PM: 0.700 units/hour   BASAL RATES and times:  12   AM (midnight): 0.700 units/hour    9     AM:  0.0900 units/hour   11:30   AM: 1.100 units/hour   2    PM: 0.400 units/hour   7    PM: 1.400 units/hour      10   PM: 0.700 units/hour     CARB RATIO and times:  12   AM (midnight): 12.0  9     AM:  6.5  10   PM:  12.0 CARB RATIO and times:  12   AM (midnight): 12.0  6     AM: 6.0  10   PM:  12.0   Corection Factor (Sensitivity) and times:  12   AM (midnight): 50 mg/dL  9     AM: 40 mg/dL  2    PM: 45 mg/dL   10   PM: 50 mg/dL Corection Factor (Sensitivity) and times:  12   AM (midnight): 50 mg/dL  9     AM: 40 mg/dL  2    PM: 50 mg/dL   10   PM: 50 mg/dL   BLOOD GLUCOSE TARGET and times:  12   AM (midnight): 120 mg/dl BLOOD GLUCOSE TARGET and times:  12   AM (midnight): 120 mg/dl     #2 Subarachnoid hemorrhage  Not discussed.     #3 interest in weight loss  Congratulated patient on continued weight loss and healthy lifestyle choices.     Follow-up: Return in about 6 months (around 8/20/2020).     >50% of 30 minute visit spent in face to face counseling, education and coordination of care  related to options for better glycemic control as well as preventing, detecting, and treating hypoglycemia.         Scribe Disclosure:  I, Inés Ken, am serving as a scribe to document services personally performed by Jemma Anderson MD at this visit, based upon the provider's statements to me. All documentation has been reviewed by the aforementioned provider prior to being entered into the official medical record.     Portions of this medical record were completed by a scribe. UPON MY REVIEW AND AUTHENTICATION BY ELECTRONIC SIGNATURE, this confirms (a) I performed the applicable clinical services, and (b) the record is accurate.

## 2020-04-28 DIAGNOSIS — F41.9 ANXIETY: ICD-10-CM

## 2020-04-29 RX ORDER — FLUOXETINE 40 MG/1
40 CAPSULE ORAL DAILY
Qty: 90 CAPSULE | Refills: 3 | Status: SHIPPED | OUTPATIENT
Start: 2020-04-29 | End: 2020-12-18

## 2020-04-29 NOTE — TELEPHONE ENCOUNTER
FLUOXETINE HCL 40 MG CAPSULE    Last Written Prescription Date:  3/29/2019  Last Fill Quantity: 90,   # refills: 3  Last Office Visit : 11/15/2019  Future Office visit:  8/28/20    Routing refill request to provider for review/approval because:  SSRI not on the [facility], [facility] or [facility] ENdocrine refill protocol

## 2020-08-20 ENCOUNTER — TELEPHONE (OUTPATIENT)
Dept: ENDOCRINOLOGY | Facility: CLINIC | Age: 45
End: 2020-08-20

## 2020-08-20 DIAGNOSIS — R21 RASH: Primary | ICD-10-CM

## 2020-08-20 RX ORDER — HYDROCORTISONE 2.5 %
CREAM (GRAM) TOPICAL 2 TIMES DAILY
Qty: 30 G | Refills: 1 | Status: SHIPPED | OUTPATIENT
Start: 2020-08-20 | End: 2021-10-26

## 2020-08-20 RX ORDER — HYDROXYZINE HYDROCHLORIDE 25 MG/1
25 TABLET, FILM COATED ORAL 2 TIMES DAILY PRN
Qty: 20 TABLET | Refills: 0 | Status: SHIPPED | OUTPATIENT
Start: 2020-08-20 | End: 2021-08-13

## 2020-08-27 NOTE — PROGRESS NOTES
"Shannon Sow is a 44 year old female who is being evaluated via a billable telephone visit.      The patient has been notified of following:     \"This telephone visit will be conducted via a call between you and your physician/provider. We have found that certain health care needs can be provided without the need for a physical exam.  This service lets us provide the care you need with a short phone conversation.  If a prescription is necessary we can send it directly to your pharmacy.  If lab work is needed we can place an order for that and you can then stop by our lab to have the test done at a later time.    Telephone visits are billed at different rates depending on your insurance coverage. During this emergency period, for some insurers they may be billed the same as an in-person visit.  Please reach out to your insurance provider with any questions.    If during the course of the call the physician/provider feels a telephone visit is not appropriate, you will not be charged for this service.\"    Patient has given verbal consent for Telephone visit?  Yes    What phone number would you like to be contacted at? [phone]    How would you like to obtain your AVS? Mail a copy    Shannon Walden MA    Patients Glucose Data was Patient stated they are not currently checking their glucose Not using Azam. Not checking blood sugars. Is using pump. Will try to upload to [employer].         "

## 2020-08-28 ENCOUNTER — VIRTUAL VISIT (OUTPATIENT)
Dept: ENDOCRINOLOGY | Facility: CLINIC | Age: 45
End: 2020-08-28
Payer: COMMERCIAL

## 2020-08-28 DIAGNOSIS — J31.0 CHRONIC RHINITIS: Primary | ICD-10-CM

## 2020-08-28 DIAGNOSIS — E10.9 TYPE 1 DIABETES MELLITUS WITHOUT COMPLICATION (H): ICD-10-CM

## 2020-08-28 RX ORDER — IPRATROPIUM BROMIDE 21 UG/1
2 SPRAY, METERED NASAL EVERY 12 HOURS
Qty: 1 BOX | Refills: 3 | Status: SHIPPED | OUTPATIENT
Start: 2020-08-28 | End: 2021-08-13

## 2020-08-28 NOTE — PROGRESS NOTES
"[facility]  Endocrinology  Jemma Anderson MD  8/28/2020     Chief Complaint:   Diabetes    History of Present Illness:   Shannon Sow is a 44 year old female with a history of diabetes who presents for follow up of diabetes.    Telephone call: started at 12:03, stop time 1228, total time 25 minutes    #1 Type 1 diabetes mellitus  The patient was diagnosed with diabetes in 1992.  She was on Lantus and Humalog from approximately 2000 - May 2009 when she started on an insulin pump.  As of November 2013, she has been on a newer Medtronic pump with low glucose suspend.  She started using the Freestyle Zaam CGMS system in February 2018.  Her A1c level in the past 5 years have generally been in the high 6% - mid 7% range.  March 2019 hemoglobin A1c was 7%. July 2019 A1c was 7.2%. November 2019 A1c 7.4%.   February 2020 A1c 7.0%    Interval history:The dexcom was going to cost her $500 per month.Pt working from home. Pt is not using her azam. She isn't really checking her blood sugars. Pt feels emotionally exhausted.  Ok with the T-slim pump. She notes loading the Tslim is \"clunkier\". The touch screen has been a bit problematic for her as well because it is quite sensitive. She has a prescription for Basaglar in case her pump fails.  Of note, she had been using the freestyle azam but has stopped since COVID started (March 2020) because she felt that it gave her unnecessary stress.  She was also frustrated with the hyperglycemia and her ability to respond appropriately.  Consequently, she has not really been checking her sugars.  She denies any significant problems with hypoglycemia.      Settings :   BASAL RATES and times:  12   AM (midnight): 0.700 units/hour    9     AM:  0.0900 units/hour   11:30   AM: 1.100 units/hour   2    PM: 0.400 units/hour   7    PM: 1.400 units/hour      10   PM: 0.700 units/hour     CARB RATIO and times:  12   AM (midnight): 12.0  6     AM: 6.0  10   PM:  12.0   Corection Factor " (Sensitivity) and times:  12   AM (midnight): 50 mg/dL  9     AM: 40 mg/dL  2    PM: 50 mg/dL   10   PM: 50 mg/dL   BLOOD GLUCOSE TARGET and times:  12   AM (midnight): 120 mg/dl       Blood Glucose Monitoring:  Patient does not have blood sugars for review    Diabetes monitoring and complications:  CAD: No  Last eye exam results: 11/20/2019, mild nonproliferative retinopathy  Microalbuminuria: 5.18 mg/g Cr  Neuropathy: No  HTN: No  On Statin: No  On Aspirin: No  Depression: Yes    #2 Subarachnoid hemorrhage  She was hospitalized at [facility] 8/2 - 8/9/18 for a subarachnoid hemorrhage after presenting with a sudden onset headache, nausea, and vomiting.  She had no complications. She saw Dr. Jacob in Neurology on 11/9/2018 who discussed with her that she is at low risk for any recurrent issues (2 - 5 % of recurrence).  She has no history of hypertension. She followed with Dr. Regan in Neurology on 11/20/2018 who concluded that her hemorrhage was likely non-aneurysmal in origin and found no other signs of ongoing symptoms. She was also interested in getting back into exercises such as yoga and walking.    Interval history: no headaches     #3 interest in weight loss  At previous visit the patient reported changing her diet and increasing her exercise.  She had lost 2 pounds compared with November 2018. She stopped drinking diet coke in March 2019, and no longer loves it as much as she use to. In July 2019 she had been eating healthier at lunch and eating more vegetables overall. She was not interested in any medications.    Interval history: Pt has lost about 2 more lbs.     Review of Systems:   Pertinent items are noted in HPI.  All other systems are negative.    Active Medications:   Current Outpatient Medications   Medication Sig Dispense Refill     blood glucose test strip Test up to 6 times daily 6 Box 4     cholecalciferol (VITAMIN D) 1000 UNIT tablet Take 1 tablet (1,000 Units) by mouth daily 90  tablet 3     Continuous Blood Gluc  (FREESTYLE RADHA 14 DAY READER) NAILA 1 each See Admin Instructions To use to read blood sugars as manufacture's instructions. 1 Device 1     Continuous Blood Gluc Sensor (FREESTYLE RADHA 14 DAY SENSOR) MISC 1 each See Admin Instructions Change every 14 days. 2 each 11     FLUoxetine (PROZAC) 40 MG capsule Take 1 capsule (40 mg) by mouth daily 90 capsule 3     hydrocortisone 2.5 % cream Apply topically 2 times daily 30 g 1     hydrOXYzine (ATARAX) 25 MG tablet Take 1 tablet (25 mg) by mouth 2 times daily as needed for itching 20 tablet 0     insulin aspart (NOVOLOG VIAL) 100 UNITS/ML SC vial USE UP TO 80 UNITS DAILY IN PUMP 80 mL 3     insulin glargine (BASAGLAR KWIKPEN) 100 UNIT/ML pen Take 20 units if pump fails 30 mL 3     insulin pen needle (B-D U/F) 31G X 5 MM Use if pump fails 4-5 times daily 100 each 1     Multiple Vitamins-Minerals (MULTIVITAMIN OR) Take by mouth daily        STATIN NOT PRESCRIBED (INTENTIONAL) Please choose reason not prescribed, below          Allergies:   Nkda [no known drug allergies]   Seasonal allergies      Past Medical History:  Type I diabetes mellitus   Vitamin deficiency  Upper respiratory infection   Cerebral salt-wasting syndrome  Hypertensive disease\  Recurrent major depressive disorder  Subarachnoid hemorrhage      Past Surgical History:  History reviewed. No pertinent past surgical history.      Family History:   Breast cancer - mother      Social History:   Pt working from home      Physical Exam:   Vital signs and physical exam not available.  However the patient reports feeling well. Psych: Alert and oriented times 3; coherent speech, normal  rate and volume, able to articulate logical thoughts, able to abstract reason, no tangential thoughts, no hallucinations or delusions     Data:  No visits with results within 1 Week(s) from this visit.   Latest known visit with results is:   Orders Only on 11/15/2019   Component Date Value  Ref Range Status     Ferritin 11/15/2019 50  12 - 150 ng/mL Final     25 OH Vit D2 11/15/2019 <5  ug/L Final     25 OH Vit D3 11/15/2019 34  ug/L Final     25 OH Vit D total 11/15/2019 <39  20 - 75 ug/L Final    Comment: Season, race, dietary intake, and treatment affect the concentration of   25-hydroxy-Vitamin D. Values may decrease during winter months and increase   during summer months. Values 20-29 ug/L may indicate Vitamin D insufficiency   and values <20 ug/L may indicate Vitamin D deficiency.  This test was developed and its performance characteristics determined by the   [facility],  Special Chemistry Laboratory. It has   not been cleared or approved by the FDA. The laboratory is regulated under   CLIA as qualified to perform high-complexity testing. This test is used for   clinical purposes. It should not be regarded as investigational or for   research.       Sodium 11/15/2019 137  133 - 144 mmol/L Final     Potassium 11/15/2019 4.2  3.4 - 5.3 mmol/L Final     Chloride 11/15/2019 104  94 - 109 mmol/L Final     Carbon Dioxide 11/15/2019 30  20 - 32 mmol/L Final     Anion Gap 11/15/2019 2* 3 - 14 mmol/L Final     Glucose 11/15/2019 106* 70 - 99 mg/dL Final     Urea Nitrogen 11/15/2019 12  7 - 30 mg/dL Final     Creatinine 11/15/2019 0.67  0.52 - 1.04 mg/dL Final     GFR Estimate 11/15/2019 >90  >60 mL/min/[1.73_m2] Final    Comment: Non  GFR Calc  Starting 12/18/2018, serum creatinine based estimated GFR (eGFR) will be   calculated using the Chronic Kidney Disease Epidemiology Collaboration   (CKD-EPI) equation.       GFR Estimate If Black 11/15/2019 >90  >60 mL/min/[1.73_m2] Final    Comment:  GFR Calc  Starting 12/18/2018, serum creatinine based estimated GFR (eGFR) will be   calculated using the Chronic Kidney Disease Epidemiology Collaboration   (CKD-EPI) equation.       Calcium 11/15/2019 9.0  8.5 - 10.1 mg/dL Final     Tissue  Transglutaminase Antibody I* 11/15/2019 1  <7 U/mL Final    Comment: Negative  The tTG-IgA assay has limited utility for patients with decreased levels of   IgA. Screening for celiac disease should include IgA testing to rule out   selective IgA deficiency and to guide selection and interpretation of   serological testing. tTG-IgG testing may be positive in celiac disease   patients with IgA deficiency.       Tissue Transglutaminase Britt IgG 11/15/2019 <1  <7 U/mL Final    Negative     T4 Free 11/15/2019 0.89  0.76 - 1.46 ng/dL Final     TSH 11/15/2019 0.61  0.40 - 4.00 mU/L Final     Cholesterol 11/15/2019 194  <200 mg/dL Final     Triglycerides 11/15/2019 64  <150 mg/dL Final     HDL Cholesterol 11/15/2019 82  >49 mg/dL Final     LDL Cholesterol Calculated 11/15/2019 99  <100 mg/dL Final    Desirable:       <100 mg/dl     Non HDL Cholesterol 11/15/2019 112  <130 mg/dL Final     Assessment and Plan:  #1 Type 1 diabetes mellitus   February 2020 A1c 7.0%.  She is currently on the T slim pump.  Unfortunately, the Dexcom is not well covered by her insurance so she cannot change over to the control IQ program.  We will look into options for her Dexcom coverage, as I think that Dexcom-T slim control IQ could be transformative for her to reduce her burden from diabetes.  I did briefly talk with her about the possibility of Jardiance or Trulicity, although I think they would not be good candidates given her type 1 diabetes status (Jardiance) or Trulicity(patient concerned about pancreatitis, pancreatic cancer).    Patient to return in 3 months.  We will check labs then (see below).  We will look into options about having the patient having access to the Dexcom at a cheaper price (currently $500 per month).    Orders Placed This Encounter   Procedures     TSH     T4 free     Hemoglobin A1c     25 Hydroxyvitamin D2 and D3     Comprehensive metabolic panel     Albumin Random Urine Quantitative with Creat Ratio     Lipid  Profile     Tissue transglutaminase payal IgA and IgG       #2 Subarachnoid hemorrhage  Doing well currently.    #3 interest in weight loss  Continues to lose weight.  Congratulated patient.    #4 rhinitis  The patient reports of allergic rhinitis.  She is on generic Zyrtec.  Patient given prescription for Atrovent nasal spray to use in addition as needed.    Follow-up: 4 months.        Due to the COVID 19 pandemic this visit was converted to a telephone/virtual  visit in order to help prevent spread of infection in this high risk patient and the general population .      Telephone call: started at 12:03, stop time 1228, total time 25 minutes

## 2020-08-28 NOTE — LETTER
"8/28/2020       RE: Shannon Sow  [address] [place] [address]     Dear Colleague,    Thank you for referring your patient, Shannon Sow, to the [facility] ENDOCRINOLOGY at [facility]. Please see a copy of my visit note below.    Shannon Sow is a 44 year old female who is being evaluated via a billable telephone visit.      The patient has been notified of following:     \"This telephone visit will be conducted via a call between you and your physician/provider. We have found that certain health care needs can be provided without the need for a physical exam.  This service lets us provide the care you need with a short phone conversation.  If a prescription is necessary we can send it directly to your pharmacy.  If lab work is needed we can place an order for that and you can then stop by our lab to have the test done at a later time.    Telephone visits are billed at different rates depending on your insurance coverage. During this emergency period, for some insurers they may be billed the same as an in-person visit.  Please reach out to your insurance provider with any questions.    If during the course of the call the physician/provider feels a telephone visit is not appropriate, you will not be charged for this service.\"    Patient has given verbal consent for Telephone visit?  Yes    What phone number would you like to be contacted at? [phone]    How would you like to obtain your AVS? Mail a copy    Shannon Walden MA    Patients Glucose Data was Patient stated they are not currently checking their glucose Not using Azam. Not checking blood sugars. Is using pump. Will try to upload to [employer].           [facility]  Endocrinology  Jemma Anderson MD  8/28/2020     Chief Complaint:   Diabetes    History of Present Illness:   Shannon Sow is a 44 year old female with a history of diabetes who presents for follow up " "of diabetes.    Telephone call: started at 12:03, stop time 1228, total time 25 minutes    #1 Type 1 diabetes mellitus  The patient was diagnosed with diabetes in 1992.  She was on Lantus and Humalog from approximately 2000 - May 2009 when she started on an insulin pump.  As of November 2013, she has been on a newer Medtronic pump with low glucose suspend.  She started using the Freestyle Azam CGMS system in February 2018.  Her A1c level in the past 5 years have generally been in the high 6% - mid 7% range.  March 2019 hemoglobin A1c was 7%. July 2019 A1c was 7.2%. November 2019 A1c 7.4%.   February 2020 A1c 7.0%    Interval history:The dexcom was going to cost her $500 per month.Pt working from home. Pt is not using her azam. She isn't really checking her blood sugars. Pt feels emotionally exhausted.  Ok with the T-slim pump. She notes loading the Tslim is \"clunkier\". The touch screen has been a bit problematic for her as well because it is quite sensitive. She has a prescription for Basaglar in case her pump fails.  Of note, she had been using the freestyle azam but has stopped since COVID started (March 2020) because she felt that it gave her unnecessary stress.  She was also frustrated with the hyperglycemia and her ability to respond appropriately.  Consequently, she has not really been checking her sugars.  She denies any significant problems with hypoglycemia.      Settings :   BASAL RATES and times:  12   AM (midnight): 0.700 units/hour    9     AM:  0.0900 units/hour   11:30   AM: 1.100 units/hour   2    PM: 0.400 units/hour   7    PM: 1.400 units/hour      10   PM: 0.700 units/hour     CARB RATIO and times:  12   AM (midnight): 12.0  6     AM: 6.0  10   PM:  12.0   Corection Factor (Sensitivity) and times:  12   AM (midnight): 50 mg/dL  9     AM: 40 mg/dL  2    PM: 50 mg/dL   10   PM: 50 mg/dL   BLOOD GLUCOSE TARGET and times:  12   AM (midnight): 120 mg/dl       Blood Glucose Monitoring:  Patient does " not have blood sugars for review    Diabetes monitoring and complications:  CAD: No  Last eye exam results: 11/20/2019, mild nonproliferative retinopathy  Microalbuminuria: 5.18 mg/g Cr  Neuropathy: No  HTN: No  On Statin: No  On Aspirin: No  Depression: Yes    #2 Subarachnoid hemorrhage  She was hospitalized at [facility] 8/2 - 8/9/18 for a subarachnoid hemorrhage after presenting with a sudden onset headache, nausea, and vomiting.  She had no complications. She saw Dr. Jacob in Neurology on 11/9/2018 who discussed with her that she is at low risk for any recurrent issues (2 - 5 % of recurrence).  She has no history of hypertension. She followed with Dr. Regan in Neurology on 11/20/2018 who concluded that her hemorrhage was likely non-aneurysmal in origin and found no other signs of ongoing symptoms. She was also interested in getting back into exercises such as yoga and walking.    Interval history: no headaches     #3 interest in weight loss  At previous visit the patient reported changing her diet and increasing her exercise.  She had lost 2 pounds compared with November 2018. She stopped drinking diet coke in March 2019, and no longer loves it as much as she use to. In July 2019 she had been eating healthier at lunch and eating more vegetables overall. She was not interested in any medications.    Interval history: Pt has lost about 2 more lbs.     Review of Systems:   Pertinent items are noted in HPI.  All other systems are negative.    Active Medications:   Current Outpatient Medications   Medication Sig Dispense Refill     blood glucose test strip Test up to 6 times daily 6 Box 4     cholecalciferol (VITAMIN D) 1000 UNIT tablet Take 1 tablet (1,000 Units) by mouth daily 90 tablet 3     Continuous Blood Gluc  ("[employer]YLE RADHA 14 DAY READER) NAILA 1 each See Admin Instructions To use to read blood sugars as manufacture's instructions. 1 Device 1     Continuous Blood Gluc Sensor (FREESTYLE  RADHA 14 DAY SENSOR) [facility] 1 each See Admin Instructions Change every 14 days. 2 each 11     FLUoxetine (PROZAC) 40 MG capsule Take 1 capsule (40 mg) by mouth daily 90 capsule 3     hydrocortisone 2.5 % cream Apply topically 2 times daily 30 g 1     hydrOXYzine (ATARAX) 25 MG tablet Take 1 tablet (25 mg) by mouth 2 times daily as needed for itching 20 tablet 0     insulin aspart (NOVOLOG VIAL) 100 UNITS/ML SC vial USE UP TO 80 UNITS DAILY IN PUMP 80 mL 3     insulin glargine (BASAGLAR KWIKPEN) 100 UNIT/ML pen Take 20 units if pump fails 30 mL 3     insulin pen needle (B-D U/F) 31G X 5 MM Use if pump fails 4-5 times daily 100 each 1     Multiple Vitamins-Minerals (MULTIVITAMIN OR) Take by mouth daily        STATIN NOT PRESCRIBED (INTENTIONAL) Please choose reason not prescribed, below          Allergies:   Nkda [no known drug allergies]   Seasonal allergies      Past Medical History:  Type I diabetes mellitus   Vitamin deficiency  Upper respiratory infection   Cerebral salt-wasting syndrome  Hypertensive disease\  Recurrent major depressive disorder  Subarachnoid hemorrhage      Past Surgical History:  History reviewed. No pertinent past surgical history.      Family History:   Breast cancer - mother      Social History:   Pt working from home      Physical Exam:   Vital signs and physical exam not available.  However the patient reports feeling well. Psych: Alert and oriented times 3; coherent speech, normal  rate and volume, able to articulate logical thoughts, able to abstract reason, no tangential thoughts, no hallucinations or delusions     Data:  No visits with results within 1 Week(s) from this visit.   Latest known visit with results is:   Orders Only on 11/15/2019   Component Date Value Ref Range Status     Ferritin 11/15/2019 50  12 - 150 ng/mL Final     25 OH Vit D2 11/15/2019 <5  ug/L Final     25 OH Vit D3 11/15/2019 34  ug/L Final     25 OH Vit D total 11/15/2019 <39  20 - 75 ug/L Final    Comment:  Season, race, dietary intake, and treatment affect the concentration of   25-hydroxy-Vitamin D. Values may decrease during winter months and increase   during summer months. Values 20-29 ug/L may indicate Vitamin D insufficiency   and values <20 ug/L may indicate Vitamin D deficiency.  This test was developed and its performance characteristics determined by the   [facility],  Special Chemistry Laboratory. It has   not been cleared or approved by the FDA. The laboratory is regulated under   CLIA as qualified to perform high-complexity testing. This test is used for   clinical purposes. It should not be regarded as investigational or for   research.       Sodium 11/15/2019 137  133 - 144 mmol/L Final     Potassium 11/15/2019 4.2  3.4 - 5.3 mmol/L Final     Chloride 11/15/2019 104  94 - 109 mmol/L Final     Carbon Dioxide 11/15/2019 30  20 - 32 mmol/L Final     Anion Gap 11/15/2019 2* 3 - 14 mmol/L Final     Glucose 11/15/2019 106* 70 - 99 mg/dL Final     Urea Nitrogen 11/15/2019 12  7 - 30 mg/dL Final     Creatinine 11/15/2019 0.67  0.52 - 1.04 mg/dL Final     GFR Estimate 11/15/2019 >90  >60 mL/min/[1.73_m2] Final    Comment: Non  GFR Calc  Starting 12/18/2018, serum creatinine based estimated GFR (eGFR) will be   calculated using the Chronic Kidney Disease Epidemiology Collaboration   (CKD-EPI) equation.       GFR Estimate If Black 11/15/2019 >90  >60 mL/min/[1.73_m2] Final    Comment:  GFR Calc  Starting 12/18/2018, serum creatinine based estimated GFR (eGFR) will be   calculated using the Chronic Kidney Disease Epidemiology Collaboration   (CKD-EPI) equation.       Calcium 11/15/2019 9.0  8.5 - 10.1 mg/dL Final     Tissue Transglutaminase Antibody I* 11/15/2019 1  <7 U/mL Final    Comment: Negative  The tTG-IgA assay has limited utility for patients with decreased levels of   IgA. Screening for celiac disease should include IgA testing to rule out    selective IgA deficiency and to guide selection and interpretation of   serological testing. tTG-IgG testing may be positive in celiac disease   patients with IgA deficiency.       Tissue Transglutaminase Payal IgG 11/15/2019 <1  <7 U/mL Final    Negative     T4 Free 11/15/2019 0.89  0.76 - 1.46 ng/dL Final     TSH 11/15/2019 0.61  0.40 - 4.00 mU/L Final     Cholesterol 11/15/2019 194  <200 mg/dL Final     Triglycerides 11/15/2019 64  <150 mg/dL Final     HDL Cholesterol 11/15/2019 82  >49 mg/dL Final     LDL Cholesterol Calculated 11/15/2019 99  <100 mg/dL Final    Desirable:       <100 mg/dl     Non HDL Cholesterol 11/15/2019 112  <130 mg/dL Final     Assessment and Plan:  #1 Type 1 diabetes mellitus   February 2020 A1c 7.0%.  She is currently on the T slim pump.  Unfortunately, the Dexcom is not well covered by her insurance so she cannot change over to the control IQ program.  We will look into options for her Dexcom coverage, as I think that Dexcom-T slim control IQ could be transformative for her to reduce her burden from diabetes.  I did briefly talk with her about the possibility of Jardiance or Trulicity, although I think they would not be good candidates given her type 1 diabetes status (Jardiance) or Trulicity(patient concerned about pancreatitis, pancreatic cancer).    Patient to return in 3 months.  We will check labs then (see below).  We will look into options about having the patient having access to the Dexcom at a cheaper price (currently $500 per month).    Orders Placed This Encounter   Procedures     TSH     T4 free     Hemoglobin A1c     25 Hydroxyvitamin D2 and D3     Comprehensive metabolic panel     Albumin Random Urine Quantitative with Creat Ratio     Lipid Profile     Tissue transglutaminase payal IgA and IgG       #2 Subarachnoid hemorrhage  Doing well currently.    #3 interest in weight loss  Continues to lose weight.  Congratulated patient.    #4 rhinitis  The patient reports of allergic  rhinitis.  She is on generic Zyrtec.  Patient given prescription for Atrovent nasal spray to use in addition as needed.    Follow-up: 4 months.        Due to the COVID 19 pandemic this visit was converted to a telephone/virtual  visit in order to help prevent spread of infection in this high risk patient and the general population .      Telephone call: started at 12:03, stop time 1228, total time 25 minutes

## 2020-10-28 ENCOUNTER — DOCUMENTATION ONLY (OUTPATIENT)
Dept: CARE COORDINATION | Facility: CLINIC | Age: 45
End: 2020-10-28

## 2020-12-17 NOTE — PROGRESS NOTES
Outcome for 12/17/20 11:54 AM :Left Voicemail for patient to call back   Outcome for 12/18/20 8:30 AM :Left Voicemail for patient to call back      [facility]  Endocrinology  Jemma Anderson MD  8/28/2020     Chief Complaint:   Diabetes    History of Present Illness:   Shannon Sow is a 45 year old female with a history of diabetes who presents for follow up of diabetes.    [facility] started on 9:07, ended at 939, documentation time 5 minutes.      #1 Type 1 diabetes mellitus  The patient was diagnosed with diabetes in 1992.  She was on Lantus and Humalog from approximately 2000 - May 2009 when she started on an insulin pump.  As of November 2013, she has been on a newer Medtronic pump with low glucose suspend.  She started using the Freestyle Azam CGMS system in February 2018.  Her A1c level in the past 5 years have generally been in the high 6% - mid 7% range.  March 2019 hemoglobin A1c was 7%. July 2019 A1c was 7.2%. November 2019 A1c 7.4%.   February 2020 A1c 7.0%    Interval history: Although the patient has the T slim pump, the main barrier for her converting to control IQ is because of the Dexcom sensors.  She reports that using the sensor and seeing her blood sugar variability is very anxious for her.  She has not been checking her sugars regularly.  However she does feel she does not have significant hypoglycemia.      Settings :   BASAL RATES and times:  10 PM to 9 AM: 0.7 units/h  9     AM:  0.0900 units/hour   11:30   AM: 1.2 units/h  2    PM: 0.400 units/hour   7    PM: 1.400 units/hour       CARB RATIO and times:  12   AM (midnight): 12.0  6     AM: 6.0  10   PM:  12.0   Corection Factor (Sensitivity) and times:  12   AM (midnight): 50 mg/dL  9     AM: 40 mg/dL  2    PM: 50 mg/dL   10   PM: 50 mg/dL   BLOOD GLUCOSE TARGET and times:  12   AM (midnight): 120 mg/dl       Blood Glucose Monitoring:  Patient does not have blood sugars for review    Diabetes monitoring and  complications:  CAD: No  Last eye exam results: 11/20/2019, mild nonproliferative retinopathy  Microalbuminuria: 5.18 mg/g Cr  Neuropathy: No  HTN: No  On Statin: No  On Aspirin: No  Depression: Yes    #2 Subarachnoid hemorrhage  She was hospitalized at [facility] 8/2 - 8/9/18 for a subarachnoid hemorrhage after presenting with a sudden onset headache, nausea, and vomiting.  She had no complications. She saw Dr. Jacob in Neurology on 11/9/2018 who discussed with her that she is at low risk for any recurrent issues (2 - 5 % of recurrence).  She has no history of hypertension. She followed with Dr. Regan in Neurology on 11/20/2018 who concluded that her hemorrhage was likely non-aneurysmal in origin and found no other signs of ongoing symptoms.     Interval history: no headaches     #3 interest in weight loss  At previous visit the patient reported changing her diet and increasing her exercise.  She had lost 2 pounds compared with November 2018. She stopped drinking diet coke in March 2019, and no longer loves it as much as she use to. In July 2019 she had been eating healthier at lunch and eating more vegetables overall. She was not interested in any medications.    Interval history: The patient reports she is now down to 148.  She has lost about 12 pounds compared with February 2020.    #4 fatigue  This is her most pressing issue.  She reports significant fatigue.  She reports fatigue even when she wakes up in the morning.  She reports that she would wake up for an hour at night and then go back to sleep.  She does report significant stress in her life.  She is working with a therapist.  She feels that her Prozac dose is adequate.    Review of Systems:   Pertinent items are noted in HPI.  All other systems are negative.    Active Medications:   Current Outpatient Medications   Medication Sig Dispense Refill     blood glucose test strip Test up to 6 times daily 6 Box 4     cholecalciferol (VITAMIN D) 1000  "UNIT tablet Take 1 tablet (1,000 Units) by mouth daily 90 tablet 3     Continuous Blood Gluc  (FREESTYLE RADHA 14 DAY READER) NAILA 1 each See Admin Instructions To use to read blood sugars as manufacture's instructions. 1 Device 1     Continuous Blood Gluc Sensor (FREESTYLE RADHA 14 DAY SENSOR) MISC 1 each See Admin Instructions Change every 14 days. 2 each 11     FLUoxetine (PROZAC) 40 MG capsule Take 1 capsule (40 mg) by mouth daily 90 capsule 3     hydrocortisone 2.5 % cream Apply topically 2 times daily 30 g 1     hydrOXYzine (ATARAX) 25 MG tablet Take 1 tablet (25 mg) by mouth 2 times daily as needed for itching 20 tablet 0     insulin aspart (NOVOLOG VIAL) 100 UNITS/ML SC vial USE UP TO 80 UNITS DAILY IN PUMP 80 mL 3     insulin glargine (BASAGLAR KWIKPEN) 100 UNIT/ML pen Take 20 units if pump fails 30 mL 3     insulin pen needle (B-D U/F) 31G X 5 MM Use if pump fails 4-5 times daily 100 each 1     ipratropium (ATROVENT) 0.03 % nasal spray Spray 2 sprays into both nostrils every 12 hours 1 Box 3     Multiple Vitamins-Minerals (MULTIVITAMIN OR) Take by mouth daily        STATIN NOT PRESCRIBED (INTENTIONAL) Please choose reason not prescribed, below          Allergies:   Nkda [no known drug allergies]   Seasonal allergies      Past Medical History:  Type I diabetes mellitus   Vitamin deficiency  Upper respiratory infection   Cerebral salt-wasting syndrome  Hypertensive disease\  Recurrent major depressive disorder  Subarachnoid hemorrhage      Past Surgical History:  History reviewed. No pertinent past surgical history.      Family History:   Breast cancer - mother      Social History:   Pt working from home      Physical Exam:     GENERAL: Healthy, alert and no distress\",\"EYES: Eyes grossly normal to inspection.  No discharge or erythema, or obvious scleral/conjunctival abnormalities.\",\"RESP: No audible wheeze, cough, or visible cyanosis.  No visible retractions or increased work of breathing.  \",\"SKIN: " "Visible skin clear. No significant rash, abnormal pigmentation or lesions.\",\"NEURO: Cranial nerves grossly intact.  Mentation and speech appropriate for age.\",\"PSYCH: Mentation appears normal, affect normal/bright, judgement and insight intact, normal speech and appearance well-groomed.     Data:  No visits with results within 6 Month(s) from this visit.   Latest known visit with results is:   Orders Only on 11/15/2019   Component Date Value Ref Range Status     Ferritin 11/15/2019 50  12 - 150 ng/mL Final     25 OH Vit D2 11/15/2019 <5  ug/L Final     25 OH Vit D3 11/15/2019 34  ug/L Final     25 OH Vit D total 11/15/2019 <39  20 - 75 ug/L Final    Comment: Season, race, dietary intake, and treatment affect the concentration of   25-hydroxy-Vitamin D. Values may decrease during winter months and increase   during summer months. Values 20-29 ug/L may indicate Vitamin D insufficiency   and values <20 ug/L may indicate Vitamin D deficiency.  This test was developed and its performance characteristics determined by the   [facility],  Special Chemistry Laboratory. It has   not been cleared or approved by the FDA. The laboratory is regulated under   CLIA as qualified to perform high-complexity testing. This test is used for   clinical purposes. It should not be regarded as investigational or for   research.       Sodium 11/15/2019 137  133 - 144 mmol/L Final     Potassium 11/15/2019 4.2  3.4 - 5.3 mmol/L Final     Chloride 11/15/2019 104  94 - 109 mmol/L Final     Carbon Dioxide 11/15/2019 30  20 - 32 mmol/L Final     Anion Gap 11/15/2019 2* 3 - 14 mmol/L Final     Glucose 11/15/2019 106* 70 - 99 mg/dL Final     Urea Nitrogen 11/15/2019 12  7 - 30 mg/dL Final     Creatinine 11/15/2019 0.67  0.52 - 1.04 mg/dL Final     GFR Estimate 11/15/2019 >90  >60 mL/min/[1.73_m2] Final    Comment: Non  GFR Calc  Starting 12/18/2018, serum creatinine based estimated GFR (eGFR) will be "   calculated using the Chronic Kidney Disease Epidemiology Collaboration   (CKD-EPI) equation.       GFR Estimate If Black 11/15/2019 >90  >60 mL/min/[1.73_m2] Final    Comment:  GFR Calc  Starting 12/18/2018, serum creatinine based estimated GFR (eGFR) will be   calculated using the Chronic Kidney Disease Epidemiology Collaboration   (CKD-EPI) equation.       Calcium 11/15/2019 9.0  8.5 - 10.1 mg/dL Final     Tissue Transglutaminase Antibody I* 11/15/2019 1  <7 U/mL Final    Comment: Negative  The tTG-IgA assay has limited utility for patients with decreased levels of   IgA. Screening for celiac disease should include IgA testing to rule out   selective IgA deficiency and to guide selection and interpretation of   serological testing. tTG-IgG testing may be positive in celiac disease   patients with IgA deficiency.       Tissue Transglutaminase Britt IgG 11/15/2019 <1  <7 U/mL Final    Negative     T4 Free 11/15/2019 0.89  0.76 - 1.46 ng/dL Final     TSH 11/15/2019 0.61  0.40 - 4.00 mU/L Final     Cholesterol 11/15/2019 194  <200 mg/dL Final     Triglycerides 11/15/2019 64  <150 mg/dL Final     HDL Cholesterol 11/15/2019 82  >49 mg/dL Final     LDL Cholesterol Calculated 11/15/2019 99  <100 mg/dL Final    Desirable:       <100 mg/dl     Non HDL Cholesterol 11/15/2019 112  <130 mg/dL Final         Assessment and Plan:  #1 Type 1 diabetes mellitus   February 2020 A1c 7.0%.  She is currently on the T slim pump.  Unfortunately, the Dexcom is not well covered by her insurance so she cannot change over to the control IQ program.  We will look into options for her Dexcom coverage, as I think that Dexcom-T slim control IQ could be transformative for her to reduce her burden from diabetes.  I did briefly talk with her about the possibility of Jardiance or Trulicity, although I think they would not be good candidates given her type 1 diabetes status (Jardiance) or Trulicity(patient concerned about pancreatitis,  pancreatic cancer).    We will look and again for the Dexcom coverage.  I did advise the patient she would benefit from checking her blood sugars to avoid hypoglycemia.  She does have the freestyle flores in the interim.  We will have patient check labs as noted below including hemoglobin A1c and diabetes related labs    #2 Subarachnoid hemorrhage  Doing well currently.    #3 interest in weight loss  Continues to lose weight.  Congratulated patient.    #4  Fatigue  Uncertain of reason.  This may be related to her stress.  We will check labs as noted below: She will call to arrange this appointment.    Orders Placed This Encounter   Procedures     TSH     T4 free     T3 total     CBC with platelets     Comprehensive metabolic panel     Lipid Profile     Hemoglobin A1c     25 Hydroxyvitamin D2 and D3     Ferritin     Cortisol     Tissue transglutaminase payal IgA and IgG     Albumin Random Urine Quantitative with Creat Ratio     CK total       Follow-up: 4 months.    Video visit by nick started on 9:07, ended at 939, documentation time 5 minutes.

## 2020-12-18 ENCOUNTER — TELEPHONE (OUTPATIENT)
Dept: ENDOCRINOLOGY | Facility: CLINIC | Age: 45
End: 2020-12-18

## 2020-12-18 ENCOUNTER — VIRTUAL VISIT (OUTPATIENT)
Dept: ENDOCRINOLOGY | Facility: CLINIC | Age: 45
End: 2020-12-18
Payer: COMMERCIAL

## 2020-12-18 DIAGNOSIS — E10.9 TYPE 1 DIABETES MELLITUS WITHOUT COMPLICATION (H): Primary | ICD-10-CM

## 2020-12-18 DIAGNOSIS — F41.9 ANXIETY: ICD-10-CM

## 2020-12-18 DIAGNOSIS — R53.83 OTHER FATIGUE: ICD-10-CM

## 2020-12-18 PROCEDURE — 99214 OFFICE O/P EST MOD 30 MIN: CPT | Mod: 95 | Performed by: INTERNAL MEDICINE

## 2020-12-18 RX ORDER — PROCHLORPERAZINE 25 MG/1
SUPPOSITORY RECTAL
Qty: 1 EACH | Refills: 3 | Status: SHIPPED | OUTPATIENT
Start: 2020-12-18 | End: 2021-10-26

## 2020-12-18 RX ORDER — FLASH GLUCOSE SENSOR
1 KIT MISCELLANEOUS SEE ADMIN INSTRUCTIONS
Qty: 2 EACH | Refills: 11 | Status: SHIPPED | OUTPATIENT
Start: 2020-12-18 | End: 2021-04-09

## 2020-12-18 RX ORDER — PROCHLORPERAZINE 25 MG/1
3 SUPPOSITORY RECTAL PRN
Qty: 3 EACH | Refills: 3 | Status: SHIPPED | OUTPATIENT
Start: 2020-12-18 | End: 2021-04-09

## 2020-12-18 RX ORDER — FLUOXETINE 40 MG/1
40 CAPSULE ORAL DAILY
Qty: 90 CAPSULE | Refills: 3 | Status: SHIPPED | OUTPATIENT
Start: 2020-12-18 | End: 2021-08-13

## 2020-12-18 RX ORDER — PROCHLORPERAZINE 25 MG/1
1 SUPPOSITORY RECTAL PRN
Qty: 1 DEVICE | Refills: 4 | Status: SHIPPED | OUTPATIENT
Start: 2020-12-18 | End: 2021-10-26

## 2020-12-18 NOTE — TELEPHONE ENCOUNTER
----- Message from Jemma Anderson MD sent at 12/18/2020  9:23 AM CST -----  I sent the script for the dexcom g6 sensor, transmitter,  to [facility] pharmacy - can you check into cost? If too expensive, we will need to cancel. Please work with Alma if possible

## 2020-12-18 NOTE — TELEPHONE ENCOUNTER
----- Message from Jemma Anderson MD sent at 12/18/2020  9:23 AM CST -----  I sent the script for the dexcom g6 sensor, transmitter,  to [facility] pharmacy - can you check into cost? If too expensive, we will need to cancel. Please work with Alma if possible

## 2020-12-18 NOTE — TELEPHONE ENCOUNTER
Specialty Pharmacy PA team will investigate and then reach out to clinic for chart notes and Letter of Medical Necessity, then submit a test claim to insurance and then provide cost information. Typically takes 2-7 business days.   Provider notified.   Lorraine Farr RN on 12/18/2020 at 10:03 AM

## 2020-12-18 NOTE — LETTER
12/18/2020       RE: Shannon Sow  [address] [place] [address]     Dear Colleague,    Thank you for referring your patient, Shannon Sow, to the [facility] ENDOCRINOLOGY CLINIC [place] at [facility]. Please see a copy of my visit note below.    Outcome for 12/17/20 11:54 AM :Left Voicemail for patient to call back   Outcome for 12/18/20 8:30 AM :Left Voicemail for patient to call back      [facility]  Endocrinology  Jemma Anderson MD  8/28/2020     Chief Complaint:   Diabetes    History of Present Illness:   Shannon Sow is a 45 year old female with a history of diabetes who presents for follow up of diabetes.    Video visit by nick started on 9:07, ended at 939, documentation time 5 minutes.      #1 Type 1 diabetes mellitus  The patient was diagnosed with diabetes in 1992.  She was on Lantus and Humalog from approximately 2000 - May 2009 when she started on an insulin pump.  As of November 2013, she has been on a newer Medtronic pump with low glucose suspend.  She started using the Freestyle Azam CGMS system in February 2018.  Her A1c level in the past 5 years have generally been in the high 6% - mid 7% range.  March 2019 hemoglobin A1c was 7%. July 2019 A1c was 7.2%. November 2019 A1c 7.4%.   February 2020 A1c 7.0%    Interval history: Although the patient has the T slim pump, the main barrier for her converting to control IQ is because of the Dexcom sensors.  She reports that using the sensor and seeing her blood sugar variability is very anxious for her.  She has not been checking her sugars regularly.  However she does feel she does not have significant hypoglycemia.      Settings :   BASAL RATES and times:  10 PM to 9 AM: 0.7 units/h  9     AM:  0.0900 units/hour   11:30   AM: 1.2 units/h  2    PM: 0.400 units/hour   7    PM: 1.400 units/hour       CARB RATIO and times:  12   AM (midnight): 12.0  6     AM:  6.0  10   PM:  12.0   Corection Factor (Sensitivity) and times:  12   AM (midnight): 50 mg/dL  9     AM: 40 mg/dL  2    PM: 50 mg/dL   10   PM: 50 mg/dL   BLOOD GLUCOSE TARGET and times:  12   AM (midnight): 120 mg/dl       Blood Glucose Monitoring:  Patient does not have blood sugars for review    Diabetes monitoring and complications:  CAD: No  Last eye exam results: 11/20/2019, mild nonproliferative retinopathy  Microalbuminuria: 5.18 mg/g Cr  Neuropathy: No  HTN: No  On Statin: No  On Aspirin: No  Depression: Yes    #2 Subarachnoid hemorrhage  She was hospitalized at [facility] 8/2 - 8/9/18 for a subarachnoid hemorrhage after presenting with a sudden onset headache, nausea, and vomiting.  She had no complications. She saw Dr. Jacob in Neurology on 11/9/2018 who discussed with her that she is at low risk for any recurrent issues (2 - 5 % of recurrence).  She has no history of hypertension. She followed with Dr. Regan in Neurology on 11/20/2018 who concluded that her hemorrhage was likely non-aneurysmal in origin and found no other signs of ongoing symptoms.     Interval history: no headaches     #3 interest in weight loss  At previous visit the patient reported changing her diet and increasing her exercise.  She had lost 2 pounds compared with November 2018. She stopped drinking diet coke in March 2019, and no longer loves it as much as she use to. In July 2019 she had been eating healthier at lunch and eating more vegetables overall. She was not interested in any medications.    Interval history: The patient reports she is now down to 148.  She has lost about 12 pounds compared with February 2020.    #4 fatigue  This is her most pressing issue.  She reports significant fatigue.  She reports fatigue even when she wakes up in the morning.  She reports that she would wake up for an hour at night and then go back to sleep.  She does report significant stress in her life.  She is working with a therapist.   She feels that her Prozac dose is adequate.    Review of Systems:   Pertinent items are noted in HPI.  All other systems are negative.    Active Medications:   Current Outpatient Medications   Medication Sig Dispense Refill     blood glucose test strip Test up to 6 times daily 6 Box 4     cholecalciferol (VITAMIN D) 1000 UNIT tablet Take 1 tablet (1,000 Units) by mouth daily 90 tablet 3     Continuous Blood Gluc  (FREESTYLE RADHA 14 DAY READER) NAILA 1 each See Admin Instructions To use to read blood sugars as manufacture's instructions. 1 Device 1     Continuous Blood Gluc Sensor (FREESTYLE RADHA 14 DAY SENSOR) MISC 1 each See Admin Instructions Change every 14 days. 2 each 11     FLUoxetine (PROZAC) 40 MG capsule Take 1 capsule (40 mg) by mouth daily 90 capsule 3     hydrocortisone 2.5 % cream Apply topically 2 times daily 30 g 1     hydrOXYzine (ATARAX) 25 MG tablet Take 1 tablet (25 mg) by mouth 2 times daily as needed for itching 20 tablet 0     insulin aspart (NOVOLOG VIAL) 100 UNITS/ML SC vial USE UP TO 80 UNITS DAILY IN PUMP 80 mL 3     insulin glargine (BASAGLAR KWIKPEN) 100 UNIT/ML pen Take 20 units if pump fails 30 mL 3     insulin pen needle (B-D U/F) 31G X 5 MM Use if pump fails 4-5 times daily 100 each 1     ipratropium (ATROVENT) 0.03 % nasal spray Spray 2 sprays into both nostrils every 12 hours 1 Box 3     Multiple Vitamins-Minerals (MULTIVITAMIN OR) Take by mouth daily        STATIN NOT PRESCRIBED (INTENTIONAL) Please choose reason not prescribed, below          Allergies:   Nkda [no known drug allergies]   Seasonal allergies      Past Medical History:  Type I diabetes mellitus   Vitamin deficiency  Upper respiratory infection   Cerebral salt-wasting syndrome  Hypertensive disease\  Recurrent major depressive disorder  Subarachnoid hemorrhage      Past Surgical History:  History reviewed. No pertinent past surgical history.      Family History:   Breast cancer - mother      Social History:  "  Pt working from home      Physical Exam:     GENERAL: Healthy, alert and no distress\",\"EYES: Eyes grossly normal to inspection.  No discharge or erythema, or obvious scleral/conjunctival abnormalities.\",\"RESP: No audible wheeze, cough, or visible cyanosis.  No visible retractions or increased work of breathing.  \",\"SKIN: Visible skin clear. No significant rash, abnormal pigmentation or lesions.\",\"NEURO: Cranial nerves grossly intact.  Mentation and speech appropriate for age.\",\"PSYCH: Mentation appears normal, affect normal/bright, judgement and insight intact, normal speech and appearance well-groomed.     Data:  No visits with results within 6 Month(s) from this visit.   Latest known visit with results is:   Orders Only on 11/15/2019   Component Date Value Ref Range Status     Ferritin 11/15/2019 50  12 - 150 ng/mL Final     25 OH Vit D2 11/15/2019 <5  ug/L Final     25 OH Vit D3 11/15/2019 34  ug/L Final     25 OH Vit D total 11/15/2019 <39  20 - 75 ug/L Final    Comment: Season, race, dietary intake, and treatment affect the concentration of   25-hydroxy-Vitamin D. Values may decrease during winter months and increase   during summer months. Values 20-29 ug/L may indicate Vitamin D insufficiency   and values <20 ug/L may indicate Vitamin D deficiency.  This test was developed and its performance characteristics determined by the   [facility],  Special Chemistry Laboratory. It has   not been cleared or approved by the FDA. The laboratory is regulated under   CLIA as qualified to perform high-complexity testing. This test is used for   clinical purposes. It should not be regarded as investigational or for   research.       Sodium 11/15/2019 137  133 - 144 mmol/L Final     Potassium 11/15/2019 4.2  3.4 - 5.3 mmol/L Final     Chloride 11/15/2019 104  94 - 109 mmol/L Final     Carbon Dioxide 11/15/2019 30  20 - 32 mmol/L Final     Anion Gap 11/15/2019 2* 3 - 14 mmol/L Final     Glucose " 11/15/2019 106* 70 - 99 mg/dL Final     Urea Nitrogen 11/15/2019 12  7 - 30 mg/dL Final     Creatinine 11/15/2019 0.67  0.52 - 1.04 mg/dL Final     GFR Estimate 11/15/2019 >90  >60 mL/min/[1.73_m2] Final    Comment: Non  GFR Calc  Starting 12/18/2018, serum creatinine based estimated GFR (eGFR) will be   calculated using the Chronic Kidney Disease Epidemiology Collaboration   (CKD-EPI) equation.       GFR Estimate If Black 11/15/2019 >90  >60 mL/min/[1.73_m2] Final    Comment:  GFR Calc  Starting 12/18/2018, serum creatinine based estimated GFR (eGFR) will be   calculated using the Chronic Kidney Disease Epidemiology Collaboration   (CKD-EPI) equation.       Calcium 11/15/2019 9.0  8.5 - 10.1 mg/dL Final     Tissue Transglutaminase Antibody I* 11/15/2019 1  <7 U/mL Final    Comment: Negative  The tTG-IgA assay has limited utility for patients with decreased levels of   IgA. Screening for celiac disease should include IgA testing to rule out   selective IgA deficiency and to guide selection and interpretation of   serological testing. tTG-IgG testing may be positive in celiac disease   patients with IgA deficiency.       Tissue Transglutaminase Britt IgG 11/15/2019 <1  <7 U/mL Final    Negative     T4 Free 11/15/2019 0.89  0.76 - 1.46 ng/dL Final     TSH 11/15/2019 0.61  0.40 - 4.00 mU/L Final     Cholesterol 11/15/2019 194  <200 mg/dL Final     Triglycerides 11/15/2019 64  <150 mg/dL Final     HDL Cholesterol 11/15/2019 82  >49 mg/dL Final     LDL Cholesterol Calculated 11/15/2019 99  <100 mg/dL Final    Desirable:       <100 mg/dl     Non HDL Cholesterol 11/15/2019 112  <130 mg/dL Final         Assessment and Plan:  #1 Type 1 diabetes mellitus   February 2020 A1c 7.0%.  She is currently on the T slim pump.  Unfortunately, the Dexcom is not well covered by her insurance so she cannot change over to the control IQ program.  We will look into options for her Dexcom coverage, as I think  that Dexcom-T slim control IQ could be transformative for her to reduce her burden from diabetes.  I did briefly talk with her about the possibility of Jardiance or Trulicity, although I think they would not be good candidates given her type 1 diabetes status (Jardiance) or Trulicity(patient concerned about pancreatitis, pancreatic cancer).    We will look and again for the Dexcom coverage.  I did advise the patient she would benefit from checking her blood sugars to avoid hypoglycemia.  She does have the freestyle flores in the interim.  We will have patient check labs as noted below including hemoglobin A1c and diabetes related labs    #2 Subarachnoid hemorrhage  Doing well currently.    #3 interest in weight loss  Continues to lose weight.  Congratulated patient.    #4  Fatigue  Uncertain of reason.  This may be related to her stress.  We will check labs as noted below: She will call to arrange this appointment.    Orders Placed This Encounter   Procedures     TSH     T4 free     T3 total     CBC with platelets     Comprehensive metabolic panel     Lipid Profile     Hemoglobin A1c     25 Hydroxyvitamin D2 and D3     Ferritin     Cortisol     Tissue transglutaminase payal IgA and IgG     Albumin Random Urine Quantitative with Creat Ratio     CK total       Follow-up: 4 months.    Video visit by nick started on 9:07, ended at 939, documentation time 5 minutes.    Again, thank you for allowing me to participate in the care of your patient.      Sincerely,    Jemma Anderson MD

## 2020-12-24 DIAGNOSIS — R53.83 OTHER FATIGUE: ICD-10-CM

## 2020-12-24 DIAGNOSIS — E10.9 TYPE 1 DIABETES MELLITUS WITHOUT COMPLICATION (H): ICD-10-CM

## 2020-12-24 LAB
ALBUMIN SERPL-MCNC: 3.8 G/DL (ref 3.4–5)
ALP SERPL-CCNC: 51 U/L (ref 40–150)
ALT SERPL W P-5'-P-CCNC: 17 U/L (ref 0–50)
ANION GAP SERPL CALCULATED.3IONS-SCNC: 6 MMOL/L (ref 3–14)
AST SERPL W P-5'-P-CCNC: 14 U/L (ref 0–45)
BILIRUB SERPL-MCNC: 0.7 MG/DL (ref 0.2–1.3)
BUN SERPL-MCNC: 15 MG/DL (ref 7–30)
CALCIUM SERPL-MCNC: 8.6 MG/DL (ref 8.5–10.1)
CHLORIDE SERPL-SCNC: 107 MMOL/L (ref 94–109)
CHOLEST SERPL-MCNC: 196 MG/DL
CK SERPL-CCNC: 50 U/L (ref 30–225)
CO2 SERPL-SCNC: 25 MMOL/L (ref 20–32)
CORTIS SERPL-MCNC: 15.6 UG/DL (ref 4–22)
CREAT SERPL-MCNC: 0.66 MG/DL (ref 0.52–1.04)
CREAT UR-MCNC: 335 MG/DL
ERYTHROCYTE [DISTWIDTH] IN BLOOD BY AUTOMATED COUNT: 11.9 % (ref 10–15)
FERRITIN SERPL-MCNC: 53 NG/ML (ref 12–150)
GFR SERPL CREATININE-BSD FRML MDRD: >90 ML/MIN/{1.73_M2}
GLUCOSE SERPL-MCNC: 174 MG/DL (ref 70–99)
HBA1C MFR BLD: 7.8 % (ref 0–5.6)
HCT VFR BLD AUTO: 41.7 % (ref 35–47)
HDLC SERPL-MCNC: 94 MG/DL
HGB BLD-MCNC: 14 G/DL (ref 11.7–15.7)
LDLC SERPL CALC-MCNC: 94 MG/DL
MCH RBC QN AUTO: 32.2 PG (ref 26.5–33)
MCHC RBC AUTO-ENTMCNC: 33.6 G/DL (ref 31.5–36.5)
MCV RBC AUTO: 96 FL (ref 78–100)
MICROALBUMIN UR-MCNC: 31 MG/L
MICROALBUMIN/CREAT UR: 9.22 MG/G CR (ref 0–25)
NONHDLC SERPL-MCNC: 102 MG/DL
PLATELET # BLD AUTO: 287 10E9/L (ref 150–450)
POTASSIUM SERPL-SCNC: 4 MMOL/L (ref 3.4–5.3)
PROT SERPL-MCNC: 7 G/DL (ref 6.8–8.8)
RBC # BLD AUTO: 4.35 10E12/L (ref 3.8–5.2)
SODIUM SERPL-SCNC: 138 MMOL/L (ref 133–144)
TRIGL SERPL-MCNC: 40 MG/DL
WBC # BLD AUTO: 3.4 10E9/L (ref 4–11)

## 2020-12-24 PROCEDURE — 83516 IMMUNOASSAY NONANTIBODY: CPT | Performed by: INTERNAL MEDICINE

## 2020-12-24 PROCEDURE — 82550 ASSAY OF CK (CPK): CPT | Performed by: INTERNAL MEDICINE

## 2020-12-24 PROCEDURE — 84480 ASSAY TRIIODOTHYRONINE (T3): CPT | Performed by: INTERNAL MEDICINE

## 2020-12-24 PROCEDURE — 84439 ASSAY OF FREE THYROXINE: CPT | Performed by: INTERNAL MEDICINE

## 2020-12-24 PROCEDURE — 85027 COMPLETE CBC AUTOMATED: CPT | Performed by: INTERNAL MEDICINE

## 2020-12-24 PROCEDURE — 83036 HEMOGLOBIN GLYCOSYLATED A1C: CPT | Performed by: INTERNAL MEDICINE

## 2020-12-24 PROCEDURE — 84443 ASSAY THYROID STIM HORMONE: CPT | Performed by: INTERNAL MEDICINE

## 2020-12-24 PROCEDURE — 82728 ASSAY OF FERRITIN: CPT | Performed by: INTERNAL MEDICINE

## 2020-12-24 PROCEDURE — 36415 COLL VENOUS BLD VENIPUNCTURE: CPT | Performed by: INTERNAL MEDICINE

## 2020-12-24 PROCEDURE — 82306 VITAMIN D 25 HYDROXY: CPT | Performed by: INTERNAL MEDICINE

## 2020-12-24 PROCEDURE — 80061 LIPID PANEL: CPT | Performed by: INTERNAL MEDICINE

## 2020-12-24 PROCEDURE — 83516 IMMUNOASSAY NONANTIBODY: CPT | Mod: 59 | Performed by: INTERNAL MEDICINE

## 2020-12-24 PROCEDURE — 82533 TOTAL CORTISOL: CPT | Performed by: INTERNAL MEDICINE

## 2020-12-24 PROCEDURE — 82043 UR ALBUMIN QUANTITATIVE: CPT | Performed by: INTERNAL MEDICINE

## 2020-12-24 PROCEDURE — 80053 COMPREHEN METABOLIC PANEL: CPT | Performed by: INTERNAL MEDICINE

## 2020-12-24 NOTE — LETTER
Patient:  Shannon Sow  :   1975  MRN:     [id number]        Ms.Jessica Abby Sow  [address] [place] [address]        2020    Dear Shannon    Here are your labs which really look good. I think that we should work on improving your blood sugars. Otherwise everything else looked great ( don't worry about the white count - sometimes it can just drop a little bit when people are feeling a bit sick - it hasn't been an issue before and we will keep the eye on it).     If you have any questions, please feel free to contact my nurse at [phone] select option #3 for triage nurse  or  option #1 for scheduling related questions    Regards    Jemma Anderson MD      Resulted Orders   CK total   Result Value Ref Range    CK Total 50 30 - 225 U/L   Albumin Random Urine Quantitative with Creat Ratio   Result Value Ref Range    Creatinine Urine 335 mg/dL    Albumin Urine mg/L 31 mg/L    Albumin Urine mg/g Cr 9.22 0 - 25 mg/g Cr   Tissue transglutaminase payal IgA and IgG   Result Value Ref Range    Tissue Transglutaminase Antibody IgA <1 <7 U/mL      Comment:      Negative  The tTG-IgA assay has limited utility for patients with decreased levels of   IgA. Screening for celiac disease should include IgA testing to rule out   selective IgA deficiency and to guide selection and interpretation of   serological testing. tTG-IgG testing may be positive in celiac disease   patients with IgA deficiency.      Tissue Transglutaminase Payal IgG <1 <7 U/mL      Comment:      Negative   Cortisol   Result Value Ref Range    Cortisol Serum 15.6 4 - 22 ug/dL      Comment:      8 AM Cortisol Reference Range = 4-22 ug/dL  4 PM Cortisol Reference Range = 3-17 ug/dL     Ferritin   Result Value Ref Range    Ferritin 53 12 - 150 ng/mL   25 Hydroxyvitamin D2 and D3   Result Value Ref Range    25 OH Vit D2 <5 ug/L    25 OH Vit D3 30 ug/L    25 OH Vit D total <35 20 - 75 ug/L      Comment:      Season, race,  dietary intake, and treatment affect the concentration of   25-hydroxy-Vitamin D. Values may decrease during winter months and increase   during summer months. Values 20-29 ug/L may indicate Vitamin D insufficiency   and values <20 ug/L may indicate Vitamin D deficiency.  This test was developed and its performance characteristics determined by the   [facility].   It has not been cleared or approved by the FDA. The laboratory is regulated   under CLIA as qualified to perform high-complexity testing. This test is used   for clinical purposes. It should not be regarded as investigational or for   research.     Hemoglobin A1c   Result Value Ref Range    Hemoglobin A1C 7.8 (H) 0 - 5.6 %      Comment:      Normal <5.7% Prediabetes 5.7-6.4%  Diabetes 6.5% or higher - adopted from ADA   consensus guidelines.     Lipid Profile   Result Value Ref Range    Cholesterol 196 <200 mg/dL    Triglycerides 40 <150 mg/dL    HDL Cholesterol 94 >49 mg/dL    LDL Cholesterol Calculated 94 <100 mg/dL      Comment:      Desirable:       <100 mg/dl    Non HDL Cholesterol 102 <130 mg/dL   Comprehensive metabolic panel   Result Value Ref Range    Sodium 138 133 - 144 mmol/L    Potassium 4.0 3.4 - 5.3 mmol/L    Chloride 107 94 - 109 mmol/L    Carbon Dioxide 25 20 - 32 mmol/L    Anion Gap 6 3 - 14 mmol/L    Glucose 174 (H) 70 - 99 mg/dL    Urea Nitrogen 15 7 - 30 mg/dL    Creatinine 0.66 0.52 - 1.04 mg/dL    GFR Estimate >90 >60 mL/min/[1.73_m2]      Comment:      Non  GFR Calc  Starting 12/18/2018, serum creatinine based estimated GFR (eGFR) will be   calculated using the Chronic Kidney Disease Epidemiology Collaboration   (CKD-EPI) equation.      GFR Estimate If Black >90 >60 mL/min/[1.73_m2]      Comment:       GFR Calc  Starting 12/18/2018, serum creatinine based estimated GFR (eGFR) will be   calculated using the Chronic Kidney Disease  Epidemiology Collaboration   (CKD-EPI) equation.      Calcium 8.6 8.5 - 10.1 mg/dL    Bilirubin Total 0.7 0.2 - 1.3 mg/dL    Albumin 3.8 3.4 - 5.0 g/dL    Protein Total 7.0 6.8 - 8.8 g/dL    Alkaline Phosphatase 51 40 - 150 U/L    ALT 17 0 - 50 U/L    AST 14 0 - 45 U/L   CBC with platelets   Result Value Ref Range    WBC 3.4 (L) 4.0 - 11.0 10e9/L    RBC Count 4.35 3.8 - 5.2 10e12/L    Hemoglobin 14.0 11.7 - 15.7 g/dL    Hematocrit 41.7 35.0 - 47.0 %    MCV 96 78 - 100 fl    MCH 32.2 26.5 - 33.0 pg    MCHC 33.6 31.5 - 36.5 g/dL    RDW 11.9 10.0 - 15.0 %    Platelet Count 287 150 - 450 10e9/L   T4 free   Result Value Ref Range    T4 Free 0.94 0.76 - 1.46 ng/dL   TSH   Result Value Ref Range    TSH 0.95 0.40 - 4.00 mU/L   T3 total   Result Value Ref Range    Triiodothyronine (T3) 89 60 - 181 ng/dL       [facility]

## 2020-12-25 LAB — T3 SERPL-MCNC: 89 NG/DL (ref 60–181)

## 2020-12-27 LAB
T4 FREE SERPL-MCNC: 0.94 NG/DL (ref 0.76–1.46)
TSH SERPL DL<=0.005 MIU/L-ACNC: 0.95 MU/L (ref 0.4–4)

## 2020-12-28 LAB
TTG IGA SER-ACNC: <1 U/ML
TTG IGG SER-ACNC: <1 U/ML

## 2020-12-29 ENCOUNTER — TELEPHONE (OUTPATIENT)
Dept: ENDOCRINOLOGY | Facility: CLINIC | Age: 45
End: 2020-12-29

## 2020-12-29 LAB
DEPRECATED CALCIDIOL+CALCIFEROL SERPL-MC: <35 UG/L (ref 20–75)
VITAMIN D2 SERPL-MCNC: <5 UG/L
VITAMIN D3 SERPL-MCNC: 30 UG/L

## 2020-12-29 NOTE — TELEPHONE ENCOUNTER
----- Message from Jemma Anderson MD sent at 12/29/2020  3:31 PM CST -----  Any update on the dexcom sensors?

## 2020-12-29 NOTE — TELEPHONE ENCOUNTER
Spoke with [facility] Radha Ruggiero: Spoke w/ Pt on 12/22 and Pts requested to wait until after 01/01/2021 to start the process.  Provider notified.   Lorraine Farr RN on 12/29/2020 at 5:25 PM        RE    Specialty Pharmacy PA team will investigate and then reach out to clinic for chart notes and Letter of Medical Necessity, then submit a test claim to insurance and then provide cost information. Typically takes 2-7 business days.

## 2021-01-04 ENCOUNTER — TELEPHONE (OUTPATIENT)
Dept: ENDOCRINOLOGY | Facility: CLINIC | Age: 46
End: 2021-01-04

## 2021-01-04 NOTE — TELEPHONE ENCOUNTER
Are you able to add the patients testing frequency to the chart notes from 12/18?  Also send blood glucose logs to [phone] if available.    Thank you,    [facility]  [phone]

## 2021-03-01 NOTE — MR AVS SNAPSHOT
After Visit Summary   2018    Shannon Sow    MRN: [id number]           Patient Information     Date Of Birth          1975        Visit Information        Provider Department      2018 2:00 PM Job Gonzalez OD M [facility]        Today's Diagnoses     Mild nonproliferative diabetic retinopathy of both eyes without macular edema associated with type 2 diabetes mellitus (H)    -  1    Myopia of both eyes        Cortical cataract of both eyes           Follow-ups after your visit        Follow-up notes from your care team     Return in about 1 year (around 2019) for Comprehensive Eye Exam.      Your next 10 appointments already scheduled     Mar 29, 2019 12:30 PM CDT   (Arrive by 12:15 PM)   RETURN DIABETES with MD MATT Jacobo [facility] ([facility])    [address] [place] [id number] [phone]              Who to contact     Please call your clinic at [phone] to:    Ask questions about your health    Make or cancel appointments    Discuss your medicines    Learn about your test results    Speak to your doctor            Additional Information About Your Visit        [employer] Information     [employer] is an electronic gateway that provides easy, online access to your medical records. With [employer], you can request a clinic appointment, read your test results, renew a prescription or communicate with your care team.     To sign up for [employer] visit the website at [email or web address]   You will be asked to enter the access code listed below, as well as some personal information. Please follow the directions to create your username and password.     Your access code is: [email or web address]  Expires: 2019  5:30 AM     Your access code will  in 90 days. If you need help or a new code, please contact your [facility] or call [phone] for  assistance.        Care EveryWhere ID     This is your Care EveryWhere ID. This could be used by other organizations to access your [place] medical records  [id number]        Your Vitals Were     Last Period                   11/08/2018            Blood Pressure from Last 3 Encounters:   11/20/18 118/73   11/09/18 115/72   06/15/18 116/77    Weight from Last 3 Encounters:   11/20/18 75.3 kg (166 lb)   11/09/18 75.2 kg (165 lb 12.8 oz)   06/15/18 74.4 kg (164 lb 1.6 oz)              We Performed the Following     OCT Retina Spectralis OU (both eyes)     REFRACTION [[id number]]        Primary Care Provider Office Phone # Fax #    Jemma Anderson [phone]       [address] [place] [id number]        Equal Access to Services     BESSY ROSAS : Hadii aad ku hadasho Soomaali, waaxda luqadaha, qaybta kaalmada adeegyada, jeremías traylor hayfredo wild . So [facility] [phone].    ATENCIÓN: Si habla español, tiene a abebe disposición servicios gratuitos de asistencia lingüística. Halina al [phone].    We comply with applicable federal civil rights laws and [place] laws. We do not discriminate on the basis of race, color, national origin, age, disability, sex, sexual orientation, or gender identity.            Thank you!     Thank you for choosing [facility]  for your care. Our goal is always to provide you with excellent care. Hearing back from our patients is one way we can continue to improve our services. Please take a few minutes to complete the written survey that you may receive in the mail after your visit with us. Thank you!             Your Updated Medication List - Protect others around you: Learn how to safely use, store and throw away your medicines at www.disposemymeds.org.          This list is accurate as of 12/4/18  2:58 PM.  Always use your most recent med list.                   Brand Name Dispense Instructions for use Diagnosis    blood glucose  monitoring test strip    NO BRAND SPECIFIED    6 Box    Test up to 6 times daily    DM type 1 (diabetes mellitus, type 1) (H)       continuous blood glucose monitoring sensor     3 each    For use with Freestyle Azam Flash  for continuous monitioring of blood glucose levels. Replace sensor every 10 days.    Type 1 diabetes mellitus with complications (H)       FLUoxetine 40 MG capsule    PROZAC    90 capsule    Take 1 capsule (40 mg) by mouth daily    Anxiety       FREESTYLE AZAM READER Atiya     1 Device    1 Application as needed    Type 1 diabetes mellitus with complications (H)       glucagon 1 MG kit    GLUCAGON EMERGENCY    1 mg    Inject 1 mg into the muscle once for 1 dose    Type 1 diabetes mellitus without complication (H)       insulin glargine 100 UNIT/ML pen     30 mL    INJECT 22 UNITS SUBCUTANEOUSLY DAILY    Type 1 diabetes mellitus with complications (H)       insulin pen needle 31G X 5 MM miscellaneous    B-D U/F    100 each    Use if pump fails 4-5 times daily    Type 1 diabetes mellitus (H)       MULTIVITAMIN PO      Take by mouth daily        * NovoLOG FLEXPEN 100 UNIT/ML pen   Generic drug:  insulin aspart     15 mL    Use as needed - takes about 30 units per day    Diabetes mellitus type 1 (H)       * insulin aspart 100 UNITS/ML vial    NovoLOG VIAL    80 vial    Pt takes 80 units daily in pump - pt also needs humalog pens for travel, Disp-80    Diabetes mellitus type 1 (H)       vitamin D3 1000 units (25 mcg) tablet    CHOLECALCIFEROL    90 tablet    Take 1 tablet (1,000 Units) by mouth daily    Unspecified vitamin deficiency       * Notice:  This list has 2 medication(s) that are the same as other medications prescribed for you. Read the directions carefully, and ask your doctor or other care provider to review them with you.       Detail Level: Generalized General Sunscreen Counseling: I recommended a broad spectrum sunscreen with a SPF of 30 or higher.  I explained that SPF 30 sunscreens block approximately 97 percent of the sun's harmful rays.  Sunscreens should be applied at least 15 minutes prior to expected sun exposure and then every 2 hours after that as long as sun exposure continues. If swimming or exercising sunscreen should be reapplied every 45 minutes to an hour after getting wet or sweating.  One ounce, or the equivalent of a shot glass full of sunscreen, is adequate to protect the skin not covered by a bathing suit. I also recommended a lip balm with a sunscreen as well. Sun protective clothing can be used in lieu of sunscreen but must be worn the entire time you are exposed to the sun's rays.

## 2021-03-26 ENCOUNTER — TELEPHONE (OUTPATIENT)
Dept: ENDOCRINOLOGY | Facility: CLINIC | Age: 46
End: 2021-03-26

## 2021-03-26 NOTE — TELEPHONE ENCOUNTER
LVM for pt to c/b to schedule her 4 month follow up with Dr Anderson (April 2021.) Ok to manually schedule into ARSALAN slot on Justin's Friday schedule.    Call Sharmila/Lorrie/Denita with any questions.

## 2021-04-08 NOTE — PROGRESS NOTES
Outcome for 04/09/21 10:23 AM :Glucose sent via Email azam      Shannon Sow  is being evaluated via a billable video visit.      How would you like to obtain your AVS? Mail a copy  For the video visit, send the invitation by: Send to e-mail at: [email or web address]  Will anyone else be joining your video visit? [facility]  Endocrinology  Jemma Anderson MD  4/9/21    Chief Complaint:   Diabetes    Patient was virtual visit conducted by Leydakareem.  Start time 235.  Stop time 257.  Documentation time, chart review, coordination of care 8 minutes, total time spent the day of the encounter: 30 minutes    History of Present Illness:   Shannon Sow is a 45 year old female with a history of diabetes who presents for follow up of diabetes.    #1 Type 1 diabetes mellitus  The patient was diagnosed with diabetes in 1992.  She was on Lantus and Humalog from approximately 2000 - May 2009 when she started on an insulin pump.  As of November 2013, she has been on a newer Medtronic pump with low glucose suspend.  She started using the Freestyle Azam CGMS system in February 2018.  Her A1c level in the past 5 years have generally been in the high 6% - mid 7% range.  March 2019 hemoglobin A1c was 7%. July 2019 A1c was 7.2%. November 2019 A1c 7.4%.   February 2020 A1c 7.0%    Interval history: Although the patient has the T slim pump, the main barrier for her converting to control IQ is because of the Dexcom sensors.  She has not been able to receive the Dexcom sensors and more recently, her insurance has changed.  She has been using the azam.  Reviewing her CGM, she tends to have some hypoglycemia overnight as well as hyperglycemia particularly after breakfast and after lunch.  She tends to have hyperglycemia with supper.  Average glucose 131, estimated hemoglobin A1c 6.4, 61% within target range, 21% blood sugars greater than 180.  December 2020 hemoglobin A1c of 7.8.      Settings :   BASAL  RATES and times:  Midnight to 9 AM: 0.7 units/h  9     AM:  0.900 units/hour   11:30   AM: 1.2 units/h  2    PM: 0.400 units/hour   7    PM: 1.400 units/hour      10 pm : 0.7 units/hour   CARB RATIO and times:  12   AM (midnight): 12.0  6     AM: 6.0  10   PM:  12.0   Corection Factor (Sensitivity) and times:  12   AM (midnight): 50 mg/dL  9     AM: 40 mg/dL  2    PM: 50 mg/dL   10   PM: 50 mg/dL   BLOOD GLUCOSE TARGET and times:  12   AM (midnight): 120 mg/dl       Blood Glucose Monitoring:  Patient does not have blood sugars for review    Diabetes monitoring and complications:  CAD: December 2020 LDL was 94  Last eye exam results: 11/20/2019, mild nonproliferative retinopathy  Microalbuminuria: Negative in December 2020  Neuropathy: No  HTN: No  On Statin: No  On Aspirin: No  Depression: Yes    #2 Subarachnoid hemorrhage  She was hospitalized at [facility] 8/2 - 8/9/18 for a subarachnoid hemorrhage after presenting with a sudden onset headache, nausea, and vomiting.  She had no complications. She saw Dr. Jacob in Neurology on 11/9/2018 who discussed with her that she is at low risk for any recurrent issues (2 - 5 % of recurrence).  She has no history of hypertension. She followed with Dr. Regan in Neurology on 11/20/2018 who concluded that her hemorrhage was likely non-aneurysmal in origin and found no other signs of ongoing symptoms.     Interval history: Not discussed at visit today.     #3 interest in weight loss  At previous visit the patient reported changing her diet and increasing her exercise.      Interval history: This has been somewhat disrupted by Covid as she has been more sedentary.  The patient reports that she has stabilized her weight at 152.    #4 fatigue  This is her most pressing issue.  She reports significant fatigue.  She reports fatigue even when she wakes up in the morning.  She reports that she would wake up for an hour at night and then go back to sleep.  She does report  significant stress in her life.  She is working with a therapist.  She feels that her Prozac dose is adequate.    Interval history: Extensive evaluation in December 2020 was unremarkable.  The patient attributes this to stress and is working with therapist.    Review of Systems:   Pertinent items are noted in HPI.  All other systems are negative.    Active Medications:   Current Outpatient Medications   Medication Sig Dispense Refill     blood glucose test strip Test up to 6 times daily 6 Box 4     cholecalciferol (VITAMIN D) 1000 UNIT tablet Take 1 tablet (1,000 Units) by mouth daily 90 tablet 3     Continuous Blood Gluc  (DEXCOM G6 ) NAILA 1 applicator as needed (as needed) 1 Device 4     Continuous Blood Gluc  (FREESTYLE RADHA 14 DAY READER) NAILA 1 each See Admin Instructions To use to read blood sugars as manufacture's instructions. 1 Device 1     Continuous Blood Gluc Sensor (DEXCOM G6 SENSOR) MISC 3 applicators as needed (as needed) 3 each 3     Continuous Blood Gluc Sensor (FREESTYLE RADHA 14 DAY SENSOR) MISC 1 each See Admin Instructions Change every 14 days. 2 each 11     Continuous Blood Gluc Transmit (DEXCOM G6 TRANSMITTER) MISC Change every 3 months. 1 each 3     FLUoxetine (PROZAC) 40 MG capsule Take 1 capsule (40 mg) by mouth daily 90 capsule 3     hydrocortisone 2.5 % cream Apply topically 2 times daily 30 g 1     hydrOXYzine (ATARAX) 25 MG tablet Take 1 tablet (25 mg) by mouth 2 times daily as needed for itching 20 tablet 0     insulin aspart (NOVOLOG VIAL) 100 UNITS/ML vial USE UP TO 80 UNITS DAILY IN PUMP 80 mL 3     insulin glargine (BASAGLAR KWIKPEN) 100 UNIT/ML pen Take 20 units if pump fails 30 mL 3     insulin pen needle (B-D U/F) 31G X 5 MM Use if pump fails 4-5 times daily 100 each 1     ipratropium (ATROVENT) 0.03 % nasal spray Spray 2 sprays into both nostrils every 12 hours 1 Box 3     Multiple Vitamins-Minerals (MULTIVITAMIN OR) Take by mouth daily        STATIN  "NOT PRESCRIBED (INTENTIONAL) Please choose reason not prescribed, below          Allergies:   Nkda [no known drug allergies]   Seasonal allergies      Past Medical History:  Type I diabetes mellitus   Vitamin deficiency  Upper respiratory infection   Cerebral salt-wasting syndrome  Hypertensive disease\  Recurrent major depressive disorder  Subarachnoid hemorrhage      Past Surgical History:  History reviewed. No pertinent past surgical history.      Family History:   Breast cancer - mother      Social History:   Pt working from home      Physical Exam:     GENERAL: Healthy, alert and no distress\",\"EYES: Eyes grossly normal to inspection.  No discharge or erythema, or obvious scleral/conjunctival abnormalities.\",\"RESP: No audible wheeze, cough, or visible cyanosis.  No visible retractions or increased work of breathing.  \",\"SKIN: Visible skin clear. No significant rash, abnormal pigmentation or lesions.\",\"NEURO: Cranial nerves grossly intact.  Mentation and speech appropriate for age.\",\"PSYCH: Mentation appears normal, affect normal/bright, judgement and insight intact, normal speech and appearance well-groomed.     Data:  No visits with results within 1 Week(s) from this visit.   Latest known visit with results is:   Orders Only on 12/24/2020   Component Date Value Ref Range Status     CK Total 12/24/2020 50  30 - 225 U/L Final     Creatinine Urine 12/24/2020 335  mg/dL Final     Albumin Urine mg/L 12/24/2020 31  mg/L Final     Albumin Urine mg/g Cr 12/24/2020 9.22  0 - 25 mg/g Cr Final     Tissue Transglutaminase Antibody I* 12/24/2020 <1  <7 U/mL Final    Comment: Negative  The tTG-IgA assay has limited utility for patients with decreased levels of   IgA. Screening for celiac disease should include IgA testing to rule out   selective IgA deficiency and to guide selection and interpretation of   serological testing. tTG-IgG testing may be positive in celiac disease   patients with IgA deficiency.       Tissue " Transglutaminase Britt IgG 12/24/2020 <1  <7 U/mL Final    Negative     Cortisol Serum 12/24/2020 15.6  4 - 22 ug/dL Final    Comment: 8 AM Cortisol Reference Range = 4-22 ug/dL  4 PM Cortisol Reference Range = 3-17 ug/dL       Ferritin 12/24/2020 53  12 - 150 ng/mL Final     25 OH Vit D2 12/24/2020 <5  ug/L Final     25 OH Vit D3 12/24/2020 30  ug/L Final     25 OH Vit D total 12/24/2020 <35  20 - 75 ug/L Final    Comment: Season, race, dietary intake, and treatment affect the concentration of   25-hydroxy-Vitamin D. Values may decrease during winter months and increase   during summer months. Values 20-29 ug/L may indicate Vitamin D insufficiency   and values <20 ug/L may indicate Vitamin D deficiency.  This test was developed and its performance characteristics determined by the   [facility].   It has not been cleared or approved by the FDA. The laboratory is regulated   under CLIA as qualified to perform high-complexity testing. This test is used   for clinical purposes. It should not be regarded as investigational or for   research.       Hemoglobin A1C 12/24/2020 7.8* 0 - 5.6 % Final    Comment: Normal <5.7% Prediabetes 5.7-6.4%  Diabetes 6.5% or higher - adopted from ADA   consensus guidelines.       Cholesterol 12/24/2020 196  <200 mg/dL Final     Triglycerides 12/24/2020 40  <150 mg/dL Final     HDL Cholesterol 12/24/2020 94  >49 mg/dL Final     LDL Cholesterol Calculated 12/24/2020 94  <100 mg/dL Final    Desirable:       <100 mg/dl     Non HDL Cholesterol 12/24/2020 102  <130 mg/dL Final     Sodium 12/24/2020 138  133 - 144 mmol/L Final     Potassium 12/24/2020 4.0  3.4 - 5.3 mmol/L Final     Chloride 12/24/2020 107  94 - 109 mmol/L Final     Carbon Dioxide 12/24/2020 25  20 - 32 mmol/L Final     Anion Gap 12/24/2020 6  3 - 14 mmol/L Final     Glucose 12/24/2020 174* 70 - 99 mg/dL Final     Urea Nitrogen 12/24/2020 15  7 - 30 mg/dL Final      Creatinine 12/24/2020 0.66  0.52 - 1.04 mg/dL Final     GFR Estimate 12/24/2020 >90  >60 mL/min/[1.73_m2] Final    Comment: Non  GFR Calc  Starting 12/18/2018, serum creatinine based estimated GFR (eGFR) will be   calculated using the Chronic Kidney Disease Epidemiology Collaboration   (CKD-EPI) equation.       GFR Estimate If Black 12/24/2020 >90  >60 mL/min/[1.73_m2] Final    Comment:  GFR Calc  Starting 12/18/2018, serum creatinine based estimated GFR (eGFR) will be   calculated using the Chronic Kidney Disease Epidemiology Collaboration   (CKD-EPI) equation.       Calcium 12/24/2020 8.6  8.5 - 10.1 mg/dL Final     Bilirubin Total 12/24/2020 0.7  0.2 - 1.3 mg/dL Final     Albumin 12/24/2020 3.8  3.4 - 5.0 g/dL Final     Protein Total 12/24/2020 7.0  6.8 - 8.8 g/dL Final     Alkaline Phosphatase 12/24/2020 51  40 - 150 U/L Final     ALT 12/24/2020 17  0 - 50 U/L Final     AST 12/24/2020 14  0 - 45 U/L Final     WBC 12/24/2020 3.4* 4.0 - 11.0 10e9/L Final     RBC Count 12/24/2020 4.35  3.8 - 5.2 10e12/L Final     Hemoglobin 12/24/2020 14.0  11.7 - 15.7 g/dL Final     Hematocrit 12/24/2020 41.7  35.0 - 47.0 % Final     MCV 12/24/2020 96  78 - 100 fl Final     MCH 12/24/2020 32.2  26.5 - 33.0 pg Final     MCHC 12/24/2020 33.6  31.5 - 36.5 g/dL Final     RDW 12/24/2020 11.9  10.0 - 15.0 % Final     Platelet Count 12/24/2020 287  150 - 450 10e9/L Final     T4 Free 12/24/2020 0.94  0.76 - 1.46 ng/dL Final     TSH 12/24/2020 0.95  0.40 - 4.00 mU/L Final     Triiodothyronine (T3) 12/24/2020 89  60 - 181 ng/dL Final         Assessment and Plan:  #1 Type 1 diabetes mellitus   February 2020 A1c 7.0%.  She is currently on the T slim pump.  Unfortunately, the Dexcom is not well covered by her insurance so she cannot change over to the control IQ program.  We will look into options for her Dexcom coverage, as I think that Dexcom-T slim control IQ could be transformative for her to reduce her  burden from diabetes.  We will inquire again with insurance.    I did discuss with the patient the possibility of low-dose Metformin.  This might help her with weight loss, as well as reduce her risk for cardiovascular events as well as insulin requirements.  She will think about this and let me know.  In the meantime, we will change her pump settings to avoid hypoglycemia overnight, to avoid hypoglycemia with breakfast and lunch, and to give a little more insulin to treat her hyperglycemia associated with her evening meal.  New settings are noted as below:    BASAL RATES and times:  Midnight to 9 AM: 0.6 units/h  9     AM:  0.900 units/hour   11:30   AM: 1.1 units/h  2    PM: 0.400 units/hour   7    PM: 1.400 units/hour      10 pm : 0.7 units/hour   CARB RATIO and times:  12   AM (midnight): 12.0  6     AM: 10  5 pm : 5.0  10   PM:  12.0   Corection Factor (Sensitivity) and times:  12   AM (midnight): 50 mg/dL  9     AM: 40 mg/dL  2    PM: 50 mg/dL   10   PM: 50 mg/dL   BLOOD GLUCOSE TARGET and times:  12   AM (midnight): 120 mg/dl     The patient would like a paper prescription for Lantus in case of pump failure.    The patient is currently using a flores CGM and has been checking her blood sugar at least 4 times per day using the flores CGM.  She finds it extremely helpful and is interested in transitioning over to the Dexcom as she already has the T slim pump.    #2 Subarachnoid hemorrhage  Not discussed at current visit    #3 interest in weight loss  Continue with dietary lifestyle changes.  Especially with increasing her exercise.    #4  Fatigue  Extensive evaluation in December 2020 including cortisol, TFTs, were entirely unremarkable.  The patient attributes this to stress.    Follow-up: 4 months.    Patient was virtual visit conducted by Bombfell.  Start time 235.  Stop time 257.  Documentation time, chart review, coordination of care 8 minutes, total time spent the day of the encounter: 30 minutes

## 2021-04-09 ENCOUNTER — VIRTUAL VISIT (OUTPATIENT)
Dept: ENDOCRINOLOGY | Facility: CLINIC | Age: 46
End: 2021-04-09
Payer: COMMERCIAL

## 2021-04-09 ENCOUNTER — TELEPHONE (OUTPATIENT)
Dept: ENDOCRINOLOGY | Facility: CLINIC | Age: 46
End: 2021-04-09

## 2021-04-09 DIAGNOSIS — E10.9 TYPE 1 DIABETES MELLITUS WITHOUT COMPLICATION (H): Primary | ICD-10-CM

## 2021-04-09 PROCEDURE — 99214 OFFICE O/P EST MOD 30 MIN: CPT | Mod: 95 | Performed by: INTERNAL MEDICINE

## 2021-04-09 PROCEDURE — 95251 CONT GLUC MNTR ANALYSIS I&R: CPT | Performed by: INTERNAL MEDICINE

## 2021-04-09 NOTE — TELEPHONE ENCOUNTER
----- Message from Jemma Anderson MD sent at 4/9/2021  2:49 PM CDT -----  Pt needs paper script  for lantus in case of pump failure - please send to me and I can sign and then you can mail to her

## 2021-04-09 NOTE — LETTER
4/9/2021       RE: Shannon Sow  [address]  [place] [address]     Dear Colleague,    Thank you for referring your patient, Shannon Sow, to the [facility] [place] at . Please see a copy of my visit note below.    Outcome for 04/09/21 10:23 AM :Glucose sent via Email azam Sow  is being evaluated via a billable video visit.      How would you like to obtain your AVS? Mail a copy  For the video visit, send the invitation by: Send to e-mail at: [email or web address]  Will anyone else be joining your video visit? No      [facility]  Endocrinology  Jemma Anderson MD  4/9/21    Chief Complaint:   Diabetes    Patient was virtual visit conducted by Marilyn.  Start time 235.  Stop time 257.  Documentation time, chart review, coordination of care 8 minutes, total time spent the day of the encounter: 30 minutes    History of Present Illness:   Shannon Sow is a 45 year old female with a history of diabetes who presents for follow up of diabetes.    #1 Type 1 diabetes mellitus  The patient was diagnosed with diabetes in 1992.  She was on Lantus and Humalog from approximately 2000 - May 2009 when she started on an insulin pump.  As of November 2013, she has been on a newer Medtronic pump with low glucose suspend.  She started using the Freestyle Azam CGMS system in February 2018.  Her A1c level in the past 5 years have generally been in the high 6% - mid 7% range.  March 2019 hemoglobin A1c was 7%. July 2019 A1c was 7.2%. November 2019 A1c 7.4%.   February 2020 A1c 7.0%    Interval history: Although the patient has the T slim pump, the main barrier for her converting to control IQ is because of the Dexcom sensors.  She has not been able to receive the Dexcom sensors and more recently, her insurance has changed.  She has been using the azam.  Reviewing her CGM, she  tends to have some hypoglycemia overnight as well as hyperglycemia particularly after breakfast and after lunch.  She tends to have hyperglycemia with supper.  Average glucose 131, estimated hemoglobin A1c 6.4, 61% within target range, 21% blood sugars greater than 180.  December 2020 hemoglobin A1c of 7.8.      Settings :   BASAL RATES and times:  Midnight to 9 AM: 0.7 units/h  9     AM:  0.900 units/hour   11:30   AM: 1.2 units/h  2    PM: 0.400 units/hour   7    PM: 1.400 units/hour      10 pm : 0.7 units/hour   CARB RATIO and times:  12   AM (midnight): 12.0  6     AM: 6.0  10   PM:  12.0   Corection Factor (Sensitivity) and times:  12   AM (midnight): 50 mg/dL  9     AM: 40 mg/dL  2    PM: 50 mg/dL   10   PM: 50 mg/dL   BLOOD GLUCOSE TARGET and times:  12   AM (midnight): 120 mg/dl       Blood Glucose Monitoring:  Patient does not have blood sugars for review    Diabetes monitoring and complications:  CAD: December 2020 LDL was 94  Last eye exam results: 11/20/2019, mild nonproliferative retinopathy  Microalbuminuria: Negative in December 2020  Neuropathy: No  HTN: No  On Statin: No  On Aspirin: No  Depression: Yes    #2 Subarachnoid hemorrhage  She was hospitalized at [facility] 8/2 - 8/9/18 for a subarachnoid hemorrhage after presenting with a sudden onset headache, nausea, and vomiting.  She had no complications. She saw Dr. Jacob in Neurology on 11/9/2018 who discussed with her that she is at low risk for any recurrent issues (2 - 5 % of recurrence).  She has no history of hypertension. She followed with Dr. Regan in Neurology on 11/20/2018 who concluded that her hemorrhage was likely non-aneurysmal in origin and found no other signs of ongoing symptoms.     Interval history: Not discussed at visit today.     #3 interest in weight loss  At previous visit the patient reported changing her diet and increasing her exercise.      Interval history: This has been somewhat disrupted by Covid as she has  been more sedentary.  The patient reports that she has stabilized her weight at 152.    #4 fatigue  This is her most pressing issue.  She reports significant fatigue.  She reports fatigue even when she wakes up in the morning.  She reports that she would wake up for an hour at night and then go back to sleep.  She does report significant stress in her life.  She is working with a therapist.  She feels that her Prozac dose is adequate.    Interval history: Extensive evaluation in December 2020 was unremarkable.  The patient attributes this to stress and is working with therapist.    Review of Systems:   Pertinent items are noted in HPI.  All other systems are negative.    Active Medications:   Current Outpatient Medications   Medication Sig Dispense Refill     blood glucose test strip Test up to 6 times daily 6 Box 4     cholecalciferol (VITAMIN D) 1000 UNIT tablet Take 1 tablet (1,000 Units) by mouth daily 90 tablet 3     Continuous Blood Gluc  (DEXCOM G6 ) NAILA 1 applicator as needed (as needed) 1 Device 4     Continuous Blood Gluc  (FREESTYLE RADHA 14 DAY READER) NAILA 1 each See Admin Instructions To use to read blood sugars as manufacture's instructions. 1 Device 1     Continuous Blood Gluc Sensor (DEXCOM G6 SENSOR) MISC 3 applicators as needed (as needed) 3 each 3     Continuous Blood Gluc Sensor (FREESTYLE RADHA 14 DAY SENSOR) MISC 1 each See Admin Instructions Change every 14 days. 2 each 11     Continuous Blood Gluc Transmit (DEXCOM G6 TRANSMITTER) MISC Change every 3 months. 1 each 3     FLUoxetine (PROZAC) 40 MG capsule Take 1 capsule (40 mg) by mouth daily 90 capsule 3     hydrocortisone 2.5 % cream Apply topically 2 times daily 30 g 1     hydrOXYzine (ATARAX) 25 MG tablet Take 1 tablet (25 mg) by mouth 2 times daily as needed for itching 20 tablet 0     insulin aspart (NOVOLOG VIAL) 100 UNITS/ML vial USE UP TO 80 UNITS DAILY IN PUMP 80 mL 3     insulin glargine (BASAGLAR KWIKPEN)  "100 UNIT/ML pen Take 20 units if pump fails 30 mL 3     insulin pen needle (B-D U/F) 31G X 5 MM Use if pump fails 4-5 times daily 100 each 1     ipratropium (ATROVENT) 0.03 % nasal spray Spray 2 sprays into both nostrils every 12 hours 1 Box 3     Multiple Vitamins-Minerals (MULTIVITAMIN OR) Take by mouth daily        STATIN NOT PRESCRIBED (INTENTIONAL) Please choose reason not prescribed, below          Allergies:   Nkda [no known drug allergies]   Seasonal allergies      Past Medical History:  Type I diabetes mellitus   Vitamin deficiency  Upper respiratory infection   Cerebral salt-wasting syndrome  Hypertensive disease\  Recurrent major depressive disorder  Subarachnoid hemorrhage      Past Surgical History:  History reviewed. No pertinent past surgical history.      Family History:   Breast cancer - mother      Social History:   Pt working from home      Physical Exam:     GENERAL: Healthy, alert and no distress\",\"EYES: Eyes grossly normal to inspection.  No discharge or erythema, or obvious scleral/conjunctival abnormalities.\",\"RESP: No audible wheeze, cough, or visible cyanosis.  No visible retractions or increased work of breathing.  \",\"SKIN: Visible skin clear. No significant rash, abnormal pigmentation or lesions.\",\"NEURO: Cranial nerves grossly intact.  Mentation and speech appropriate for age.\",\"PSYCH: Mentation appears normal, affect normal/bright, judgement and insight intact, normal speech and appearance well-groomed.     Data:  No visits with results within 1 Week(s) from this visit.   Latest known visit with results is:   Orders Only on 12/24/2020   Component Date Value Ref Range Status     CK Total 12/24/2020 50  30 - 225 U/L Final     Creatinine Urine 12/24/2020 335  mg/dL Final     Albumin Urine mg/L 12/24/2020 31  mg/L Final     Albumin Urine mg/g Cr 12/24/2020 9.22  0 - 25 mg/g Cr Final     Tissue Transglutaminase Antibody I* 12/24/2020 <1  <7 U/mL Final    Comment: Negative  The tTG-IgA assay " has limited utility for patients with decreased levels of   IgA. Screening for celiac disease should include IgA testing to rule out   selective IgA deficiency and to guide selection and interpretation of   serological testing. tTG-IgG testing may be positive in celiac disease   patients with IgA deficiency.       Tissue Transglutaminase Britt IgG 12/24/2020 <1  <7 U/mL Final    Negative     Cortisol Serum 12/24/2020 15.6  4 - 22 ug/dL Final    Comment: 8 AM Cortisol Reference Range = 4-22 ug/dL  4 PM Cortisol Reference Range = 3-17 ug/dL       Ferritin 12/24/2020 53  12 - 150 ng/mL Final     25 OH Vit D2 12/24/2020 <5  ug/L Final     25 OH Vit D3 12/24/2020 30  ug/L Final     25 OH Vit D total 12/24/2020 <35  20 - 75 ug/L Final    Comment: Season, race, dietary intake, and treatment affect the concentration of   25-hydroxy-Vitamin D. Values may decrease during winter months and increase   during summer months. Values 20-29 ug/L may indicate Vitamin D insufficiency   and values <20 ug/L may indicate Vitamin D deficiency.  This test was developed and its performance characteristics determined by the   [facility].   It has not been cleared or approved by the FDA. The laboratory is regulated   under CLIA as qualified to perform high-complexity testing. This test is used   for clinical purposes. It should not be regarded as investigational or for   research.       Hemoglobin A1C 12/24/2020 7.8* 0 - 5.6 % Final    Comment: Normal <5.7% Prediabetes 5.7-6.4%  Diabetes 6.5% or higher - adopted from ADA   consensus guidelines.       Cholesterol 12/24/2020 196  <200 mg/dL Final     Triglycerides 12/24/2020 40  <150 mg/dL Final     HDL Cholesterol 12/24/2020 94  >49 mg/dL Final     LDL Cholesterol Calculated 12/24/2020 94  <100 mg/dL Final    Desirable:       <100 mg/dl     Non HDL Cholesterol 12/24/2020 102  <130 mg/dL Final     Sodium 12/24/2020 138  133 - 144  mmol/L Final     Potassium 12/24/2020 4.0  3.4 - 5.3 mmol/L Final     Chloride 12/24/2020 107  94 - 109 mmol/L Final     Carbon Dioxide 12/24/2020 25  20 - 32 mmol/L Final     Anion Gap 12/24/2020 6  3 - 14 mmol/L Final     Glucose 12/24/2020 174* 70 - 99 mg/dL Final     Urea Nitrogen 12/24/2020 15  7 - 30 mg/dL Final     Creatinine 12/24/2020 0.66  0.52 - 1.04 mg/dL Final     GFR Estimate 12/24/2020 >90  >60 mL/min/[1.73_m2] Final    Comment: Non  GFR Calc  Starting 12/18/2018, serum creatinine based estimated GFR (eGFR) will be   calculated using the Chronic Kidney Disease Epidemiology Collaboration   (CKD-EPI) equation.       GFR Estimate If Black 12/24/2020 >90  >60 mL/min/[1.73_m2] Final    Comment:  GFR Calc  Starting 12/18/2018, serum creatinine based estimated GFR (eGFR) will be   calculated using the Chronic Kidney Disease Epidemiology Collaboration   (CKD-EPI) equation.       Calcium 12/24/2020 8.6  8.5 - 10.1 mg/dL Final     Bilirubin Total 12/24/2020 0.7  0.2 - 1.3 mg/dL Final     Albumin 12/24/2020 3.8  3.4 - 5.0 g/dL Final     Protein Total 12/24/2020 7.0  6.8 - 8.8 g/dL Final     Alkaline Phosphatase 12/24/2020 51  40 - 150 U/L Final     ALT 12/24/2020 17  0 - 50 U/L Final     AST 12/24/2020 14  0 - 45 U/L Final     WBC 12/24/2020 3.4* 4.0 - 11.0 10e9/L Final     RBC Count 12/24/2020 4.35  3.8 - 5.2 10e12/L Final     Hemoglobin 12/24/2020 14.0  11.7 - 15.7 g/dL Final     Hematocrit 12/24/2020 41.7  35.0 - 47.0 % Final     MCV 12/24/2020 96  78 - 100 fl Final     MCH 12/24/2020 32.2  26.5 - 33.0 pg Final     MCHC 12/24/2020 33.6  31.5 - 36.5 g/dL Final     RDW 12/24/2020 11.9  10.0 - 15.0 % Final     Platelet Count 12/24/2020 287  150 - 450 10e9/L Final     T4 Free 12/24/2020 0.94  0.76 - 1.46 ng/dL Final     TSH 12/24/2020 0.95  0.40 - 4.00 mU/L Final     Triiodothyronine (T3) 12/24/2020 89  60 - 181 ng/dL Final         Assessment and Plan:  #1 Type 1 diabetes mellitus    February 2020 A1c 7.0%.  She is currently on the T slim pump.  Unfortunately, the Dexcom is not well covered by her insurance so she cannot change over to the control IQ program.  We will look into options for her Dexcom coverage, as I think that Dexcom-T slim control IQ could be transformative for her to reduce her burden from diabetes.  We will inquire again with insurance.    I did discuss with the patient the possibility of low-dose Metformin.  This might help her with weight loss, as well as reduce her risk for cardiovascular events as well as insulin requirements.  She will think about this and let me know.  In the meantime, we will change her pump settings to avoid hypoglycemia overnight, to avoid hypoglycemia with breakfast and lunch, and to give a little more insulin to treat her hyperglycemia associated with her evening meal.  New settings are noted as below:    BASAL RATES and times:  Midnight to 9 AM: 0.6 units/h  9     AM:  0.900 units/hour   11:30   AM: 1.1 units/h  2    PM: 0.400 units/hour   7    PM: 1.400 units/hour      10 pm : 0.7 units/hour   CARB RATIO and times:  12   AM (midnight): 12.0  6     AM: 10  5 pm : 5.0  10   PM:  12.0   Corection Factor (Sensitivity) and times:  12   AM (midnight): 50 mg/dL  9     AM: 40 mg/dL  2    PM: 50 mg/dL   10   PM: 50 mg/dL   BLOOD GLUCOSE TARGET and times:  12   AM (midnight): 120 mg/dl     The patient would like a paper prescription for Lantus in case of pump failure.    The patient is currently using a flores CGM and has been checking her blood sugar at least 4 times per day using the flores CGM.  She finds it extremely helpful and is interested in transitioning over to the Dexcom as she already has the T slim pump.    #2 Subarachnoid hemorrhage  Not discussed at current visit    #3 interest in weight loss  Continue with dietary lifestyle changes.  Especially with increasing her exercise.    #4  Fatigue  Extensive evaluation in December 2020 including  cortisol, TFTs, were entirely unremarkable.  The patient attributes this to stress.    Follow-up: 4 months.    Patient was virtual visit conducted by [facility].  Start time 235.  Stop time 257.  Documentation time, chart review, coordination of care 8 minutes, total time spent the day of the encounter: 30 minutes    Again, thank you for allowing me to participate in the care of your patient.      Sincerely,    Jemma Anderson MD

## 2021-04-12 ENCOUNTER — TELEPHONE (OUTPATIENT)
Dept: ENDOCRINOLOGY | Facility: CLINIC | Age: 46
End: 2021-04-12

## 2021-04-16 ENCOUNTER — TELEPHONE (OUTPATIENT)
Dept: ENDOCRINOLOGY | Facility: CLINIC | Age: 46
End: 2021-04-16

## 2021-04-16 NOTE — TELEPHONE ENCOUNTER
Patient needs 30 days logging.     -  Nolan Easton,    The patient logs need to be demonstrated for 30 days per [employer] outlined in the guidelines VII-05.    Also since the patient is now on Freestyle Aazm we will need the following information:    When purchased, any malfunctions, warranty info, why does the patient want to switch to Dexcom.    Thank you

## 2021-04-20 ENCOUNTER — TELEPHONE (OUTPATIENT)
Dept: ENDOCRINOLOGY | Facility: CLINIC | Age: 46
End: 2021-04-20

## 2021-04-20 NOTE — TELEPHONE ENCOUNTER
----- Message from Jemma Anderson MD sent at 4/16/2021  5:42 PM CDT -----  Let patient know that if she is interested in the Dexcom sensor we will need 30 days using the azam where she scanned at least 4 times per day    -  Nolan Easton,    The patient logs need to be demonstrated for 30 days per [employer] outlined in the guidelines VII-05.    Also since the patient is now on Freestyle Azam we will need the following information:    When purchased, any malfunctions, warranty info, why does the patient want to switch to Dexcom.    Thank you

## 2021-04-20 NOTE — TELEPHONE ENCOUNTER
I left a message for Shannon to scan her flores 4 times daily for 30 days  . I also mailed this information to the home address on file Eileen Garcia RN on 4/20/2021 at 8:46 AM

## 2021-04-28 ENCOUNTER — TELEPHONE (OUTPATIENT)
Dept: ENDOCRINOLOGY | Facility: CLINIC | Age: 46
End: 2021-04-28

## 2021-04-28 NOTE — TELEPHONE ENCOUNTER
----- Message from Jemma Anderson MD sent at 4/9/2021  3:07 PM CDT -----  Has her hypoglycemia improved with the pump changes?  What are her thoughts on the Metformin?

## 2021-04-28 NOTE — TELEPHONE ENCOUNTER
"Spoke w/ PT: States glucose has \"not been dropping as badly at night\", BS has been higher in the AM mid 80-220s. And she is \"not interested in the metformin\".  Provider notified.   Lorraine Farr RN on 4/28/2021 at 7:43 AM       "

## 2021-04-28 NOTE — TELEPHONE ENCOUNTER
Called pt - left message - pt does not want metformin, pt can increase basal from 9 am to 11 am if she wants to new program as noted below       BASAL RATES and times:  Midnight to 9 AM: 0.6 units/h  9     AM:  1.1 units/hour (previously 0.9 units/hour)  11:30   AM: 1.1 units/h  2    PM: 0.400 units/hour   7    PM: 1.400 units/hour      10 pm : 0.7 units/hour   CARB RATIO and times:  12   AM (midnight): 12.0  6     AM: 10  5 pm : 5.0  10   PM:  12.0   Corection Factor (Sensitivity) and times:  12   AM (midnight): 50 mg/dL  9     AM: 40 mg/dL  2    PM: 50 mg/dL   10   PM: 50 mg/dL   BLOOD GLUCOSE TARGET and times:  12   AM (midnight): 120 mg/dl      The patient would like a paper prescription for Lantus in case of pump failure.

## 2021-08-12 NOTE — PROGRESS NOTES
Outcome for 08/12/21 12:28 PM :Left Voicemail for patient to call back     Outcome for 08/12/21 4:52 PM :Patient stated they are not currently checking their glucose      Shannon Sow  is being evaluated via a billable video visit.      [facility]  Endocrinology  Jemma Anderson MD  8/13/21    Chief Complaint:   Diabetes    Virtual visit by [facility] - Start time 8:30, stop time 8:50, documentation time and coordination of care: 10 minutes, total time 30 minutes    History of Present Illness:   Shannon Sow is a 45 year old female with a history of diabetes who presents for follow up of diabetes.    #1 Type 1 diabetes mellitus  The patient was diagnosed with diabetes in 1992.  She was on Lantus and Humalog from approximately 2000 - May 2009 when she started on an insulin pump.  As of November 2013, she has been on a newer Medtronic pump with low glucose suspend.  She started using the Freestyle Azam CGMS system in February 2018.  Her A1c level in the past 5 years have generally been in the high 6% - mid 7% range.  March 2019 hemoglobin A1c was 7%. July 2019 A1c was 7.2%. November 2019 A1c 7.4%.   February 2020 A1c 7.0% .  December 2020 hemoglobin A1c of 7.8.    Interval history: April 2021- pt was offered metformin and she declined. Although the patient has the T slim pump, the main barrier for her converting to control IQ is because of the Dexcom sensors.  Pt would like more refills of the azam sensors.      Settings :   BASAL RATES and times:  Midnight to 6 AM: 0.6 units/h  9     AM:  0.9 units/hour  11:30   AM: 1.1 units/hour   2   PM: 0.4 units/hour      7 pm : 1.4 units/hour  9 pm - 0.7 units/hour   CARB RATIO and times:  12  AM (midnight): 12.0  6  AM: 10  9  AM: 6  5 pm: 5  7 pm: 6  9 pm 12     Correction Factor (Sensitivity) and times:  12   AM (midnight): 50 mg/dL  9     AM: 40 mg/dL  2    PM: 50 mg/dL   10   PM: 50 mg/dL   BLOOD GLUCOSE TARGET and times:  12   AM (midnight): 120 mg/dl        Blood Glucose Monitoring:  Patient does not have blood sugars for review    Diabetes monitoring and complications:  CAD: December 2020 LDL was 94  Last eye exam results: 11/20/2019, mild nonproliferative retinopathy  Microalbuminuria: Negative in December 2020  Neuropathy: No  HTN: No  On Statin: No  On Aspirin: No  Depression: Yes    #2 Subarachnoid hemorrhage  She was hospitalized at [facility] 8/2 - 8/9/18 for a subarachnoid hemorrhage after presenting with a sudden onset headache, nausea, and vomiting.  She had no complications. She saw Dr. Jacob in Neurology on 11/9/2018 who discussed with her that she is at low risk for any recurrent issues (2 - 5 % of recurrence).  She has no history of hypertension. She followed with Dr. Regan in Neurology on 11/20/2018 who concluded that her hemorrhage was likely non-aneurysmal in origin and found no other signs of ongoing symptoms.     Interval history: Not discussed at visit today.     #3 interest in weight loss  At previous visit the patient reported changing her diet and increasing her exercise.      Interval history: Reports an additional 2 pound weight loss and that her weight is now approximately 150 pounds.    #4 fatigue  This is her most pressing issue.  She reports significant fatigue.  She reports fatigue even when she wakes up in the morning.  She reports that she would wake up for an hour at night and then go back to sleep.  She does report significant stress in her life.  She is working with a therapist.  She feels that her Prozac dose is adequate.Extensive evaluation in December 2020 was unremarkable.    Interval history: The patient continues to work with the therapist.  She feels that when she gets good sleep, this is improved.    Review of Systems:   Pertinent items are noted in HPI.  All other systems are negative.    Active Medications:   Current Outpatient Medications   Medication Sig Dispense Refill     blood glucose test strip Test up  to 6 times daily 6 Box 4     cholecalciferol (VITAMIN D) 1000 UNIT tablet Take 1 tablet (1,000 Units) by mouth daily 90 tablet 3     FLUoxetine (PROZAC) 40 MG capsule Take 1 capsule (40 mg) by mouth daily 90 capsule 3     insulin aspart (NOVOLOG VIAL) 100 UNITS/ML vial USE UP TO 80 UNITS DAILY IN PUMP 80 mL 3     insulin glargine (BASAGLAR KWIKPEN) 100 UNIT/ML pen Take 20 units if pump fails 30 mL 3     insulin glargine (LANTUS SOLOSTAR) 100 UNIT/ML pen Take 20 units if pump fails. 30 mL 1     insulin pen needle (B-D U/F) 31G X 5 MM Use if pump fails 4-5 times daily 100 each 1     ipratropium (ATROVENT) 0.03 % nasal spray Spray 2 sprays into both nostrils every 12 hours 1 Box 3     Multiple Vitamins-Minerals (MULTIVITAMIN OR) Take by mouth daily        STATIN NOT PRESCRIBED (INTENTIONAL) Please choose reason not prescribed, below       Continuous Blood Gluc  (DEXCOM G6 ) NAILA 1 applicator as needed (as needed) (Patient not taking: Reported on 4/9/2021) 1 Device 4     Continuous Blood Gluc  (FREESTYLE RADHA 14 DAY READER) NAILA 1 each See Admin Instructions To use to read blood sugars as manufacture's instructions. (Patient not taking: Reported on 8/12/2021) 1 Device 1     Continuous Blood Gluc Transmit (DEXCOM G6 TRANSMITTER) MISC Change every 3 months. (Patient not taking: Reported on 4/9/2021) 1 each 3     hydrocortisone 2.5 % cream Apply topically 2 times daily (Patient not taking: Reported on 8/12/2021) 30 g 1     hydrOXYzine (ATARAX) 25 MG tablet Take 1 tablet (25 mg) by mouth 2 times daily as needed for itching (Patient not taking: Reported on 8/12/2021) 20 tablet 0        Allergies:   Nkda [no known drug allergies]   Seasonal allergies      Past Medical History:  Type I diabetes mellitus   Vitamin deficiency  Upper respiratory infection   Cerebral salt-wasting syndrome  Hypertensive disease\  Recurrent major depressive disorder  Subarachnoid hemorrhage      Past Surgical  "History:  History reviewed. No pertinent past surgical history.      Family History:   Breast cancer - mother      Social History:   Pt working from home      Physical Exam:     GENERAL: Healthy, alert and no distress\",\"EYES: Eyes grossly normal to inspection.  No discharge or erythema, or obvious scleral/conjunctival abnormalities.\",\"RESP: No audible wheeze, cough, or visible cyanosis.  No visible retractions or increased work of breathing.  \",\"SKIN: Visible skin clear. No significant rash, abnormal pigmentation or lesions.\",\"NEURO: Cranial nerves grossly intact.  Mentation and speech appropriate for age.\",\"PSYCH: Mentation appears normal, affect normal/bright, judgement and insight intact, normal speech and appearance well-groomed.     Data:  No visits with results within 1 Week(s) from this visit.   Latest known visit with results is:   Orders Only on 12/24/2020   Component Date Value Ref Range Status     CK Total 12/24/2020 50  30 - 225 U/L Final     Creatinine Urine 12/24/2020 335  mg/dL Final     Albumin Urine mg/L 12/24/2020 31  mg/L Final     Albumin Urine mg/g Cr 12/24/2020 9.22  0 - 25 mg/g Cr Final     Tissue Transglutaminase Antibody I* 12/24/2020 <1  <7 U/mL Final    Comment: Negative  The tTG-IgA assay has limited utility for patients with decreased levels of   IgA. Screening for celiac disease should include IgA testing to rule out   selective IgA deficiency and to guide selection and interpretation of   serological testing. tTG-IgG testing may be positive in celiac disease   patients with IgA deficiency.       Tissue Transglutaminase Britt IgG 12/24/2020 <1  <7 U/mL Final    Negative     Cortisol Serum 12/24/2020 15.6  4 - 22 ug/dL Final    Comment: 8 AM Cortisol Reference Range = 4-22 ug/dL  4 PM Cortisol Reference Range = 3-17 ug/dL       Ferritin 12/24/2020 53  12 - 150 ng/mL Final     25 OH Vit D2 12/24/2020 <5  ug/L Final     25 OH Vit D3 12/24/2020 30  ug/L Final     25 OH Vit D total 12/24/2020 " <35  20 - 75 ug/L Final    Comment: Season, race, dietary intake, and treatment affect the concentration of   25-hydroxy-Vitamin D. Values may decrease during winter months and increase   during summer months. Values 20-29 ug/L may indicate Vitamin D insufficiency   and values <20 ug/L may indicate Vitamin D deficiency.  This test was developed and its performance characteristics determined by the   [facility].   It has not been cleared or approved by the FDA. The laboratory is regulated   under CLIA as qualified to perform high-complexity testing. This test is used   for clinical purposes. It should not be regarded as investigational or for   research.       Hemoglobin A1C 12/24/2020 7.8* 0 - 5.6 % Final    Comment: Normal <5.7% Prediabetes 5.7-6.4%  Diabetes 6.5% or higher - adopted from ADA   consensus guidelines.       Cholesterol 12/24/2020 196  <200 mg/dL Final     Triglycerides 12/24/2020 40  <150 mg/dL Final     HDL Cholesterol 12/24/2020 94  >49 mg/dL Final     LDL Cholesterol Calculated 12/24/2020 94  <100 mg/dL Final    Desirable:       <100 mg/dl     Non HDL Cholesterol 12/24/2020 102  <130 mg/dL Final     Sodium 12/24/2020 138  133 - 144 mmol/L Final     Potassium 12/24/2020 4.0  3.4 - 5.3 mmol/L Final     Chloride 12/24/2020 107  94 - 109 mmol/L Final     Carbon Dioxide 12/24/2020 25  20 - 32 mmol/L Final     Anion Gap 12/24/2020 6  3 - 14 mmol/L Final     Glucose 12/24/2020 174* 70 - 99 mg/dL Final     Urea Nitrogen 12/24/2020 15  7 - 30 mg/dL Final     Creatinine 12/24/2020 0.66  0.52 - 1.04 mg/dL Final     GFR Estimate 12/24/2020 >90  >60 mL/min/[1.73_m2] Final    Comment: Non  GFR Calc  Starting 12/18/2018, serum creatinine based estimated GFR (eGFR) will be   calculated using the Chronic Kidney Disease Epidemiology Collaboration   (CKD-EPI) equation.       GFR Estimate If Black 12/24/2020 >90  >60 mL/min/[1.73_m2]  Final    Comment:  GFR Calc  Starting 12/18/2018, serum creatinine based estimated GFR (eGFR) will be   calculated using the Chronic Kidney Disease Epidemiology Collaboration   (CKD-EPI) equation.       Calcium 12/24/2020 8.6  8.5 - 10.1 mg/dL Final     Bilirubin Total 12/24/2020 0.7  0.2 - 1.3 mg/dL Final     Albumin 12/24/2020 3.8  3.4 - 5.0 g/dL Final     Protein Total 12/24/2020 7.0  6.8 - 8.8 g/dL Final     Alkaline Phosphatase 12/24/2020 51  40 - 150 U/L Final     ALT 12/24/2020 17  0 - 50 U/L Final     AST 12/24/2020 14  0 - 45 U/L Final     WBC 12/24/2020 3.4* 4.0 - 11.0 10e9/L Final     RBC Count 12/24/2020 4.35  3.8 - 5.2 10e12/L Final     Hemoglobin 12/24/2020 14.0  11.7 - 15.7 g/dL Final     Hematocrit 12/24/2020 41.7  35.0 - 47.0 % Final     MCV 12/24/2020 96  78 - 100 fl Final     MCH 12/24/2020 32.2  26.5 - 33.0 pg Final     MCHC 12/24/2020 33.6  31.5 - 36.5 g/dL Final     RDW 12/24/2020 11.9  10.0 - 15.0 % Final     Platelet Count 12/24/2020 287  150 - 450 10e9/L Final     T4 Free 12/24/2020 0.94  0.76 - 1.46 ng/dL Final     TSH 12/24/2020 0.95  0.40 - 4.00 mU/L Final     Triiodothyronine (T3) 12/24/2020 89  60 - 181 ng/dL Final         Assessment and Plan:  #1 Type 1 diabetes mellitus   February 2020 A1c 7.0%.  She is currently on the T slim pump.  Unfortunately, the Dexcom is not well covered by her insurance so she cannot change over to the control IQ program.  We will look into options for her Dexcom coverage, as I think that Dexcom-T slim control IQ could be transformative for her to reduce her burden from diabetes.  We will inquire again with insurance.    In the meantime, refills given for the flores 2 sensor (can use phone macrina).  As previously discussed, patient was not interested in Metformin (April 2021).  I agree that the most important thing for her at this time is to get the Dexcom sensor and turn on the control IQ program.     Of note, the patient is exercising more.   Discussed with the patient that she is at risk for developing hypoglycemia with exercise as she should reduce her pump rate by 50% about 2 hours before exercise to help     No changes to her pump at this time.  We will have patient do labs as noted below:    Orders Placed This Encounter   Procedures     25 Hydroxyvitamin D2 and D3     TSH     T4 free     Hemoglobin A1c     Basic metabolic panel       #2 Subarachnoid hemorrhage  Not discussed at current visit    #3 interest in weight loss  Continue with dietary lifestyle changes.  Advised patient to reduce her basal rate by 50% for 2 hours before exercise if she has planned exercise.    #4  Fatigue  Encourage patient to continue biking with her family.  This will be helpful for her.    Follow-up: 4 months.

## 2021-08-13 ENCOUNTER — VIRTUAL VISIT (OUTPATIENT)
Dept: ENDOCRINOLOGY | Facility: CLINIC | Age: 46
End: 2021-08-13
Payer: COMMERCIAL

## 2021-08-13 DIAGNOSIS — F41.9 ANXIETY: ICD-10-CM

## 2021-08-13 DIAGNOSIS — E10.9 TYPE 1 DIABETES MELLITUS WITHOUT COMPLICATION (H): Primary | ICD-10-CM

## 2021-08-13 DIAGNOSIS — J31.0 CHRONIC RHINITIS: ICD-10-CM

## 2021-08-13 PROCEDURE — 99214 OFFICE O/P EST MOD 30 MIN: CPT | Mod: 95 | Performed by: INTERNAL MEDICINE

## 2021-08-13 RX ORDER — FLUOXETINE 40 MG/1
40 CAPSULE ORAL DAILY
Qty: 90 CAPSULE | Refills: 3 | Status: SHIPPED | OUTPATIENT
Start: 2021-08-13 | End: 2022-06-24

## 2021-08-13 RX ORDER — IPRATROPIUM BROMIDE 21 UG/1
2 SPRAY, METERED NASAL EVERY 12 HOURS
Qty: 30 ML | Refills: 3 | Status: SHIPPED | OUTPATIENT
Start: 2021-08-13

## 2021-08-13 NOTE — LETTER
8/13/2021       RE: Shannon Sow  [address] [place] [address]     Dear Colleague,    Thank you for referring your patient, Shannon Sow, to the [facility] [place] at [facility]. Please see a copy of my visit note below.    Outcome for 08/12/21 12:28 PM :Left Voicemail for patient to call back     Outcome for 08/12/21 4:52 PM :Patient stated they are not currently checking their glucose      Shannon Sow  is being evaluated via a billable video visit.      [facility]  Endocrinology  Jemma Anderson MD  8/13/21    Chief Complaint:   Diabetes    Virtual visit by Sandra - Start time 8:30, stop time 8:50, documentation time and coordination of care: 10 minutes, total time 30 minutes    History of Present Illness:   Shannon Sow is a 45 year old female with a history of diabetes who presents for follow up of diabetes.    #1 Type 1 diabetes mellitus  The patient was diagnosed with diabetes in 1992.  She was on Lantus and Humalog from approximately 2000 - May 2009 when she started on an insulin pump.  As of November 2013, she has been on a newer Medtronic pump with low glucose suspend.  She started using the Freestyle Azam CGMS system in February 2018.  Her A1c level in the past 5 years have generally been in the high 6% - mid 7% range.  March 2019 hemoglobin A1c was 7%. July 2019 A1c was 7.2%. November 2019 A1c 7.4%.   February 2020 A1c 7.0% .  December 2020 hemoglobin A1c of 7.8.    Interval history: April 2021- pt was offered metformin and she declined. Although the patient has the T slim pump, the main barrier for her converting to control IQ is because of the Dexcom sensors.  Pt would like more refills of the azam sensors.      Settings :   BASAL RATES and times:  Midnight to 6 AM: 0.6 units/h  9     AM:  0.9 units/hour  11:30   AM: 1.1 units/hour   2   PM: 0.4 units/hour      7 pm  : 1.4 units/hour  9 pm - 0.7 units/hour   CARB RATIO and times:  12  AM (midnight): 12.0  6  AM: 10  9  AM: 6  5 pm: 5  7 pm: 6  9 pm 12     Correction Factor (Sensitivity) and times:  12   AM (midnight): 50 mg/dL  9     AM: 40 mg/dL  2    PM: 50 mg/dL   10   PM: 50 mg/dL   BLOOD GLUCOSE TARGET and times:  12   AM (midnight): 120 mg/dl       Blood Glucose Monitoring:  Patient does not have blood sugars for review    Diabetes monitoring and complications:  CAD: December 2020 LDL was 94  Last eye exam results: 11/20/2019, mild nonproliferative retinopathy  Microalbuminuria: Negative in December 2020  Neuropathy: No  HTN: No  On Statin: No  On Aspirin: No  Depression: Yes    #2 Subarachnoid hemorrhage  She was hospitalized at [facility] 8/2 - 8/9/18 for a subarachnoid hemorrhage after presenting with a sudden onset headache, nausea, and vomiting.  She had no complications. She saw Dr. Jacob in Neurology on 11/9/2018 who discussed with her that she is at low risk for any recurrent issues (2 - 5 % of recurrence).  She has no history of hypertension. She followed with Dr. Regan in Neurology on 11/20/2018 who concluded that her hemorrhage was likely non-aneurysmal in origin and found no other signs of ongoing symptoms.     Interval history: Not discussed at visit today.     #3 interest in weight loss  At previous visit the patient reported changing her diet and increasing her exercise.      Interval history: Reports an additional 2 pound weight loss and that her weight is now approximately 150 pounds.    #4 fatigue  This is her most pressing issue.  She reports significant fatigue.  She reports fatigue even when she wakes up in the morning.  She reports that she would wake up for an hour at night and then go back to sleep.  She does report significant stress in her life.  She is working with a therapist.  She feels that her Prozac dose is adequate.Extensive evaluation in December 2020 was  unremarkable.    Interval history: The patient continues to work with the therapist.  She feels that when she gets good sleep, this is improved.    Review of Systems:   Pertinent items are noted in HPI.  All other systems are negative.    Active Medications:   Current Outpatient Medications   Medication Sig Dispense Refill     blood glucose test strip Test up to 6 times daily 6 Box 4     cholecalciferol (VITAMIN D) 1000 UNIT tablet Take 1 tablet (1,000 Units) by mouth daily 90 tablet 3     FLUoxetine (PROZAC) 40 MG capsule Take 1 capsule (40 mg) by mouth daily 90 capsule 3     insulin aspart (NOVOLOG VIAL) 100 UNITS/ML vial USE UP TO 80 UNITS DAILY IN PUMP 80 mL 3     insulin glargine (BASAGLAR KWIKPEN) 100 UNIT/ML pen Take 20 units if pump fails 30 mL 3     insulin glargine (LANTUS SOLOSTAR) 100 UNIT/ML pen Take 20 units if pump fails. 30 mL 1     insulin pen needle (B-D U/F) 31G X 5 MM Use if pump fails 4-5 times daily 100 each 1     ipratropium (ATROVENT) 0.03 % nasal spray Spray 2 sprays into both nostrils every 12 hours 1 Box 3     Multiple Vitamins-Minerals (MULTIVITAMIN OR) Take by mouth daily        STATIN NOT PRESCRIBED (INTENTIONAL) Please choose reason not prescribed, below       Continuous Blood Gluc  (DEXCOM G6 ) NAILA 1 applicator as needed (as needed) (Patient not taking: Reported on 4/9/2021) 1 Device 4     Continuous Blood Gluc  (FREESTYLE RADHA 14 DAY READER) NAILA 1 each See Admin Instructions To use to read blood sugars as manufacture's instructions. (Patient not taking: Reported on 8/12/2021) 1 Device 1     Continuous Blood Gluc Transmit (DEXCOM G6 TRANSMITTER) MISC Change every 3 months. (Patient not taking: Reported on 4/9/2021) 1 each 3     hydrocortisone 2.5 % cream Apply topically 2 times daily (Patient not taking: Reported on 8/12/2021) 30 g 1     hydrOXYzine (ATARAX) 25 MG tablet Take 1 tablet (25 mg) by mouth 2 times daily as needed for itching (Patient not taking:  "Reported on 8/12/2021) 20 tablet 0        Allergies:   Nkda [no known drug allergies]   Seasonal allergies      Past Medical History:  Type I diabetes mellitus   Vitamin deficiency  Upper respiratory infection   Cerebral salt-wasting syndrome  Hypertensive disease\  Recurrent major depressive disorder  Subarachnoid hemorrhage      Past Surgical History:  History reviewed. No pertinent past surgical history.      Family History:   Breast cancer - mother      Social History:   Pt working from home      Physical Exam:     GENERAL: Healthy, alert and no distress\",\"EYES: Eyes grossly normal to inspection.  No discharge or erythema, or obvious scleral/conjunctival abnormalities.\",\"RESP: No audible wheeze, cough, or visible cyanosis.  No visible retractions or increased work of breathing.  \",\"SKIN: Visible skin clear. No significant rash, abnormal pigmentation or lesions.\",\"NEURO: Cranial nerves grossly intact.  Mentation and speech appropriate for age.\",\"PSYCH: Mentation appears normal, affect normal/bright, judgement and insight intact, normal speech and appearance well-groomed.     Data:  No visits with results within 1 Week(s) from this visit.   Latest known visit with results is:   Orders Only on 12/24/2020   Component Date Value Ref Range Status     CK Total 12/24/2020 50  30 - 225 U/L Final     Creatinine Urine 12/24/2020 335  mg/dL Final     Albumin Urine mg/L 12/24/2020 31  mg/L Final     Albumin Urine mg/g Cr 12/24/2020 9.22  0 - 25 mg/g Cr Final     Tissue Transglutaminase Antibody I* 12/24/2020 <1  <7 U/mL Final    Comment: Negative  The tTG-IgA assay has limited utility for patients with decreased levels of   IgA. Screening for celiac disease should include IgA testing to rule out   selective IgA deficiency and to guide selection and interpretation of   serological testing. tTG-IgG testing may be positive in celiac disease   patients with IgA deficiency.       Tissue Transglutaminase Britt IgG 12/24/2020 <1  <7 " U/mL Final    Negative     Cortisol Serum 12/24/2020 15.6  4 - 22 ug/dL Final    Comment: 8 AM Cortisol Reference Range = 4-22 ug/dL  4 PM Cortisol Reference Range = 3-17 ug/dL       Ferritin 12/24/2020 53  12 - 150 ng/mL Final     25 OH Vit D2 12/24/2020 <5  ug/L Final     25 OH Vit D3 12/24/2020 30  ug/L Final     25 OH Vit D total 12/24/2020 <35  20 - 75 ug/L Final    Comment: Season, race, dietary intake, and treatment affect the concentration of   25-hydroxy-Vitamin D. Values may decrease during winter months and increase   during summer months. Values 20-29 ug/L may indicate Vitamin D insufficiency   and values <20 ug/L may indicate Vitamin D deficiency.  This test was developed and its performance characteristics determined by the   [facility].   It has not been cleared or approved by the FDA. The laboratory is regulated   under CLIA as qualified to perform high-complexity testing. This test is used   for clinical purposes. It should not be regarded as investigational or for   research.       Hemoglobin A1C 12/24/2020 7.8* 0 - 5.6 % Final    Comment: Normal <5.7% Prediabetes 5.7-6.4%  Diabetes 6.5% or higher - adopted from ADA   consensus guidelines.       Cholesterol 12/24/2020 196  <200 mg/dL Final     Triglycerides 12/24/2020 40  <150 mg/dL Final     HDL Cholesterol 12/24/2020 94  >49 mg/dL Final     LDL Cholesterol Calculated 12/24/2020 94  <100 mg/dL Final    Desirable:       <100 mg/dl     Non HDL Cholesterol 12/24/2020 102  <130 mg/dL Final     Sodium 12/24/2020 138  133 - 144 mmol/L Final     Potassium 12/24/2020 4.0  3.4 - 5.3 mmol/L Final     Chloride 12/24/2020 107  94 - 109 mmol/L Final     Carbon Dioxide 12/24/2020 25  20 - 32 mmol/L Final     Anion Gap 12/24/2020 6  3 - 14 mmol/L Final     Glucose 12/24/2020 174* 70 - 99 mg/dL Final     Urea Nitrogen 12/24/2020 15  7 - 30 mg/dL Final     Creatinine 12/24/2020 0.66  0.52 - 1.04  mg/dL Final     GFR Estimate 12/24/2020 >90  >60 mL/min/[1.73_m2] Final    Comment: Non  GFR Calc  Starting 12/18/2018, serum creatinine based estimated GFR (eGFR) will be   calculated using the Chronic Kidney Disease Epidemiology Collaboration   (CKD-EPI) equation.       GFR Estimate If Black 12/24/2020 >90  >60 mL/min/[1.73_m2] Final    Comment:  GFR Calc  Starting 12/18/2018, serum creatinine based estimated GFR (eGFR) will be   calculated using the Chronic Kidney Disease Epidemiology Collaboration   (CKD-EPI) equation.       Calcium 12/24/2020 8.6  8.5 - 10.1 mg/dL Final     Bilirubin Total 12/24/2020 0.7  0.2 - 1.3 mg/dL Final     Albumin 12/24/2020 3.8  3.4 - 5.0 g/dL Final     Protein Total 12/24/2020 7.0  6.8 - 8.8 g/dL Final     Alkaline Phosphatase 12/24/2020 51  40 - 150 U/L Final     ALT 12/24/2020 17  0 - 50 U/L Final     AST 12/24/2020 14  0 - 45 U/L Final     WBC 12/24/2020 3.4* 4.0 - 11.0 10e9/L Final     RBC Count 12/24/2020 4.35  3.8 - 5.2 10e12/L Final     Hemoglobin 12/24/2020 14.0  11.7 - 15.7 g/dL Final     Hematocrit 12/24/2020 41.7  35.0 - 47.0 % Final     MCV 12/24/2020 96  78 - 100 fl Final     MCH 12/24/2020 32.2  26.5 - 33.0 pg Final     MCHC 12/24/2020 33.6  31.5 - 36.5 g/dL Final     RDW 12/24/2020 11.9  10.0 - 15.0 % Final     Platelet Count 12/24/2020 287  150 - 450 10e9/L Final     T4 Free 12/24/2020 0.94  0.76 - 1.46 ng/dL Final     TSH 12/24/2020 0.95  0.40 - 4.00 mU/L Final     Triiodothyronine (T3) 12/24/2020 89  60 - 181 ng/dL Final         Assessment and Plan:  #1 Type 1 diabetes mellitus   February 2020 A1c 7.0%.  She is currently on the T slim pump.  Unfortunately, the Dexcom is not well covered by her insurance so she cannot change over to the control IQ program.  We will look into options for her Dexcom coverage, as I think that Dexcom-T slim control IQ could be transformative for her to reduce her burden from diabetes.  We will inquire again  with insurance.    In the meantime, refills given for the flores 2 sensor (can use phone macrina).  As previously discussed, patient was not interested in Metformin (April 2021).  I agree that the most important thing for her at this time is to get the Dexcom sensor and turn on the control IQ program.     Of note, the patient is exercising more.  Discussed with the patient that she is at risk for developing hypoglycemia with exercise as she should reduce her pump rate by 50% about 2 hours before exercise to help     No changes to her pump at this time.  We will have patient do labs as noted below:    Orders Placed This Encounter   Procedures     25 Hydroxyvitamin D2 and D3     TSH     T4 free     Hemoglobin A1c     Basic metabolic panel       #2 Subarachnoid hemorrhage  Not discussed at current visit    #3 interest in weight loss  Continue with dietary lifestyle changes.  Advised patient to reduce her basal rate by 50% for 2 hours before exercise if she has planned exercise.    #4  Fatigue  Encourage patient to continue biking with her family.  This will be helpful for her.    Follow-up: 4 months.    Again, thank you for allowing me to participate in the care of your patient.      Sincerely,    Jemma Anderson MD

## 2021-08-13 NOTE — PATIENT INSTRUCTIONS
Azam 2 macrina    Reduce basal by 50% for 2 hours before and continue during biking    Keep up with the biking    Call to schedule labs

## 2021-08-24 ENCOUNTER — TELEPHONE (OUTPATIENT)
Dept: ENDOCRINOLOGY | Facility: CLINIC | Age: 46
End: 2021-08-24

## 2021-08-24 NOTE — TELEPHONE ENCOUNTER
----- Message from Arminda Graves sent at 8/23/2021  4:35 PM CDT -----  Nolan Easton!  Here  are the notes from the diabetes team at the pharmacy.... looks like the patient would be responsible up to the amount of the deductible then covered 100% after.   Cash prices are following.  Thanks!   Arminda    Dexcom sensors (3)  $450.27  Dexcom Transmitter  $494.51  Dexcom  $489.84    (DEXCOM) PATIENT MEETS CRITERIA THROUGH [id number]. PT IS SUBJECT TO THEIR DEDUCTIBLE AND MAX OUT OF POCKET. ##PLEASE GO OVER CASH PRICE OF DEXCOM ITEMS FOR WHEN THE PLAN RENEWS NEXT YEAR SO PATIENT IS AWARE OF THE COST DIFFERENCE AS THEY WILL BE RESPONSIBLE % OF COST UNTIL THEY MEET THE $2,800 OUT OF POCKET, ONCE THE DEDUCTIBLE IS MET THEN PT WILL BE COVERED % UNTIL THE PLAN RENEWS.  ----- Message -----  From: Payam Terrell  Sent: 8/23/2021   4:23 PM CDT  To: Arminda Graves      ----- Message -----  From: Arminda Graves  Sent: 8/23/2021   4:06 PM CDT  To: Payam Terrell      ----- Message -----  From: Jemma Anderson MD  Sent: 8/13/2021   6:23 AM CDT  To: Pharmacy Liaison Endocrinology Pool    What is cost of dexcomg g6 cgm for pt?

## 2021-09-07 ENCOUNTER — TELEPHONE (OUTPATIENT)
Dept: ENDOCRINOLOGY | Facility: CLINIC | Age: 46
End: 2021-09-07

## 2021-09-07 NOTE — TELEPHONE ENCOUNTER
CLINIC COORDINATOR SCHEDULING NOTES    RESULT: LVM + letter sent    APPT TYPE: VIDEO VISIT RETURN / [facility] RETURN DIABETES         PROVIDER: Dr. Anderson    DATE APPT NEEDED: December 2021/next available after that

## 2021-09-08 ENCOUNTER — TELEPHONE (OUTPATIENT)
Dept: ENDOCRINOLOGY | Facility: CLINIC | Age: 46
End: 2021-09-08

## 2021-09-08 NOTE — TELEPHONE ENCOUNTER
I was unable to reach her by phone so mailed the quote for HERMILO Garcia RN on 9/8/2021 at 12:19 PM

## 2021-09-08 NOTE — TELEPHONE ENCOUNTER
----- Message from Jemma Anderson MD sent at 9/8/2021 11:27 AM CDT -----  Please review this with pt - I imagine that she will not want to get the DEXCOM but just run it past her    -[facility]  ----- Message -----  From: Renate Arce  Sent: 9/8/2021   7:45 AM CDT  To: Jemma Anderson MD, Eileen Garcia RN, #    Hi Jemma!  Here  are the notes from the diabetes team at the pharmacy.... looks like the patient would be responsible up to the amount of the deductible then covered 100% after.   Cash prices are following.  Thanks!   Arminda     Dexcom sensors (3)  $450.27  Dexcom Transmitter  $494.51  Dexcom  $489.84     (DEXCOM) PATIENT MEETS CRITERIA THROUGH [id number]. PT IS SUBJECT TO THEIR DEDUCTIBLE AND MAX OUT OF POCKET. ##PLEASE GO OVER CASH PRICE OF DEXCOM ITEMS FOR WHEN THE PLAN RENEWS NEXT YEAR SO PATIENT IS AWARE OF THE COST DIFFERENCE AS THEY WILL BE RESPONSIBLE % OF COST UNTIL THEY MEET THE $2,800 OUT OF POCKET, ONCE THE DEDUCTIBLE IS MET THEN PT WILL BE COVERED % UNTIL THE PLAN RENEWS.    ----- Message -----  From: Eileen Garcia, RN  Sent: 9/3/2021   8:57 AM CDT  To: Renate Arce      ----- Message -----  From: Amirah Soliman RN  Sent: 9/3/2021   8:44 AM CDT  To: Eileen Garcia RN    Shannon has BCBS insurance, which currently is the only one that requires that the Dexcom be processed and covered under the durable medical equipment benefit, rather than pharmacy benefit, so we need to have Dr. Anderson sign a CMN and send the order to a DME supplier who works with her insurance (she would have to check with her insurance to see which one her plan prefers).  That's the only way to get an estimate on cost.  I believe that an order was submitted to [facility] and she felt that the copay was too high (this was some time ago).      Has something changed with her insurance?    ----- Message -----  From: Eileen Garcia RN  Sent: 9/1/2021  11:30 AM  CDT  To: Amirah Soliman, DANGELO    I have no idea what Dr Anderson is asking.   We will look into options for her Dexcom coverage, as I think that Dexcom-T slim control IQ could be transformative for her to reduce her burden from diabetes.  We will inquire again with insurance  .If an order isn't placed then price  can't be discussed . Do you want her to see you again to discuss ? Last visit  was 2019 .   ----- Message -----  From: Jemma Anderson MD  Sent: 8/31/2021   1:25 PM CDT  To: [facility]    Please review the price of Dexcom sensor (in my most recent note) with patient.  This is something she would be interested in?  In addition, the patient may need to have her labs scheduled as well as return visit with me.  ----- Message -----  From: Jemma Anderson MD  Sent: 8/31/2021  To: Jemma Anderson MD    Pt get dexcom and do labs?

## 2021-09-10 ENCOUNTER — LAB (OUTPATIENT)
Dept: LAB | Facility: CLINIC | Age: 46
End: 2021-09-10
Payer: COMMERCIAL

## 2021-09-10 DIAGNOSIS — E10.9 TYPE 1 DIABETES MELLITUS WITHOUT COMPLICATION (H): ICD-10-CM

## 2021-09-10 LAB
ANION GAP SERPL CALCULATED.3IONS-SCNC: 10 MMOL/L (ref 5–18)
BUN SERPL-MCNC: 13 MG/DL (ref 8–22)
CALCIUM SERPL-MCNC: 9.1 MG/DL (ref 8.5–10.5)
CHLORIDE BLD-SCNC: 101 MMOL/L (ref 98–107)
CO2 SERPL-SCNC: 24 MMOL/L (ref 22–31)
CREAT SERPL-MCNC: 0.95 MG/DL (ref 0.6–1.1)
GFR SERPL CREATININE-BSD FRML MDRD: 74 ML/MIN/1.73M2
GLUCOSE BLD-MCNC: 328 MG/DL (ref 70–125)
HBA1C MFR BLD: 7.4 % (ref 0–5.6)
POTASSIUM BLD-SCNC: 4.4 MMOL/L (ref 3.5–5)
SODIUM SERPL-SCNC: 135 MMOL/L (ref 136–145)
T4 FREE SERPL-MCNC: 0.84 NG/DL (ref 0.7–1.8)
TSH SERPL DL<=0.005 MIU/L-ACNC: 0.44 UIU/ML (ref 0.3–5)

## 2021-09-10 PROCEDURE — 84443 ASSAY THYROID STIM HORMONE: CPT

## 2021-09-10 PROCEDURE — 83036 HEMOGLOBIN GLYCOSYLATED A1C: CPT

## 2021-09-10 PROCEDURE — 80048 BASIC METABOLIC PNL TOTAL CA: CPT

## 2021-09-10 PROCEDURE — 82306 VITAMIN D 25 HYDROXY: CPT

## 2021-09-10 PROCEDURE — 84439 ASSAY OF FREE THYROXINE: CPT

## 2021-09-10 PROCEDURE — 36415 COLL VENOUS BLD VENIPUNCTURE: CPT

## 2021-09-15 ENCOUNTER — TELEPHONE (OUTPATIENT)
Dept: ENDOCRINOLOGY | Facility: CLINIC | Age: 46
End: 2021-09-15

## 2021-09-15 NOTE — TELEPHONE ENCOUNTER
Called pt - left message.     -  Dear Shannon    Here are your labs which show normal vitamin D, normal TSH, slightly higher glucose (328), slightly lower sodium which could be related to the slightly higher glucose, and a hemoglobin A1c of 7.4.  Overall, I think they are better.    If you have any questions, please feel free to contact my nurse at [phone] select option #3 for triage nurse  or  option #1 for scheduling related questions.    Regards    Jemma Anderson MD

## 2021-09-19 ENCOUNTER — HEALTH MAINTENANCE LETTER (OUTPATIENT)
Age: 46
End: 2021-09-19

## 2021-10-26 ENCOUNTER — OFFICE VISIT (OUTPATIENT)
Dept: FAMILY MEDICINE | Facility: CLINIC | Age: 46
End: 2021-10-26
Payer: COMMERCIAL

## 2021-10-26 VITALS
HEIGHT: 63 IN | WEIGHT: 157 LBS | HEART RATE: 98 BPM | DIASTOLIC BLOOD PRESSURE: 62 MMHG | BODY MASS INDEX: 27.82 KG/M2 | SYSTOLIC BLOOD PRESSURE: 118 MMHG | OXYGEN SATURATION: 98 %

## 2021-10-26 DIAGNOSIS — E10.9 TYPE 1 DIABETES MELLITUS WITHOUT COMPLICATION (H): ICD-10-CM

## 2021-10-26 DIAGNOSIS — Z00.00 ROUTINE GENERAL MEDICAL EXAMINATION AT A HEALTH CARE FACILITY: ICD-10-CM

## 2021-10-26 DIAGNOSIS — M72.2 PLANTAR FASCIITIS: Primary | ICD-10-CM

## 2021-10-26 DIAGNOSIS — Z12.31 VISIT FOR SCREENING MAMMOGRAM: ICD-10-CM

## 2021-10-26 DIAGNOSIS — R76.8 HEPATITIS C ANTIBODY TEST POSITIVE: ICD-10-CM

## 2021-10-26 LAB
ALBUMIN SERPL-MCNC: 4 G/DL (ref 3.5–5)
ALP SERPL-CCNC: 61 U/L (ref 45–120)
ALT SERPL W P-5'-P-CCNC: 14 U/L (ref 0–45)
ANION GAP SERPL CALCULATED.3IONS-SCNC: 13 MMOL/L (ref 5–18)
AST SERPL W P-5'-P-CCNC: 14 U/L (ref 0–40)
BILIRUB SERPL-MCNC: 0.7 MG/DL (ref 0–1)
BUN SERPL-MCNC: 9 MG/DL (ref 8–22)
CALCIUM SERPL-MCNC: 9.5 MG/DL (ref 8.5–10.5)
CHLORIDE BLD-SCNC: 99 MMOL/L (ref 98–107)
CO2 SERPL-SCNC: 26 MMOL/L (ref 22–31)
CREAT SERPL-MCNC: 0.81 MG/DL (ref 0.6–1.1)
GFR SERPL CREATININE-BSD FRML MDRD: 89 ML/MIN/1.73M2
GLUCOSE BLD-MCNC: 208 MG/DL (ref 70–125)
HIV 1+2 AB+HIV1 P24 AG SERPL QL IA: NEGATIVE
POTASSIUM BLD-SCNC: 3.6 MMOL/L (ref 3.5–5)
PROT SERPL-MCNC: 7 G/DL (ref 6–8)
SODIUM SERPL-SCNC: 138 MMOL/L (ref 136–145)

## 2021-10-26 PROCEDURE — 86803 HEPATITIS C AB TEST: CPT | Performed by: STUDENT IN AN ORGANIZED HEALTH CARE EDUCATION/TRAINING PROGRAM

## 2021-10-26 PROCEDURE — 87389 HIV-1 AG W/HIV-1&-2 AB AG IA: CPT | Performed by: STUDENT IN AN ORGANIZED HEALTH CARE EDUCATION/TRAINING PROGRAM

## 2021-10-26 PROCEDURE — 99386 PREV VISIT NEW AGE 40-64: CPT | Performed by: STUDENT IN AN ORGANIZED HEALTH CARE EDUCATION/TRAINING PROGRAM

## 2021-10-26 PROCEDURE — 86706 HEP B SURFACE ANTIBODY: CPT | Performed by: STUDENT IN AN ORGANIZED HEALTH CARE EDUCATION/TRAINING PROGRAM

## 2021-10-26 PROCEDURE — 36415 COLL VENOUS BLD VENIPUNCTURE: CPT | Performed by: STUDENT IN AN ORGANIZED HEALTH CARE EDUCATION/TRAINING PROGRAM

## 2021-10-26 PROCEDURE — 80053 COMPREHEN METABOLIC PANEL: CPT | Performed by: STUDENT IN AN ORGANIZED HEALTH CARE EDUCATION/TRAINING PROGRAM

## 2021-10-26 PROCEDURE — 87902 NFCT AGT GNTYP ALYS HEP C: CPT | Performed by: STUDENT IN AN ORGANIZED HEALTH CARE EDUCATION/TRAINING PROGRAM

## 2021-10-26 ASSESSMENT — ANXIETY QUESTIONNAIRES
7. FEELING AFRAID AS IF SOMETHING AWFUL MIGHT HAPPEN: SEVERAL DAYS
3. WORRYING TOO MUCH ABOUT DIFFERENT THINGS: NOT AT ALL
IF YOU CHECKED OFF ANY PROBLEMS ON THIS QUESTIONNAIRE, HOW DIFFICULT HAVE THESE PROBLEMS MADE IT FOR YOU TO DO YOUR WORK, TAKE CARE OF THINGS AT HOME, OR GET ALONG WITH OTHER PEOPLE: NOT DIFFICULT AT ALL
1. FEELING NERVOUS, ANXIOUS, OR ON EDGE: SEVERAL DAYS
2. NOT BEING ABLE TO STOP OR CONTROL WORRYING: NOT AT ALL
5. BEING SO RESTLESS THAT IT IS HARD TO SIT STILL: NOT AT ALL
GAD7 TOTAL SCORE: 4
6. BECOMING EASILY ANNOYED OR IRRITABLE: SEVERAL DAYS

## 2021-10-26 ASSESSMENT — PATIENT HEALTH QUESTIONNAIRE - PHQ9
5. POOR APPETITE OR OVEREATING: SEVERAL DAYS
SUM OF ALL RESPONSES TO PHQ QUESTIONS 1-9: 6

## 2021-10-26 ASSESSMENT — MIFFLIN-ST. JEOR: SCORE: 1324.89

## 2021-10-26 NOTE — PROGRESS NOTES
SUBJECTIVE:   CC: Shannon Sow is an 46 year old woman who presents for preventive health visit.     Patient has been advised of split billing requirements and indicates understanding: Yes  Healthy Habits:     Getting at least 3 servings of Calcium per day:  Yes    Bi-annual eye exam:  NO    Dental care twice a year:  Yes    Sleep apnea or symptoms of sleep apnea:  Daytime drowsiness    Diet:  Diabetic    Frequency of exercise:  1 day/week    Duration of exercise:  Less than 15 minutes    Taking medications regularly:  Yes    PHQ-2 Total Score: 2    Additional concerns today:  Yes    Exercise walking, yoga, bike    Patient is presenting for a physical.  Patient's main concern is 1 hyperpigmented spot on her face.  Patient has no additional questions or concerns    Today's PHQ-2 Score:   PHQ-2 ( 1999 [employer]) 10/26/2021   Q1: Little interest or pleasure in doing things 1   Q2: Feeling down, depressed or hopeless 1   PHQ-2 Score 2   Q1: Little interest or pleasure in doing things Several days   Q2: Feeling down, depressed or hopeless Several days   PHQ-2 Score 2       Abuse: Current or Past (Physical, Sexual or Emotional) - No  Do you feel safe in your environment? Yes    Have you ever done Advance Care Planning? (For example, a Health Directive, POLST, or a discussion with a medical provider or your loved ones about your wishes): Yes, patient states has an Advance Care Planning document and will bring a copy to the clinic.    Social History     Tobacco Use     Smoking status: Never Smoker     Smokeless tobacco: Never Used   Substance Use Topics     Alcohol use: Yes     Comment: 3 glasses of wine a night       Alcohol Use 10/26/2021   Prescreen: >3 drinks/day or >7 drinks/week? Yes   AUDIT SCORE  6     Reviewed orders with patient.  Reviewed health maintenance and updated orders accordingly - Yes    Breast Cancer Screening:  Any new diagnosis of family breast, ovarian, or bowel cancer? No    FHS-7: No  "flowsheet data found.    Mammogram Screening - Offered annual screening and updated Health Maintenance for [place] plan based on risk factor consideration    Pertinent mammograms are reviewed under the imaging tab.    History of abnormal Pap smear: NO - age 30-65 PAP every 5 years with negative HPV co-testing recommended     Reviewed and updated as needed this visit by clinical staff  Tobacco  Allergies  Meds  Problems  Med Hx  Surg Hx  Fam Hx          Reviewed and updated as needed this visit by Provider  Tobacco  Allergies  Meds  Problems  Med Hx  Surg Hx  Fam Hx         Review of Systems  CONSTITUTIONAL: NEGATIVE for fever, chills, change in weight  INTEGUMENTARU/SKIN: NEGATIVE for worrisome rashes, moles or lesions  EYES: NEGATIVE for vision changes or irritation  ENT: NEGATIVE for ear, mouth and throat problems  RESP: NEGATIVE for significant cough or SOB  BREAST: NEGATIVE for masses, tenderness or discharge  CV: NEGATIVE for chest pain, palpitations or peripheral edema  GI: NEGATIVE for nausea, abdominal pain, heartburn, or change in bowel habits  : NEGATIVE for unusual urinary or vaginal symptoms. Periods are regular.  MUSCULOSKELETAL: Positive for pain in her feet bilaterally in the morning.  NEURO: NEGATIVE for weakness, dizziness or paresthesias  PSYCHIATRIC: NEGATIVE for changes in mood or affect     OBJECTIVE:   /62   Pulse 98   Ht 1.59 m (5' 2.6\")   Wt 71.2 kg (157 lb)   SpO2 98%   BMI 28.17 kg/m    Physical Exam  GENERAL: healthy, alert and no distress  EYES: Eyes grossly normal to inspection, PERRL and conjunctivae and sclerae normal  HENT: ear canals and TM's normal, nose and mouth without ulcers or lesions  NECK: no adenopathy, no asymmetry, masses, or scars and thyroid normal to palpation  RESP: lungs clear to auscultation - no rales, rhonchi or wheezes  CV: regular rate and rhythm, normal S1 S2, no S3 or S4, no murmur, click or rub, no peripheral edema and peripheral " pulses strong  ABDOMEN: soft, nontender, no hepatosplenomegaly, no masses and bowel sounds normal  MS: no gross musculoskeletal defects noted, no edema  SKIN: no suspicious lesions or rashes, complete body exam performed  NEURO: Normal strength and tone, mentation intact and speech normal  PSYCH: mentation appears normal, affect normal/bright  Diabetic foot: No lesions noted.  Normal monofilament test    Diagnostic Test Results:  Labs reviewed in Epic    ASSESSMENT/PLAN:   Shannon was seen today for physical.    Diagnoses and all orders for this visit:    Plantar fasciitis: Patient noting to have pain in her plantar fascia upon wakening every morning.  Discussed the diagnosis of plantar fasciitis.  Offered referral to podiatry versus physical therapy.  Patient will see a podiatrist  -     Orthopedic  Referral; Future    Type 1 diabetes mellitus without complication (H): Follows with endocrinology.  Completed diabetic foot exam today  -     Comprehensive metabolic panel (BMP + Alb, Alk Phos, ALT, AST, Total. Bili, TP); Future  -     Comprehensive metabolic panel (BMP + Alb, Alk Phos, ALT, AST, Total. Bili, TP)    Routine general medical examination at a health care facility: Due for some screening labs today.  We will collect those.  In addition unknown whether or not patient is immune to hepatitis B.  We will collect a screen for this as well.  Patient's last Tdap was 1 year ago.  Patient feels she is up-to-date with her pneumococcal.  She will look to see if she has record of this at home.  -     Hepatitis B Surface Antibody; Future  -     HIV Antigen Antibody Combo; Future  -     Hepatitis C antibody; Future  -     Hepatitis B Surface Antibody  -     HIV Antigen Antibody Combo  -     Hepatitis C antibody    Visit for screening mammogram  -     *MA Screening Digital Bilateral; Future    Patient has been advised of split billing requirements and indicates understanding: No  COUNSELING:  Reviewed preventive  "health counseling, as reflected in patient instructions       Regular exercise       Healthy diet/nutrition       Alcohol Use       Contraception       Family planning       Consider Hep C screening for all patients one time for ages 18-79 years       HIV screeninx in teen years, 1x in adult years, and at intervals if high risk    Estimated body mass index is 28.17 kg/m  as calculated from the following:    Height as of this encounter: 1.59 m (5' 2.6\").    Weight as of this encounter: 71.2 kg (157 lb).    She reports that she has never smoked. She has never used smokeless tobacco.    Counseling Resources:  ATP IV Guidelines  Pooled Cohorts Equation Calculator  Breast Cancer Risk Calculator  BRCA-Related Cancer Risk Assessment: FHS-7 Tool  FRAX Risk Assessment  ICSI Preventive Guidelines  Dietary Guidelines for Americans, 2010  USDA's MyPlate  ASA Prophylaxis  Lung CA Screening    Analia Kelly, [facility]  "

## 2021-10-27 LAB
HBV SURFACE AB SERPLBLD QL IA.RAPID: NEGATIVE
HCV AB SERPL QL IA: POSITIVE

## 2021-10-27 ASSESSMENT — ANXIETY QUESTIONNAIRES: GAD7 TOTAL SCORE: 4

## 2021-10-28 ENCOUNTER — TELEPHONE (OUTPATIENT)
Dept: ENDOCRINOLOGY | Facility: CLINIC | Age: 46
End: 2021-10-28

## 2021-10-28 DIAGNOSIS — Z78.9 NONIMMUNE TO HEPATITIS B VIRUS: Primary | ICD-10-CM

## 2021-10-28 LAB — HCV RNA SERPL NAA+PROBE-ACNC: NOT DETECTED IU/ML

## 2021-10-28 NOTE — TELEPHONE ENCOUNTER
Reason for Call:  Other call back    Detailed comments: Pt would like hep c lab and then also order the Hep b injection.    Please call pt to schedule once orders are placed    Phone Number Patient can be reached at: Home number on file [phone] (home)    Best Time: any    Can we leave a detailed message on this number? YES    Call taken on 10/28/2021 at 9:14 AM by Pam J. Behr=

## 2021-10-29 NOTE — TELEPHONE ENCOUNTER
Hepatitis C RNA is already running.  I ordered a her hepatitis B series.  Patient may schedule nurse visits to get these.  We will want to collect a hepatitis B lab after she completes the series.    Analia Kelly

## 2021-11-04 ENCOUNTER — ANCILLARY PROCEDURE (OUTPATIENT)
Dept: MAMMOGRAPHY | Facility: CLINIC | Age: 46
End: 2021-11-04
Attending: STUDENT IN AN ORGANIZED HEALTH CARE EDUCATION/TRAINING PROGRAM
Payer: COMMERCIAL

## 2021-11-04 DIAGNOSIS — Z12.31 VISIT FOR SCREENING MAMMOGRAM: ICD-10-CM

## 2021-11-04 PROCEDURE — 77067 SCR MAMMO BI INCL CAD: CPT | Mod: TC | Performed by: RADIOLOGY

## 2021-11-10 ENCOUNTER — ALLIED HEALTH/NURSE VISIT (OUTPATIENT)
Dept: FAMILY MEDICINE | Facility: CLINIC | Age: 46
End: 2021-11-10
Payer: COMMERCIAL

## 2021-11-10 DIAGNOSIS — Z78.9 NONIMMUNE TO HEPATITIS B VIRUS: ICD-10-CM

## 2021-11-10 PROCEDURE — 90471 IMMUNIZATION ADMIN: CPT

## 2021-11-10 PROCEDURE — 90746 HEPB VACCINE 3 DOSE ADULT IM: CPT

## 2021-11-10 PROCEDURE — 99207 PR NO CHARGE NURSE ONLY: CPT

## 2021-11-12 ENCOUNTER — TELEPHONE (OUTPATIENT)
Dept: ENDOCRINOLOGY | Facility: CLINIC | Age: 46
End: 2021-11-12

## 2021-11-12 ENCOUNTER — OFFICE VISIT (OUTPATIENT)
Dept: PODIATRY | Facility: CLINIC | Age: 46
End: 2021-11-12
Payer: COMMERCIAL

## 2021-11-12 VITALS — BODY MASS INDEX: 28.52 KG/M2 | OXYGEN SATURATION: 99 % | HEIGHT: 62 IN | HEART RATE: 76 BPM | WEIGHT: 155 LBS

## 2021-11-12 DIAGNOSIS — E10.9 TYPE 1 DIABETES MELLITUS WITHOUT COMPLICATION (H): Primary | ICD-10-CM

## 2021-11-12 DIAGNOSIS — M24.573 EQUINUS CONTRACTURE OF ANKLE: Primary | ICD-10-CM

## 2021-11-12 DIAGNOSIS — M72.2 PLANTAR FASCIITIS: ICD-10-CM

## 2021-11-12 PROCEDURE — 99203 OFFICE O/P NEW LOW 30 MIN: CPT | Performed by: PODIATRIST

## 2021-11-12 RX ORDER — PREDNISONE 10 MG/1
TABLET ORAL
Qty: 30 TABLET | Refills: 0 | Status: SHIPPED | OUTPATIENT
Start: 2021-11-12 | End: 2022-03-07

## 2021-11-12 ASSESSMENT — MIFFLIN-ST. JEOR: SCORE: 1306.33

## 2021-11-12 ASSESSMENT — PAIN SCALES - GENERAL: PAINLEVEL: MODERATE PAIN (4)

## 2021-11-12 NOTE — PATIENT INSTRUCTIONS
SUPER FEET INSERTS (GREEN)        What is Plantar Fasciitis?  Plantar Fasciitis also referred to as  heel pain syndrome  is the most common cause cited for pain in heels. Plantar Fasciitis is the pain and inflammation at the point where the flat band of tissue called the plantar fascia connects the heel bone to the toes. Plantar fascia maintains the arch of the foot. Applying pressure on it will make it swollen, weak, and irritated. This will make the heel of your foot to ache when you walk or stand for a prolonged period.    Symptoms  Most people suffering from Plantar Fasciitis experience pain when they walk after resting their feet for a long duration. You may experience less pain and stiffness after a few steps. But your foot may hurt more as the day goes on. It may hurt the most when you climb stairs or after you stand for a long time. Continuous foot pain at night might be due to different problems like arthritis or nerve problems.    Causes  Straining the ligament which supports the arch can cause Plantar Fasciitis. Repeated straining can cause tiny tears in the ligament which lead to pain and swelling. Plantar Fasciitis is very evident in cases of:    Tight Achilles tendon or calf muscles     Running long distances, especially on hard & uneven surfaces     Problems of the foot arch     Sudden obesity     Wearing shoes with soft soles or poor arch support     In-toeing    Treatment Options    Anti-Inflammatory Medication   - Ibuprofen 600 mg by mouth 3 times per day with food     (discontinue if stomach irritation occurs)    - Topical pain creams from [facility].     * apply 1-2 grams to painful areas 3 times per day prior to      Stretching    - Oral Prednisone    Orthotics (functional orthotics) Insurance Code:  (custome made, doctor prescribed)     Steroid injections    - Maximum of 3 injections in one year.     Night Splint     Physical therapy    - Stretching, Ultrasound, etc...    Prednisone  tablets    Brand Names: Deltasone, Predone, Sterapred, Sterapred DS   What is this medicine?  PREDNISONE (PRED ni sone) is a corticosteroid. It is commonly used to treat inflammation of the skin, joints, lungs, and other organs. Common conditions treated include asthma, allergies, and arthritis. It is also used for other conditions, such as blood disorders and diseases of the adrenal glands.  How should I use this medicine?  Take this medicine by mouth with a glass of water. Follow the directions on the prescription label. Take this medicine with food. If you are taking this medicine once a day, take it in the morning. Do not take more medicine than you are told to take. Do not suddenly stop taking your medicine because you may develop a severe reaction. Your doctor will tell you how much medicine to take. If your doctor wants you to stop the medicine, the dose may be slowly lowered over time to avoid any side effects.  Talk to your pediatrician regarding the use of this medicine in children. Special care may be needed.  What side effects may I notice from receiving this medicine?  Side effects that you should report to your doctor or health care professional as soon as possible:    allergic reactions like skin rash, itching or hives, swelling of the face, lips, or tongue    changes in emotions or moods    changes in vision    depressed mood    eye pain    fever or chills, cough, sore throat, pain or difficulty passing urine    increased thirst    swelling of ankles, feet  Side effects that usually do not require medical attention (report to your doctor or health care professional if they continue or are bothersome):    confusion, excitement, restlessness    headache    nausea, vomiting    skin problems, acne, thin and shiny skin    trouble sleeping    weight gain  What may interact with this medicine?  Do not take this medicine with any of the following medications:    metyrapone    mifepristone  This medicine may  also interact with the following medications:    aminoglutethimide    amphotericin B    aspirin and aspirin-like medicines    barbiturates    certain medicines for diabetes, like glipizide or glyburide    cholestyramine    cholinesterase inhibitors    cyclosporine    digoxin    diuretics    ephedrine    female hormones, like estrogens and birth control pills    isoniazid    ketoconazole    NSAIDS, medicines for pain and inflammation, like ibuprofen or naproxen    phenytoin    rifampin    toxoids    vaccines    warfarin  What if I miss a dose?  If you miss a dose, take it as soon as you can. If it is almost time for your next dose, talk to your doctor or health care professional. You may need to miss a dose or take an extra dose. Do not take double or extra doses without advice.  Where should I keep my medicine?  Keep out of the reach of children.  Store at room temperature between 15 and 30 degrees C (59 and 86 degrees F). Protect from light. Keep container tightly closed. Throw away any unused medicine after the expiration date.  What should I tell my health care provider before I take this medicine?  They need to know if you have any of these conditions:    Cushing's syndrome    diabetes    glaucoma    heart disease    high blood pressure    infection (especially a virus infection such as chickenpox, cold sores, or herpes)    kidney disease    liver disease    mental illness    myasthenia gravis    osteoporosis    seizures    stomach or intestine problems    thyroid disease    an unusual or allergic reaction to lactose, prednisone, other medicines, foods, dyes, or preservatives    pregnant or trying to get pregnant    breast-feeding  What should I watch for while using this medicine?  Visit your doctor or health care professional for regular checks on your progress. If you are taking this medicine over a prolonged period, carry an identification card with your name and address, the type and dose of your medicine,  and your doctor's name and address.  This medicine may increase your risk of getting an infection. Tell your doctor or health care professional if you are around anyone with measles or chickenpox, or if you develop sores or blisters that do not heal properly.  If you are going to have surgery, tell your doctor or health care professional that you have taken this medicine within the last twelve months.  Ask your doctor or health care professional about your diet. You may need to lower the amount of salt you eat.  This medicine may affect blood sugar levels. If you have diabetes, check with your doctor or health care professional before you change your diet or the dose of your diabetic medicine.  What are Prescription Custom Orthotics?  Custom orthotics are specially-made devices designed to support and comfort your feet. Prescription orthotics are crafted for you and no one else. They match the contours of your feet precisely and are designed for the way you move. Orthotics are only manufactured after a podiatrist has conducted a complete evaluation of your feet, ankles, and legs, so the orthotic can accommodate your unique foot structure and pathology.  Prescription orthotics are divided into two categories:    Functional orthotics are designed to control abnormal motion. They may be used to treat foot pain caused by abnormal motion; they can also be used to treat injuries such as shin splints or tendinitis. Functional orthotics are usually crafted of a semi-rigid material such as plastic or graphite.    Accommodative orthotics are softer and meant to provide additional cushioning and support. They can be used to treat diabetic foot ulcers, painful calluses on the bottom of the foot, and other uncomfortable conditions.  Podiatrists use orthotics to treat foot problems such as plantar fasciitis, bursitis, tendinitis, diabetic foot ulcers, and foot, ankle, and heel pain. Clinical research studies have shown that  podiatrist-prescribed foot orthotics decrease foot pain and improve function.  Orthotics typically cost more than shoe inserts purchased in a retail store, but the additional cost is usually well worth it. Unlike shoe inserts, orthotics are molded to fit each individual foot, so you can be sure that your orthotics fit and do what they're supposed to do. Prescription orthotics are also made of top-notch materials and last many years when cared for properly. Insurance often helps pay for prescription orthotics.  What are Shoe Inserts?   You've seen them at the grocery store and at the mall. You've probably even seen them on TV and online. Shoe inserts are any kind of non-prescription foot support designed to be worn inside a shoe. Pre-packaged, mass produced, arch supports are shoe inserts. So are the  custom-made  insoles and foot supports that you can order online or at retail stores. Unless the device has been prescribed by a doctor and crafted for your specific foot, it's a shoe insert, not a custom orthotic device--despite what the ads might say.  Shoe inserts can be very helpful for a variety of foot ailments, including flat arches and foot and leg pain. They can cushion your feet, provide comfort, and support your arches, but they can't correct biomechanical foot problems or cure long-standing foot issues.  The most common types of shoe inserts are:    Arch supports: Some people have high arches. Others have low arches or flat feet. Arch supports generally have a  bumped-up  appearance and are designed to support the foot's natural arch.     Insoles: Insoles slip into your shoe to provide extra cushioning and support. Insoles are often made of gel, foam, or plastic.     Heel liners: Heel liners, sometimes called heel pads or heel cups, provide extra cushioning in the heel region. They may be especially useful for patients who have foot pain caused by age-related thinning of the heels' natural fat pads.     Foot  cushions: Do your shoes rub against your heel or your toes? Foot cushions come in many different shapes and sizes and can be used as a barrier between you and your shoe.  Choosing an Over-the-Counter Shoe Insert  Selecting a shoe insert from the wide variety of devices on the market can be overwhelming. Here are some podiatrist-tested tips to help you find the insert that best meets your needs:    Consider your health. Do you have diabetes? Problems with circulation? An over-the-counter insert may not be your best bet. Diabetes and poor circulation increase your risk of foot ulcers and infections, so schedule an appointment with a podiatrist. He or she can help you select a solution that won't cause additional health problems.     Think about the purpose. Are you planning to run a marathon, or do you just need a little arch support in your work shoes? Look for a product that fits your planned level of activity.     Bring your shoes. For the insert to be effective, it has to fit into your shoes. So bring your sneakers, dress shoes, or work boots--whatever you plan to wear with your insert. Look for an insert that will fit the contours of your shoe.     Try them on. If all possible, slip the insert into your shoe and try it out. Walk around a little. How does it feel? Don't assume that feelings of pressure will go away with continued wear. (If you can't try the inserts at the store, ask about the store's return policy and hold on to your receipt.)

## 2021-11-12 NOTE — TELEPHONE ENCOUNTER
"Called pt - pt on prednisone for for plantar fasciitis. Pt on prednisone at 40 mg for 3  Days, tapering by 10 mg every 3 days.     For scheduling appointments or labs, please request an appointment through [facility] or call [phone] select option #3 for triage nurse    For questions for your provider or the endocrine nurse, please send a [facility] message or call [phone] select option #3 for triage nurse    If this is an emergency overnight or on weekends, please call [phone]. This will get you the [facility] . Please ask for adult endocrinology \"on call\" and they will connect you to one of my colleagues who will always be available.  "

## 2021-11-12 NOTE — PROGRESS NOTES
FOOT AND ANKLE SURGERY/PODIATRY Progress Note        ASSESSMENT:   Plantar Fasciitis  Gastrosoleus Equinus       TREATMENT:  -Patient has pain along the plantar heel at insertion of plantar fascia consistent with plantar fasciitis. We discussed treatment options to include stretching exercises, anti-inflammatory medication, orthotics, steroid injections, physical therapy and a night splint.     -All questions invited and answered. I will start her on a 12 day course of prednisone and have referred her to [facility] for custom orthotics.     -I have asked her to follow-up after using the orthotics x3-4 weeks if symptoms continue.     Enoch Fair DPM  [facility] Podiatry/Foot & Ankle Surgery      HPI: I was asked to see Shannon Sow today for bilateral arch and heel pain. She reports first having pain about 6 months ago and denies previous treatment. She typically wears supportive shoes. Stands and walks a lot for work. She is unable to tolerate NSAID's.     Past Medical History:   Diagnosis Date     Cerebral infarction (H)      Type 1 diabetes mellitus without complication (H)        Past Surgical History:   Procedure Laterality Date     C/SECTION, LOW TRANSVERSE         Allergies   Allergen Reactions     Nkda [No Known Drug Allergies]      Seasonal Allergies          Current Outpatient Medications:      blood glucose test strip, Test up to 6 times daily, Disp: 6 Box, Rfl: 4     cholecalciferol (VITAMIN D) 1000 UNIT tablet, Take 1 tablet (1,000 Units) by mouth daily, Disp: 90 tablet, Rfl: 3     Continuous Blood Gluc Sensor (FREESTYLE RADHA 2 SENSOR) MISC, 1 applicator daily as needed (as needed), Disp: 6 each, Rfl: 3     FLUoxetine (PROZAC) 40 MG capsule, Take 1 capsule (40 mg) by mouth daily, Disp: 90 capsule, Rfl: 3     insulin aspart (NOVOLOG VIAL) 100 UNITS/ML vial, USE UP TO 80 UNITS DAILY IN PUMP, Disp: 80 mL, Rfl: 3     insulin glargine (LANTUS SOLOSTAR) 100 UNIT/ML pen, Take 20 units  if pump fails., Disp: 30 mL, Rfl: 1     insulin pen needle (B-D U/F) 31G X 5 MM, Use if pump fails 4-5 times daily, Disp: 100 each, Rfl: 1     ipratropium (ATROVENT) 0.03 % nasal spray, Spray 2 sprays into both nostrils every 12 hours, Disp: 30 mL, Rfl: 3     Multiple Vitamins-Minerals (MULTIVITAMIN OR), Take by mouth daily , Disp: , Rfl:      STATIN NOT PRESCRIBED (INTENTIONAL), Please choose reason not prescribed, below, Disp: , Rfl:     Family History   Problem Relation Age of Onset     Breast Cancer Mother      Breast Cancer Maternal Aunt      Glaucoma No family hx of      Macular Degeneration No family hx of      Colon Cancer No family hx of        Social History     Socioeconomic History     Marital status:      Spouse name: Not on file     Number of children: Not on file     Years of education: Not on file     Highest education level: Not on file   Occupational History     Occupation: college proffessor: aritecture and interior design   Tobacco Use     Smoking status: Never Smoker     Smokeless tobacco: Never Used   Substance and Sexual Activity     Alcohol use: Yes     Comment: 3 glasses of wine a night     Drug use: No     Sexual activity: Yes     Partners: Male     Birth control/protection: None   Other Topics Concern     Not on file   Social History Narrative     Not on file     Social Determinants of Health     Financial Resource Strain: Not on file   Food Insecurity: Not on file   Transportation Needs: Not on file   Physical Activity: Not on file   Stress: Not on file   Social Connections: Not on file   Intimate Partner Violence: Not on file   Housing Stability: Not on file       Review of Systems - 10 point Review of Systems is negative except for arch pain which is noted in HPI.    OBJECTIVE:  Appearance: alert, well appearing, and in no distress.    General appearance: Patient is alert and fully cooperative with history & exam.  No sign of distress is noted during the visit.     Psychiatric:  Affect is pleasant & appropriate.  Patient appears motivated to improve health.     Respiratory: Breathing is regular & unlabored while sitting.     HEENT: Hearing is intact to spoken word.  Speech is clear.  No gross evidence of visual impairment that would impact ambulation.    Vascular: Dorsalis pedis and posterior tibial pulses are palpable. There is pedal hair growth bilateral.  CFT < 3 sec from anterior tibial surface to distal digits bilateral. There is no appreciable edema noted.  Dermatologic: Turgor and texture are within normal limits. No coloration or temperature changes. No primary or secondary lesions noted.  Neurologic: All epicritic and proprioceptive sensations are grossly intact bilateral.  Musculoskeletal: Mild pain along the plantar bilateral arch and heel at the insertion of the plantar fascia. Limited ankle dorsiflexion with knee extended and flexed.     Imaging:     *MA Screening Digital Bilateral    Result Date: 11/8/2021  BILATERAL FULL FIELD DIGITAL SCREENING MAMMOGRAM Performed on: 11/4/21 Compared to: 03/28/2018, 03/28/2018, and 03/22/2018 Technique: This study was evaluated with the assistance of Computer-Aided Detection. Findings: The breasts are heterogeneously dense, which may obscure small masses.  There is no radiographic evidence of malignancy. IMPRESSION: ACR BI-RADS Category 1: Negative RECOMMENDED FOLLOW-UP: Annual routine screening mammogram The results and recommendations of this examination will be communicated to the patient.

## 2021-11-12 NOTE — LETTER
11/12/2021         RE: Shannon Swo  [address] [place] [address]        Dear Colleague,    Thank you for referring your patient, Shannon Sow, to the [facility]. Please see a copy of my visit note below.    FOOT AND ANKLE SURGERY/PODIATRY Progress Note        ASSESSMENT:   Plantar Fasciitis  Gastrosoleus Equinus       TREATMENT:  -Patient has pain along the plantar heel at insertion of plantar fascia consistent with plantar fasciitis. We discussed treatment options to include stretching exercises, anti-inflammatory medication, orthotics, steroid injections, physical therapy and a night splint.     -All questions invited and answered. I will start her on a 12 day course of prednisone and have referred her to [facility] for custom orthotics.     -I have asked her to follow-up after using the orthotics x3-4 weeks if symptoms continue.     Enoch Fair, KOKI  [facility] Podiatry/Foot & Ankle Surgery      HPI: I was asked to see Shannon Sow today for bilateral arch and heel pain. She reports first having pain about 6 months ago and denies previous treatment. She typically wears supportive shoes. Stands and walks a lot for work. She is unable to tolerate NSAID's.     Past Medical History:   Diagnosis Date     Cerebral infarction (H)      Type 1 diabetes mellitus without complication (H)        Past Surgical History:   Procedure Laterality Date     C/SECTION, LOW TRANSVERSE         Allergies   Allergen Reactions     Nkda [No Known Drug Allergies]      Seasonal Allergies          Current Outpatient Medications:      blood glucose test strip, Test up to 6 times daily, Disp: 6 Box, Rfl: 4     cholecalciferol (VITAMIN D) 1000 UNIT tablet, Take 1 tablet (1,000 Units) by mouth daily, Disp: 90 tablet, Rfl: 3     Continuous Blood Gluc Sensor (FREESTYLE RADHA 2 SENSOR) MISC, 1 applicator daily as needed (as needed), Disp: 6 each, Rfl: 3      FLUoxetine (PROZAC) 40 MG capsule, Take 1 capsule (40 mg) by mouth daily, Disp: 90 capsule, Rfl: 3     insulin aspart (NOVOLOG VIAL) 100 UNITS/ML vial, USE UP TO 80 UNITS DAILY IN PUMP, Disp: 80 mL, Rfl: 3     insulin glargine (LANTUS SOLOSTAR) 100 UNIT/ML pen, Take 20 units if pump fails., Disp: 30 mL, Rfl: 1     insulin pen needle (B-D U/F) 31G X 5 MM, Use if pump fails 4-5 times daily, Disp: 100 each, Rfl: 1     ipratropium (ATROVENT) 0.03 % nasal spray, Spray 2 sprays into both nostrils every 12 hours, Disp: 30 mL, Rfl: 3     Multiple Vitamins-Minerals (MULTIVITAMIN OR), Take by mouth daily , Disp: , Rfl:      STATIN NOT PRESCRIBED (INTENTIONAL), Please choose reason not prescribed, below, Disp: , Rfl:     Family History   Problem Relation Age of Onset     Breast Cancer Mother      Breast Cancer Maternal Aunt      Glaucoma No family hx of      Macular Degeneration No family hx of      Colon Cancer No family hx of        Social History     Socioeconomic History     Marital status:      Spouse name: Not on file     Number of children: Not on file     Years of education: Not on file     Highest education level: Not on file   Occupational History     Occupation: college proffessor: aritecture and interior design   Tobacco Use     Smoking status: Never Smoker     Smokeless tobacco: Never Used   Substance and Sexual Activity     Alcohol use: Yes     Comment: 3 glasses of wine a night     Drug use: No     Sexual activity: Yes     Partners: Male     Birth control/protection: None   Other Topics Concern     Not on file   Social History Narrative     Not on file     Social Determinants of Health     Financial Resource Strain: Not on file   Food Insecurity: Not on file   Transportation Needs: Not on file   Physical Activity: Not on file   Stress: Not on file   Social Connections: Not on file   Intimate Partner Violence: Not on file   Housing Stability: Not on file       Review of Systems - 10 point Review of Systems  is negative except for arch pain which is noted in HPI.    OBJECTIVE:  Appearance: alert, well appearing, and in no distress.    General appearance: Patient is alert and fully cooperative with history & exam.  No sign of distress is noted during the visit.     Psychiatric: Affect is pleasant & appropriate.  Patient appears motivated to improve health.     Respiratory: Breathing is regular & unlabored while sitting.     HEENT: Hearing is intact to spoken word.  Speech is clear.  No gross evidence of visual impairment that would impact ambulation.    Vascular: Dorsalis pedis and posterior tibial pulses are palpable. There is pedal hair growth bilateral.  CFT < 3 sec from anterior tibial surface to distal digits bilateral. There is no appreciable edema noted.  Dermatologic: Turgor and texture are within normal limits. No coloration or temperature changes. No primary or secondary lesions noted.  Neurologic: All epicritic and proprioceptive sensations are grossly intact bilateral.  Musculoskeletal: Mild pain along the plantar bilateral arch and heel at the insertion of the plantar fascia. Limited ankle dorsiflexion with knee extended and flexed.     Imaging:     *MA Screening Digital Bilateral    Result Date: 11/8/2021  BILATERAL FULL FIELD DIGITAL SCREENING MAMMOGRAM Performed on: 11/4/21 Compared to: 03/28/2018, 03/28/2018, and 03/22/2018 Technique: This study was evaluated with the assistance of Computer-Aided Detection. Findings: The breasts are heterogeneously dense, which may obscure small masses.  There is no radiographic evidence of malignancy. IMPRESSION: ACR BI-RADS Category 1: Negative RECOMMENDED FOLLOW-UP: Annual routine screening mammogram The results and recommendations of this examination will be communicated to the patient.              Again, thank you for allowing me to participate in the care of your patient.        Sincerely,        Enoch Fair DPM

## 2021-12-16 ENCOUNTER — ALLIED HEALTH/NURSE VISIT (OUTPATIENT)
Dept: FAMILY MEDICINE | Facility: CLINIC | Age: 46
End: 2021-12-16
Payer: COMMERCIAL

## 2021-12-16 DIAGNOSIS — Z23 NEED FOR VACCINATION: Primary | ICD-10-CM

## 2021-12-16 DIAGNOSIS — Z78.9 NONIMMUNE TO HEPATITIS B VIRUS: ICD-10-CM

## 2021-12-16 PROCEDURE — 90471 IMMUNIZATION ADMIN: CPT

## 2021-12-16 PROCEDURE — 99207 PR NO CHARGE NURSE ONLY: CPT

## 2021-12-16 PROCEDURE — 90746 HEPB VACCINE 3 DOSE ADULT IM: CPT

## 2022-01-05 NOTE — PROGRESS NOTES
"Outcome for 01/05/22 9:00 AM: Villij message sent   Outcome for 01/06/22 8:43 AM: Data emailed to provider   Iraida Berry CMA    Pump settings sent via enGene:  Basal    Correct   Carb   Target  0.6        1:50         1:12        120      12am to 6am  \"              \"              1:10        \"           6am to 9am  0.9         1:60          1:6       \"             9am to 11:30  1.1         \"                \"              \"          11:30am to 2pm  0.4         1:50            \"           \"          2pm to 5pm  \"               \"              1:5          \"         5pm to 7pm  1.4           \"              1:6           \"           7pm to 9pm  0.7            \"              1:12        \"          9pm to midnight      [facility]  Endocrinology  Jemma Anderson MD  1/7/22    Chief Complaint:   Diabetes    History of Present Illness:   Shannon Sow is a 46 year old female with a history of diabetes who presents for follow up of diabetes.    This a video visit started at 1030, stopped at 1055, documentation time, coordination of care, 5 minutes.  Total time spent day of encounter 30 minutes    #1 Type 1 diabetes mellitus  The patient was diagnosed with diabetes in 1992.  She was on Lantus and Humalog from approximately 2000 - May 2009 when she started on an insulin pump.  As of November 2013, she has been on a newer Medtronic pump with low glucose suspend.  She started using the Freestyle Azam CGMS system in February 2018.  Her A1c level in the past 5 years have generally been in the high 6% - mid 7% range.  March 2019 hemoglobin A1c was 7%. July 2019 A1c was 7.2%. November 2019 A1c 7.4%.   February 2020 A1c 7.0% .  December 2020 hemoglobin A1c of 7.8.  April 2021- pt was offered metformin and she declined    Interval history:. Although the patient has the T slim pump, the main barrier for her converting to control IQ is because of the Dexcom sensors, and extremely expensive at roughly $500 per month.  " "That being said, she is on the azam 2 (uses the phone macrina) and has been very satisfied with this program.  September 2021  hemoglobin A1c of 7.4.  Reviewing her azam 2, Average glucose of 172, estimated hemoglobin A1c of 7.4, 64% within target range, 35% above target range.  Her hyperglycemia is primarily associated with meals, although she does have hypoglycemia with correction of her hyperglycemia.     Basal    Correct   Carb   Target  0.6        1:50         1:12        120      12am to 6am  \"              \"              1:10        \"           6am to 9am  0.9         1:60          1:6       \"             9am to 11:30  1.1         \"                \"              \"          11:30am to 2pm  0.4         1:50            \"           \"          2pm to 5pm  \"               \"              1:5          \"         5pm to 7pm  1.4           \"              1:6           \"           7pm to 9pm  0.7            \"              1:12        \"          9pm to midnight      Blood Glucose Monitoring:  Azam 2 results per above    Diabetes monitoring and complications:  CAD: December 2020 LDL was 94  Last eye exam results: 11/20/2019, mild nonproliferative retinopathy-patient is overdue.  Microalbuminuria: Negative in December 2020  Neuropathy: No  HTN: No  On Statin: No  On Aspirin: No  Depression: Yes    #2 Subarachnoid hemorrhage  She was hospitalized at [facility] 8/2 - 8/9/18 for a subarachnoid hemorrhage after presenting with a sudden onset headache, nausea, and vomiting.  She had no complications. She saw Dr. Jacob in Neurology on 11/9/2018 who discussed with her that she is at low risk for any recurrent issues (2 - 5 % of recurrence).  She has no history of hypertension. She followed with Dr. Regan in Neurology on 11/20/2018 who concluded that her hemorrhage was likely non-aneurysmal in origin and found no other signs of ongoing symptoms.     Interval history: Reports blood pressure is doing fine.  No " recurrence.     #3 interest in weight loss  At previous visit the patient reported changing her diet and increasing her exercise.      Interval history: Weight stable.  Has been active with exercise.    #4 fatigue  This is her most pressing issue.  She reports significant fatigue.  She reports fatigue even when she wakes up in the morning.  She reports that she would wake up for an hour at night and then go back to sleep.  She does report significant stress in her life.  She is working with a therapist.  She feels that her Prozac dose is adequate.Extensive evaluation in December 2020 was unremarkable.    Interval history: Reports that this has been manageable for her.    Review of Systems:   Pertinent items are noted in HPI.  All other systems are negative.    Active Medications:   Current Outpatient Medications   Medication Sig Dispense Refill     blood glucose test strip Test up to 6 times daily 6 Box 4     cholecalciferol (VITAMIN D) 1000 UNIT tablet Take 1 tablet (1,000 Units) by mouth daily 90 tablet 3     Continuous Blood Gluc Sensor (FREESTYLE RADHA 2 SENSOR) MISC 1 applicator daily as needed (as needed) 6 each 3     FLUoxetine (PROZAC) 40 MG capsule Take 1 capsule (40 mg) by mouth daily 90 capsule 3     insulin aspart (NOVOLOG VIAL) 100 UNITS/ML vial USE UP TO 80 UNITS DAILY IN PUMP 80 mL 3     insulin glargine (LANTUS SOLOSTAR) 100 UNIT/ML pen Take 20 units if pump fails. 30 mL 1     insulin  UNIT/ML injection Pt to take 10 units NPH at prednisone 40 mg daily 3 mL 3     insulin pen needle (B-D U/F) 31G X 5 MM Use if pump fails 4-5 times daily 100 each 1     ipratropium (ATROVENT) 0.03 % nasal spray Spray 2 sprays into both nostrils every 12 hours 30 mL 3     Multiple Vitamins-Minerals (MULTIVITAMIN OR) Take by mouth daily        predniSONE (DELTASONE) 10 MG tablet 40,30,20,10 mg x3 days each 30 tablet 0     STATIN NOT PRESCRIBED (INTENTIONAL) Please choose reason not prescribed, below         "  Allergies:   Nkda [no known drug allergies]   Seasonal allergies      Past Medical History:  Type I diabetes mellitus   Vitamin deficiency  Upper respiratory infection   Cerebral salt-wasting syndrome  Hypertensive disease\  Recurrent major depressive disorder  Subarachnoid hemorrhage      Past Surgical History:  History reviewed. No pertinent past surgical history.      Family History:   Breast cancer - mother      Social History:   Pt working from home      Physical Exam:     GENERAL: Healthy, alert and no distress\",\"EYES: Eyes grossly normal to inspection.  No discharge or erythema, or obvious scleral/conjunctival abnormalities.\",\"RESP: No audible wheeze, cough, or visible cyanosis.  No visible retractions or increased work of breathing.  \",\"SKIN: Visible skin clear. No significant rash, abnormal pigmentation or lesions.\",\"NEURO: Cranial nerves grossly intact.  Mentation and speech appropriate for age.\",\"PSYCH: Mentation appears normal, affect normal/bright, judgement and insight intact, normal speech and appearance well-groomed.     Data:  Office Visit on 10/26/2021   Component Date Value Ref Range Status     Sodium 10/26/2021 138  136 - 145 mmol/L Final     Potassium 10/26/2021 3.6  3.5 - 5.0 mmol/L Final     Chloride 10/26/2021 99  98 - 107 mmol/L Final     Carbon Dioxide (CO2) 10/26/2021 26  22 - 31 mmol/L Final     Anion Gap 10/26/2021 13  5 - 18 mmol/L Final     Urea Nitrogen 10/26/2021 9  8 - 22 mg/dL Final     Creatinine 10/26/2021 0.81  0.60 - 1.10 mg/dL Final     Calcium 10/26/2021 9.5  8.5 - 10.5 mg/dL Final     Glucose 10/26/2021 208* 70 - 125 mg/dL Final     Alkaline Phosphatase 10/26/2021 61  45 - 120 U/L Final     AST 10/26/2021 14  0 - 40 U/L Final     ALT 10/26/2021 14  0 - 45 U/L Final     Protein Total 10/26/2021 7.0  6.0 - 8.0 g/dL Final     Albumin 10/26/2021 4.0  3.5 - 5.0 g/dL Final     Bilirubin Total 10/26/2021 0.7  0.0 - 1.0 mg/dL Final     GFR Estimate 10/26/2021 89  >60 mL/min/1.73m2 " Final    As of 2021, eGFR is calculated by the CKD-EPI creatinine equation, without race adjustment. eGFR can be influenced by muscle mass, exercise, and diet. The reported eGFR is an estimation only and is only applicable if the renal function is stable.   Lab on 09/10/2021   Component Date Value Ref Range Status     25 OH Vitamin D2 09/10/2021 <5  ug/L Final     25 OH Vitamin D3 09/10/2021 30  ug/L Final     25 OH Vit D Total 09/10/2021 <35  20 - 75 ug/L Final    Season, race, dietary intake, and treatment affect the concentration of 25-hydroxy-Vitamin D. Values may decrease during winter months and increase during summer months. Values 20-29 ug/L may indicate Vitamin D insufficiency and values <20 ug/L may indicate Vitamin D deficiency.     TSH 09/10/2021 0.44  0.30 - 5.00 uIU/mL Final     Free T4 09/10/2021 0.84  0.70 - 1.80 ng/dL Final    Performance of the Free T4 test has not been established with  specimens (<= 2 months of age).       Hemoglobin A1C 09/10/2021 7.4* 0.0 - 5.6 % Final    Normal <5.7%   Prediabetes 5.7-6.4%    Diabetes 6.5% or higher     Note: Adopted from ADA consensus guidelines.     Sodium 09/10/2021 135* 136 - 145 mmol/L Final     Potassium 09/10/2021 4.4  3.5 - 5.0 mmol/L Final     Chloride 09/10/2021 101  98 - 107 mmol/L Final     Carbon Dioxide (CO2) 09/10/2021 24  22 - 31 mmol/L Final     Anion Gap 09/10/2021 10  5 - 18 mmol/L Final     Urea Nitrogen 09/10/2021 13  8 - 22 mg/dL Final     Creatinine 09/10/2021 0.95  0.60 - 1.10 mg/dL Final     Calcium 09/10/2021 9.1  8.5 - 10.5 mg/dL Final     Glucose 09/10/2021 328* 70 - 125 mg/dL Final     GFR Estimate 09/10/2021 74  >60 mL/min/1.73m2 Final    As of 2021, eGFR is calculated by the CKD-EPI creatinine equation, without race adjustment. eGFR can be influenced by muscle mass, exercise, and diet. The reported eGFR is an estimation only and is only applicable if the renal function is stable.       Assessment and  "Plan:  #1 Type 1 diabetes mellitus   Estimated hemoglobin A1c of 7.4.  She is currently on the T slim pump.  Unfortunately, the Dexcom is not well covered by her insurance so she cannot change over to the control IQ program.   However, she is very satisfied with the Azam 2 and feels that this is affordable for her.  She finds alarms helpful.    I think we can modify her settings as noted below, with a little less correction, and a little bit more carb coverage.  Revise settings as noted below.    Basal    Correct   Carb   Target  0.6         1:60        1:12        120      12am to 6am  \"              \"              1:8        \"           6am to 9am  0.9         1:65         1:5       \"             9am to 11:30  1.1         \"                \"              \"          11:30am to 2pm  0.4         1:65            \"           \"          2pm to 5pm  \"               \"              1:5          \"         5pm to 7pm  1.4           \"              1:5          \"           7pm to 9pm  0.7          1:60            1:10        \"          9pm to midnight    #2 Subarachnoid hemorrhage  Not discussed at current visit    #3 interest in weight loss  Continue with dietary lifestyle changes.     #4  Fatigue  Encourage patient to continue exercising with her family.    Follow-up: Return visit in person in roughly 4 to 5 months-labs to be done before return visit.  Referral for ophthalmology made.    Orders Placed This Encounter   Procedures     Hemoglobin A1c     Albumin Random Urine Quantitative with Creat Ratio     Lipid Profile     TSH     Basic metabolic panel     25 Hydroxyvitamin D2 and D3     Tissue transglutaminase payal IgA and IgG     Adult Eye Referral                   "

## 2022-01-07 ENCOUNTER — VIRTUAL VISIT (OUTPATIENT)
Dept: ENDOCRINOLOGY | Facility: CLINIC | Age: 47
End: 2022-01-07
Payer: COMMERCIAL

## 2022-01-07 DIAGNOSIS — E10.9 TYPE 1 DIABETES MELLITUS WITHOUT COMPLICATION (H): Primary | ICD-10-CM

## 2022-01-07 PROCEDURE — 99214 OFFICE O/P EST MOD 30 MIN: CPT | Mod: 95 | Performed by: INTERNAL MEDICINE

## 2022-01-07 NOTE — LETTER
"1/7/2022       RE: Shannon Sow  [address] [place] [address]     Dear Colleague,    Thank you for referring your patient, Shannon Sow, to the [facility] ENDOCRINOLOGY [facility] [place] at [facility]. Please see a copy of my visit note below.    Outcome for 01/05/22 9:00 AM: [employer] message sent   Outcome for 01/06/22 8:43 AM: Data emailed to provider   Iraida Berry CMA    Pump settings sent via [employer]  Basal    Correct   Carb   Target  0.6        1:50         1:12        120      12am to 6am  \"              \"              1:10        \"           6am to 9am  0.9         1:60          1:6       \"             9am to 11:30  1.1         \"                \"              \"          11:30am to 2pm  0.4         1:50            \"           \"          2pm to 5pm  \"               \"              1:5          \"         5pm to 7pm  1.4           \"              1:6           \"           7pm to 9pm  0.7            \"              1:12        \"          9pm to midnight      [facility]  Endocrinology  Jemma Anderson MD  1/7/22    Chief Complaint:   Diabetes    History of Present Illness:   Shannon Sow is a 46 year old female with a history of diabetes who presents for follow up of diabetes.    This a video visit started at 1030, stopped at 1055, documentation time, coordination of care, 5 minutes.  Total time spent day of encounter 30 minutes    #1 Type 1 diabetes mellitus  The patient was diagnosed with diabetes in 1992.  She was on Lantus and Humalog from approximately 2000 - May 2009 when she started on an insulin pump.  As of November 2013, she has been on a newer Medtronic pump with low glucose suspend.  She started using the Freestyle Azam CGMS system in February 2018.  Her A1c level in the past 5 years have generally been in the high 6% - mid 7% range.  March 2019 hemoglobin A1c was 7%. July 2019 A1c was 7.2%. November " "2019 A1c 7.4%.   February 2020 A1c 7.0% .  December 2020 hemoglobin A1c of 7.8.  April 2021- pt was offered metformin and she declined    Interval history:. Although the patient has the T slim pump, the main barrier for her converting to control IQ is because of the Dexcom sensors, and extremely expensive at roughly $500 per month.  That being said, she is on the azam 2 (uses the phone macrina) and has been very satisfied with this program.  September 2021  hemoglobin A1c of 7.4.  Reviewing her azam 2, Average glucose of 172, estimated hemoglobin A1c of 7.4, 64% within target range, 35% above target range.  Her hyperglycemia is primarily associated with meals, although she does have hypoglycemia with correction of her hyperglycemia.     Basal    Correct   Carb   Target  0.6        1:50         1:12        120      12am to 6am  \"              \"              1:10        \"           6am to 9am  0.9         1:60          1:6       \"             9am to 11:30  1.1         \"                \"              \"          11:30am to 2pm  0.4         1:50            \"           \"          2pm to 5pm  \"               \"              1:5          \"         5pm to 7pm  1.4           \"              1:6           \"           7pm to 9pm  0.7            \"              1:12        \"          9pm to midnight      Blood Glucose Monitoring:  Azam 2 results per above    Diabetes monitoring and complications:  CAD: December 2020 LDL was 94  Last eye exam results: 11/20/2019, mild nonproliferative retinopathy-patient is overdue.  Microalbuminuria: Negative in December 2020  Neuropathy: No  HTN: No  On Statin: No  On Aspirin: No  Depression: Yes    #2 Subarachnoid hemorrhage  She was hospitalized at [facility] 8/2 - 8/9/18 for a subarachnoid hemorrhage after presenting with a sudden onset headache, nausea, and vomiting.  She had no complications. She saw Dr. Jacob in Neurology on 11/9/2018 who discussed with her that she is at low risk " for any recurrent issues (2 - 5 % of recurrence).  She has no history of hypertension. She followed with Dr. Regan in Neurology on 11/20/2018 who concluded that her hemorrhage was likely non-aneurysmal in origin and found no other signs of ongoing symptoms.     Interval history: Reports blood pressure is doing fine.  No recurrence.     #3 interest in weight loss  At previous visit the patient reported changing her diet and increasing her exercise.      Interval history: Weight stable.  Has been active with exercise.    #4 fatigue  This is her most pressing issue.  She reports significant fatigue.  She reports fatigue even when she wakes up in the morning.  She reports that she would wake up for an hour at night and then go back to sleep.  She does report significant stress in her life.  She is working with a therapist.  She feels that her Prozac dose is adequate.Extensive evaluation in December 2020 was unremarkable.    Interval history: Reports that this has been manageable for her.    Review of Systems:   Pertinent items are noted in HPI.  All other systems are negative.    Active Medications:   Current Outpatient Medications   Medication Sig Dispense Refill     blood glucose test strip Test up to 6 times daily 6 Box 4     cholecalciferol (VITAMIN D) 1000 UNIT tablet Take 1 tablet (1,000 Units) by mouth daily 90 tablet 3     Continuous Blood Gluc Sensor (FREESTYLE RADHA 2 SENSOR) MISC 1 applicator daily as needed (as needed) 6 each 3     FLUoxetine (PROZAC) 40 MG capsule Take 1 capsule (40 mg) by mouth daily 90 capsule 3     insulin aspart (NOVOLOG VIAL) 100 UNITS/ML vial USE UP TO 80 UNITS DAILY IN PUMP 80 mL 3     insulin glargine (LANTUS SOLOSTAR) 100 UNIT/ML pen Take 20 units if pump fails. 30 mL 1     insulin  UNIT/ML injection Pt to take 10 units NPH at prednisone 40 mg daily 3 mL 3     insulin pen needle (B-D U/F) 31G X 5 MM Use if pump fails 4-5 times daily 100 each 1     ipratropium (ATROVENT)  "0.03 % nasal spray Spray 2 sprays into both nostrils every 12 hours 30 mL 3     Multiple Vitamins-Minerals (MULTIVITAMIN OR) Take by mouth daily        predniSONE (DELTASONE) 10 MG tablet 40,30,20,10 mg x3 days each 30 tablet 0     STATIN NOT PRESCRIBED (INTENTIONAL) Please choose reason not prescribed, below          Allergies:   Nkda [no known drug allergies]   Seasonal allergies      Past Medical History:  Type I diabetes mellitus   Vitamin deficiency  Upper respiratory infection   Cerebral salt-wasting syndrome  Hypertensive disease\  Recurrent major depressive disorder  Subarachnoid hemorrhage      Past Surgical History:  History reviewed. No pertinent past surgical history.      Family History:   Breast cancer - mother      Social History:   Pt working from home      Physical Exam:     GENERAL: Healthy, alert and no distress\",\"EYES: Eyes grossly normal to inspection.  No discharge or erythema, or obvious scleral/conjunctival abnormalities.\",\"RESP: No audible wheeze, cough, or visible cyanosis.  No visible retractions or increased work of breathing.  \",\"SKIN: Visible skin clear. No significant rash, abnormal pigmentation or lesions.\",\"NEURO: Cranial nerves grossly intact.  Mentation and speech appropriate for age.\",\"PSYCH: Mentation appears normal, affect normal/bright, judgement and insight intact, normal speech and appearance well-groomed.     Data:  Office Visit on 10/26/2021   Component Date Value Ref Range Status     Sodium 10/26/2021 138  136 - 145 mmol/L Final     Potassium 10/26/2021 3.6  3.5 - 5.0 mmol/L Final     Chloride 10/26/2021 99  98 - 107 mmol/L Final     Carbon Dioxide (CO2) 10/26/2021 26  22 - 31 mmol/L Final     Anion Gap 10/26/2021 13  5 - 18 mmol/L Final     Urea Nitrogen 10/26/2021 9  8 - 22 mg/dL Final     Creatinine 10/26/2021 0.81  0.60 - 1.10 mg/dL Final     Calcium 10/26/2021 9.5  8.5 - 10.5 mg/dL Final     Glucose 10/26/2021 208* 70 - 125 mg/dL Final     Alkaline Phosphatase " 10/26/2021 61  45 - 120 U/L Final     AST 10/26/2021 14  0 - 40 U/L Final     ALT 10/26/2021 14  0 - 45 U/L Final     Protein Total 10/26/2021 7.0  6.0 - 8.0 g/dL Final     Albumin 10/26/2021 4.0  3.5 - 5.0 g/dL Final     Bilirubin Total 10/26/2021 0.7  0.0 - 1.0 mg/dL Final     GFR Estimate 10/26/2021 89  >60 mL/min/1.73m2 Final    As of 2021, eGFR is calculated by the CKD-EPI creatinine equation, without race adjustment. eGFR can be influenced by muscle mass, exercise, and diet. The reported eGFR is an estimation only and is only applicable if the renal function is stable.   Lab on 09/10/2021   Component Date Value Ref Range Status     25 OH Vitamin D2 09/10/2021 <5  ug/L Final     25 OH Vitamin D3 09/10/2021 30  ug/L Final     25 OH Vit D Total 09/10/2021 <35  20 - 75 ug/L Final    Season, race, dietary intake, and treatment affect the concentration of 25-hydroxy-Vitamin D. Values may decrease during winter months and increase during summer months. Values 20-29 ug/L may indicate Vitamin D insufficiency and values <20 ug/L may indicate Vitamin D deficiency.     TSH 09/10/2021 0.44  0.30 - 5.00 uIU/mL Final     Free T4 09/10/2021 0.84  0.70 - 1.80 ng/dL Final    Performance of the Free T4 test has not been established with  specimens (<= 2 months of age).       Hemoglobin A1C 09/10/2021 7.4* 0.0 - 5.6 % Final    Normal <5.7%   Prediabetes 5.7-6.4%    Diabetes 6.5% or higher     Note: Adopted from ADA consensus guidelines.     Sodium 09/10/2021 135* 136 - 145 mmol/L Final     Potassium 09/10/2021 4.4  3.5 - 5.0 mmol/L Final     Chloride 09/10/2021 101  98 - 107 mmol/L Final     Carbon Dioxide (CO2) 09/10/2021 24  22 - 31 mmol/L Final     Anion Gap 09/10/2021 10  5 - 18 mmol/L Final     Urea Nitrogen 09/10/2021 13  8 - 22 mg/dL Final     Creatinine 09/10/2021 0.95  0.60 - 1.10 mg/dL Final     Calcium 09/10/2021 9.1  8.5 - 10.5 mg/dL Final     Glucose 09/10/2021 328* 70 - 125 mg/dL Final     GFR  "Estimate 09/10/2021 74  >60 mL/min/1.73m2 Final    As of July 11, 2021, eGFR is calculated by the CKD-EPI creatinine equation, without race adjustment. eGFR can be influenced by muscle mass, exercise, and diet. The reported eGFR is an estimation only and is only applicable if the renal function is stable.       Assessment and Plan:  #1 Type 1 diabetes mellitus   Estimated hemoglobin A1c of 7.4.  She is currently on the T slim pump.  Unfortunately, the Dexcom is not well covered by her insurance so she cannot change over to the control IQ program.   However, she is very satisfied with the Azam 2 and feels that this is affordable for her.  She finds alarms helpful.    I think we can modify her settings as noted below, with a little less correction, and a little bit more carb coverage.  Revise settings as noted below.    Basal    Correct   Carb   Target  0.6         1:60        1:12        120      12am to 6am  \"              \"              1:8        \"           6am to 9am  0.9         1:65         1:5       \"             9am to 11:30  1.1         \"                \"              \"          11:30am to 2pm  0.4         1:65            \"           \"          2pm to 5pm  \"               \"              1:5          \"         5pm to 7pm  1.4           \"              1:5          \"           7pm to 9pm  0.7          1:60            1:10        \"          9pm to midnight    #2 Subarachnoid hemorrhage  Not discussed at current visit    #3 interest in weight loss  Continue with dietary lifestyle changes.     #4  Fatigue  Encourage patient to continue exercising with her family.    Follow-up: Return visit in person in roughly 4 to 5 months-labs to be done before return visit.  Referral for ophthalmology made.    Orders Placed This Encounter   Procedures     Hemoglobin A1c     Albumin Random Urine Quantitative with Creat Ratio     Lipid Profile     TSH     Basic metabolic panel     25 Hydroxyvitamin D2 and D3     Tissue " transglutaminase payal IgA and IgG     Adult Eye Referral                     Shannon is a 46 year old who is being evaluated via a billable video visit.      How would you like to obtain your AVS? MyChart  If the video visit is dropped, the invitation should be resent by: Send to e-mail at: [email or web address]  Will anyone else be joining your video visit? No    GEOFF Adams        Again, thank you for allowing me to participate in the care of your patient.      Sincerely,    Jemma Anderson MD

## 2022-01-07 NOTE — LETTER
Date:January 7, 2022      Patient was self referred, no letter generated. Do not send.        [facility] Health Information

## 2022-01-07 NOTE — PROGRESS NOTES
Shannon is a 46 year old who is being evaluated via a billable video visit.      How would you like to obtain your AVS? MyChart  If the video visit is dropped, the invitation should be resent by: Send to e-mail at: [email or web address]  Will anyone else be joining your video visit? Tiffany Valdovinos, GEOFF

## 2022-01-24 ENCOUNTER — TELEPHONE (OUTPATIENT)
Dept: ENDOCRINOLOGY | Facility: CLINIC | Age: 47
End: 2022-01-24
Payer: COMMERCIAL

## 2022-01-24 NOTE — TELEPHONE ENCOUNTER
----- Message from Melina De Guzman RN sent at 1/22/2022 10:54 AM CST -----  Regarding: Nelda Machado,    This patient met with Dr. Anderson in January and she would like to see the patient again in May with labs.  Her next available is September.  Can you please reach out to the patient?      Check Out Comments: Pt to do labs before in person visit in May 2022, please help set pt up with ophthalmology  [CER [id number]]   Disposition: Return in about 4 months (around 5/19/2022).  [CER [id number]      Thank you,  Melina

## 2022-01-24 NOTE — TELEPHONE ENCOUNTER
LVM and sent [facility] for pt to call back to schedule follow up with Dr Anderson and labs.     Can offer 5/20 at 3:30 in person

## 2022-02-03 ENCOUNTER — OFFICE VISIT (OUTPATIENT)
Dept: OPHTHALMOLOGY | Facility: CLINIC | Age: 47
End: 2022-02-03
Attending: INTERNAL MEDICINE
Payer: COMMERCIAL

## 2022-02-03 DIAGNOSIS — H52.13 MYOPIA OF BOTH EYES: ICD-10-CM

## 2022-02-03 DIAGNOSIS — E10.9 TYPE 1 DIABETES MELLITUS WITHOUT COMPLICATION (H): ICD-10-CM

## 2022-02-03 DIAGNOSIS — E10.9 TYPE 1 DIABETES MELLITUS WITHOUT RETINOPATHY (H): Primary | ICD-10-CM

## 2022-02-03 DIAGNOSIS — H26.9 CORTICAL CATARACT OF BOTH EYES: ICD-10-CM

## 2022-02-03 PROCEDURE — 92004 COMPRE OPH EXAM NEW PT 1/>: CPT | Performed by: OPTOMETRIST

## 2022-02-03 PROCEDURE — 92015 DETERMINE REFRACTIVE STATE: CPT | Performed by: OPTOMETRIST

## 2022-02-03 ASSESSMENT — CUP TO DISC RATIO
OS_RATIO: 0.45
OD_RATIO: 0.45

## 2022-02-03 ASSESSMENT — VISUAL ACUITY
CORRECTION_TYPE: GLASSES
METHOD: SNELLEN - LINEAR
OD_CC: 20/20
OS_CC+: -2
OS_CC: 20/25

## 2022-02-03 ASSESSMENT — REFRACTION_WEARINGRX: SPECS_TYPE: PAL

## 2022-02-03 ASSESSMENT — REFRACTION_MANIFEST
OD_SPHERE: -1.00
OS_ADD: +1.50
OS_CYLINDER: SPHERE
OD_ADD: +1.50
OS_SPHERE: -0.75
OD_CYLINDER: SPHERE

## 2022-02-03 ASSESSMENT — EXTERNAL EXAM - LEFT EYE: OS_EXAM: NORMAL

## 2022-02-03 ASSESSMENT — TONOMETRY
OD_IOP_MMHG: 18
IOP_METHOD: ICARE
OS_IOP_MMHG: 16

## 2022-02-03 ASSESSMENT — EXTERNAL EXAM - RIGHT EYE: OD_EXAM: NORMAL

## 2022-02-03 ASSESSMENT — CONF VISUAL FIELD
OS_NORMAL: 1
METHOD: COUNTING FINGERS
OD_NORMAL: 1

## 2022-02-03 ASSESSMENT — SLIT LAMP EXAM - LIDS
COMMENTS: NORMAL
COMMENTS: NORMAL

## 2022-02-03 NOTE — NURSING NOTE
Chief Complaints and History of Present Illnesses   Patient presents with     Diabetic Eye Exam     Chief Complaint(s) and History of Present Illness(es)     Diabetic Eye Exam     Vision: is stable    Associated symptoms: Negative for floaters, flashes, tearing, redness and itching    Diabetes Type: Type 1 and on insulin    Blood Sugars: is controlled    Treatments tried: no treatments and glasses    Response to treatment: moderate improvement    Pain scale: 0/10              Comments     Pt here today for comprehensive diabetic eye examination.  DM1 treated with insulin. BS = controlled   OZ was here 11/20/2019   States vision is stable has to change reader when blood sugars fluctuate.  No eye health or vision concerns today.     Last A1C   .Lab Results       Component                Value               Date                       A1C                      7.4                 09/10/2021                 A1C                      7.8                 12/24/2020                 A1C                      7.2                 11/09/2018                 A1C                      7.5                 11/01/2013                 A1C                      7.5                 07/26/2013                 A1C                      7.4                 03/01/2013              Ocular meds = none    Luis M VALENTIN, KENDRICK 1:23 PM 02/03/2022

## 2022-02-03 NOTE — Clinical Note
Thank you for referring Shannon Sow for her annual eye exam.  No diabetic retinopathy noted on examination today (improved from 11/2019).  Recommended repeat evaluation in 1 year.  Please contact me with any questions.  Job Gonzalez, OD on 2/3/2022 at 2:20 PM

## 2022-02-03 NOTE — PROGRESS NOTES
History  HPI     Diabetic Eye Exam     Vision is stable.  Associated symptoms include Negative for floaters, flashes, tearing, redness and itching.  Diabetes characteristics include Type 1 and on insulin.  Blood sugar level is controlled.  Treatments tried include no treatments and glasses.  Response to treatment was moderate improvement.  Pain was noted as 0/10.              Comments     Pt here today for comprehensive diabetic eye examination.  DM1 treated with insulin. BS = controlled   OZ was here 11/20/2019   States vision is stable has to change reader when blood sugars fluctuate.  No eye health or vision concerns today.     Last A1C   .Lab Results       Component                Value               Date                       A1C                      7.4                 09/10/2021                 A1C                      7.8                 12/24/2020                 A1C                      7.2                 11/09/2018                 A1C                      7.5                 11/01/2013                 A1C                      7.5                 07/26/2013                 A1C                      7.4                 03/01/2013              Ocular meds = none    Luis M VALENTIN, KENDRICK 1:23 PM 02/03/2022             Last edited by Luis M Rivera on 2/3/2022  1:24 PM. (History)          Assessment/Plan  (E10.9) Type 1 diabetes mellitus without retinopathy (H)  (primary encounter diagnosis)  (E10.9) Type 1 diabetes mellitus without complication (H)  Comment: History of NPDR, no retinopathy on examination today  Plan:  Educated patient on clinical findings and the importance of continued management with primary care physician. Continue management as directed and return to clinic in 1 year for dilated exam, or sooner, as needed. Copy of chart sent to Dr. Anderson.    (H52.13) Myopia of both eyes  Comment: Myopia with presbyopia both eyes, removes glasses for computer use  Plan: REFRACTION          Dispensed spectacle prescription for as-needed wear. Monitor annually.    (H26.9) Cortical cataract of both eyes  Comment: Not visually significant, stable  Plan:  No treatment indicated at this time. Monitor annually.    Return to clinic in 1 year for comprehensive eye exam.    Complete documentation of historical and exam elements from today's encounter can  be found in the full encounter summary report (not reduplicated in this progress  note). I personally obtained the chief complaint(s) and history of present illness. I  confirmed and edited as necessary the review of systems, past medical/surgical  history, family history, social history, and examination findings as documented by  others; and I examined the patient myself. I personally reviewed the relevant tests,  images, and reports as documented above. I formulated and edited as necessary the  assessment and plan and discussed the findings and management plan with the  patient and family.    Job Gonzalez, JACY, FAAO

## 2022-02-18 ENCOUNTER — MYC MEDICAL ADVICE (OUTPATIENT)
Dept: ENDOCRINOLOGY | Facility: CLINIC | Age: 47
End: 2022-02-18
Payer: COMMERCIAL

## 2022-03-07 ENCOUNTER — OFFICE VISIT (OUTPATIENT)
Dept: INTERNAL MEDICINE | Facility: CLINIC | Age: 47
End: 2022-03-07
Payer: COMMERCIAL

## 2022-03-07 VITALS
WEIGHT: 160 LBS | HEIGHT: 62 IN | HEART RATE: 86 BPM | DIASTOLIC BLOOD PRESSURE: 62 MMHG | OXYGEN SATURATION: 98 % | BODY MASS INDEX: 29.44 KG/M2 | SYSTOLIC BLOOD PRESSURE: 122 MMHG

## 2022-03-07 DIAGNOSIS — Z71.84 TRAVEL ADVICE ENCOUNTER: Primary | ICD-10-CM

## 2022-03-07 PROCEDURE — 99401 PREV MED CNSL INDIV APPRX 15: CPT | Performed by: INTERNAL MEDICINE

## 2022-03-07 NOTE — LETTER
[facility]  [address] [place] [address]  [phone]          March 7, 2022    RE:  Shannon Sow                                                                                                                                                       [address] [place] [address]            To whom it may concern:    Shannon Sow is under my professional care.  She had Covid illness in December of 2021.  She has made a complete recovery.          Sincerely,          Bart Ko MD

## 2022-04-04 ENCOUNTER — MYC MEDICAL ADVICE (OUTPATIENT)
Dept: FAMILY MEDICINE | Facility: CLINIC | Age: 47
End: 2022-04-04
Payer: COMMERCIAL

## 2022-04-04 DIAGNOSIS — R10.2 LEFT ADNEXAL TENDERNESS: Primary | ICD-10-CM

## 2022-04-14 PROBLEM — Z78.9: Status: ACTIVE | Noted: 2022-04-14

## 2022-04-21 ENCOUNTER — TELEPHONE (OUTPATIENT)
Dept: FAMILY MEDICINE | Facility: CLINIC | Age: 47
End: 2022-04-21
Payer: COMMERCIAL

## 2022-04-21 NOTE — TELEPHONE ENCOUNTER
04/21 lm to call and schedule a DM lab appt only      ----- Message from Jazmyn Hook sent at 4/7/2022  9:43 AM CDT -----  Please help schedule pt for DM lab only appointment   Future labs placed

## 2022-04-30 ENCOUNTER — HEALTH MAINTENANCE LETTER (OUTPATIENT)
Age: 47
End: 2022-04-30

## 2022-05-03 ENCOUNTER — HOSPITAL ENCOUNTER (OUTPATIENT)
Dept: ULTRASOUND IMAGING | Facility: CLINIC | Age: 47
Discharge: HOME | End: 2022-05-03
Attending: FAMILY MEDICINE | Admitting: FAMILY MEDICINE
Payer: COMMERCIAL

## 2022-05-03 DIAGNOSIS — R10.2 LEFT ADNEXAL TENDERNESS: ICD-10-CM

## 2022-05-03 PROCEDURE — 76856 US EXAM PELVIC COMPLETE: CPT

## 2022-06-15 NOTE — PROGRESS NOTES
Outcome for 06/15/22 10:30 AM: [employer] message sent  GEOFF Adams  Outcome for 06/21/22 9:13 AM: Replied to [employer] message  GEOFF Adams  Outcome for 06/23/22 10:30 AM: Left Voicemail   GEOFF Adams  Outcome for 06/24/22 6:47 AM: Azam emailed to provider. [employer]andem data did not upload.   Carin Alvares, VF

## 2022-06-24 ENCOUNTER — VIRTUAL VISIT (OUTPATIENT)
Dept: ENDOCRINOLOGY | Facility: CLINIC | Age: 47
End: 2022-06-24
Payer: COMMERCIAL

## 2022-06-24 DIAGNOSIS — N92.6 IRREGULAR MENSES: Primary | ICD-10-CM

## 2022-06-24 DIAGNOSIS — E10.9 TYPE 1 DIABETES MELLITUS WITHOUT COMPLICATION (H): ICD-10-CM

## 2022-06-24 DIAGNOSIS — F41.9 ANXIETY: ICD-10-CM

## 2022-06-24 PROCEDURE — 99215 OFFICE O/P EST HI 40 MIN: CPT | Mod: 95 | Performed by: INTERNAL MEDICINE

## 2022-06-24 RX ORDER — INSULIN GLARGINE 100 [IU]/ML
INJECTION, SOLUTION SUBCUTANEOUS
Qty: 30 ML | Refills: 1 | OUTPATIENT
Start: 2022-06-24 | End: 2022-06-24

## 2022-06-24 RX ORDER — INSULIN GLARGINE 100 [IU]/ML
INJECTION, SOLUTION SUBCUTANEOUS
Qty: 30 ML | Refills: 1 | Status: SHIPPED | OUTPATIENT
Start: 2022-06-24 | End: 2022-06-28

## 2022-06-24 RX ORDER — FLUOXETINE 40 MG/1
40 CAPSULE ORAL DAILY
Qty: 90 CAPSULE | Refills: 3 | Status: SHIPPED | OUTPATIENT
Start: 2022-06-24 | End: 2023-02-03

## 2022-06-24 RX ORDER — INSULIN ASPART 100 [IU]/ML
INJECTION, SOLUTION INTRAVENOUS; SUBCUTANEOUS
Qty: 60 ML | Refills: 3 | Status: SHIPPED | OUTPATIENT
Start: 2022-06-24 | End: 2022-12-13

## 2022-06-24 NOTE — NURSING NOTE
Patient will take zyrtec for seasonal allergies.. Pharmacy updated in chart.    Sharda MESSINA, Virtual Visit Facilitator 12:06 PM June 24, 2022

## 2022-06-24 NOTE — PROGRESS NOTES
Shannon is a 46 year old who is being evaluated via a billable video visit.      How would you like to obtain your AVS? MyChart  If the video visit is dropped, the invitation should be resent by: Send to e-mail at: [email or web address]  Will anyone else be joining your video visit? No      Patient reports that she has not had time to upload pump information as she is busy with work. She did however have her flores information uploaded.

## 2022-06-24 NOTE — LETTER
Date:June 26, 2022      Patient was self referred, no letter generated. Do not send.        [facility] Health Information

## 2022-06-24 NOTE — LETTER
6/24/2022       RE: Shannon Sow  [address]  [place] [address]     Dear Colleague,    Thank you for referring your patient, Shannon Sow, to the [facility] ENDOCRINOLOGY CLINIC [place] [facility]. Please see a copy of my visit note below.    Outcome for 06/15/22 10:30 AM: [employer] message sent  GEOFF Adams  Outcome for 06/21/22 9:13 AM: Replied to [facility] message  GEOFF Adams  Outcome for 06/23/22 10:30 AM: Left Voicemail   GEOFF Adams  Outcome for 06/24/22 6:47 AM: Azam emailed to provider. Tandem data did not upload.   Carin Alvares, GEOFF              [facility]  Endocrinology  Jemma Anderson MD  6/24/22    Chief Complaint:   Diabetes    History of Present Illness:   Shannon Sow is a 46 year old female with a history of diabetes who presents for follow up of diabetes.    Is a virtual visit conducted by Sandra: Start time 1230, stop time 1:00, chart review, documentation time, coordination of care, 10 minutes.  Total time spent day of encounter 40 minutes.    #1 Type 1 diabetes mellitus  The patient was diagnosed with diabetes in 1992.  She was on Lantus and Humalog from approximately 2000 - May 2009 when she started on an insulin pump.  As of November 2013, she has been on a newer Medtronic pump with low glucose suspend.  She started using the Freestyle Azam CGMS system in February 2018.  Her A1c level in the past 5 years have generally been in the high 6% - mid 7% range.  March 2019 hemoglobin A1c was 7%. July 2019 A1c was 7.2%. November 2019 A1c 7.4%.   February 2020 A1c 7.0% .  December 2020 hemoglobin A1c of 7.8.  April 2021- pt was offered metformin and she declined September 2021  hemoglobin A1c of 7.4.     Interval history:. Although the patient has the T slim pump, the main barrier for her converting to control IQ is because of the Dexcom sensors, and extremely expensive  at roughly $500 per month.  That being said, she is on the azam 2 (uses the phone macrina) and has been very satisfied with this program.  Reviewing her azam 2, her average glucose was 184, estimated hemoglobin A1c at 7.7, 52% within range, 46% above range.  She does have some hypoglycemia primarily during the days, as well as hyperglycemia associated with meals.    Pump settings:  Basal rate:  Midnight to 6 AM: 0.6 units/h  6 AM to 9 AM: 0.6 units/h  9 AM to noon: 0.9 units/h  Noon to 2 PM: 1.1 units/h  2 PM to 7 PM: 0.4 units/h  7 PM to 9 PM: 1.4 units/h  9 PM to midnight: 0.7 units/h    Bolus:  Midnight to 6 AM: 1 per 12 g carbohydrate  6 AM to 9 am 1 unit per 8 g carbohydrate  9 AM to 9 PM: 1 unit per 5 g carbohydrate  9 PM to midnight: 1 per 10 g carbohydrate    Blood Glucose Monitoring:  Azam 2 results per above    Diabetes monitoring and complications:  CAD: December 2020 LDL was 94  Last eye exam results: 11/20/2019, mild nonproliferative retinopathy-patient is overdue.  Repeat eye exam in February 2022 no identify any retinopathy.  Microalbuminuria: Negative in December 2020  Neuropathy: No  HTN: No  On Statin: No  On Aspirin: No  Depression: Yes    #2 Subarachnoid hemorrhage  She was hospitalized at [facility] 8/2 - 8/9/18 for a subarachnoid hemorrhage after presenting with a sudden onset headache, nausea, and vomiting.  She had no complications. She saw Dr. Jacob in Neurology on 11/9/2018 who discussed with her that she is at low risk for any recurrent issues (2 - 5 % of recurrence).  She has no history of hypertension. She followed with Dr. Regan in Neurology on 11/20/2018 who concluded that her hemorrhage was likely non-aneurysmal in origin and found no other signs of ongoing symptoms.     Interval history: Reports that she is doing well without headache and good control of her blood pressure.     #3 interest in weight loss  At previous visit the patient reported changing her diet and  increasing her exercise.      Interval history: Weight stable.      #4 fatigue  This is her most pressing issue.  She reports significant fatigue.  She reports fatigue even when she wakes up in the morning.  She reports that she would wake up for an hour at night and then go back to sleep.  She does report significant stress in her life.  She is working with a therapist.  She feels that her Prozac dose is adequate.Extensive evaluation in December 2020 was unremarkable.    Interval history: Reports that this is situational for her.    #5 irregular menses  The patient reports some irregular menses.  She reports occasionally skipping her menses.  She is uncertain whether she is having hot flashes.  She reports that she is not currently pregnant (has checked twice).  She is uncertain when her family members may have gone through menopause    Review of Systems:   Pertinent items are noted in HPI.  All other systems are negative.    Active Medications:   Current Outpatient Medications   Medication Sig Dispense Refill     blood glucose test strip Test up to 6 times daily 6 Box 4     cholecalciferol (VITAMIN D) 1000 UNIT tablet Take 1 tablet (1,000 Units) by mouth daily 90 tablet 3     Continuous Blood Gluc Sensor (FREESTYLE RADHA 2 SENSOR) MISC 1 applicator daily as needed (as needed) 6 each 3     FLUoxetine (PROZAC) 40 MG capsule Take 1 capsule (40 mg) by mouth daily 90 capsule 3     insulin aspart (NOVOLOG VIAL) 100 UNITS/ML vial USE UP TO 80 UNITS DAILY IN PUMP 80 mL 3     insulin glargine (LANTUS SOLOSTAR) 100 UNIT/ML pen Take 20 units if pump fails. 30 mL 1     insulin  UNIT/ML injection Pt to take 10 units NPH at prednisone 40 mg daily 3 mL 3     insulin pen needle (B-D U/F) 31G X 5 MM Use if pump fails 4-5 times daily 100 each 1     ipratropium (ATROVENT) 0.03 % nasal spray Spray 2 sprays into both nostrils every 12 hours 30 mL 3     Multiple Vitamins-Minerals (MULTIVITAMIN OR) Take by mouth daily         "STATIN NOT PRESCRIBED (INTENTIONAL) Please choose reason not prescribed, below          Allergies:   Nkda [no known drug allergies]   Seasonal allergies      Past Medical History:  Type I diabetes mellitus   Vitamin deficiency  Upper respiratory infection   Cerebral salt-wasting syndrome  Hypertensive disease\  Recurrent major depressive disorder  Subarachnoid hemorrhage      Past Surgical History:  History reviewed. No pertinent past surgical history.      Family History:   Breast cancer - mother      Social History:   Pt working from home      Physical Exam:     GENERAL: Healthy, alert and no distress\",\"EYES: Eyes grossly normal to inspection.  No discharge or erythema, or obvious scleral/conjunctival abnormalities.\",\"RESP: No audible wheeze, cough, or visible cyanosis.  No visible retractions or increased work of breathing.  \",\"SKIN: Visible skin clear. No significant rash, abnormal pigmentation or lesions.\",\"NEURO: Cranial nerves grossly intact.  Mentation and speech appropriate for age.\",\"PSYCH: Mentation appears normal, affect normal/bright, judgement and insight intact, normal speech and appearance well-groomed.     Data:  No visits with results within 6 Month(s) from this visit.   Latest known visit with results is:   Office Visit on 10/26/2021   Component Date Value Ref Range Status     Sodium 10/26/2021 138  136 - 145 mmol/L Final     Potassium 10/26/2021 3.6  3.5 - 5.0 mmol/L Final     Chloride 10/26/2021 99  98 - 107 mmol/L Final     Carbon Dioxide (CO2) 10/26/2021 26  22 - 31 mmol/L Final     Anion Gap 10/26/2021 13  5 - 18 mmol/L Final     Urea Nitrogen 10/26/2021 9  8 - 22 mg/dL Final     Creatinine 10/26/2021 0.81  0.60 - 1.10 mg/dL Final     Calcium 10/26/2021 9.5  8.5 - 10.5 mg/dL Final     Glucose 10/26/2021 208 (A) 70 - 125 mg/dL Final     Alkaline Phosphatase 10/26/2021 61  45 - 120 U/L Final     AST 10/26/2021 14  0 - 40 U/L Final     ALT 10/26/2021 14  0 - 45 U/L Final     Protein Total " 10/26/2021 7.0  6.0 - 8.0 g/dL Final     Albumin 10/26/2021 4.0  3.5 - 5.0 g/dL Final     Bilirubin Total 10/26/2021 0.7  0.0 - 1.0 mg/dL Final     GFR Estimate 10/26/2021 89  >60 mL/min/1.73m2 Final    As of July 11, 2021, eGFR is calculated by the CKD-EPI creatinine equation, without race adjustment. eGFR can be influenced by muscle mass, exercise, and diet. The reported eGFR is an estimation only and is only applicable if the renal function is stable.     Assessment and Plan:  #1 Type 1 diabetes mellitus   Patient is overdue on her labs-we will have patient draw labs as noted below.  We will adjust as noted below:    Pump settings:  Basal rate:  Midnight to 6 AM: 0.7 units/h  6 AM to 9 AM: 0.6 units/h  9 AM to noon: 0.9 units/h  Noon to 2 PM: 0.8 units/h  2 PM to 7 PM: 0.4 units/h  7 PM to 9 PM: 1.3 units/h  9 PM to midnight: 0.9 units/h    Bolus:  Midnight to 6 AM: 1 per 12 g carbohydrate  6 AM to 9 am 1 unit per 5 g carbohydrate  9 am to  9 PM: 1 unit per 4.5 g carbohydrate  9 PM to midnight: 1 per 10 g carbohydrate    We will see if there may be any options for Dexcom patient assistance for patient.    #2 Subarachnoid hemorrhage  Doing well     #3 interest in weight loss  Continue with increased activity.  Recommended that patient reduce basal rate by 50% for 1 hour after exercising (using temp basal) to reduce hypoglycemia post exercise.    #4 fatigue  This is situational.    #5 irregular menses  The patient reports some irregular menses.  We will have patient check labs as noted below.    Orders Placed This Encounter   Procedures     25 Hydroxyvitamin D2 and D3     TSH     Lipid Profile     Albumin Random Urine Quantitative with Creat Ratio     Basic metabolic panel     Hemoglobin A1c     Follicle stimulating hormone     Luteinizing Hormone, Adult     Estradiol     Prolactin     DHEA sulfate     Ferritin     Return visit planned in 3 to 4 months.  Patient to do labs in the next 1 to 2 months.      Shannon is  a 46 year old who is being evaluated via a billable video visit.      How would you like to obtain your AVS? MyChart  If the video visit is dropped, the invitation should be resent by: Send to e-mail at: [email or web address]  Will anyone else be joining your video visit? No      Patient reports that she has not had time to upload pump information as she is busy with work. She did however have her flores information uploaded.      Again, thank you for allowing me to participate in the care of your patient.      Sincerely,    Jemma Anderson MD

## 2022-06-24 NOTE — PROGRESS NOTES
[facility]  Endocrinology  Jemma Anderson MD  6/24/22    Chief Complaint:   Diabetes    History of Present Illness:   Shannon Sow is a 46 year old female with a history of diabetes who presents for follow up of diabetes.    Is a virtual visit conducted by Sandra: Start time 1230, stop time 1:00, chart review, documentation time, coordination of care, 10 minutes.  Total time spent day of encounter 40 minutes.    #1 Type 1 diabetes mellitus  The patient was diagnosed with diabetes in 1992.  She was on Lantus and Humalog from approximately 2000 - May 2009 when she started on an insulin pump.  As of November 2013, she has been on a newer Medtronic pump with low glucose suspend.  She started using the Freestyle Azam CGMS system in February 2018.  Her A1c level in the past 5 years have generally been in the high 6% - mid 7% range.  March 2019 hemoglobin A1c was 7%. July 2019 A1c was 7.2%. November 2019 A1c 7.4%.   February 2020 A1c 7.0% .  December 2020 hemoglobin A1c of 7.8.  April 2021- pt was offered metformin and she declined September 2021  hemoglobin A1c of 7.4.     Interval history:. Although the patient has the T slim pump, the main barrier for her converting to control IQ is because of the Dexcom sensors, and extremely expensive at roughly $500 per month.  That being said, she is on the azam 2 (uses the phone macrina) and has been very satisfied with this program.  Reviewing her azam 2, her average glucose was 184, estimated hemoglobin A1c at 7.7, 52% within range, 46% above range.  She does have some hypoglycemia primarily during the days, as well as hyperglycemia associated with meals.    Pump settings:  Basal rate:  Midnight to 6 AM: 0.6 units/h  6 AM to 9 AM: 0.6 units/h  9 AM to noon: 0.9 units/h  Noon to 2 PM: 1.1 units/h  2 PM to 7 PM: 0.4 units/h  7 PM to 9 PM: 1.4 units/h  9 PM to midnight: 0.7 units/h    Bolus:  Midnight to 6 AM: 1 per 12 g carbohydrate  6 AM to 9 am 1 unit per 8 g  carbohydrate  9 AM to 9 PM: 1 unit per 5 g carbohydrate  9 PM to midnight: 1 per 10 g carbohydrate    Blood Glucose Monitoring:  Azam 2 results per above    Diabetes monitoring and complications:  CAD: December 2020 LDL was 94  Last eye exam results: 11/20/2019, mild nonproliferative retinopathy-patient is overdue.  Repeat eye exam in February 2022 no identify any retinopathy.  Microalbuminuria: Negative in December 2020  Neuropathy: No  HTN: No  On Statin: No  On Aspirin: No  Depression: Yes    #2 Subarachnoid hemorrhage  She was hospitalized at [facility] 8/2 - 8/9/18 for a subarachnoid hemorrhage after presenting with a sudden onset headache, nausea, and vomiting.  She had no complications. She saw Dr. Jacob in Neurology on 11/9/2018 who discussed with her that she is at low risk for any recurrent issues (2 - 5 % of recurrence).  She has no history of hypertension. She followed with Dr. Regan in Neurology on 11/20/2018 who concluded that her hemorrhage was likely non-aneurysmal in origin and found no other signs of ongoing symptoms.     Interval history: Reports that she is doing well without headache and good control of her blood pressure.     #3 interest in weight loss  At previous visit the patient reported changing her diet and increasing her exercise.      Interval history: Weight stable.      #4 fatigue  This is her most pressing issue.  She reports significant fatigue.  She reports fatigue even when she wakes up in the morning.  She reports that she would wake up for an hour at night and then go back to sleep.  She does report significant stress in her life.  She is working with a therapist.  She feels that her Prozac dose is adequate.Extensive evaluation in December 2020 was unremarkable.    Interval history: Reports that this is situational for her.    #5 irregular menses  The patient reports some irregular menses.  She reports occasionally skipping her menses.  She is uncertain whether she  "is having hot flashes.  She reports that she is not currently pregnant (has checked twice).  She is uncertain when her family members may have gone through menopause    Review of Systems:   Pertinent items are noted in HPI.  All other systems are negative.    Active Medications:   Current Outpatient Medications   Medication Sig Dispense Refill     blood glucose test strip Test up to 6 times daily 6 Box 4     cholecalciferol (VITAMIN D) 1000 UNIT tablet Take 1 tablet (1,000 Units) by mouth daily 90 tablet 3     Continuous Blood Gluc Sensor (FREESTYLE RADHA 2 SENSOR) MISC 1 applicator daily as needed (as needed) 6 each 3     FLUoxetine (PROZAC) 40 MG capsule Take 1 capsule (40 mg) by mouth daily 90 capsule 3     insulin aspart (NOVOLOG VIAL) 100 UNITS/ML vial USE UP TO 80 UNITS DAILY IN PUMP 80 mL 3     insulin glargine (LANTUS SOLOSTAR) 100 UNIT/ML pen Take 20 units if pump fails. 30 mL 1     insulin  UNIT/ML injection Pt to take 10 units NPH at prednisone 40 mg daily 3 mL 3     insulin pen needle (B-D U/F) 31G X 5 MM Use if pump fails 4-5 times daily 100 each 1     ipratropium (ATROVENT) 0.03 % nasal spray Spray 2 sprays into both nostrils every 12 hours 30 mL 3     Multiple Vitamins-Minerals (MULTIVITAMIN OR) Take by mouth daily        STATIN NOT PRESCRIBED (INTENTIONAL) Please choose reason not prescribed, below          Allergies:   Nkda [no known drug allergies]   Seasonal allergies      Past Medical History:  Type I diabetes mellitus   Vitamin deficiency  Upper respiratory infection   Cerebral salt-wasting syndrome  Hypertensive disease\  Recurrent major depressive disorder  Subarachnoid hemorrhage      Past Surgical History:  History reviewed. No pertinent past surgical history.      Family History:   Breast cancer - mother      Social History:   Pt working from home      Physical Exam:     GENERAL: Healthy, alert and no distress\",\"EYES: Eyes grossly normal to inspection.  No discharge or erythema, or " "obvious scleral/conjunctival abnormalities.\",\"RESP: No audible wheeze, cough, or visible cyanosis.  No visible retractions or increased work of breathing.  \",\"SKIN: Visible skin clear. No significant rash, abnormal pigmentation or lesions.\",\"NEURO: Cranial nerves grossly intact.  Mentation and speech appropriate for age.\",\"PSYCH: Mentation appears normal, affect normal/bright, judgement and insight intact, normal speech and appearance well-groomed.     Data:  No visits with results within 6 Month(s) from this visit.   Latest known visit with results is:   Office Visit on 10/26/2021   Component Date Value Ref Range Status     Sodium 10/26/2021 138  136 - 145 mmol/L Final     Potassium 10/26/2021 3.6  3.5 - 5.0 mmol/L Final     Chloride 10/26/2021 99  98 - 107 mmol/L Final     Carbon Dioxide (CO2) 10/26/2021 26  22 - 31 mmol/L Final     Anion Gap 10/26/2021 13  5 - 18 mmol/L Final     Urea Nitrogen 10/26/2021 9  8 - 22 mg/dL Final     Creatinine 10/26/2021 0.81  0.60 - 1.10 mg/dL Final     Calcium 10/26/2021 9.5  8.5 - 10.5 mg/dL Final     Glucose 10/26/2021 208 (A) 70 - 125 mg/dL Final     Alkaline Phosphatase 10/26/2021 61  45 - 120 U/L Final     AST 10/26/2021 14  0 - 40 U/L Final     ALT 10/26/2021 14  0 - 45 U/L Final     Protein Total 10/26/2021 7.0  6.0 - 8.0 g/dL Final     Albumin 10/26/2021 4.0  3.5 - 5.0 g/dL Final     Bilirubin Total 10/26/2021 0.7  0.0 - 1.0 mg/dL Final     GFR Estimate 10/26/2021 89  >60 mL/min/1.73m2 Final    As of July 11, 2021, eGFR is calculated by the CKD-EPI creatinine equation, without race adjustment. eGFR can be influenced by muscle mass, exercise, and diet. The reported eGFR is an estimation only and is only applicable if the renal function is stable.     Assessment and Plan:  #1 Type 1 diabetes mellitus   Patient is overdue on her labs-we will have patient draw labs as noted below.  We will adjust as noted below:    Pump settings:  Basal rate:  Midnight to 6 AM: 0.7 units/h  6 " AM to 9 AM: 0.6 units/h  9 AM to noon: 0.9 units/h  Noon to 2 PM: 0.8 units/h  2 PM to 7 PM: 0.4 units/h  7 PM to 9 PM: 1.3 units/h  9 PM to midnight: 0.9 units/h    Bolus:  Midnight to 6 AM: 1 per 12 g carbohydrate  6 AM to 9 am 1 unit per 5 g carbohydrate  9 am to  9 PM: 1 unit per 4.5 g carbohydrate  9 PM to midnight: 1 per 10 g carbohydrate    We will see if there may be any options for Dexcom patient assistance for patient.    #2 Subarachnoid hemorrhage  Doing well     #3 interest in weight loss  Continue with increased activity.  Recommended that patient reduce basal rate by 50% for 1 hour after exercising (using temp basal) to reduce hypoglycemia post exercise.    #4 fatigue  This is situational.    #5 irregular menses  The patient reports some irregular menses.  We will have patient check labs as noted below.    Orders Placed This Encounter   Procedures     25 Hydroxyvitamin D2 and D3     TSH     Lipid Profile     Albumin Random Urine Quantitative with Creat Ratio     Basic metabolic panel     Hemoglobin A1c     Follicle stimulating hormone     Luteinizing Hormone, Adult     Estradiol     Prolactin     DHEA sulfate     Ferritin     Return visit planned in 3 to 4 months.  Patient to do labs in the next 1 to 2 months.

## 2022-06-27 ENCOUNTER — TELEPHONE (OUTPATIENT)
Dept: ENDOCRINOLOGY | Facility: CLINIC | Age: 47
End: 2022-06-27

## 2022-06-27 NOTE — TELEPHONE ENCOUNTER
----- Message from Amirah Soliman RN sent at 6/24/2022  1:05 PM CDT -----  Unfortunately Dexcom doesn't offer any sort of special deal on their product.  I think we have quoted prices and directed Shannon in the past to cash prices, etc.  Have you prescribed the Azam 2 for her?  That would be decidedly cheaper if she has to pay out of pocket.    ----- Message -----  From: Jemma Anderson MD  Sent: 6/24/2022  12:54 PM CDT  To: [facility] Certified Diabetes Educators Adult [facility]    Any options on cheaper or lowert cost dexcom for pt?

## 2022-06-28 ENCOUNTER — TELEPHONE (OUTPATIENT)
Dept: ENDOCRINOLOGY | Facility: CLINIC | Age: 47
End: 2022-06-28

## 2022-06-28 ENCOUNTER — MYC MEDICAL ADVICE (OUTPATIENT)
Dept: ENDOCRINOLOGY | Facility: CLINIC | Age: 47
End: 2022-06-28

## 2022-06-28 DIAGNOSIS — E10.9 TYPE 1 DIABETES MELLITUS WITHOUT COMPLICATION (H): ICD-10-CM

## 2022-06-28 RX ORDER — INSULIN GLARGINE 100 [IU]/ML
INJECTION, SOLUTION SUBCUTANEOUS
Qty: 30 ML | Refills: 1
Start: 2022-06-28 | End: 2023-02-03

## 2022-06-28 NOTE — TELEPHONE ENCOUNTER
----- Message from Eileen Garcia RN sent at 6/27/2022 11:55 AM CDT -----  The paper script needs your signature we can't send one without recreating it and emailing to you for sig which I think I did for her already . Can't remember what I did Friday it was so busy    ----- Message -----  From: Jemma Anderson MD  Sent: 6/27/2022  11:53 AM CDT  To: [facility] Triage-    She wanted the paper script mailed to her  ----- Message -----  From: Lolita Brandt  Sent: 6/24/2022   1:18 PM CDT  To: MD Nolan Jacobo,    I was wondering if you can resend the lantus? I accidentally locally printed it. Other wise I can fax it to the pharmacy.    Thank you,  Eryn Brandt, [facility]  Diabetes Weight Management Clinic Liaison     [facility]    [email or web address]  Phone: [phone]  Fax: [phone]

## 2022-07-08 ENCOUNTER — TELEPHONE (OUTPATIENT)
Dept: ENDOCRINOLOGY | Facility: CLINIC | Age: 47
End: 2022-07-08

## 2022-07-14 ENCOUNTER — TELEPHONE (OUTPATIENT)
Dept: ENDOCRINOLOGY | Facility: CLINIC | Age: 47
End: 2022-07-14

## 2022-07-14 NOTE — TELEPHONE ENCOUNTER
Attempted to reach patient to schedule follow up in the Endocrinology Clinic. No answer, LM on VM to call office back.    Schedule with Jemma Anderson MD.    Kelsey Grimes,   Virtual Visit Facilitator

## 2022-07-15 NOTE — TELEPHONE ENCOUNTER
Patient called back to schedule in September per  Left.  Scheduled for 12/2/2022 for Video call with Dr Anderson. Have placed Patient on waitlist. Call patient back if appointment is needed earlier. Please Advise. Thank you.

## 2022-07-18 ENCOUNTER — LAB (OUTPATIENT)
Dept: LAB | Facility: CLINIC | Age: 47
End: 2022-07-18
Payer: COMMERCIAL

## 2022-07-18 DIAGNOSIS — N92.6 IRREGULAR MENSES: ICD-10-CM

## 2022-07-18 DIAGNOSIS — E10.9 TYPE 1 DIABETES MELLITUS WITHOUT COMPLICATION (H): ICD-10-CM

## 2022-07-18 LAB
ANION GAP SERPL CALCULATED.3IONS-SCNC: 9 MMOL/L (ref 7–15)
BUN SERPL-MCNC: 11.9 MG/DL (ref 6–20)
CALCIUM SERPL-MCNC: 8.9 MG/DL (ref 8.6–10)
CHLORIDE SERPL-SCNC: 103 MMOL/L (ref 98–107)
CHOLEST SERPL-MCNC: 189 MG/DL
CREAT SERPL-MCNC: 0.74 MG/DL (ref 0.51–0.95)
DEPRECATED HCO3 PLAS-SCNC: 27 MMOL/L (ref 22–29)
ESTRADIOL SERPL-MCNC: 113 PG/ML
FERRITIN SERPL-MCNC: 68 NG/ML (ref 6–175)
FSH SERPL IRP2-ACNC: 24.2 MIU/ML
GFR SERPL CREATININE-BSD FRML MDRD: >90 ML/MIN/1.73M2
GLUCOSE SERPL-MCNC: 287 MG/DL (ref 70–99)
HBA1C MFR BLD: 7 % (ref 0–5.6)
HDLC SERPL-MCNC: 93 MG/DL
LDLC SERPL CALC-MCNC: 89 MG/DL
LH SERPL-ACNC: 20.4 MIU/ML
NONHDLC SERPL-MCNC: 96 MG/DL
POTASSIUM SERPL-SCNC: 4.1 MMOL/L (ref 3.4–5.3)
PROLACTIN SERPL 3RD IS-MCNC: 21 NG/ML (ref 5–23)
SODIUM SERPL-SCNC: 139 MMOL/L (ref 136–145)
TRIGL SERPL-MCNC: 35 MG/DL
TSH SERPL DL<=0.005 MIU/L-ACNC: 1.12 UIU/ML (ref 0.3–4.2)

## 2022-07-18 PROCEDURE — 84443 ASSAY THYROID STIM HORMONE: CPT

## 2022-07-18 PROCEDURE — 80061 LIPID PANEL: CPT

## 2022-07-18 PROCEDURE — 83001 ASSAY OF GONADOTROPIN (FSH): CPT

## 2022-07-18 PROCEDURE — 82627 DEHYDROEPIANDROSTERONE: CPT

## 2022-07-18 PROCEDURE — 82043 UR ALBUMIN QUANTITATIVE: CPT

## 2022-07-18 PROCEDURE — 36415 COLL VENOUS BLD VENIPUNCTURE: CPT

## 2022-07-18 PROCEDURE — 82670 ASSAY OF TOTAL ESTRADIOL: CPT

## 2022-07-18 PROCEDURE — 80048 BASIC METABOLIC PNL TOTAL CA: CPT

## 2022-07-18 PROCEDURE — 82306 VITAMIN D 25 HYDROXY: CPT

## 2022-07-18 PROCEDURE — 83036 HEMOGLOBIN GLYCOSYLATED A1C: CPT

## 2022-07-18 PROCEDURE — 84146 ASSAY OF PROLACTIN: CPT

## 2022-07-18 PROCEDURE — 82728 ASSAY OF FERRITIN: CPT

## 2022-07-18 PROCEDURE — 83002 ASSAY OF GONADOTROPIN (LH): CPT

## 2022-07-18 NOTE — LETTER
Patient:  Shannon Sow  :   1975  MRN:     [id number]        Ms.Shannon Sow  [address] [place] [address]        2022    Dear Shannon    Here are your lab results.  Overall, it looks really good with your hemoglobin A1c at 7.0.  Your thyroid, cholesterol, urine for protein levels were all normal.  I am not sure why your menses are irregular-you do not seem to be in menopause yet.  You do make plenty of estrogen.  Lets just keep an eye on this, and we can revisit it if you are having more irregular menses.    If you have any questions, please feel free to contact my nurse at [phone] select option #3 for triage nurse  or  option #1 for scheduling related questions.    Regards    Jemma Anderson MD     Resulted Orders   25 Hydroxyvitamin D2 and D3   Result Value Ref Range    25 OH Vitamin D2 <5 ug/L    25 OH Vitamin D3 29 ug/L    25 OH Vit D Total <34 20 - 75 ug/L      Comment:      Season, race, dietary intake, and treatment affect the concentration of 25-hydroxy-Vitamin D. Values may decrease during winter months and increase during summer months. Values 20-29 ug/L may indicate Vitamin D insufficiency and values <20 ug/L may indicate Vitamin D deficiency.    Narrative    This test was developed and its performance characteristics determined by the [facility],  Special Chemistry Laboratory. It has not been cleared or approved by the FDA. The laboratory is regulated under CLIA as qualified to perform high-complexity testing. This test is used for clinical purposes. It should not be regarded as investigational or for research.   TSH   Result Value Ref Range    TSH 1.12 0.30 - 4.20 uIU/mL   Lipid Profile   Result Value Ref Range    Cholesterol 189 <200 mg/dL    Triglycerides 35 <150 mg/dL    Direct Measure HDL 93 >=50 mg/dL    LDL Cholesterol Calculated 89 <=100 mg/dL    Non HDL Cholesterol 96 <130 mg/dL    Narrative     Cholesterol  Desirable:  <200 mg/dL    Triglycerides  Normal:  Less than 150 mg/dL  Borderline High:  150-199 mg/dL  High:  200-499 mg/dL  Very High:  Greater than or equal to 500 mg/dL    Direct Measure HDL  Female:  Greater than or equal to 50 mg/dL   Male:  Greater than or equal to 40 mg/dL    LDL Cholesterol  Desirable:  <100mg/dL  Above Desirable:  100-129 mg/dL   Borderline High:  130-159 mg/dL   High:  160-189 mg/dL   Very High:  >= 190 mg/dL    Non HDL Cholesterol  Desirable:  130 mg/dL  Above Desirable:  130-159 mg/dL  Borderline High:  160-189 mg/dL  High:  190-219 mg/dL  Very High:  Greater than or equal to 220 mg/dL   Albumin Random Urine Quantitative with Creat Ratio   Result Value Ref Range    Albumin Urine mg/L <12.0 mg/L      Comment:      The reference ranges have not been established in urine albumin. The results should be integrated into the clinical context for interpretation.    Albumin Urine mg/g Cr        Comment:      Unable to calculate, urine albumin and/or urine creatinine is outside detectable limits.  Microalbuminuria is defined as an albumin:creatinine ratio of 17 to 299 for males and 25 to 299 for females. A ratio of albumin:creatinine of 300 or higher is indicative of overt proteinuria.  Due to biologic variability, positive results should be confirmed by a second, first-morning random or 24-hour timed urine specimen. If there is discrepancy, a third specimen is recommended. When 2 out of 3 results are in the microalbuminuria range, this is evidence for incipient nephropathy and warrants increased efforts at glucose control, blood pressure control, and institution of therapy with an angiotensin-converting-enzyme (ACE) inhibitor (if the patient can tolerate it).      Creatinine Urine mg/dL 36.8 mg/dL      Comment:      The reference ranges have not been established in urine creatinine. The results should be integrated into the clinical context for interpretation.   Basic metabolic  panel   Result Value Ref Range    Creatinine 0.74 0.51 - 0.95 mg/dL    Sodium 139 136 - 145 mmol/L    Potassium 4.1 3.4 - 5.3 mmol/L    Urea Nitrogen 11.9 6.0 - 20.0 mg/dL    Chloride 103 98 - 107 mmol/L    Carbon Dioxide (CO2) 27 22 - 29 mmol/L    Anion Gap 9 7 - 15 mmol/L    Glucose 287 (H) 70 - 99 mg/dL    GFR Estimate >90 >60 mL/min/1.73m2      Comment:      Effective December 21, 2021 eGFRcr in adults is calculated using the 2021 CKD-EPI creatinine equation which includes age and gender (Mer et al., NEJ, DOI: 10.1056/FWLCxi2240674)    Calcium 8.9 8.6 - 10.0 mg/dL   Hemoglobin A1c   Result Value Ref Range    Hemoglobin A1C 7.0 (H) 0.0 - 5.6 %      Comment:      Normal <5.7%   Prediabetes 5.7-6.4%    Diabetes 6.5% or higher     Note: Adopted from ADA consensus guidelines.   Follicle stimulating hormone   Result Value Ref Range    FSH 24.2 mIU/mL      Comment:      19 years and older:   Follicular phase: 3.5-12.5 mIU/mL   Ovulation phase: 4.7-21.5 mIU/mL   Luteal phase: 1.7-7.7 mIU/mL   Postmenopause: 25.8-134.8 mIU/mL      Luteinizing Hormone, Adult   Result Value Ref Range    Lutropin 20.4 mIU/mL      Comment:      FEMALE:  Age  0 - 6 mo:  <0.1-8.2 mIU/mL  6 mo - 11 years: <0.1-1.3 mIU/mL   11 - 14 years: <0.1-10 mIU/mL   14 - 19 years: 0.4-25 mIU/mL     19 years and older:   Follicular Phase: 2.4-12.6 mIU/mL  Ovulation Phase: 14.0-95.6 mIU/mL  Luteal Phase: 1.0-11.4  mIU/mL  Postmenopausal: 7.7-58.5 mIU/mL     Estradiol   Result Value Ref Range    Estradiol 113 pg/mL      Comment:      Healthy Men:   11.3-43.2 pg/mL    Healthy Postmenopausal Women:  Postmenopause: <5-138 pg/mL    Healthy Pregnant Women:  1st trimester: 154-3243 pg/mL  2nd trimester: 1561-82966 pg/mL  3rd trimester: 8525->14933 pg/mL    Healthy Women Cycle Phase:  Follicular: 30.9-90.4 pg/mL  Ovulation: 60.4-533 pg/mL  Luteal: 60.4-232 pg/mL    Healthy Women Cycle Sub-Phase:  Early Follicular: 20.5-62.8 pg/mL  Intermediate Follicular: 26-79.8  pg/mL  Late Follicular: 49.5-233 pg/mL  Ovulation: 60.4-602 pg/mL  Early Luteal: 51.1-179 pg/mL  Intermediate Luteal: 66.5-305 pg/mL  Late Luteal: 30.2-222 pg/mL   Prolactin   Result Value Ref Range    Prolactin 21 5 - 23 ng/mL   DHEA sulfate   Result Value Ref Range    DHEA Sulfate 409 35 - 430 ug/dL   Ferritin   Result Value Ref Range    Ferritin 68 6 - 175 ng/mL       [facility]

## 2022-07-19 LAB
CREAT UR-MCNC: 36.8 MG/DL
DHEA-S SERPL-MCNC: 409 UG/DL (ref 35–430)
MICROALBUMIN UR-MCNC: <12 MG/L
MICROALBUMIN/CREAT UR: NORMAL MG/G{CREAT}

## 2022-07-26 ENCOUNTER — TELEPHONE (OUTPATIENT)
Dept: ENDOCRINOLOGY | Facility: CLINIC | Age: 47
End: 2022-07-26

## 2022-07-26 NOTE — RESULT ENCOUNTER NOTE
Dear Shannon    Here are your lab results.  Overall, it looks really good with your hemoglobin A1c at 7.0.  Your thyroid, cholesterol, urine for protein levels were all normal.  I am not sure why your menses are irregular-you do not seem to be in menopause yet.  You do make plenty of estrogen.  Lets just keep an eye on this, and we can revisit it if you are having more irregular menses.    If you have any questions, please feel free to contact my nurse at [phone] select option #3 for triage nurse  or  option #1 for scheduling related questions.    Regards    Jemma Anderson MD

## 2022-07-26 NOTE — TELEPHONE ENCOUNTER
Called pt -reviewed results.     -  Dear Shannon    Here are your lab results.  Overall, it looks really good with your hemoglobin A1c at 7.0.  Your thyroid, cholesterol, urine for protein levels were all normal.  I am not sure why your menses are irregular-you do not seem to be in menopause yet.  You do make plenty of estrogen.  Lets just keep an eye on this, and we can revisit it if you are having more irregular menses.    If you have any questions, please feel free to contact my nurse at [phone] select option #3 for triage nurse  or  option #1 for scheduling related questions.    Regards    Jemma Anderson MD     No visits with results within 1 Week(s) from this visit.   Latest known visit with results is:   Lab on 07/18/2022   Component Date Value Ref Range Status     25 OH Vitamin D2 07/18/2022 <5  ug/L Final     25 OH Vitamin D3 07/18/2022 29  ug/L Final     25 OH Vit D Total 07/18/2022 <34  20 - 75 ug/L Final    Season, race, dietary intake, and treatment affect the concentration of 25-hydroxy-Vitamin D. Values may decrease during winter months and increase during summer months. Values 20-29 ug/L may indicate Vitamin D insufficiency and values <20 ug/L may indicate Vitamin D deficiency.     TSH 07/18/2022 1.12  0.30 - 4.20 uIU/mL Final     Cholesterol 07/18/2022 189  <200 mg/dL Final     Triglycerides 07/18/2022 35  <150 mg/dL Final     Direct Measure HDL 07/18/2022 93  >=50 mg/dL Final     LDL Cholesterol Calculated 07/18/2022 89  <=100 mg/dL Final     Non HDL Cholesterol 07/18/2022 96  <130 mg/dL Final     Albumin Urine mg/L 07/18/2022 <12.0  mg/L Final    The reference ranges have not been established in urine albumin. The results should be integrated into the clinical context for interpretation.     Albumin Urine mg/g Cr 07/18/2022    Final    Unable to calculate, urine albumin and/or urine creatinine is outside detectable limits.  Microalbuminuria is defined as an albumin:creatinine ratio of 17 to 299 for  males and 25 to 299 for females. A ratio of albumin:creatinine of 300 or higher is indicative of overt proteinuria.  Due to biologic variability, positive results should be confirmed by a second, first-morning random or 24-hour timed urine specimen. If there is discrepancy, a third specimen is recommended. When 2 out of 3 results are in the microalbuminuria range, this is evidence for incipient nephropathy and warrants increased efforts at glucose control, blood pressure control, and institution of therapy with an angiotensin-converting-enzyme (ACE) inhibitor (if the patient can tolerate it).       Creatinine Urine mg/dL 07/18/2022 36.8  mg/dL Final    The reference ranges have not been established in urine creatinine. The results should be integrated into the clinical context for interpretation.     Creatinine 07/18/2022 0.74  0.51 - 0.95 mg/dL Final     Sodium 07/18/2022 139  136 - 145 mmol/L Final     Potassium 07/18/2022 4.1  3.4 - 5.3 mmol/L Final     Urea Nitrogen 07/18/2022 11.9  6.0 - 20.0 mg/dL Final     Chloride 07/18/2022 103  98 - 107 mmol/L Final     Carbon Dioxide (CO2) 07/18/2022 27  22 - 29 mmol/L Final     Anion Gap 07/18/2022 9  7 - 15 mmol/L Final     Glucose 07/18/2022 287 (A) 70 - 99 mg/dL Final     GFR Estimate 07/18/2022 >90  >60 mL/min/1.73m2 Final    Effective December 21, 2021 eGFRcr in adults is calculated using the 2021 CKD-EPI creatinine equation which includes age and gender (Mer et al., NEJ, DOI: 10.1056/LIRDhq4290612)     Calcium 07/18/2022 8.9  8.6 - 10.0 mg/dL Final     Hemoglobin A1C 07/18/2022 7.0 (A) 0.0 - 5.6 % Final    Normal <5.7%   Prediabetes 5.7-6.4%    Diabetes 6.5% or higher     Note: Adopted from ADA consensus guidelines.     FSH 07/18/2022 24.2  mIU/mL Final    19 years and older:   Follicular phase: 3.5-12.5 mIU/mL   Ovulation phase: 4.7-21.5 mIU/mL   Luteal phase: 1.7-7.7 mIU/mL   Postmenopause: 25.8-134.8 mIU/mL        Lutropin 07/18/2022 20.4  mIU/mL Final     FEMALE:  Age  0 - 6 mo:  <0.1-8.2 mIU/mL  6 mo - 11 years: <0.1-1.3 mIU/mL   11 - 14 years: <0.1-10 mIU/mL   14 - 19 years: 0.4-25 mIU/mL     19 years and older:   Follicular Phase: 2.4-12.6 mIU/mL  Ovulation Phase: 14.0-95.6 mIU/mL  Luteal Phase: 1.0-11.4  mIU/mL  Postmenopausal: 7.7-58.5 mIU/mL       Estradiol 07/18/2022 113  pg/mL Final    Healthy Men:   11.3-43.2 pg/mL    Healthy Postmenopausal Women:  Postmenopause: <5-138 pg/mL    Healthy Pregnant Women:  1st trimester: 154-3243 pg/mL  2nd trimester: 1561-41709 pg/mL  3rd trimester: 8525->59053 pg/mL    Healthy Women Cycle Phase:  Follicular: 30.9-90.4 pg/mL  Ovulation: 60.4-533 pg/mL  Luteal: 60.4-232 pg/mL    Healthy Women Cycle Sub-Phase:  Early Follicular: 20.5-62.8 pg/mL  Intermediate Follicular: 26-79.8 pg/mL  Late Follicular: 49.5-233 pg/mL  Ovulation: 60.4-602 pg/mL  Early Luteal: 51.1-179 pg/mL  Intermediate Luteal: 66.5-305 pg/mL  Late Luteal: 30.2-222 pg/mL     Prolactin 07/18/2022 21  5 - 23 ng/mL Final     DHEA Sulfate 07/18/2022 409  35 - 430 ug/dL Final     Ferritin 07/18/2022 68  6 - 175 ng/mL Final

## 2022-08-28 DIAGNOSIS — E10.9 TYPE 1 DIABETES MELLITUS WITHOUT COMPLICATION (H): Primary | ICD-10-CM

## 2022-10-07 NOTE — PROGRESS NOTES
Outcome for 10/07/22 2:59 PM: Data uploaded on [employer]  Outcome for 10/07/22 3:00 PM: [employer] message sent  GEOFF Koenig  Outcome for 10/12/22 11:26 AM: Left Voicemail   GEOFF Means  Outcome for 10/12/22 1:36 PM: Replied to [facility] message  GEOFF Means  Outcome for 10/13/22 11:59 AM: [employer] and [employer] emailed to provider  Betsy Rodney, VF

## 2022-10-07 NOTE — PROGRESS NOTES
" ASSESSMENT AND PLAN:    1. Travel advice encounter  She needs note for travel, given her Covid illness in December.      Travel advice time:  10 minutes.     CHIEF COMPLAINT:  Travel advice    HISTORY OF PRESENT ILLNESS:  Shannon Sow is a 46 year old female with need for letter that she has recovered, fully, from her covid illness of December 2021.  She feels well, no fever, or cough, or dyspnea or nausea.     Past Medical History:   Diagnosis Date     Cerebral infarction (H)      Type 1 diabetes mellitus without complication (H)      History   Smoking Status     Never Smoker   Smokeless Tobacco     Never Used     Family History   Problem Relation Age of Onset     Breast Cancer Mother      Breast Cancer Maternal Aunt      Glaucoma No family hx of      Macular Degeneration No family hx of      Colon Cancer No family hx of      Past Surgical History:   Procedure Laterality Date     C/SECTION, LOW TRANSVERSE       VITALS:  Vitals:    03/07/22 1522   BP: 122/62   BP Location: Left arm   Patient Position: Sitting   Cuff Size: Adult Regular   Pulse: 86   SpO2: 98%   Weight: 72.6 kg (160 lb)   Height: 1.575 m (5' 2\")     Wt Readings from Last 3 Encounters:   03/07/22 72.6 kg (160 lb)   11/12/21 70.3 kg (155 lb)   10/26/21 71.2 kg (157 lb)     PHYSICAL EXAM:  Constitutional:  In NAD, alert and oriented    Current Outpatient Medications   Medication Sig Dispense Refill     blood glucose test strip Test up to 6 times daily 6 Box 4     cholecalciferol (VITAMIN D) 1000 UNIT tablet Take 1 tablet (1,000 Units) by mouth daily 90 tablet 3     Continuous Blood Gluc Sensor (FREESTYLE RADHA 2 SENSOR) MISC 1 applicator daily as needed (as needed) 6 each 3     FLUoxetine (PROZAC) 40 MG capsule Take 1 capsule (40 mg) by mouth daily 90 capsule 3     insulin aspart (NOVOLOG VIAL) 100 UNITS/ML vial USE UP TO 80 UNITS DAILY IN PUMP 80 mL 3     insulin glargine (LANTUS SOLOSTAR) 100 UNIT/ML pen Take 20 units if pump fails. 30 mL " Referral entered with Dr. Mandi sheikh.   1     insulin  UNIT/ML injection Pt to take 10 units NPH at prednisone 40 mg daily 3 mL 3     insulin pen needle (B-D U/F) 31G X 5 MM Use if pump fails 4-5 times daily 100 each 1     ipratropium (ATROVENT) 0.03 % nasal spray Spray 2 sprays into both nostrils every 12 hours 30 mL 3     Multiple Vitamins-Minerals (MULTIVITAMIN OR) Take by mouth daily        STATIN NOT PRESCRIBED (INTENTIONAL) Please choose reason not prescribed, below       predniSONE (DELTASONE) 10 MG tablet 40,30,20,10 mg x3 days each (Patient not taking: Reported on 3/7/2022) 30 tablet 0     Bart Ko MD  Internal Medicine  [facility]

## 2022-10-14 ENCOUNTER — TELEPHONE (OUTPATIENT)
Dept: ENDOCRINOLOGY | Facility: CLINIC | Age: 47
End: 2022-10-14

## 2022-10-14 ENCOUNTER — VIRTUAL VISIT (OUTPATIENT)
Dept: ENDOCRINOLOGY | Facility: CLINIC | Age: 47
End: 2022-10-14
Payer: COMMERCIAL

## 2022-10-14 DIAGNOSIS — N92.6 IRREGULAR MENSES: Primary | ICD-10-CM

## 2022-10-14 PROCEDURE — 99214 OFFICE O/P EST MOD 30 MIN: CPT | Mod: 95 | Performed by: INTERNAL MEDICINE

## 2022-10-14 NOTE — PATIENT INSTRUCTIONS
--  Dear Shannon    Here are your new pump settings - I hope this helps. The BOLD are the changes    Dr. Anderson    Midnight to 6 AM: Basal rate 0.6, correction factor I per 60, carb ratio 1 per 10, target glucose 120    6 AM to 9 AM: Basal rate 0.6, correction factor I per 60, carb ratio 1 per 4.5, target glucose 120    9 AM to noon: Basal rate 0.9, , correction factor I per 60, carb ratio 1 per 4.5, target 120    Noon to 2 PM: Basal rate 1.0, correction factor I per 60, carb ratio  1 per 4.5, target 120    2 PM to 7 PM: Basal rate 0.3, correction factor I per 60, carb ratio  1 per 4.5, target 120    7 PM to 9 PM: Basal rate 1.4, correction factor I per 60, carb ratio  1 per 4.5, goal 120    9 PM to midnight: Basal rate 0.7, correction factor I per 60, carb ratio 1 per 10, goal 120

## 2022-10-14 NOTE — TELEPHONE ENCOUNTER
LVM   To call and schedule 4 month follow up  with chow  Labs also needed. Left scheduling numbers.  Taylor Myhre ,VVF

## 2022-10-14 NOTE — LETTER
Date:October 17, 2022      Provider requested that no letter be sent. Do not send.       [facility]

## 2022-10-14 NOTE — PROGRESS NOTES
[facility]  Endocrinology  Jemma Anderson MD  October 14, 2022    Chief Complaint:   Diabetes    History of Present Illness:   Shannon Sow is a 46 year old female with a history of diabetes who presents for follow up of diabetes.    ideo-Visit Details    Type of service:  Video Visit    Virtual visit conducted by Sandra.      Originating Location (pt. Location): In car    Distant Location (provider location):  Off site    Mode of Communication:  Video Conference via [facility]    Physician has received verbal consent for a Video Visit from the patient? YES      Jemma Anderson MD    Is a virtual visit conducted by Sandra: Start time 3:00, stop time 320.  Chart review, documentation time, coordination of care, 10 minutes.  Total time spent day of encounter 30 minutes  #1 Type 1 diabetes mellitus  The patient was diagnosed with diabetes in 1992.  She was on Lantus and Humalog from approximately 2000 - May 2009 when she started on an insulin pump.  As of November 2013, she has been on a newer Medtronic pump with low glucose suspend.  She started using the Freestyle Azam CGMS system in February 2018.  Her A1c level in the past 5 years have generally been in the high 6% - mid 7% range.  March 2019 hemoglobin A1c was 7%. July 2019 A1c was 7.2%. November 2019 A1c 7.4%.   February 2020 A1c 7.0% .  December 2020 hemoglobin A1c of 7.8.  April 2021- pt was offered metformin and she declined September 2021  hemoglobin A1c of 7.4.     Interval history:. Although the patient has the T slim pump, the main barrier for her converting to control IQ is because of the Dexcom sensors, and extremely expensive at roughly $500 per month.  That being said, she is on the azam 2 (uses the phone macrina) and has been very satisfied with this program.  Reviewing her sensors, her average glucose is 188, 50% within range, July 2022 hemoglobin A1c at 7.0.  She is overall satisfied with her program.  She still reports the control IQ  is very expensive for her.  Reviewing her CGM, she tends to have hyperglycemia with meals, as well as hypoglycemia between lunch and supper.    Pump settings:  Basal rate:    Midnight to 6 AM: Basal rate 0.6, correction factor I per 60, carb ratio 1 per 12, target glucose 120    6 AM to 9 AM: Basal rate 0.6, correction factor I per 60, carb ratio 1 per 8, target glucose 120    9 AM to noon: Basal rate 0.9, , correction factor I per 60, carb ratio 1 per 5, target 120    Noon to 2 PM: Basal rate 1.1, correction factor I per 60, carb ratio 1 per 5, target 120    2 PM to 7 PM: Basal rate 0.4, correction factor I per 60, carb ratio 1 per 5, target 120    7 PM to 9 PM: Basal rate 1.4, correction factor I per 60, carb ratio 1 per 10, goal 120    9 PM to midnight: Basal rate 0.7, correction factor I per 60, carb ratio 1 per 10, goal 120    Blood Glucose Monitoring:  Azam 2 results per above    Diabetes monitoring and complications:  CAD: December 2020 LDL was 94  Last eye exam results: 11/20/2019, mild nonproliferative retinopathy-patient is overdue.  Repeat eye exam in February 2022 no identify any retinopathy.  Microalbuminuria: Negative in December 2020  Neuropathy: No  HTN: No  On Statin: No  On Aspirin: No  Depression: Yes    #2 Subarachnoid hemorrhage  She was hospitalized at [facility] 8/2 - 8/9/18 for a subarachnoid hemorrhage after presenting with a sudden onset headache, nausea, and vomiting.  She had no complications. She saw Dr. Jacob in Neurology on 11/9/2018 who discussed with her that she is at low risk for any recurrent issues (2 - 5 % of recurrence).  She has no history of hypertension. She followed with Dr. Regan in Neurology on 11/20/2018 who concluded that her hemorrhage was likely non-aneurysmal in origin and found no other signs of ongoing symptoms.     Interval history: Reports that she is doing well without headache and good control of her blood pressure.     #3 interest in weight  "loss  At previous visit the patient reported changing her diet and increasing her exercise.      Interval history: Weight stable.      #4 fatigue  This is her most pressing issue.  She reports significant fatigue.  She reports fatigue even when she wakes up in the morning.  She reports that she would wake up for an hour at night and then go back to sleep.  She does report significant stress in her life.  She is working with a therapist.  She feels that her Prozac dose is adequate.Extensive evaluation in December 2020 was unremarkable.    Interval history: Reports that this is their    #5 irregular menses  The patient reports some irregular menses.  She reports occasionally skipping her menses.  She is uncertain whether she is having hot flashes.  She reports that she is not currently pregnant (has checked twice).  She is uncertain when her family members may have gone through menopause    Interval history: She reports that this is her most pressing issue.  She has had no menses for 2 months.  She reports negative pregnancy test.  She denies any hot flashes but \"has increased acne\"    Review of Systems:   Pertinent items are noted in HPI.  All other systems are negative.    Active Medications:   Current Outpatient Medications   Medication Sig Dispense Refill     blood glucose test strip Test up to 6 times daily 6 Box 4     cholecalciferol (VITAMIN D) 1000 UNIT tablet Take 1 tablet (1,000 Units) by mouth daily 90 tablet 3     Continuous Blood Gluc Sensor (FREESTYLE RADHA 2 SENSOR) MISC 1 applicator daily as needed (as needed) 6 each 3     FLUoxetine (PROZAC) 40 MG capsule Take 1 capsule (40 mg) by mouth daily 90 capsule 3     insulin aspart (NOVOLOG VIAL) 100 UNITS/ML vial Pt to use 60 units per day in pump 60 mL 3     insulin glargine (LANTUS PEN) 100 UNIT/ML pen Inject 20 Units Subcutaneous every morning 15 mL 0     insulin glargine (LANTUS SOLOSTAR) 100 UNIT/ML pen Take 20 units if pump fails. 30 mL 1     insulin " "pen needle (B-D U/F) 31G X 5 MM Use if pump fails 4-5 times daily 100 each 1     ipratropium (ATROVENT) 0.03 % nasal spray Spray 2 sprays into both nostrils every 12 hours 30 mL 3     Multiple Vitamins-Minerals (MULTIVITAMIN OR) Take by mouth daily        STATIN NOT PRESCRIBED (INTENTIONAL) Please choose reason not prescribed, below          Allergies:   Nkda [no known drug allergies]   Seasonal allergies      Past Medical History:  Type I diabetes mellitus   Vitamin deficiency  Upper respiratory infection   Cerebral salt-wasting syndrome  Hypertensive disease\  Recurrent major depressive disorder  Subarachnoid hemorrhage      Past Surgical History:  History reviewed. No pertinent past surgical history.      Family History:   Breast cancer - mother      Social History:   Pt working from home      Physical Exam:     GENERAL: Healthy, alert and no distress\",\"EYES: Eyes grossly normal to inspection.  No discharge or erythema, or obvious scleral/conjunctival abnormalities.\",\"RESP: No audible wheeze, cough, or visible cyanosis.  No visible retractions or increased work of breathing.  \",\"SKIN: Visible skin clear. No significant rash, abnormal pigmentation or lesions.\",\"NEURO: Cranial nerves grossly intact.  Mentation and speech appropriate for age.\",\"PSYCH: Mentation appears normal, affect normal/bright, judgement and insight intact, normal speech and appearance well-groomed.     Data:  No visits with results within 1 Week(s) from this visit.   Latest known visit with results is:   Lab on 07/18/2022   Component Date Value Ref Range Status     25 OH Vitamin D2 07/18/2022 <5  ug/L Final     25 OH Vitamin D3 07/18/2022 29  ug/L Final     25 OH Vit D Total 07/18/2022 <34  20 - 75 ug/L Final    Season, race, dietary intake, and treatment affect the concentration of 25-hydroxy-Vitamin D. Values may decrease during winter months and increase during summer months. Values 20-29 ug/L may indicate Vitamin D insufficiency and values " <20 ug/L may indicate Vitamin D deficiency.     TSH 07/18/2022 1.12  0.30 - 4.20 uIU/mL Final     Cholesterol 07/18/2022 189  <200 mg/dL Final     Triglycerides 07/18/2022 35  <150 mg/dL Final     Direct Measure HDL 07/18/2022 93  >=50 mg/dL Final     LDL Cholesterol Calculated 07/18/2022 89  <=100 mg/dL Final     Non HDL Cholesterol 07/18/2022 96  <130 mg/dL Final     Albumin Urine mg/L 07/18/2022 <12.0  mg/L Final    The reference ranges have not been established in urine albumin. The results should be integrated into the clinical context for interpretation.     Albumin Urine mg/g Cr 07/18/2022    Final    Unable to calculate, urine albumin and/or urine creatinine is outside detectable limits.  Microalbuminuria is defined as an albumin:creatinine ratio of 17 to 299 for males and 25 to 299 for females. A ratio of albumin:creatinine of 300 or higher is indicative of overt proteinuria.  Due to biologic variability, positive results should be confirmed by a second, first-morning random or 24-hour timed urine specimen. If there is discrepancy, a third specimen is recommended. When 2 out of 3 results are in the microalbuminuria range, this is evidence for incipient nephropathy and warrants increased efforts at glucose control, blood pressure control, and institution of therapy with an angiotensin-converting-enzyme (ACE) inhibitor (if the patient can tolerate it).       Creatinine Urine mg/dL 07/18/2022 36.8  mg/dL Final    The reference ranges have not been established in urine creatinine. The results should be integrated into the clinical context for interpretation.     Creatinine 07/18/2022 0.74  0.51 - 0.95 mg/dL Final     Sodium 07/18/2022 139  136 - 145 mmol/L Final     Potassium 07/18/2022 4.1  3.4 - 5.3 mmol/L Final     Urea Nitrogen 07/18/2022 11.9  6.0 - 20.0 mg/dL Final     Chloride 07/18/2022 103  98 - 107 mmol/L Final     Carbon Dioxide (CO2) 07/18/2022 27  22 - 29 mmol/L Final     Anion Gap 07/18/2022 9  7  - 15 mmol/L Final     Glucose 07/18/2022 287 (H)  70 - 99 mg/dL Final     GFR Estimate 07/18/2022 >90  >60 mL/min/1.73m2 Final    Effective December 21, 2021 eGFRcr in adults is calculated using the 2021 CKD-EPI creatinine equation which includes age and gender (Mer et al., [place], DOI: 10.1056/ITIPri3036348)     Calcium 07/18/2022 8.9  8.6 - 10.0 mg/dL Final     Hemoglobin A1C 07/18/2022 7.0 (H)  0.0 - 5.6 % Final    Normal <5.7%   Prediabetes 5.7-6.4%    Diabetes 6.5% or higher     Note: Adopted from ADA consensus guidelines.     FSH 07/18/2022 24.2  mIU/mL Final    19 years and older:   Follicular phase: 3.5-12.5 mIU/mL   Ovulation phase: 4.7-21.5 mIU/mL   Luteal phase: 1.7-7.7 mIU/mL   Postmenopause: 25.8-134.8 mIU/mL        Lutropin 07/18/2022 20.4  mIU/mL Final    FEMALE:  Age  0 - 6 mo:  <0.1-8.2 mIU/mL  6 mo - 11 years: <0.1-1.3 mIU/mL   11 - 14 years: <0.1-10 mIU/mL   14 - 19 years: 0.4-25 mIU/mL     19 years and older:   Follicular Phase: 2.4-12.6 mIU/mL  Ovulation Phase: 14.0-95.6 mIU/mL  Luteal Phase: 1.0-11.4  mIU/mL  Postmenopausal: 7.7-58.5 mIU/mL       Estradiol 07/18/2022 113  pg/mL Final    Healthy Men:   11.3-43.2 pg/mL    Healthy Postmenopausal Women:  Postmenopause: <5-138 pg/mL    Healthy Pregnant Women:  1st trimester: 154-3243 pg/mL  2nd trimester: 1561-39960 pg/mL  3rd trimester: 8525->36746 pg/mL    Healthy Women Cycle Phase:  Follicular: 30.9-90.4 pg/mL  Ovulation: 60.4-533 pg/mL  Luteal: 60.4-232 pg/mL    Healthy Women Cycle Sub-Phase:  Early Follicular: 20.5-62.8 pg/mL  Intermediate Follicular: 26-79.8 pg/mL  Late Follicular: 49.5-233 pg/mL  Ovulation: 60.4-602 pg/mL  Early Luteal: 51.1-179 pg/mL  Intermediate Luteal: 66.5-305 pg/mL  Late Luteal: 30.2-222 pg/mL     Prolactin 07/18/2022 21  5 - 23 ng/mL Final     DHEA Sulfate 07/18/2022 409  35 - 430 ug/dL Final     Ferritin 07/18/2022 68  6 - 175 ng/mL Final         Assessment and Plan:  #1 Type 1 diabetes mellitus   Overall, her  glycemic program has improved.  We will adjust as noted below.    Midnight to 6 AM: Basal rate 0.6, correction factor I per 60, carb ratio 1 per 10, target glucose 120    6 AM to 9 AM: Basal rate 0.6, correction factor I per 60, carb ratio 1 per 4.5, target glucose 120    9 AM to noon: Basal rate 0.9, , correction factor I per 60, carb ratio 1 per 4.5, target 120    Noon to 2 PM: Basal rate 1.0, correction factor I per 60, carb ratio  1 per 4.5, target 120    2 PM to 7 PM: Basal rate 0.3, correction factor I per 60, carb ratio  1 per 4.5, target 120    7 PM to 9 PM: Basal rate 1.4, correction factor I per 60, carb ratio  1 per 4.5, goal 120    9 PM to midnight: Basal rate 0.7, correction factor I per 60, carb ratio 1 per 10, goal 120      We will see if there may be any options for Dexcom patient assistance for patient.  She will look into whether any options for insurance might provide better Dexcom coverage.  However I think she is doing pretty well with her flores 2 program as well as the tandem t:slim pump.    #2 Subarachnoid hemorrhage  Doing well     #3 interest in weight loss  Continues to be increasing her activity.  #4 fatigue  Improved per patient report.    #5 irregular menses  The patient has had absent menses for about 2 months.  We will call the patient next month.  If she does not have menses, we will have her try Provera, and drawn for labs as noted below.    We will plan return visit in 4 months.    Orders Placed This Encounter   Procedures     25 Hydroxyvitamin D2 and D3     TSH     Lipid Profile     Albumin Random Urine Quantitative with Creat Ratio     Basic metabolic panel     Hemoglobin A1c     Follicle stimulating hormone     Luteinizing Hormone, Adult     Estradiol     Prolactin     HCG quantitative pregnancy

## 2022-10-14 NOTE — PROGRESS NOTES
Shannon Sow is being evaluated via a billable video visit.        How would you like to obtain your AVS? [employer]  For the video visit, send the invitation by: Text to cell phone: [phone]  Will anyone else be joining your video visit? No

## 2022-10-14 NOTE — LETTER
Patient:  Shannon Sow  :   1975  MRN:     [id number]        Ms.Shannon Sow  [address] [place] [address]        2022    Dear Shannon    Here are your new pump settings - I hope this helps. The BOLD are the changes    Dr. Anderson    Midnight to 6 AM: Basal rate 0.6, correction factor I per 60, carb ratio 1 per 10, target glucose 120    6 AM to 9 AM: Basal rate 0.6, correction factor I per 60, carb ratio 1 per 4.5, target glucose 120    9 AM to noon: Basal rate 0.9, , correction factor I per 60, carb ratio 1 per 4.5, target 120    Noon to 2 PM: Basal rate 1.0, correction factor I per 60, carb ratio  1 per 4.5, target 120    2 PM to 7 PM: Basal rate 0.3, correction factor I per 60, carb ratio  1 per 4.5, target 120    7 PM to 9 PM: Basal rate 1.4, correction factor I per 60, carb ratio  1 per 4.5, goal 120    9 PM to midnight: Basal rate 0.7, correction factor I per 60, carb ratio 1 per 10, goal 120

## 2022-10-14 NOTE — NURSING NOTE
Patient denies any changes since echeck-in regarding medication and allergies and states all information entered during echeck-in remains accurate.  Taylor Myhre,JAVIERF

## 2022-10-14 NOTE — LETTER
10/14/2022       RE: Shannon Sow  [address]  [place] [address]     Dear Colleague,    Thank you for referring your patient, Shannon Sow, to the [facility] ENDOCRINOLOG[facility]. Please see a copy of my visit note below.    Outcome for 10/07/22 2:59 PM: Data uploaded on Azam  Outcome for 10/07/22 3:00 PM: [employer] message sent  GEOFF Koenig  Outcome for 10/12/22 11:26 AM: Left Voicemail   GEOFF Means  Outcome for 10/12/22 1:36 PM: Replied to [employer] message  GEOFF Means  Outcome for 10/13/22 11:59 AM: Azam [employer] emailed to provider  GEOFF Garcia            [facility]  Endocrinology  Jemma Anderson MD  October 14, 2022    Chief Complaint:   Diabetes    History of Present Illness:   Shannon Sow is a 46 year old female with a history of diabetes who presents for follow up of diabetes.    ideo-Visit Details    Type of service:  Video Visit    Virtual visit conducted by Sandra.      Originating Location (pt. Location): In car    Distant Location (provider location):  Off site    Mode of Communication:  Video Conference via [employer]    Physician has received verbal consent for a Video Visit from the patient? YES      Jemma Anderson MD    Is a virtual visit conducted by Sandra: Start time 3:00, stop time 320.  Chart review, documentation time, coordination of care, 10 minutes.  Total time spent day of encounter 30 minutes  #1 Type 1 diabetes mellitus  The patient was diagnosed with diabetes in 1992.  She was on Lantus and Humalog from approximately 2000 - May 2009 when she started on an insulin pump.  As of November 2013, she has been on a newer Medtronic pump with low glucose suspend.  She started using the Freestyle Azam CGMS system in February 2018.  Her A1c level in the past 5 years have generally been in the high 6% - mid 7%  range.  March 2019 hemoglobin A1c was 7%. July 2019 A1c was 7.2%. November 2019 A1c 7.4%.   February 2020 A1c 7.0% .  December 2020 hemoglobin A1c of 7.8.  April 2021- pt was offered metformin and she declined September 2021  hemoglobin A1c of 7.4.     Interval history:. Although the patient has the T slim pump, the main barrier for her converting to control IQ is because of the Dexcom sensors, and extremely expensive at roughly $500 per month.  That being said, she is on the azam 2 (uses the phone macrina) and has been very satisfied with this program.  Reviewing her sensors, her average glucose is 188, 50% within range, July 2022 hemoglobin A1c at 7.0.  She is overall satisfied with her program.  She still reports the control IQ is very expensive for her.  Reviewing her CGM, she tends to have hyperglycemia with meals, as well as hypoglycemia between lunch and supper.    Pump settings:  Basal rate:    Midnight to 6 AM: Basal rate 0.6, correction factor I per 60, carb ratio 1 per 12, target glucose 120    6 AM to 9 AM: Basal rate 0.6, correction factor I per 60, carb ratio 1 per 8, target glucose 120    9 AM to noon: Basal rate 0.9, , correction factor I per 60, carb ratio 1 per 5, target 120    Noon to 2 PM: Basal rate 1.1, correction factor I per 60, carb ratio 1 per 5, target 120    2 PM to 7 PM: Basal rate 0.4, correction factor I per 60, carb ratio 1 per 5, target 120    7 PM to 9 PM: Basal rate 1.4, correction factor I per 60, carb ratio 1 per 10, goal 120    9 PM to midnight: Basal rate 0.7, correction factor I per 60, carb ratio 1 per 10, goal 120    Blood Glucose Monitoring:  Azam 2 results per above    Diabetes monitoring and complications:  CAD: December 2020 LDL was 94  Last eye exam results: 11/20/2019, mild nonproliferative retinopathy-patient is overdue.  Repeat eye exam in February 2022 no identify any retinopathy.  Microalbuminuria: Negative in December 2020  Neuropathy: No  HTN: No  On Statin: No  On  "Aspirin: No  Depression: Yes    #2 Subarachnoid hemorrhage  She was hospitalized at [facility] 8/2 - 8/9/18 for a subarachnoid hemorrhage after presenting with a sudden onset headache, nausea, and vomiting.  She had no complications. She saw Dr. Jacob in Neurology on 11/9/2018 who discussed with her that she is at low risk for any recurrent issues (2 - 5 % of recurrence).  She has no history of hypertension. She followed with Dr. Regan in Neurology on 11/20/2018 who concluded that her hemorrhage was likely non-aneurysmal in origin and found no other signs of ongoing symptoms.     Interval history: Reports that she is doing well without headache and good control of her blood pressure.     #3 interest in weight loss  At previous visit the patient reported changing her diet and increasing her exercise.      Interval history: Weight stable.      #4 fatigue  This is her most pressing issue.  She reports significant fatigue.  She reports fatigue even when she wakes up in the morning.  She reports that she would wake up for an hour at night and then go back to sleep.  She does report significant stress in her life.  She is working with a therapist.  She feels that her Prozac dose is adequate.Extensive evaluation in December 2020 was unremarkable.    Interval history: Reports that this is their    #5 irregular menses  The patient reports some irregular menses.  She reports occasionally skipping her menses.  She is uncertain whether she is having hot flashes.  She reports that she is not currently pregnant (has checked twice).  She is uncertain when her family members may have gone through menopause    Interval history: She reports that this is her most pressing issue.  She has had no menses for 2 months.  She reports negative pregnancy test.  She denies any hot flashes but \"has increased acne\"    Review of Systems:   Pertinent items are noted in HPI.  All other systems are negative.    Active Medications: " "  Current Outpatient Medications   Medication Sig Dispense Refill     blood glucose test strip Test up to 6 times daily 6 Box 4     cholecalciferol (VITAMIN D) 1000 UNIT tablet Take 1 tablet (1,000 Units) by mouth daily 90 tablet 3     Continuous Blood Gluc Sensor (FREESTYLE RADHA 2 SENSOR) MISC 1 applicator daily as needed (as needed) 6 each 3     FLUoxetine (PROZAC) 40 MG capsule Take 1 capsule (40 mg) by mouth daily 90 capsule 3     insulin aspart (NOVOLOG VIAL) 100 UNITS/ML vial Pt to use 60 units per day in pump 60 mL 3     insulin glargine (LANTUS PEN) 100 UNIT/ML pen Inject 20 Units Subcutaneous every morning 15 mL 0     insulin glargine (LANTUS SOLOSTAR) 100 UNIT/ML pen Take 20 units if pump fails. 30 mL 1     insulin pen needle (B-D U/F) 31G X 5 MM Use if pump fails 4-5 times daily 100 each 1     ipratropium (ATROVENT) 0.03 % nasal spray Spray 2 sprays into both nostrils every 12 hours 30 mL 3     Multiple Vitamins-Minerals (MULTIVITAMIN OR) Take by mouth daily        STATIN NOT PRESCRIBED (INTENTIONAL) Please choose reason not prescribed, below          Allergies:   Nkda [no known drug allergies]   Seasonal allergies      Past Medical History:  Type I diabetes mellitus   Vitamin deficiency  Upper respiratory infection   Cerebral salt-wasting syndrome  Hypertensive disease\  Recurrent major depressive disorder  Subarachnoid hemorrhage      Past Surgical History:  History reviewed. No pertinent past surgical history.      Family History:   Breast cancer - mother      Social History:   Pt working from home      Physical Exam:     GENERAL: Healthy, alert and no distress\",\"EYES: Eyes grossly normal to inspection.  No discharge or erythema, or obvious scleral/conjunctival abnormalities.\",\"RESP: No audible wheeze, cough, or visible cyanosis.  No visible retractions or increased work of breathing.  \",\"SKIN: Visible skin clear. No significant rash, abnormal pigmentation or lesions.\",\"NEURO: Cranial nerves grossly " "intact.  Mentation and speech appropriate for age.\",\"PSYCH: Mentation appears normal, affect normal/bright, judgement and insight intact, normal speech and appearance well-groomed.     Data:  No visits with results within 1 Week(s) from this visit.   Latest known visit with results is:   Lab on 07/18/2022   Component Date Value Ref Range Status     25 OH Vitamin D2 07/18/2022 <5  ug/L Final     25 OH Vitamin D3 07/18/2022 29  ug/L Final     25 OH Vit D Total 07/18/2022 <34  20 - 75 ug/L Final    Season, race, dietary intake, and treatment affect the concentration of 25-hydroxy-Vitamin D. Values may decrease during winter months and increase during summer months. Values 20-29 ug/L may indicate Vitamin D insufficiency and values <20 ug/L may indicate Vitamin D deficiency.     TSH 07/18/2022 1.12  0.30 - 4.20 uIU/mL Final     Cholesterol 07/18/2022 189  <200 mg/dL Final     Triglycerides 07/18/2022 35  <150 mg/dL Final     Direct Measure HDL 07/18/2022 93  >=50 mg/dL Final     LDL Cholesterol Calculated 07/18/2022 89  <=100 mg/dL Final     Non HDL Cholesterol 07/18/2022 96  <130 mg/dL Final     Albumin Urine mg/L 07/18/2022 <12.0  mg/L Final    The reference ranges have not been established in urine albumin. The results should be integrated into the clinical context for interpretation.     Albumin Urine mg/g Cr 07/18/2022    Final    Unable to calculate, urine albumin and/or urine creatinine is outside detectable limits.  Microalbuminuria is defined as an albumin:creatinine ratio of 17 to 299 for males and 25 to 299 for females. A ratio of albumin:creatinine of 300 or higher is indicative of overt proteinuria.  Due to biologic variability, positive results should be confirmed by a second, first-morning random or 24-hour timed urine specimen. If there is discrepancy, a third specimen is recommended. When 2 out of 3 results are in the microalbuminuria range, this is evidence for incipient nephropathy and warrants " increased efforts at glucose control, blood pressure control, and institution of therapy with an angiotensin-converting-enzyme (ACE) inhibitor (if the patient can tolerate it).       Creatinine Urine mg/dL 07/18/2022 36.8  mg/dL Final    The reference ranges have not been established in urine creatinine. The results should be integrated into the clinical context for interpretation.     Creatinine 07/18/2022 0.74  0.51 - 0.95 mg/dL Final     Sodium 07/18/2022 139  136 - 145 mmol/L Final     Potassium 07/18/2022 4.1  3.4 - 5.3 mmol/L Final     Urea Nitrogen 07/18/2022 11.9  6.0 - 20.0 mg/dL Final     Chloride 07/18/2022 103  98 - 107 mmol/L Final     Carbon Dioxide (CO2) 07/18/2022 27  22 - 29 mmol/L Final     Anion Gap 07/18/2022 9  7 - 15 mmol/L Final     Glucose 07/18/2022 287 (H)  70 - 99 mg/dL Final     GFR Estimate 07/18/2022 >90  >60 mL/min/1.73m2 Final    Effective December 21, 2021 eGFRcr in adults is calculated using the 2021 CKD-EPI creatinine equation which includes age and gender (Mer et al., NEJ, DOI: 10.1056/VAPBar9787911)     Calcium 07/18/2022 8.9  8.6 - 10.0 mg/dL Final     Hemoglobin A1C 07/18/2022 7.0 (H)  0.0 - 5.6 % Final    Normal <5.7%   Prediabetes 5.7-6.4%    Diabetes 6.5% or higher     Note: Adopted from ADA consensus guidelines.     FSH 07/18/2022 24.2  mIU/mL Final    19 years and older:   Follicular phase: 3.5-12.5 mIU/mL   Ovulation phase: 4.7-21.5 mIU/mL   Luteal phase: 1.7-7.7 mIU/mL   Postmenopause: 25.8-134.8 mIU/mL        Lutropin 07/18/2022 20.4  mIU/mL Final    FEMALE:  Age  0 - 6 mo:  <0.1-8.2 mIU/mL  6 mo - 11 years: <0.1-1.3 mIU/mL   11 - 14 years: <0.1-10 mIU/mL   14 - 19 years: 0.4-25 mIU/mL     19 years and older:   Follicular Phase: 2.4-12.6 mIU/mL  Ovulation Phase: 14.0-95.6 mIU/mL  Luteal Phase: 1.0-11.4  mIU/mL  Postmenopausal: 7.7-58.5 mIU/mL       Estradiol 07/18/2022 113  pg/mL Final    Healthy Men:   11.3-43.2 pg/mL    Healthy Postmenopausal Women:  Postmenopause:  <5-138 pg/mL    Healthy Pregnant Women:  1st trimester: 154-3243 pg/mL  2nd trimester: 1561-65336 pg/mL  3rd trimester: 8525->69019 pg/mL    Healthy Women Cycle Phase:  Follicular: 30.9-90.4 pg/mL  Ovulation: 60.4-533 pg/mL  Luteal: 60.4-232 pg/mL    Healthy Women Cycle Sub-Phase:  Early Follicular: 20.5-62.8 pg/mL  Intermediate Follicular: 26-79.8 pg/mL  Late Follicular: 49.5-233 pg/mL  Ovulation: 60.4-602 pg/mL  Early Luteal: 51.1-179 pg/mL  Intermediate Luteal: 66.5-305 pg/mL  Late Luteal: 30.2-222 pg/mL     Prolactin 07/18/2022 21  5 - 23 ng/mL Final     DHEA Sulfate 07/18/2022 409  35 - 430 ug/dL Final     Ferritin 07/18/2022 68  6 - 175 ng/mL Final         Assessment and Plan:  #1 Type 1 diabetes mellitus   Overall, her glycemic program has improved.  We will adjust as noted below.    Midnight to 6 AM: Basal rate 0.6, correction factor I per 60, carb ratio 1 per 10, target glucose 120    6 AM to 9 AM: Basal rate 0.6, correction factor I per 60, carb ratio 1 per 4.5, target glucose 120    9 AM to noon: Basal rate 0.9, , correction factor I per 60, carb ratio 1 per 4.5, target 120    Noon to 2 PM: Basal rate 1.0, correction factor I per 60, carb ratio  1 per 4.5, target 120    2 PM to 7 PM: Basal rate 0.3, correction factor I per 60, carb ratio  1 per 4.5, target 120    7 PM to 9 PM: Basal rate 1.4, correction factor I per 60, carb ratio  1 per 4.5, goal 120    9 PM to midnight: Basal rate 0.7, correction factor I per 60, carb ratio 1 per 10, goal 120      We will see if there may be any options for Dexcom patient assistance for patient.  She will look into whether any options for insurance might provide better Dexcom coverage.  However I think she is doing pretty well with her flores 2 program as well as the tandem t:slim pump.    #2 Subarachnoid hemorrhage  Doing well     #3 interest in weight loss  Continues to be increasing her activity.  #4 fatigue  Improved per patient report.    #5 irregular menses  The  patient has had absent menses for about 2 months.  We will call the patient next month.  If she does not have menses, we will have her try Provera, and drawn for labs as noted below.    We will plan return visit in 4 months.    Orders Placed This Encounter   Procedures     25 Hydroxyvitamin D2 and D3     TSH     Lipid Profile     Albumin Random Urine Quantitative with Creat Ratio     Basic metabolic panel     Hemoglobin A1c     Follicle stimulating hormone     Luteinizing Hormone, Adult     Estradiol     Prolactin     HCG quantitative pregnancy       Shannon Sow is being evaluated via a billable video visit.        How would you like to obtain your AVS? MyChart  For the video visit, send the invitation by: Text to cell phone: [phone]  Will anyone else be joining your video visit? No        Again, thank you for allowing me to participate in the care of your patient.      Sincerely,    Jemma Anderson MD

## 2022-11-01 ENCOUNTER — TELEPHONE (OUTPATIENT)
Dept: ENDOCRINOLOGY | Facility: CLINIC | Age: 47
End: 2022-11-01

## 2022-11-01 NOTE — TELEPHONE ENCOUNTER
SANDRA 11/1/22 for pt to schedule 4 month follow up per Dr. Anderson. Pt can be scheduled 2/3/22 at 1530. This appointment slot is marked as ARSALAN and will not appear using auto search - appt will need to be manually scheduled.

## 2022-11-03 NOTE — TELEPHONE ENCOUNTER
Pt scheduled for f/up with Dr. Anderson. Pt was transferred to the [facility] to schedule labs ordered by Dr. Anderson.

## 2022-11-11 ENCOUNTER — TELEPHONE (OUTPATIENT)
Dept: ENDOCRINOLOGY | Facility: CLINIC | Age: 47
End: 2022-11-11

## 2022-11-11 ENCOUNTER — ANCILLARY PROCEDURE (OUTPATIENT)
Dept: MAMMOGRAPHY | Facility: CLINIC | Age: 47
End: 2022-11-11
Attending: INTERNAL MEDICINE
Payer: COMMERCIAL

## 2022-11-11 ENCOUNTER — LAB (OUTPATIENT)
Dept: LAB | Facility: CLINIC | Age: 47
End: 2022-11-11
Payer: COMMERCIAL

## 2022-11-11 DIAGNOSIS — Z12.31 VISIT FOR SCREENING MAMMOGRAM: ICD-10-CM

## 2022-11-11 DIAGNOSIS — N92.6 IRREGULAR MENSES: ICD-10-CM

## 2022-11-11 LAB
ANION GAP SERPL CALCULATED.3IONS-SCNC: 12 MMOL/L (ref 7–15)
BUN SERPL-MCNC: 13.3 MG/DL (ref 6–20)
CALCIUM SERPL-MCNC: 9.4 MG/DL (ref 8.6–10)
CHLORIDE SERPL-SCNC: 101 MMOL/L (ref 98–107)
CHOLEST SERPL-MCNC: 202 MG/DL
CREAT SERPL-MCNC: 0.72 MG/DL (ref 0.51–0.95)
CREAT UR-MCNC: 21.4 MG/DL
DEPRECATED HCO3 PLAS-SCNC: 26 MMOL/L (ref 22–29)
ESTRADIOL SERPL-MCNC: 338 PG/ML
FSH SERPL IRP2-ACNC: 14.5 MIU/ML
GFR SERPL CREATININE-BSD FRML MDRD: >90 ML/MIN/1.73M2
GLUCOSE SERPL-MCNC: 74 MG/DL (ref 70–99)
HBA1C MFR BLD: 7.2 % (ref 0–5.6)
HCG INTACT+B SERPL-ACNC: <1 MIU/ML
HDLC SERPL-MCNC: 100 MG/DL
LDLC SERPL CALC-MCNC: 94 MG/DL
LH SERPL-ACNC: 25.7 MIU/ML
MICROALBUMIN UR-MCNC: <12 MG/L
MICROALBUMIN/CREAT UR: NORMAL MG/G{CREAT}
NONHDLC SERPL-MCNC: 102 MG/DL
POTASSIUM SERPL-SCNC: 3.9 MMOL/L (ref 3.4–5.3)
PROLACTIN SERPL 3RD IS-MCNC: 8 NG/ML (ref 5–23)
SODIUM SERPL-SCNC: 139 MMOL/L (ref 136–145)
TRIGL SERPL-MCNC: 42 MG/DL
TSH SERPL DL<=0.005 MIU/L-ACNC: 0.89 UIU/ML (ref 0.3–4.2)

## 2022-11-11 PROCEDURE — 82670 ASSAY OF TOTAL ESTRADIOL: CPT

## 2022-11-11 PROCEDURE — 83002 ASSAY OF GONADOTROPIN (LH): CPT

## 2022-11-11 PROCEDURE — 80061 LIPID PANEL: CPT

## 2022-11-11 PROCEDURE — 83036 HEMOGLOBIN GLYCOSYLATED A1C: CPT

## 2022-11-11 PROCEDURE — 83001 ASSAY OF GONADOTROPIN (FSH): CPT

## 2022-11-11 PROCEDURE — 84702 CHORIONIC GONADOTROPIN TEST: CPT

## 2022-11-11 PROCEDURE — 82043 UR ALBUMIN QUANTITATIVE: CPT

## 2022-11-11 PROCEDURE — 36415 COLL VENOUS BLD VENIPUNCTURE: CPT

## 2022-11-11 PROCEDURE — 84146 ASSAY OF PROLACTIN: CPT

## 2022-11-11 PROCEDURE — 82306 VITAMIN D 25 HYDROXY: CPT

## 2022-11-11 PROCEDURE — 77067 SCR MAMMO BI INCL CAD: CPT | Mod: TC | Performed by: RADIOLOGY

## 2022-11-11 PROCEDURE — 84443 ASSAY THYROID STIM HORMONE: CPT

## 2022-11-11 PROCEDURE — 80048 BASIC METABOLIC PNL TOTAL CA: CPT

## 2022-11-11 NOTE — LETTER
November 16, 2022      Shannon Sow  [address] [place] [facility] [address]        Dear Shannon    Here are your results which are within the acceptable range.  Your hemoglobin A1c is 7.2.  It does not look like you are postmenopausal yet as you do have good estrogen levels.  I would not be surprised if you do have a menses within the next month as per our discussion.  Let's continue with the plan as discussed.    If you have any questions, please feel free to contact my nurse at [phone] select option #3 for triage nurse  or  option #1 for scheduling related questions.    Regards    Jemma Anderson MD    Resulted Orders   25 Hydroxyvitamin D2 and D3   Result Value Ref Range    25 OH Vitamin D2 <5 ug/L    25 OH Vitamin D3 28 ug/L    25 OH Vit D Total <33 20 - 75 ug/L      Comment:      Season, race, dietary intake, and treatment affect the concentration of 25-hydroxy-Vitamin D. Values may decrease during winter months and increase during summer months. Values 20-29 ug/L may indicate Vitamin D insufficiency and values <20 ug/L may indicate Vitamin D deficiency.    Narrative    This test was developed and its performance characteristics determined by the [facility],  Special Chemistry Laboratory. It has not been cleared or approved by the FDA. The laboratory is regulated under CLIA as qualified to perform high-complexity testing. This test is used for clinical purposes. It should not be regarded as investigational or for research.   TSH   Result Value Ref Range    TSH 0.89 0.30 - 4.20 uIU/mL   Lipid Profile   Result Value Ref Range    Cholesterol 202 (H) <200 mg/dL    Triglycerides 42 <150 mg/dL    Direct Measure  >=50 mg/dL    LDL Cholesterol Calculated 94 <=100 mg/dL    Non HDL Cholesterol 102 <130 mg/dL    Narrative    Cholesterol  Desirable:  <200 mg/dL    Triglycerides  Normal:  Less than 150 mg/dL  Borderline High:  150-199 mg/dL  High:  200-499 mg/dL  Very High:   Greater than or equal to 500 mg/dL    Direct Measure HDL  Female:  Greater than or equal to 50 mg/dL   Male:  Greater than or equal to 40 mg/dL    LDL Cholesterol  Desirable:  <100mg/dL  Above Desirable:  100-129 mg/dL   Borderline High:  130-159 mg/dL   High:  160-189 mg/dL   Very High:  >= 190 mg/dL    Non HDL Cholesterol  Desirable:  130 mg/dL  Above Desirable:  130-159 mg/dL  Borderline High:  160-189 mg/dL  High:  190-219 mg/dL  Very High:  Greater than or equal to 220 mg/dL   Albumin Random Urine Quantitative with Creat Ratio   Result Value Ref Range    Albumin Urine mg/L <12.0 mg/L      Comment:      The reference ranges have not been established in urine albumin. The results should be integrated into the clinical context for interpretation.    Albumin Urine mg/g Cr        Comment:      Unable to calculate, urine albumin and/or urine creatinine is outside detectable limits.  Microalbuminuria is defined as an albumin:creatinine ratio of 17 to 299 for males and 25 to 299 for females. A ratio of albumin:creatinine of 300 or higher is indicative of overt proteinuria.  Due to biologic variability, positive results should be confirmed by a second, first-morning random or 24-hour timed urine specimen. If there is discrepancy, a third specimen is recommended. When 2 out of 3 results are in the microalbuminuria range, this is evidence for incipient nephropathy and warrants increased efforts at glucose control, blood pressure control, and institution of therapy with an angiotensin-converting-enzyme (ACE) inhibitor (if the patient can tolerate it).      Creatinine Urine mg/dL 21.4 mg/dL      Comment:      The reference ranges have not been established in urine creatinine. The results should be integrated into the clinical context for interpretation.   Basic metabolic panel   Result Value Ref Range    Sodium 139 136 - 145 mmol/L    Potassium 3.9 3.4 - 5.3 mmol/L    Chloride 101 98 - 107 mmol/L    Carbon Dioxide (CO2) 26  22 - 29 mmol/L    Anion Gap 12 7 - 15 mmol/L    Urea Nitrogen 13.3 6.0 - 20.0 mg/dL    Creatinine 0.72 0.51 - 0.95 mg/dL    Calcium 9.4 8.6 - 10.0 mg/dL    Glucose 74 70 - 99 mg/dL    GFR Estimate >90 >60 mL/min/1.73m2      Comment:      Effective December 21, 2021 eGFRcr in adults is calculated using the 2021 CKD-EPI creatinine equation which includes age and gender (Mer et al., NE, DOI: 10.1056/COROoh8773323)   Hemoglobin A1c   Result Value Ref Range    Hemoglobin A1C 7.2 (H) 0.0 - 5.6 %      Comment:      Normal <5.7%   Prediabetes 5.7-6.4%    Diabetes 6.5% or higher     Note: Adopted from ADA consensus guidelines.   Follicle stimulating hormone   Result Value Ref Range    FSH 14.5 mIU/mL      Comment:      19 years and older:   Follicular phase: 3.5-12.5 mIU/mL   Ovulation phase: 4.7-21.5 mIU/mL   Luteal phase: 1.7-7.7 mIU/mL   Postmenopause: 25.8-134.8 mIU/mL      Luteinizing Hormone, Adult   Result Value Ref Range    Lutropin 25.7 mIU/mL      Comment:      FEMALE:  Age  0 - 6 mo:  <0.1-8.2 mIU/mL  6 mo - 11 years: <0.1-1.3 mIU/mL   11 - 14 years: <0.1-10 mIU/mL   14 - 19 years: 0.4-25 mIU/mL     19 years and older:   Follicular Phase: 2.4-12.6 mIU/mL  Ovulation Phase: 14.0-95.6 mIU/mL  Luteal Phase: 1.0-11.4  mIU/mL  Postmenopausal: 7.7-58.5 mIU/mL     Estradiol   Result Value Ref Range    Estradiol 338 pg/mL      Comment:      Healthy Men:   11.3-43.2 pg/mL    Healthy Postmenopausal Women:  Postmenopause: <5-138 pg/mL    Healthy Pregnant Women:  1st trimester: 154-3243 pg/mL  2nd trimester: 1561-51956 pg/mL  3rd trimester: 8525->63531 pg/mL    Healthy Women Cycle Phase:  Follicular: 30.9-90.4 pg/mL  Ovulation: 60.4-533 pg/mL  Luteal: 60.4-232 pg/mL    Healthy Women Cycle Sub-Phase:  Early Follicular: 20.5-62.8 pg/mL  Intermediate Follicular: 26-79.8 pg/mL  Late Follicular: 49.5-233 pg/mL  Ovulation: 60.4-602 pg/mL  Early Luteal: 51.1-179 pg/mL  Intermediate Luteal: 66.5-305 pg/mL  Late Luteal: 30.2-222  pg/mL   Prolactin   Result Value Ref Range    Prolactin 8 5 - 23 ng/mL   HCG quantitative pregnancy   Result Value Ref Range    hCG Quantitative <1 <5 mIU/mL      Comment:      Adult: 0-5 mIU/mL for healthy non-pregnant person  Neonates: Should be within normal ranges by 2 days after birth         If you have any questions or concerns, please call the clinic at the number listed above.       Sincerely,      Jemma Anderson MD

## 2022-11-11 NOTE — TELEPHONE ENCOUNTER
Labs noted below - mammogram negative.     -    Dear Shannon    Here are your results which are within the acceptable range.  Your hemoglobin A1c is 7.2.  It does not look like you are postmenopausal yet as you do have good estrogen levels.  I would not be surprised if you do have a menses within the next month as per our discussion.  Let's continue with the plan as discussed.    If you have any questions, please feel free to contact my nurse at [phone] select option #3 for triage nurse  or  option #1 for scheduling related questions.    Regards    Jemma Anderson MD    Lab on 11/11/2022   Component Date Value Ref Range Status     25 OH Vitamin D2 11/11/2022 <5  ug/L Final     25 OH Vitamin D3 11/11/2022 28  ug/L Final     25 OH Vit D Total 11/11/2022 <33  20 - 75 ug/L Final    Season, race, dietary intake, and treatment affect the concentration of 25-hydroxy-Vitamin D. Values may decrease during winter months and increase during summer months. Values 20-29 ug/L may indicate Vitamin D insufficiency and values <20 ug/L may indicate Vitamin D deficiency.     TSH 11/11/2022 0.89  0.30 - 4.20 uIU/mL Final     Cholesterol 11/11/2022 202 (H)  <200 mg/dL Final     Triglycerides 11/11/2022 42  <150 mg/dL Final     Direct Measure HDL 11/11/2022 100  >=50 mg/dL Final     LDL Cholesterol Calculated 11/11/2022 94  <=100 mg/dL Final     Non HDL Cholesterol 11/11/2022 102  <130 mg/dL Final     Albumin Urine mg/L 11/11/2022 <12.0  mg/L Final    The reference ranges have not been established in urine albumin. The results should be integrated into the clinical context for interpretation.     Albumin Urine mg/g Cr 11/11/2022    Final    Unable to calculate, urine albumin and/or urine creatinine is outside detectable limits.  Microalbuminuria is defined as an albumin:creatinine ratio of 17 to 299 for males and 25 to 299 for females. A ratio of albumin:creatinine of 300 or higher is indicative of overt proteinuria.  Due to biologic  variability, positive results should be confirmed by a second, first-morning random or 24-hour timed urine specimen. If there is discrepancy, a third specimen is recommended. When 2 out of 3 results are in the microalbuminuria range, this is evidence for incipient nephropathy and warrants increased efforts at glucose control, blood pressure control, and institution of therapy with an angiotensin-converting-enzyme (ACE) inhibitor (if the patient can tolerate it).       Creatinine Urine mg/dL 11/11/2022 21.4  mg/dL Final    The reference ranges have not been established in urine creatinine. The results should be integrated into the clinical context for interpretation.     Sodium 11/11/2022 139  136 - 145 mmol/L Final     Potassium 11/11/2022 3.9  3.4 - 5.3 mmol/L Final     Chloride 11/11/2022 101  98 - 107 mmol/L Final     Carbon Dioxide (CO2) 11/11/2022 26  22 - 29 mmol/L Final     Anion Gap 11/11/2022 12  7 - 15 mmol/L Final     Urea Nitrogen 11/11/2022 13.3  6.0 - 20.0 mg/dL Final     Creatinine 11/11/2022 0.72  0.51 - 0.95 mg/dL Final     Calcium 11/11/2022 9.4  8.6 - 10.0 mg/dL Final     Glucose 11/11/2022 74  70 - 99 mg/dL Final     GFR Estimate 11/11/2022 >90  >60 mL/min/1.73m2 Final    Effective December 21, 2021 eGFRcr in adults is calculated using the 2021 CKD-EPI creatinine equation which includes age and gender (Mer et al., NE, DOI: 10.1056/HGSKor1505522)     Hemoglobin A1C 11/11/2022 7.2 (H)  0.0 - 5.6 % Final    Normal <5.7%   Prediabetes 5.7-6.4%    Diabetes 6.5% or higher     Note: Adopted from ADA consensus guidelines.     FSH 11/11/2022 14.5  mIU/mL Final    19 years and older:   Follicular phase: 3.5-12.5 mIU/mL   Ovulation phase: 4.7-21.5 mIU/mL   Luteal phase: 1.7-7.7 mIU/mL   Postmenopause: 25.8-134.8 mIU/mL        Lutropin 11/11/2022 25.7  mIU/mL Final    FEMALE:  Age  0 - 6 mo:  <0.1-8.2 mIU/mL  6 mo - 11 years: <0.1-1.3 mIU/mL   11 - 14 years: <0.1-10 mIU/mL   14 - 19 years: 0.4-25 mIU/mL      19 years and older:   Follicular Phase: 2.4-12.6 mIU/mL  Ovulation Phase: 14.0-95.6 mIU/mL  Luteal Phase: 1.0-11.4  mIU/mL  Postmenopausal: 7.7-58.5 mIU/mL       Estradiol 11/11/2022 338  pg/mL Final    Healthy Men:   11.3-43.2 pg/mL    Healthy Postmenopausal Women:  Postmenopause: <5-138 pg/mL    Healthy Pregnant Women:  1st trimester: 154-3243 pg/mL  2nd trimester: 1561-85757 pg/mL  3rd trimester: 8525->23713 pg/mL    Healthy Women Cycle Phase:  Follicular: 30.9-90.4 pg/mL  Ovulation: 60.4-533 pg/mL  Luteal: 60.4-232 pg/mL    Healthy Women Cycle Sub-Phase:  Early Follicular: 20.5-62.8 pg/mL  Intermediate Follicular: 26-79.8 pg/mL  Late Follicular: 49.5-233 pg/mL  Ovulation: 60.4-602 pg/mL  Early Luteal: 51.1-179 pg/mL  Intermediate Luteal: 66.5-305 pg/mL  Late Luteal: 30.2-222 pg/mL     Prolactin 11/11/2022 8  5 - 23 ng/mL Final     hCG Quantitative 11/11/2022 <1  <5 mIU/mL Final    Adult: 0-5 mIU/mL for healthy non-pregnant person  Neonates: Should be within normal ranges by 2 days after birth

## 2022-11-16 LAB
DEPRECATED CALCIDIOL+CALCIFEROL SERPL-MC: <33 UG/L (ref 20–75)
VITAMIN D2 SERPL-MCNC: <5 UG/L
VITAMIN D3 SERPL-MCNC: 28 UG/L

## 2022-11-16 NOTE — RESULT ENCOUNTER NOTE
Dear Shannon    Here are your results which are within the acceptable range.  Your hemoglobin A1c is 7.2.  It does not look like you are postmenopausal yet as you do have good estrogen levels.  I would not be surprised if you do have a menses within the next month as per our discussion.  Let's continue with the plan as discussed.    If you have any questions, please feel free to contact my nurse at [phone] select option #3 for triage nurse  or  option #1 for scheduling related questions.    Regards    Jemma Anderson MD

## 2022-11-20 ENCOUNTER — HEALTH MAINTENANCE LETTER (OUTPATIENT)
Age: 47
End: 2022-11-20

## 2022-11-21 ENCOUNTER — TELEPHONE (OUTPATIENT)
Dept: ENDOCRINOLOGY | Facility: CLINIC | Age: 47
End: 2022-11-21

## 2022-11-21 NOTE — TELEPHONE ENCOUNTER
"----- Message from Jemma Anderson MD sent at 11/21/2022  8:22 AM CST -----  Please let pt know recent lab results    Dear Shannon     Here are your results which are within the acceptable range.  Your hemoglobin A1c is 7.2.  It does not look like you are postmenopausal yet as you do have good estrogen levels.  I would not be surprised if you do have a menses within the next month as per our discussion.  Let's continue with the plan as discussed.     If you have any questions, please feel free to contact my nurse at [phone] select option #3 for triage nurse  or  option #1 for scheduling related questions.     Regards     Jemma Anderson MD   Written by Jemma Anderson MD on 11/16/2022  3:55 PM CST      Dear Shannon     I do not have everything back yet. Your mammogram was negative.  Your hemoglobin A1c is 7.2.  It does not look like you are in menopause since you have very good estrogen levels.  I think that you might be sort of \"perimenopausal\" and that your periods are irregular.  But certainly you do make estrogen at this point.  I would not be surprised if you do have a period within the next month.  If you do not, please let me know.     Dr. Anderson   Written by Jemma Anderson MD on 11/11/2022  5:21 PM CST  Seen by patient Shannon Sow on 11/11/2022  5:34 PM        "

## 2022-12-12 DIAGNOSIS — E10.9 TYPE 1 DIABETES MELLITUS WITHOUT COMPLICATION (H): ICD-10-CM

## 2022-12-13 RX ORDER — INSULIN ASPART 100 [IU]/ML
INJECTION, SOLUTION INTRAVENOUS; SUBCUTANEOUS
Qty: 80 ML | Refills: 1 | Status: SHIPPED | OUTPATIENT
Start: 2022-12-13 | End: 2023-06-01

## 2022-12-13 NOTE — TELEPHONE ENCOUNTER
NOVOLOG 100 UNIT/ML VIAL  Last Written Prescription Date:  6/24/2022  Last Fill Quantity: 60,   # refills: 3  Last Office Visit :  10/14/2022  Future Office visit:   2/3/2023    Routing refill request to provider for review/approval because:  Drug not on the [facility], [facility] or [facility] refill protocol or controlled substance      Loretta Elliott RN  Central Triage Red Flags/Med Refills

## 2022-12-24 ENCOUNTER — HEALTH MAINTENANCE LETTER (OUTPATIENT)
Age: 47
End: 2022-12-24

## 2023-02-01 ENCOUNTER — TELEPHONE (OUTPATIENT)
Dept: ENDOCRINOLOGY | Facility: CLINIC | Age: 48
End: 2023-02-01
Payer: COMMERCIAL

## 2023-02-01 NOTE — TELEPHONE ENCOUNTER
Called patient and left voicemail. Patient has an appointment on 2/3/2023. Need patient to upload their Tandem device to site for provider to review prior to their appointment.  Taylor Myhre, RMA

## 2023-02-02 NOTE — PROGRESS NOTES
Video-Visit Details    Type of service:  Video Visit    Virtual visit conducted by Sandra.      Originating Location (pt. Location): HOME    Distant Location (provider location):  Off site    Mode of Communication:  Video Conference via [employer]    Physician has received verbal consent for a Video Visit from the patient? YES      [facility]  Endocrinology  Jemma Anderson MD  2/3/23    Chief Complaint:   Diabetes    History of Present Illness:   Shannon Sow is a 47 year old female with a history of diabetes who presents for follow up of diabetes.    ideo-Visit Details    Type of service:  Video Visit    Virtual visit conducted by Sandra.      Originating Location (pt. Location): In car    Distant Location (provider location):  Off site    Mode of Communication:  Video Conference via [employer]    Physician has received verbal consent for a Video Visit from the patient? YES    Start time 334, stop time 404, chart review, documentation time, coordination of care, 10 minutes.  Total time spent day of encounter 40 minutes.    Jemma Anderson MD    #1 Type 1 diabetes mellitus  The patient was diagnosed with diabetes in 1992.  She was on Lantus and Humalog from approximately 2000 - May 2009 when she started on an insulin pump.  As of November 2013, she has been on a newer Medtronic pump with low glucose suspend.  She started using the Freestyle Azam CGMS system in February 2018.  Her A1c level in the past 5 years have generally been in the high 6% - mid 7% range.  March 2019 hemoglobin A1c was 7%. July 2019 A1c was 7.2%. November 2019 A1c 7.4%.   February 2020 A1c 7.0% .  December 2020 hemoglobin A1c of 7.8.  April 2021- pt was offered metformin and she declined September 2021  hemoglobin A1c of 7.4.  July 2022 hemoglobin A1c at 7.0    Interval history:. Although the patient has the T slim pump, the main barrier for her converting to control IQ is because of the Dexcom sensors, and extremely expensive  at roughly $500 per month.  She has been on the flores 2 sensor although she would be interested in the flores 3.    Basal rate:  Midnight to 9 AM: 0.6 units/h  9 AM to noon: 0.9 units/h  Noon to 2 PM: 1.1 units/h  2 PM to 7 PM: 0.4 units/h  7 PM to 9 PM: 1.4 units/h  9 PM to midnight: 0.7 units/h    Correction:  1 unit per 60 mg/dL    Carb ratio:  Midnight to 6 AM: 1 per 12 g carbohydrate  6 AM to 9 AM: 1 per 8 g carbohydrate  9 AM to 9 PM: 1 per 5 g carbohydrate  9 PM to midnight: 1 per 10 g carbohydrate    Duration of insulin: 5 hours    Target glucose 120    Blood Glucose Monitoring:  I do not have her flores results are reviewed today.    Diabetes monitoring and complications:  CAD: November 2022 LDL is 94  Last eye exam results: 11/20/2019, mild nonproliferative retinopathy-patient is overdue.  Per patient report, she has an upcoming eye exam.  Microalbuminuria: November 2022 urine for protein was negative  Neuropathy: No  HTN: No  On Statin: No  On Aspirin: No  Depression: Yes    #2 Subarachnoid hemorrhage  She was hospitalized at [facility] 8/2 - 8/9/18 for a subarachnoid hemorrhage after presenting with a sudden onset headache, nausea, and vomiting.  She had no complications. She saw Dr. Jacob in Neurology on 11/9/2018 who discussed with her that she is at low risk for any recurrent issues (2 - 5 % of recurrence).  She has no history of hypertension. She followed with Dr. Regan in Neurology on 11/20/2018 who concluded that her hemorrhage was likely non-aneurysmal in origin and found no other signs of ongoing symptoms.     Interval history: Reports she is doing fairly well.     #3 interest in weight loss  At previous visit the patient reported changing her diet and increasing her exercise.      Interval history: Weight stable.      #4 fatigue  This is her most pressing issue.  She reports significant fatigue.  She reports fatigue even when she wakes up in the morning.  She reports that she would wake  up for an hour at night and then go back to sleep.  She does report significant stress in her life.  She is working with a therapist.  She feels that her Prozac dose is adequate.Extensive evaluation in December 2020 was unremarkable.    Interval history: Not discussed today.    #5 irregular menses  The patient reports some irregular menses.  She reports occasionally skipping her menses.  She is uncertain whether she is having hot flashes.  She reports that she is not currently pregnant (has checked twice).  She is uncertain when her family members may have gone through menopause    Interval history: Continues to report irregular menses.  Evaluation in November 2022 was significant for an FSH of 14.5, LH of 25.7, estradiol of 338, prolactin of 8, and negative hCG.    Review of Systems:   Pertinent items are noted in HPI.  All other systems are negative.    Active Medications:   Current Outpatient Medications   Medication Sig Dispense Refill     blood glucose test strip Test up to 6 times daily 6 Box 4     cholecalciferol (VITAMIN D) 1000 UNIT tablet Take 1 tablet (1,000 Units) by mouth daily 90 tablet 3     Continuous Blood Gluc Sensor (FREESTYLE RADHA 2 SENSOR) MISC 1 applicator daily as needed (as needed) 6 each 3     FLUoxetine (PROZAC) 40 MG capsule Take 1 capsule (40 mg) by mouth daily 90 capsule 3     insulin glargine (LANTUS PEN) 100 UNIT/ML pen Inject 20 Units Subcutaneous every morning 15 mL 0     insulin glargine (LANTUS SOLOSTAR) 100 UNIT/ML pen Take 20 units if pump fails. 30 mL 1     insulin pen needle (B-D U/F) 31G X 5 MM Use if pump fails 4-5 times daily 100 each 1     ipratropium (ATROVENT) 0.03 % nasal spray Spray 2 sprays into both nostrils every 12 hours 30 mL 3     Multiple Vitamins-Minerals (MULTIVITAMIN OR) Take by mouth daily        NOVOLOG VIAL 100 UNIT/ML soln USE UP TO 80 UNITS DAILY IN PUMP 80 mL 1     STATIN NOT PRESCRIBED (INTENTIONAL) Please choose reason not prescribed, below         "  Allergies:   Nkda [no known drug allergies]   Seasonal allergies      Past Medical History:  Type I diabetes mellitus   Vitamin deficiency  Upper respiratory infection   Cerebral salt-wasting syndrome  Hypertensive disease\  Recurrent major depressive disorder  Subarachnoid hemorrhage      Past Surgical History:  History reviewed. No pertinent past surgical history.      Family History:   Breast cancer - mother      Social History:   Pt working from home      Physical Exam:     GENERAL: Healthy, alert and no distress\",\"EYES: Eyes grossly normal to inspection.  No discharge or erythema, or obvious scleral/conjunctival abnormalities.\",\"RESP: No audible wheeze, cough, or visible cyanosis.  No visible retractions or increased work of breathing.  \",\"SKIN: Visible skin clear. No significant rash, abnormal pigmentation or lesions.\",\"NEURO: Cranial nerves grossly intact.  Mentation and speech appropriate for age.\",\"PSYCH: Mentation appears normal, affect normal/bright, judgement and insight intact, normal speech and appearance well-groomed.     Data:  No visits with results within 1 Week(s) from this visit.   Latest known visit with results is:   Lab on 11/11/2022   Component Date Value Ref Range Status     25 OH Vitamin D2 11/11/2022 <5  ug/L Final     25 OH Vitamin D3 11/11/2022 28  ug/L Final     25 OH Vit D Total 11/11/2022 <33  20 - 75 ug/L Final    Season, race, dietary intake, and treatment affect the concentration of 25-hydroxy-Vitamin D. Values may decrease during winter months and increase during summer months. Values 20-29 ug/L may indicate Vitamin D insufficiency and values <20 ug/L may indicate Vitamin D deficiency.     TSH 11/11/2022 0.89  0.30 - 4.20 uIU/mL Final     Cholesterol 11/11/2022 202 (H)  <200 mg/dL Final     Triglycerides 11/11/2022 42  <150 mg/dL Final     Direct Measure HDL 11/11/2022 100  >=50 mg/dL Final     LDL Cholesterol Calculated 11/11/2022 94  <=100 mg/dL Final     Non HDL Cholesterol " 11/11/2022 102  <130 mg/dL Final     Albumin Urine mg/L 11/11/2022 <12.0  mg/L Final    The reference ranges have not been established in urine albumin. The results should be integrated into the clinical context for interpretation.     Albumin Urine mg/g Cr 11/11/2022    Final    Unable to calculate, urine albumin and/or urine creatinine is outside detectable limits.  Microalbuminuria is defined as an albumin:creatinine ratio of 17 to 299 for males and 25 to 299 for females. A ratio of albumin:creatinine of 300 or higher is indicative of overt proteinuria.  Due to biologic variability, positive results should be confirmed by a second, first-morning random or 24-hour timed urine specimen. If there is discrepancy, a third specimen is recommended. When 2 out of 3 results are in the microalbuminuria range, this is evidence for incipient nephropathy and warrants increased efforts at glucose control, blood pressure control, and institution of therapy with an angiotensin-converting-enzyme (ACE) inhibitor (if the patient can tolerate it).       Creatinine Urine mg/dL 11/11/2022 21.4  mg/dL Final    The reference ranges have not been established in urine creatinine. The results should be integrated into the clinical context for interpretation.     Sodium 11/11/2022 139  136 - 145 mmol/L Final     Potassium 11/11/2022 3.9  3.4 - 5.3 mmol/L Final     Chloride 11/11/2022 101  98 - 107 mmol/L Final     Carbon Dioxide (CO2) 11/11/2022 26  22 - 29 mmol/L Final     Anion Gap 11/11/2022 12  7 - 15 mmol/L Final     Urea Nitrogen 11/11/2022 13.3  6.0 - 20.0 mg/dL Final     Creatinine 11/11/2022 0.72  0.51 - 0.95 mg/dL Final     Calcium 11/11/2022 9.4  8.6 - 10.0 mg/dL Final     Glucose 11/11/2022 74  70 - 99 mg/dL Final     GFR Estimate 11/11/2022 >90  >60 mL/min/1.73m2 Final    Effective December 21, 2021 eGFRcr in adults is calculated using the 2021 CKD-EPI creatinine equation which includes age and gender (Mer et al., NEJ, DOI:  10.1056/PGVTrv3827022)     Hemoglobin A1C 11/11/2022 7.2 (H)  0.0 - 5.6 % Final    Normal <5.7%   Prediabetes 5.7-6.4%    Diabetes 6.5% or higher     Note: Adopted from ADA consensus guidelines.     FSH 11/11/2022 14.5  mIU/mL Final    19 years and older:   Follicular phase: 3.5-12.5 mIU/mL   Ovulation phase: 4.7-21.5 mIU/mL   Luteal phase: 1.7-7.7 mIU/mL   Postmenopause: 25.8-134.8 mIU/mL        Luteinizing Hormone 11/11/2022 25.7  mIU/mL Final    FEMALE:  Age  0 - 6 mo:  <0.1-8.2 mIU/mL  6 mo - 11 years: <0.1-1.3 mIU/mL   11 - 14 years: <0.1-10 mIU/mL   14 - 19 years: 0.4-25 mIU/mL     19 years and older:   Follicular Phase: 2.4-12.6 mIU/mL  Ovulation Phase: 14.0-95.6 mIU/mL  Luteal Phase: 1.0-11.4  mIU/mL  Postmenopausal: 7.7-58.5 mIU/mL       Estradiol 11/11/2022 338  pg/mL Final    Healthy Men:   11.3-43.2 pg/mL    Healthy Postmenopausal Women:  Postmenopause: <5-138 pg/mL    Healthy Pregnant Women:  1st trimester: 154-3243 pg/mL  2nd trimester: 1561-29398 pg/mL  3rd trimester: 8525->24243 pg/mL    Healthy Women Cycle Phase:  Follicular: 30.9-90.4 pg/mL  Ovulation: 60.4-533 pg/mL  Luteal: 60.4-232 pg/mL    Healthy Women Cycle Sub-Phase:  Early Follicular: 20.5-62.8 pg/mL  Intermediate Follicular: 26-79.8 pg/mL  Late Follicular: 49.5-233 pg/mL  Ovulation: 60.4-602 pg/mL  Early Luteal: 51.1-179 pg/mL  Intermediate Luteal: 66.5-305 pg/mL  Late Luteal: 30.2-222 pg/mL     Prolactin 11/11/2022 8  5 - 23 ng/mL Final     hCG Quantitative 11/11/2022 <1  <5 mIU/mL Final    Adult: 0-5 mIU/mL for healthy non-pregnant person  Neonates: Should be within normal ranges by 2 days after birth         Assessment and Plan:  #1 Type 1 diabetes mellitus   The patient is overall happy with her glycemic control.  We will have the patient add metformin 500 mg extended release to her program.  We will see if this might improve her overall hemoglobin A1c, possibly make her menses more regular, and facilitate slight weight loss.  We will  also look into the possibility of semaglutide, however this is contingent on insurance coverage.  We will look again into the Dexcom coverage.  She is interested in the azam 3.  We will look into insurance coverage.  If needed, we will continue with the Azam 2, which she feels has worked well for her.  No change to her pump program for now.  She has upcoming eye exam.    #2 Subarachnoid hemorrhage  Doing well     #3 interest in weight loss  She reports frustration that her weight has been fairly stable although she has been extremely active.  We will have the patient's start metformin extended release 500 mg daily.  We will look into the possibility of semaglutide-however this is contingent on insurance coverage.  Also of note, she does report concern that when she starts the semaglutide it might be difficult to stop.  I understand.  I think it is reasonable to consider metformin for now given the safety profile of metformin.    #4 fatigue  Not discussed at current visit.    #5 irregular menses  The patient continues to have intermittent irregular menses.  November 2022 evaluation showed a high estrogen, suggestive that she is not yet postmenopausal.  We will see if this might improve with her being on the metformin.  She will start metformin extended release 500 mg daily with supper.    #6 General health maintenance  The patient is interested in colonoscopy.  Referral made for GI.  Noted some neck strain-Voltaren gel recommended..    Labs in 4 months, return visit in 6 months.    Orders Placed This Encounter   Procedures     25 Hydroxyvitamin D2 and D3     TSH     Lipid Profile     Albumin Random Urine Quantitative with Creat Ratio     Basic metabolic panel     Hemoglobin A1c     Adult GI  Referral - Procedure Only

## 2023-02-03 ENCOUNTER — VIRTUAL VISIT (OUTPATIENT)
Dept: ENDOCRINOLOGY | Facility: CLINIC | Age: 48
End: 2023-02-03
Payer: COMMERCIAL

## 2023-02-03 DIAGNOSIS — F41.9 ANXIETY: ICD-10-CM

## 2023-02-03 DIAGNOSIS — E10.9 TYPE 1 DIABETES MELLITUS WITHOUT COMPLICATION (H): ICD-10-CM

## 2023-02-03 DIAGNOSIS — R73.9 HYPERGLYCEMIA: Primary | ICD-10-CM

## 2023-02-03 DIAGNOSIS — E10.9 TYPE 1 DIABETES MELLITUS WITHOUT COMPLICATION (H): Primary | ICD-10-CM

## 2023-02-03 PROCEDURE — 99215 OFFICE O/P EST HI 40 MIN: CPT | Mod: 95 | Performed by: INTERNAL MEDICINE

## 2023-02-03 RX ORDER — INSULIN GLARGINE 100 [IU]/ML
INJECTION, SOLUTION SUBCUTANEOUS
Qty: 3 ML | Refills: 1
Start: 2023-02-03 | End: 2024-01-19

## 2023-02-03 RX ORDER — METFORMIN HCL 500 MG
500 TABLET, EXTENDED RELEASE 24 HR ORAL
Qty: 30 TABLET | Refills: 1 | Status: SHIPPED | OUTPATIENT
Start: 2023-02-03 | End: 2023-02-23

## 2023-02-03 RX ORDER — FLUOXETINE 40 MG/1
40 CAPSULE ORAL DAILY
Qty: 90 CAPSULE | Refills: 3 | Status: SHIPPED | OUTPATIENT
Start: 2023-02-03 | End: 2024-01-19

## 2023-02-03 NOTE — NURSING NOTE
Is the patient currently in the state of [place]? YES    Visit mode:VIDEO    If the visit is dropped, the patient can be reconnected by: VIDEO VISIT: Send to e-mail at: [email or web address]    Will anyone else be joining the visit? NO      How would you like to obtain your AVS? MyChart    Are changes needed to the allergy or medication list? YES: nasal spray     Comments or concerns related to today's visit: n/a

## 2023-02-03 NOTE — PATIENT INSTRUCTIONS
Voltaren gel    Read about flores 3    Read about OZEMPIC    Metformin  mg daily with supper  -  Consider adventures in cardboard    -    No visits with results within 1 Week(s) from this visit.   Latest known visit with results is:   Lab on 11/11/2022   Component Date Value Ref Range Status    25 OH Vitamin D2 11/11/2022 <5  ug/L Final    25 OH Vitamin D3 11/11/2022 28  ug/L Final    25 OH Vit D Total 11/11/2022 <33  20 - 75 ug/L Final    Season, race, dietary intake, and treatment affect the concentration of 25-hydroxy-Vitamin D. Values may decrease during winter months and increase during summer months. Values 20-29 ug/L may indicate Vitamin D insufficiency and values <20 ug/L may indicate Vitamin D deficiency.    TSH 11/11/2022 0.89  0.30 - 4.20 uIU/mL Final    Cholesterol 11/11/2022 202 (H)  <200 mg/dL Final    Triglycerides 11/11/2022 42  <150 mg/dL Final    Direct Measure HDL 11/11/2022 100  >=50 mg/dL Final    LDL Cholesterol Calculated 11/11/2022 94  <=100 mg/dL Final    Non HDL Cholesterol 11/11/2022 102  <130 mg/dL Final    Albumin Urine mg/L 11/11/2022 <12.0  mg/L Final    The reference ranges have not been established in urine albumin. The results should be integrated into the clinical context for interpretation.    Albumin Urine mg/g Cr 11/11/2022    Final    Unable to calculate, urine albumin and/or urine creatinine is outside detectable limits.  Microalbuminuria is defined as an albumin:creatinine ratio of 17 to 299 for males and 25 to 299 for females. A ratio of albumin:creatinine of 300 or higher is indicative of overt proteinuria.  Due to biologic variability, positive results should be confirmed by a second, first-morning random or 24-hour timed urine specimen. If there is discrepancy, a third specimen is recommended. When 2 out of 3 results are in the microalbuminuria range, this is evidence for incipient nephropathy and warrants increased efforts at glucose control, blood pressure control,  and institution of therapy with an angiotensin-converting-enzyme (ACE) inhibitor (if the patient can tolerate it).      Creatinine Urine mg/dL 11/11/2022 21.4  mg/dL Final    The reference ranges have not been established in urine creatinine. The results should be integrated into the clinical context for interpretation.    Sodium 11/11/2022 139  136 - 145 mmol/L Final    Potassium 11/11/2022 3.9  3.4 - 5.3 mmol/L Final    Chloride 11/11/2022 101  98 - 107 mmol/L Final    Carbon Dioxide (CO2) 11/11/2022 26  22 - 29 mmol/L Final    Anion Gap 11/11/2022 12  7 - 15 mmol/L Final    Urea Nitrogen 11/11/2022 13.3  6.0 - 20.0 mg/dL Final    Creatinine 11/11/2022 0.72  0.51 - 0.95 mg/dL Final    Calcium 11/11/2022 9.4  8.6 - 10.0 mg/dL Final    Glucose 11/11/2022 74  70 - 99 mg/dL Final    GFR Estimate 11/11/2022 >90  >60 mL/min/1.73m2 Final    Effective December 21, 2021 eGFRcr in adults is calculated using the 2021 CKD-EPI creatinine equation which includes age and gender (Mer et al., NEJ, DOI: 10.1056/WQXOnl3976302)    Hemoglobin A1C 11/11/2022 7.2 (H)  0.0 - 5.6 % Final    Normal <5.7%   Prediabetes 5.7-6.4%    Diabetes 6.5% or higher     Note: Adopted from ADA consensus guidelines.    FSH 11/11/2022 14.5  mIU/mL Final    19 years and older:   Follicular phase: 3.5-12.5 mIU/mL   Ovulation phase: 4.7-21.5 mIU/mL   Luteal phase: 1.7-7.7 mIU/mL   Postmenopause: 25.8-134.8 mIU/mL       Luteinizing Hormone 11/11/2022 25.7  mIU/mL Final    FEMALE:  Age  0 - 6 mo:  <0.1-8.2 mIU/mL  6 mo - 11 years: <0.1-1.3 mIU/mL   11 - 14 years: <0.1-10 mIU/mL   14 - 19 years: 0.4-25 mIU/mL     19 years and older:   Follicular Phase: 2.4-12.6 mIU/mL  Ovulation Phase: 14.0-95.6 mIU/mL  Luteal Phase: 1.0-11.4  mIU/mL  Postmenopausal: 7.7-58.5 mIU/mL      Estradiol 11/11/2022 338  pg/mL Final    Healthy Men:   11.3-43.2 pg/mL    Healthy Postmenopausal Women:  Postmenopause: <5-138 pg/mL    Healthy Pregnant Women:  1st trimester: 154-2331  pg/mL  2nd trimester: 1561-50980 pg/mL  3rd trimester: 8525->40084 pg/mL    Healthy Women Cycle Phase:  Follicular: 30.9-90.4 pg/mL  Ovulation: 60.4-533 pg/mL  Luteal: 60.4-232 pg/mL    Healthy Women Cycle Sub-Phase:  Early Follicular: 20.5-62.8 pg/mL  Intermediate Follicular: 26-79.8 pg/mL  Late Follicular: 49.5-233 pg/mL  Ovulation: 60.4-602 pg/mL  Early Luteal: 51.1-179 pg/mL  Intermediate Luteal: 66.5-305 pg/mL  Late Luteal: 30.2-222 pg/mL    Prolactin 11/11/2022 8  5 - 23 ng/mL Final    hCG Quantitative 11/11/2022 <1  <5 mIU/mL Final    Adult: 0-5 mIU/mL for healthy non-pregnant person  Neonates: Should be within normal ranges by 2 days after birth

## 2023-02-03 NOTE — LETTER
2/3/2023       RE: Shannon oSw  [address]  [place] [address]     Dear Colleague,    Thank you for referring your patient, Shannon Sow, to the [facility] [place] at [facility]. Please see a copy of my visit note below.    Outcome for 01/27/23 9:33 AM: ThoughtBox message sent  GEOFF Means   Outcome for 02/01/23 1:38 PM: Left Voicemail   Taylor Myhre, RMA  Outcome for 02/02/23 9:39 AM: Left Voicemail   Taylor Myhre, RMA  Outcome for 02/02/23 9:41 AM: Data uploaded on [employer] to upload tandem   Taylor Myhre, RMA                  Video-Visit Details    Type of service:  Video Visit    Virtual visit conducted by Sandra.      Originating Location (pt. Location): HOME    Distant Location (provider location):  Off site    Mode of Communication:  Video Conference via [employer]    Physician has received verbal consent for a Video Visit from the patient? YES      [facility]  Endocrinology  Jemma Anderson MD  2/3/23    Chief Complaint:   Diabetes    History of Present Illness:   Shannon Sow is a 47 year old female with a history of diabetes who presents for follow up of diabetes.    ideo-Visit Details    Type of service:  Video Visit    Virtual visit conducted by Sandra.      Originating Location (pt. Location): In car    Distant Location (provider location):  Off site    Mode of Communication:  Video Conference via [employer]    Physician has received verbal consent for a Video Visit from the patient? YES    Start time 334, stop time 404, chart review, documentation time, coordination of care, 10 minutes.  Total time spent day of encounter 40 minutes.    Jemma Anderson MD    #1 Type 1 diabetes mellitus  The patient was diagnosed with diabetes in 1992.  She was on Lantus and Humalog from approximately 2000 - May 2009 when she started on an insulin pump.  As of November 2013, she  has been on a newer Medtronic pump with low glucose suspend.  She started using the Freestyle Azam CGMS system in February 2018.  Her A1c level in the past 5 years have generally been in the high 6% - mid 7% range.  March 2019 hemoglobin A1c was 7%. July 2019 A1c was 7.2%. November 2019 A1c 7.4%.   February 2020 A1c 7.0% .  December 2020 hemoglobin A1c of 7.8.  April 2021- pt was offered metformin and she declined September 2021  hemoglobin A1c of 7.4.  July 2022 hemoglobin A1c at 7.0    Interval history:. Although the patient has the T slim pump, the main barrier for her converting to control IQ is because of the Dexcom sensors, and extremely expensive at roughly $500 per month.  She has been on the azam 2 sensor although she would be interested in the azam 3.    Basal rate:  Midnight to 9 AM: 0.6 units/h  9 AM to noon: 0.9 units/h  Noon to 2 PM: 1.1 units/h  2 PM to 7 PM: 0.4 units/h  7 PM to 9 PM: 1.4 units/h  9 PM to midnight: 0.7 units/h    Correction:  1 unit per 60 mg/dL    Carb ratio:  Midnight to 6 AM: 1 per 12 g carbohydrate  6 AM to 9 AM: 1 per 8 g carbohydrate  9 AM to 9 PM: 1 per 5 g carbohydrate  9 PM to midnight: 1 per 10 g carbohydrate    Duration of insulin: 5 hours    Target glucose 120    Blood Glucose Monitoring:  I do not have her azam results are reviewed today.    Diabetes monitoring and complications:  CAD: November 2022 LDL is 94  Last eye exam results: 11/20/2019, mild nonproliferative retinopathy-patient is overdue.  Per patient report, she has an upcoming eye exam.  Microalbuminuria: November 2022 urine for protein was negative  Neuropathy: No  HTN: No  On Statin: No  On Aspirin: No  Depression: Yes    #2 Subarachnoid hemorrhage  She was hospitalized at [facility] 8/2 - 8/9/18 for a subarachnoid hemorrhage after presenting with a sudden onset headache, nausea, and vomiting.  She had no complications. She saw Dr. Jacob in Neurology on 11/9/2018 who discussed with her that she  is at low risk for any recurrent issues (2 - 5 % of recurrence).  She has no history of hypertension. She followed with Dr. Regan in Neurology on 11/20/2018 who concluded that her hemorrhage was likely non-aneurysmal in origin and found no other signs of ongoing symptoms.     Interval history: Reports she is doing fairly well.     #3 interest in weight loss  At previous visit the patient reported changing her diet and increasing her exercise.      Interval history: Weight stable.      #4 fatigue  This is her most pressing issue.  She reports significant fatigue.  She reports fatigue even when she wakes up in the morning.  She reports that she would wake up for an hour at night and then go back to sleep.  She does report significant stress in her life.  She is working with a therapist.  She feels that her Prozac dose is adequate.Extensive evaluation in December 2020 was unremarkable.    Interval history: Not discussed today.    #5 irregular menses  The patient reports some irregular menses.  She reports occasionally skipping her menses.  She is uncertain whether she is having hot flashes.  She reports that she is not currently pregnant (has checked twice).  She is uncertain when her family members may have gone through menopause    Interval history: Continues to report irregular menses.  Evaluation in November 2022 was significant for an FSH of 14.5, LH of 25.7, estradiol of 338, prolactin of 8, and negative hCG.    Review of Systems:   Pertinent items are noted in HPI.  All other systems are negative.    Active Medications:   Current Outpatient Medications   Medication Sig Dispense Refill     blood glucose test strip Test up to 6 times daily 6 Box 4     cholecalciferol (VITAMIN D) 1000 UNIT tablet Take 1 tablet (1,000 Units) by mouth daily 90 tablet 3     Continuous Blood Gluc Sensor (FREESTYLE RADHA 2 SENSOR) MISC 1 applicator daily as needed (as needed) 6 each 3     FLUoxetine (PROZAC) 40 MG capsule Take 1  "capsule (40 mg) by mouth daily 90 capsule 3     insulin glargine (LANTUS PEN) 100 UNIT/ML pen Inject 20 Units Subcutaneous every morning 15 mL 0     insulin glargine (LANTUS SOLOSTAR) 100 UNIT/ML pen Take 20 units if pump fails. 30 mL 1     insulin pen needle (B-D U/F) 31G X 5 MM Use if pump fails 4-5 times daily 100 each 1     ipratropium (ATROVENT) 0.03 % nasal spray Spray 2 sprays into both nostrils every 12 hours 30 mL 3     Multiple Vitamins-Minerals (MULTIVITAMIN OR) Take by mouth daily        NOVOLOG VIAL 100 UNIT/ML soln USE UP TO 80 UNITS DAILY IN PUMP 80 mL 1     STATIN NOT PRESCRIBED (INTENTIONAL) Please choose reason not prescribed, below          Allergies:   Nkda [no known drug allergies]   Seasonal allergies      Past Medical History:  Type I diabetes mellitus   Vitamin deficiency  Upper respiratory infection   Cerebral salt-wasting syndrome  Hypertensive disease\  Recurrent major depressive disorder  Subarachnoid hemorrhage      Past Surgical History:  History reviewed. No pertinent past surgical history.      Family History:   Breast cancer - mother      Social History:   Pt working from home      Physical Exam:     GENERAL: Healthy, alert and no distress\",\"EYES: Eyes grossly normal to inspection.  No discharge or erythema, or obvious scleral/conjunctival abnormalities.\",\"RESP: No audible wheeze, cough, or visible cyanosis.  No visible retractions or increased work of breathing.  \",\"SKIN: Visible skin clear. No significant rash, abnormal pigmentation or lesions.\",\"NEURO: Cranial nerves grossly intact.  Mentation and speech appropriate for age.\",\"PSYCH: Mentation appears normal, affect normal/bright, judgement and insight intact, normal speech and appearance well-groomed.     Data:  No visits with results within 1 Week(s) from this visit.   Latest known visit with results is:   Lab on 11/11/2022   Component Date Value Ref Range Status     25 OH Vitamin D2 11/11/2022 <5  ug/L Final     25 OH Vitamin " D3 11/11/2022 28  ug/L Final     25 OH Vit D Total 11/11/2022 <33  20 - 75 ug/L Final    Season, race, dietary intake, and treatment affect the concentration of 25-hydroxy-Vitamin D. Values may decrease during winter months and increase during summer months. Values 20-29 ug/L may indicate Vitamin D insufficiency and values <20 ug/L may indicate Vitamin D deficiency.     TSH 11/11/2022 0.89  0.30 - 4.20 uIU/mL Final     Cholesterol 11/11/2022 202 (H)  <200 mg/dL Final     Triglycerides 11/11/2022 42  <150 mg/dL Final     Direct Measure HDL 11/11/2022 100  >=50 mg/dL Final     LDL Cholesterol Calculated 11/11/2022 94  <=100 mg/dL Final     Non HDL Cholesterol 11/11/2022 102  <130 mg/dL Final     Albumin Urine mg/L 11/11/2022 <12.0  mg/L Final    The reference ranges have not been established in urine albumin. The results should be integrated into the clinical context for interpretation.     Albumin Urine mg/g Cr 11/11/2022    Final    Unable to calculate, urine albumin and/or urine creatinine is outside detectable limits.  Microalbuminuria is defined as an albumin:creatinine ratio of 17 to 299 for males and 25 to 299 for females. A ratio of albumin:creatinine of 300 or higher is indicative of overt proteinuria.  Due to biologic variability, positive results should be confirmed by a second, first-morning random or 24-hour timed urine specimen. If there is discrepancy, a third specimen is recommended. When 2 out of 3 results are in the microalbuminuria range, this is evidence for incipient nephropathy and warrants increased efforts at glucose control, blood pressure control, and institution of therapy with an angiotensin-converting-enzyme (ACE) inhibitor (if the patient can tolerate it).       Creatinine Urine mg/dL 11/11/2022 21.4  mg/dL Final    The reference ranges have not been established in urine creatinine. The results should be integrated into the clinical context for interpretation.     Sodium 11/11/2022 139   136 - 145 mmol/L Final     Potassium 11/11/2022 3.9  3.4 - 5.3 mmol/L Final     Chloride 11/11/2022 101  98 - 107 mmol/L Final     Carbon Dioxide (CO2) 11/11/2022 26  22 - 29 mmol/L Final     Anion Gap 11/11/2022 12  7 - 15 mmol/L Final     Urea Nitrogen 11/11/2022 13.3  6.0 - 20.0 mg/dL Final     Creatinine 11/11/2022 0.72  0.51 - 0.95 mg/dL Final     Calcium 11/11/2022 9.4  8.6 - 10.0 mg/dL Final     Glucose 11/11/2022 74  70 - 99 mg/dL Final     GFR Estimate 11/11/2022 >90  >60 mL/min/1.73m2 Final    Effective December 21, 2021 eGFRcr in adults is calculated using the 2021 CKD-EPI creatinine equation which includes age and gender (Mer et al., [facility], DOI: 10.1056/MBPKdt0035711)     Hemoglobin A1C 11/11/2022 7.2 (H)  0.0 - 5.6 % Final    Normal <5.7%   Prediabetes 5.7-6.4%    Diabetes 6.5% or higher     Note: Adopted from ADA consensus guidelines.     FSH 11/11/2022 14.5  mIU/mL Final    19 years and older:   Follicular phase: 3.5-12.5 mIU/mL   Ovulation phase: 4.7-21.5 mIU/mL   Luteal phase: 1.7-7.7 mIU/mL   Postmenopause: 25.8-134.8 mIU/mL        Luteinizing Hormone 11/11/2022 25.7  mIU/mL Final    FEMALE:  Age  0 - 6 mo:  <0.1-8.2 mIU/mL  6 mo - 11 years: <0.1-1.3 mIU/mL   11 - 14 years: <0.1-10 mIU/mL   14 - 19 years: 0.4-25 mIU/mL     19 years and older:   Follicular Phase: 2.4-12.6 mIU/mL  Ovulation Phase: 14.0-95.6 mIU/mL  Luteal Phase: 1.0-11.4  mIU/mL  Postmenopausal: 7.7-58.5 mIU/mL       Estradiol 11/11/2022 338  pg/mL Final    Healthy Men:   11.3-43.2 pg/mL    Healthy Postmenopausal Women:  Postmenopause: <5-138 pg/mL    Healthy Pregnant Women:  1st trimester: 154-3243 pg/mL  2nd trimester: 1561-90245 pg/mL  3rd trimester: 8525->20350 pg/mL    Healthy Women Cycle Phase:  Follicular: 30.9-90.4 pg/mL  Ovulation: 60.4-533 pg/mL  Luteal: 60.4-232 pg/mL    Healthy Women Cycle Sub-Phase:  Early Follicular: 20.5-62.8 pg/mL  Intermediate Follicular: 26-79.8 pg/mL  Late Follicular: 49.5-233 pg/mL  Ovulation:  60.4-602 pg/mL  Early Luteal: 51.1-179 pg/mL  Intermediate Luteal: 66.5-305 pg/mL  Late Luteal: 30.2-222 pg/mL     Prolactin 11/11/2022 8  5 - 23 ng/mL Final     hCG Quantitative 11/11/2022 <1  <5 mIU/mL Final    Adult: 0-5 mIU/mL for healthy non-pregnant person  Neonates: Should be within normal ranges by 2 days after birth         Assessment and Plan:  #1 Type 1 diabetes mellitus   The patient is overall happy with her glycemic control.  We will have the patient add metformin 500 mg extended release to her program.  We will see if this might improve her overall hemoglobin A1c, possibly make her menses more regular, and facilitate slight weight loss.  We will also look into the possibility of semaglutide, however this is contingent on insurance coverage.  We will look again into the Dexcom coverage.  She is interested in the azam 3.  We will look into insurance coverage.  If needed, we will continue with the Azam 2, which she feels has worked well for her.  No change to her pump program for now.  She has upcoming eye exam.    #2 Subarachnoid hemorrhage  Doing well     #3 interest in weight loss  She reports frustration that her weight has been fairly stable although she has been extremely active.  We will have the patient's start metformin extended release 500 mg daily.  We will look into the possibility of semaglutide-however this is contingent on insurance coverage.  Also of note, she does report concern that when she starts the semaglutide it might be difficult to stop.  I understand.  I think it is reasonable to consider metformin for now given the safety profile of metformin.    #4 fatigue  Not discussed at current visit.    #5 irregular menses  The patient continues to have intermittent irregular menses.  November 2022 evaluation showed a high estrogen, suggestive that she is not yet postmenopausal.  We will see if this might improve with her being on the metformin.  She will start metformin extended  release 500 mg daily with supper.    #6 General health maintenance  The patient is interested in colonoscopy.  Referral made for GI.  Noted some neck strain-Voltaren gel recommended..    Labs in 4 months, return visit in 6 months.    Orders Placed This Encounter   Procedures     25 Hydroxyvitamin D2 and D3     TSH     Lipid Profile     Albumin Random Urine Quantitative with Creat Ratio     Basic metabolic panel     Hemoglobin A1c     Adult GI  Referral - Procedure Only       Again, thank you for allowing me to participate in the care of your patient.      Sincerely,    Jemma Anderson MD

## 2023-02-03 NOTE — LETTER
Date:February 6, 2023      Provider requested that no letter be sent. Do not send.       [facility]

## 2023-02-06 ENCOUNTER — TELEPHONE (OUTPATIENT)
Dept: ENDOCRINOLOGY | Facility: CLINIC | Age: 48
End: 2023-02-06
Payer: COMMERCIAL

## 2023-02-06 DIAGNOSIS — E10.9 TYPE 1 DIABETES MELLITUS WITHOUT COMPLICATION (H): Primary | ICD-10-CM

## 2023-02-06 NOTE — TELEPHONE ENCOUNTER
Will refer to MTM - orders placed    ----- Message from Lolita Brandt sent at 2/3/2023  3:48 PM CST -----  Freestyle is not covered under insurance. We can try for a pa but more than likely the patient has a high deductible and that is why her copay is so expensive. I don't see it being much cheaper than dexcom.  ----- Message -----  From: Jemma Anderson MD  Sent: 2/3/2023   3:45 PM CST  To: Pharmacy Liaison Endocrinology Pool    Cost of flores 3 for pt?

## 2023-02-07 ENCOUNTER — TELEPHONE (OUTPATIENT)
Dept: PHARMACY | Facility: CLINIC | Age: 48
End: 2023-02-07
Payer: COMMERCIAL

## 2023-02-07 ENCOUNTER — TELEPHONE (OUTPATIENT)
Dept: ENDOCRINOLOGY | Facility: CLINIC | Age: 48
End: 2023-02-07
Payer: COMMERCIAL

## 2023-02-07 ENCOUNTER — TELEPHONE (OUTPATIENT)
Dept: ENDOCRINOLOGY | Facility: CLINIC | Age: 48
End: 2023-02-07

## 2023-02-07 DIAGNOSIS — R73.9 HYPERGLYCEMIA: Primary | ICD-10-CM

## 2023-02-07 RX ORDER — SEMAGLUTIDE 1.34 MG/ML
0.25 INJECTION, SOLUTION SUBCUTANEOUS
Qty: 6 ML | Refills: 1 | OUTPATIENT
Start: 2023-02-07 | End: 2023-02-27

## 2023-02-07 NOTE — TELEPHONE ENCOUNTER
----- Message from Lolita Brandt sent at 2/7/2023  2:32 PM CST -----  Jared Nielson for the late response, both medications needs a pa.    Thank you,  Eryn Brandt, [facility]  Diabetes Weight Management Clinic Liaison     [facility]    [email or web address]  [email or web address]    Phone: [phone]  Fax: [phone]      ----- Message -----  From: Ximena Teran  Sent: 2/3/2023   4:07 PM CST  To: Lolita Brandt      ----- Message -----  From: Jemma Anderson MD  Sent: 2/3/2023   3:50 PM CST  To: Pharmacy Liaison Endocrinology Pool    Cost of semaglutide for pt (both wegovy or ozempic) as well as trulicity

## 2023-02-07 NOTE — TELEPHONE ENCOUNTER
PA Initiation    Medication: Ozempic  Insurance Company: [employer] [place] - Phone [phone] Fax [phone]  Pharmacy Filling the Rx:    Filling Pharmacy Phone:    Filling Pharmacy Fax:    Start Date: 2/7/2023    Key:  [id number]

## 2023-02-08 ENCOUNTER — TELEPHONE (OUTPATIENT)
Dept: ENDOCRINOLOGY | Facility: CLINIC | Age: 48
End: 2023-02-08

## 2023-02-08 ENCOUNTER — OFFICE VISIT (OUTPATIENT)
Dept: OPHTHALMOLOGY | Facility: CLINIC | Age: 48
End: 2023-02-08
Payer: COMMERCIAL

## 2023-02-08 DIAGNOSIS — H52.13 MYOPIA OF BOTH EYES: ICD-10-CM

## 2023-02-08 DIAGNOSIS — E10.9 TYPE 1 DIABETES MELLITUS WITHOUT RETINOPATHY (H): Primary | ICD-10-CM

## 2023-02-08 DIAGNOSIS — H26.9 CORTICAL CATARACT OF BOTH EYES: ICD-10-CM

## 2023-02-08 PROCEDURE — 92015 DETERMINE REFRACTIVE STATE: CPT | Performed by: OPTOMETRIST

## 2023-02-08 PROCEDURE — 92014 COMPRE OPH EXAM EST PT 1/>: CPT | Performed by: OPTOMETRIST

## 2023-02-08 ASSESSMENT — CUP TO DISC RATIO
OS_RATIO: 0.45
OD_RATIO: 0.45

## 2023-02-08 ASSESSMENT — VISUAL ACUITY
OD_CC: 20/25
METHOD: SNELLEN - LINEAR
CORRECTION_TYPE: GLASSES
OS_CC: 20/40

## 2023-02-08 ASSESSMENT — REFRACTION_MANIFEST
OD_CYLINDER: SPHERE
OS_SPHERE: -0.75
OS_CYLINDER: SPHERE
OS_ADD: +1.50
OD_ADD: +1.50
OD_SPHERE: -1.25

## 2023-02-08 ASSESSMENT — CONF VISUAL FIELD
OD_SUPERIOR_TEMPORAL_RESTRICTION: 0
OS_SUPERIOR_NASAL_RESTRICTION: 0
METHOD: COUNTING FINGERS
OD_INFERIOR_TEMPORAL_RESTRICTION: 0
OD_NORMAL: 1
OD_SUPERIOR_NASAL_RESTRICTION: 0
OS_SUPERIOR_TEMPORAL_RESTRICTION: 0
OS_NORMAL: 1
OS_INFERIOR_TEMPORAL_RESTRICTION: 0
OD_INFERIOR_NASAL_RESTRICTION: 0
OS_INFERIOR_NASAL_RESTRICTION: 0

## 2023-02-08 ASSESSMENT — TONOMETRY
OS_IOP_MMHG: 13
IOP_METHOD: ICARE
OD_IOP_MMHG: 14

## 2023-02-08 ASSESSMENT — EXTERNAL EXAM - LEFT EYE: OS_EXAM: NORMAL

## 2023-02-08 ASSESSMENT — SLIT LAMP EXAM - LIDS
COMMENTS: NORMAL
COMMENTS: NORMAL

## 2023-02-08 ASSESSMENT — EXTERNAL EXAM - RIGHT EYE: OD_EXAM: NORMAL

## 2023-02-08 NOTE — NURSING NOTE
Chief Complaints and History of Present Illnesses   Patient presents with     Diabetic Eye Exam     Chief Complaint(s) and History of Present Illness(es)     Diabetic Eye Exam            Associated symptoms: blurred vision (at near , feels bifocal may need to be adjusted to stronger).  Negative for flashes and floaters    Diabetes Type: Type 1 and on insulin    Blood Sugars: fluctuates    Treatments tried: no treatments    Pain scale: 0/10          Comments    Pt doing well, here for annual, only concerns is NVA is becoming blurred.     Lab Results       Component                Value               Date                       A1C                      7.2                 11/11/2022                 A1C                      7.0                 07/18/2022                 A1C                      7.4                 09/10/2021                 A1C                      7.8                 12/24/2020                 A1C                      7.2                 11/09/2018                 A1C                      7.5                 11/01/2013                 A1C                      7.5                 07/26/2013                 A1C                      7.4                 03/01/2013              Arabella COSBY February 8, 2023 3:13 PM

## 2023-02-08 NOTE — PROGRESS NOTES
History  HPI     Diabetic Eye Exam    Associated symptoms include blurred vision (at near , feels bifocal may need to be adjusted to stronger).  Negative for flashes and floaters.  Diabetes characteristics include Type 1 and on insulin.  Blood sugar level fluctuates.  Treatments tried include no treatments.  Pain was noted as 0/10.           Comments    Pt doing well, here for annual, only concerns is NVA is becoming blurred.     Lab Results       Component                Value               Date                       A1C                      7.2                 11/11/2022                 A1C                      7.0                 07/18/2022                 A1C                      7.4                 09/10/2021                 A1C                      7.8                 12/24/2020                 A1C                      7.2                 11/09/2018                 A1C                      7.5                 11/01/2013                 A1C                      7.5                 07/26/2013                 A1C                      7.4                 03/01/2013              Arabella Novak COT February 8, 2023 3:13 PM              Last edited by Leslie Novak on 2/8/2023  3:13 PM.          Assessment/Plan  (E10.9) Type 1 diabetes mellitus without retinopathy (H)  (primary encounter diagnosis)  Comment: No retinopathy  Plan:  Educated patient on clinical findings and the importance of continued management with primary care physician. Continue management as directed and return to clinic in 1 year for dilated exam, or sooner, as needed. Copy of chart sent to Dr. Anderson.    (H52.13) Myopia of both eyes  Comment: Myopia both eyes with presbyopia  Plan:  Dispensed spectacle prescription for full time wear. Monitor annually.    (H26.9) Cortical cataract of both eyes  Comment: Longstanding, stable, not visually significant  Plan:  No treatment indicated at this time. Monitor annually.    Return to clinic in 1 year for  comprehensive eye exam.    Complete documentation of historical and exam elements from today's encounter can  be found in the full encounter summary report (not reduplicated in this progress  note). I personally obtained the chief complaint(s) and history of present illness. I  confirmed and edited as necessary the review of systems, past medical/surgical  history, family history, social history, and examination findings as documented by  others; and I examined the patient myself. I personally reviewed the relevant tests,  images, and reports as documented above. I formulated and edited as necessary the  assessment and plan and discussed the findings and management plan with the  patient and family.    Job Gonzalez OD, FAAO

## 2023-02-08 NOTE — TELEPHONE ENCOUNTER
MTM referral from: [facility] visit (referral by provider)    MTM referral outreach attempt #2 on February 8, 2023 at 11:29 AM      Outcome: Patient not reachable after several attempts, will route to MTM Pharmacist/Provider as an FYI.  MT scheduling number is [phone].  Thank you for the referral.    Vinicius Justin, MTM coordinator

## 2023-02-08 NOTE — Clinical Note
Thank you for referring Shannon FIGUEROA Abby Sow for her annual eye exam. No diabetic retinopathy noted on examination today. Recommended repeat evaluation in 1 year. Please contact me with any questions. Job Gonzalez, OD on 2/9/2023 at 7:26 AM 
[phone]

## 2023-02-20 ENCOUNTER — TELEPHONE (OUTPATIENT)
Dept: GASTROENTEROLOGY | Facility: CLINIC | Age: 48
End: 2023-02-20
Payer: COMMERCIAL

## 2023-02-20 ENCOUNTER — TELEPHONE (OUTPATIENT)
Dept: ENDOCRINOLOGY | Facility: CLINIC | Age: 48
End: 2023-02-20
Payer: COMMERCIAL

## 2023-02-20 NOTE — TELEPHONE ENCOUNTER
Ozempic denied, patient must have type 2 diabetes for insurance to cover. Please advise on how you would like to proceed. If appealed please provide medical rational

## 2023-02-20 NOTE — TELEPHONE ENCOUNTER
Screening Questions  BLUE  KIND OF PREP RED  LOCATION [review exclusion criteria] GREEN  SEDATION TYPE        Y Are you active on mychart?       Jemma Anderson Ordering/Referring Provider?        BCBS What type of coverage do you have?      N Have you had a positive covid test in the last 14 days?     29.3 1. BMI  [BMI 40+ - review exclusion criteria]    Y  2. Are you able to give consent for your medical care? [IF NO,RN REVIEW]          N  3. Are you taking any prescription pain medications on a routine schedule   (ex narcotics: oxycodone, roxicodone, oxycontin,  and percocet)? [RN Review]        N  3a. EXTENDED PREP What kind of prescription?     N 4. Do you have any chemical dependencies such as alcohol, street drugs, or methadone?        **If yes 3- 5 , please schedule with MAC sedation.**          IF YES TO ANY 6 - 10 - HOSPITAL SETTING ONLY.     N 6.   Do you need assistance transferring?     N 7.   Have you had a heart or lung transplant?    N 8.   Are you currently on dialysis?   N 9.   Do you use daily home oxygen?   N 10. Do you take nitroglycerin?   10a. N If yes, how often?     11. [FEMALES]  N Are you currently pregnant?    11a. N If yes, how many weeks? [ Greater than 12 weeks, OR NEEDED]    N 12. Do you have Pulmonary Hypertension? *NEED PAC APPT AT [facility] w/ MAC*     N 13. [review exclusion criteria]  Do you have any implantable devices in your body (pacemaker, defib, LVAD)?    N 14. In the past 6 months, have you had any heart related issues including cardiomyopathy or heart attack?     14a. N If yes, did it require cardiac stenting if so when?     N 15. Have you had a stroke or Transient ischemic attack (TIA - aka  mini stroke ) within 6 months?      N 16. Do you have mod to severe Obstructive Sleep Apnea?  [Hospital only]    N 17. Do you have SEVERE AND UNCONTROLLED asthma? *NEED PAC APPT AT [facility] w/MAC*     N 18. Are you currently taking any blood thinners?     18a. If yes, inform patient to  "\"follow up w/ ordering provider for bridging instructions.\"    N 19. Do you take the medication Phentermine?    19a. If yes, \"Hold for 7 days before procedure.  Please consult your prescribing provider if you have questions about holding this medication.\"     N  20. Do you have chronic kidney disease?      N  TYPE 121. Do you have a diagnosis of diabetes?     N  22. On a regular basis do you go 3-5 days between bowel movements?     N 23. Preferred LOCAL Pharmacy for Pre Prescription    [ LIST ONLY ONE PHARMACY]     [facility] [id number] [facility] [place], [place] - [address] [place] [address] W        - CLOSING REMINDERS -    Informed patient they will need an adult    Cannot take any type of public or medical transportation alone    Conscious Sedation- Needs  for 6 hours after the procedure       MAC/General-Needs  for 24 hours after procedure    Pre-Procedure Covid test to be completed [[facility] PCR Testing Required]    Confirmed Nurse will call to complete assessment       - SCHEDULING DETAILS -  N Hospital Setting Required? If yes, what is the exclusion?: BELLA MCINTYRE  Surgeon    4/21  Date of Procedure  Lower Endoscopy [Colonoscopy]  Type of Procedure Scheduled  [facility] Location   [facility]-If you answer yes to questions #8, #20, #21Which Colonoscopy Prep was Sent?     MODERATE Sedation Type     N PAC / Pre-op Required                 "

## 2023-02-20 NOTE — TELEPHONE ENCOUNTER
PRIOR AUTHORIZATION DENIED    Medication: Ozempic    Denial Date: 2/10/2023    Denial Rational:     Appeal Information: Fax # [phone]

## 2023-02-21 ENCOUNTER — TELEPHONE (OUTPATIENT)
Dept: ENDOCRINOLOGY | Facility: CLINIC | Age: 48
End: 2023-02-21
Payer: COMMERCIAL

## 2023-02-22 ENCOUNTER — TELEPHONE (OUTPATIENT)
Dept: ENDOCRINOLOGY | Facility: CLINIC | Age: 48
End: 2023-02-22
Payer: COMMERCIAL

## 2023-02-22 NOTE — TELEPHONE ENCOUNTER
Pt has followup with MTM -  We will have patient adjust settings as noted below. Angel sent. Called pt - left message.    Midnight to 9 AM: 0.5 units/h  9 AM to noon: 1.0 units/h  2 to 7 PM: 0.2 units/h  7 PM to 9 PM: 1.6units/h  9 PM to midnight: 0.5 units/h    Correction factor:  1 per 60    Carb ratio:  9 PM to midnight: 1 per 12  Midnight to 6 AM: 1 per 12  6 AM to 9 PM: 1 per 5.0 g      -  Reviewed.  Average glucose 156, estimated hemoglobin A1c at 7.0, 65% within range, 31% above range, 4% low.    Reviewing her CGM, she tends to have lows in the afternoon, especially from about 2 to 6 PM.  She tends to have highs with supper.    Reviewing her pump settings, she takes about 32 units of insulin per day, 46% basal, 51% bolus.    Settings include the following:  Midnight to 9 AM: 0.6 units/h  9 AM to noon: 1.0 units/h  2 to 7 PM: 0.3 units/h  7 PM to 9 PM: 1.4 units/h  9 PM to midnight: 0.6 units/h    Correction factor:  1 per 60    Carb ratio:  9 PM to midnight: 1 per 10  Midnight to 6 AM: 1 per 12  6 AM to 9 PM: 1 per 4.5 g

## 2023-02-23 ENCOUNTER — TELEPHONE (OUTPATIENT)
Dept: ENDOCRINOLOGY | Facility: CLINIC | Age: 48
End: 2023-02-23
Payer: COMMERCIAL

## 2023-02-23 DIAGNOSIS — R73.9 HYPERGLYCEMIA: ICD-10-CM

## 2023-02-23 RX ORDER — METFORMIN HCL 500 MG
500 TABLET, EXTENDED RELEASE 24 HR ORAL
Qty: 90 TABLET | Refills: 3 | Status: SHIPPED | OUTPATIENT
Start: 2023-02-23 | End: 2023-07-14

## 2023-02-27 ENCOUNTER — VIRTUAL VISIT (OUTPATIENT)
Dept: PHARMACY | Facility: CLINIC | Age: 48
End: 2023-02-27
Payer: COMMERCIAL

## 2023-02-27 ENCOUNTER — TELEPHONE (OUTPATIENT)
Dept: ENDOCRINOLOGY | Facility: CLINIC | Age: 48
End: 2023-02-27

## 2023-02-27 PROCEDURE — 99207 PR NO CHARGE LOS: CPT | Mod: VID

## 2023-02-27 RX ORDER — BLOOD-GLUCOSE SENSOR
1 EACH MISCELLANEOUS
Qty: 6 EACH | Refills: 3 | Status: SHIPPED | OUTPATIENT
Start: 2023-02-27 | End: 2024-03-10

## 2023-02-27 NOTE — TELEPHONE ENCOUNTER
PA Initiation    Medication: Wegovy  Insurance Company: [employer] [place] - Phone [phone] Fax [phone]  Pharmacy Filling the Rx:    Filling Pharmacy Phone:    Filling Pharmacy Fax:    Start Date: 2/27/2023    Key: [id number]

## 2023-02-27 NOTE — TELEPHONE ENCOUNTER
"Oops--you're too quick Eryn!    Here is clinical info if needed:     To Whom it May Concern:    I am writing to formally request a prior authorization of coverage for my patient,  Shannon Sow, for treatment using Wegovy 0.25 mg #2 mL for 28 day supply and all subsequent doses.    ICD code (if different than what is on RX):  Z68.29 BMI 29-29.9, adult. Note: The original diagnosis on the claim \"type 1 diabetes\" was erroneously attached to the prescription.  The patient will be using for FDA approved indication (patient has a BMI greater than 27 + documented history of hypertensive disease).     Please see below for rationale for coverage for Wegovy:      Current BMI is 29.26 (1.575 m; 72.6 kg) and Wegovy would be used for comprehensive weight management    Wegovy would be used as an adjunct to a reduced-calorie diet and increased physical activity    Patient has been and is currently following a dietary and behavior modification program for weight loss. She has tried exercise -- biking, hiking, walking, and rowing. She has tried a calorie-restrictive diet--well versed in counting/tracking calorie and carb intake.    Other formulary options are not appropriate for her.  Given history of anxiety, Contrave--which contains antidepressant bupropion--can exacerbate anxiety so it is not appropriate for this patient.  Qsymia and phentermine monotherapy are also not appropriate options given the patient has a history of hypertensive disease and anxiety -- stimulates can exacerbate both of these issues.  Saxenda is a once daily injection, which would be burdensome to this patient given requirement of daily administration vs. the once weekly convenience of Wegovy.     Please approve this patient for Wegovy to help this patient to achieve her weight loss goals.    I would request this submission be evaluated on an individual basis by an endocrinologist or weight  who is current in the practice " and standards of care. I firmly believe that this therapy is clinically appropriate and that Shannon would benefit from improved clinical outcomes and quality of life if allowed the opportunity to receive this treatment.  I urged you to follow the aforementioned evidence presented to keep the best interest of our patient in mind.

## 2023-02-27 NOTE — TELEPHONE ENCOUNTER
PA Initiation with updated Diagnosis code    Medication: Wegovy  Insurance Company: [employer] [place] - Phone [phone] Fax [phone]  Pharmacy Filling the Rx:    Filling Pharmacy Phone:    Filling Pharmacy Fax:    Start Date: 2/27/2023    Key:  [id number]

## 2023-02-27 NOTE — LETTER
"3/7/2023    INSURER: Payor: BCBS / Plan: [employer] [place] / Product Type: Indemnity /   ATTN: Appeals Department  Re: Prior Authorization Request  Patient: Shannon Sow  Policy ID#:  [id number]  : 1975      To Whom it May Concern:    I am writing to formally request a prior authorization of coverage for my patient,  Shannon Sow, for treatment using Wegovy 0.25 mg #2 mL for 28 day supply and all subsequent doses.    ICD code (if different than what is on RX):  Z68.29 BMI 29-29.9, adult. Note: The original diagnosis on the claim \"type 1 diabetes\" was erroneously attached to the prescription.  The patient will be using for FDA approved indication (patient has a BMI greater than 27 + documented history of hypertensive disease).     Please see below for rationale for coverage for Wegovy:      Current BMI is 29.26 (1.575 m; 72.6 kg) and Wegovy would be used for comprehensive weight management    Wegovy would be used as an adjunct to low calorie diet, increased physical activity, and lifestyle changes    Patient has been and is currently following a dietary and behavior modification program for weight loss. She has tried exercise -- biking, hiking, walking, and rowing. She has tried a calorie-restrictive diet--well versed in counting/tracking calorie and carb intake.    Other formulary options are not appropriate for her.  Given history of anxiety, Contrave--which contains antidepressant bupropion--can exacerbate anxiety so it is not appropriate for this patient.  Qsymia and phentermine monotherapy are also not appropriate options given the patient has a history of hypertensive disease and anxiety -- stimulates can exacerbate both of these issues.  Saxenda is a once daily injection, which would be burdensome to this patient given requirement of daily administration vs. the once weekly convenience of Wegovy.     Please approve this patient for Wegovy to help this patient to achieve her " weight loss goals.    I would request this submission be evaluated on an individual basis by an endocrinologist or weight  who is current in the practice and standards of care. I firmly believe that this therapy is clinically appropriate and that Shannon would benefit from improved clinical outcomes and quality of life if allowed the opportunity to receive this treatment.  I urged you to follow the aforementioned evidence presented to keep the best interest of our patient in mind.    Please send your written decision to me at this address:  [facility] [address] [address] [phone]  Dept: [phone]  E-mail: [email or web address]      Sincerely,      Jemma Anderson MD        Enclosures

## 2023-02-27 NOTE — PROGRESS NOTES
"Disease State Management Encounter:                          Shannon Sow is a 47 year old female called for for an initial visit. She was referred to me from Dr. Anderson.      Reason for visit: Wegovy appeal.    BMI 29-29.9: Patient denied other GLP-1 agonist therapy secondary to having a diagnosis of type 1 diabetes.  We may have luck with an appeal/prior authorization given patient qualifies for the FDA indication for Wegovy and Saxenda.  See letter in A/P for more information for why this patient qualifies.  Patient has a documented history of hypertensive disease.    Wt Readings from Last 3 Encounters:   03/02/23 160 lb (72.6 kg)   03/07/22 160 lb (72.6 kg)   11/12/21 155 lb (70.3 kg)     Estimated body mass index is 29.26 kg/m  as calculated from the following:    Height as of 3/7/22: 5' 2\" (1.575 m).    Weight as of 3/2/23: 160 lb (72.6 kg).    Assessment/Plan:    1. PA letter for Wegovy created based on patient interview. See below.  2. We will try for Azam 3 PA per patient request    To Whom it May Concern:    I am writing to formally request a prior authorization of coverage for my patient,  Shannon Sow, for treatment using Wegovy 0.25 mg #2 mL for 28 day supply and all subsequent doses.    ICD code (if different than what is on RX):  Z68.29 BMI 29-29.9, adult. Note: The original diagnosis on the claim \"type 1 diabetes\" was erroneously attached to the prescription.  The patient will be using for FDA approved indication (patient has a BMI greater than 27 + documented history of hypertensive disease).     Please see below for rationale for coverage for Wegovy:      Current BMI is 29.26 (1.575 m; 72.6 kg) and Wegovy would be used for comprehensive weight management    Wegovy would be used as an adjunct to a reduced-calorie diet and increased physical activity    Patient has been and is currently following a dietary and behavior modification program for weight loss. She has tried " exercise -- biking, hiking, walking, and rowing. She has tried a calorie-restrictive diet--well versed in counting/tracking calorie and carb intake.    Other formulary options are not appropriate for her.  Given history of anxiety, Contrave--which contains antidepressant bupropion--can exacerbate anxiety so it is not appropriate for this patient.  Qsymia and phentermine monotherapy are also not appropriate options given the patient has a history of hypertensive disease and anxiety -- stimulates can exacerbate both of these issues.  Saxenda is a once daily injection, which would be burdensome to this patient given requirement of daily administration vs. the once weekly convenience of Wegovy.     Please approve this patient for Wegovy to help this patient to achieve her weight loss goals.    I would request this submission be evaluated on an individual basis by an endocrinologist or weight  who is current in the practice and standards of care. I firmly believe that this therapy is clinically appropriate and that Shannon would benefit from improved clinical outcomes and quality of life if allowed the opportunity to receive this treatment.  I urged you to follow the aforementioned evidence presented to keep the best interest of our patient in mind.      I spent 15 minutes with this patient today. All changes were made via collaborative practice agreement with Dr. Anderson. A copy of the visit note was provided to the patient's provider(s).    A summary of these recommendations was given to the patient.    Lebron Edwards, PharmD  [facility] Pharmacist  [address] [place] [id number]  Direct Voicemail: [phone]       Medication Therapy Recommendations  BMI 29.0-29.9,adult    Current Medication: Semaglutide-Weight Management (WEGOVY) 0.25 MG/0.5ML pen   Rationale: Cannot afford medication product - Cost - Adherence   Recommendation: Referral to Service    Status: Resolved Med  Access Issue

## 2023-03-02 ENCOUNTER — ANCILLARY PROCEDURE (OUTPATIENT)
Dept: GENERAL RADIOLOGY | Facility: CLINIC | Age: 48
End: 2023-03-02
Attending: PEDIATRICS
Payer: COMMERCIAL

## 2023-03-02 ENCOUNTER — OFFICE VISIT (OUTPATIENT)
Dept: ORTHOPEDICS | Facility: CLINIC | Age: 48
End: 2023-03-02
Payer: COMMERCIAL

## 2023-03-02 VITALS
SYSTOLIC BLOOD PRESSURE: 128 MMHG | DIASTOLIC BLOOD PRESSURE: 80 MMHG | WEIGHT: 160 LBS | HEART RATE: 74 BPM | BODY MASS INDEX: 29.26 KG/M2

## 2023-03-02 DIAGNOSIS — M76.31 ILIOTIBIAL BAND SYNDROME OF RIGHT SIDE: ICD-10-CM

## 2023-03-02 DIAGNOSIS — M25.551 RIGHT HIP PAIN: Primary | ICD-10-CM

## 2023-03-02 DIAGNOSIS — M62.89 TENSOR FASCIA LATA SYNDROME: ICD-10-CM

## 2023-03-02 PROCEDURE — 99203 OFFICE O/P NEW LOW 30 MIN: CPT | Performed by: PEDIATRICS

## 2023-03-02 PROCEDURE — 73502 X-RAY EXAM HIP UNI 2-3 VIEWS: CPT | Mod: TC | Performed by: RADIOLOGY

## 2023-03-02 NOTE — LETTER
"    3/2/2023         RE: Shannon Sow  [address] [place] [address]        Dear Colleague,    Thank you for referring your patient, Shannon Sow, to the [facility]. Please see a copy of my visit note below.    ASSESSMENT & PLAN    Shannon was seen today for pain.    Diagnoses and all orders for this visit:    Right hip pain  -     XR Pelvis w Hip RT 1 View  -     Physical Therapy Referral; Future    Iliotibial band syndrome of right side  -     Physical Therapy Referral; Future    Tensor fascia shelly syndrome  -     Physical Therapy Referral; Future      This issue is acute and Unchanged.    Right hip pain mostly in TFL and IT Band, some IT Band tightness and gluteal weakness on that side.    Plan:  - Today's Plan of Care:  Rehab: Physical Therapy: [facility] - [phone]    Discussed activity considerations and other supportive care including Ice/Heat, OTC and other topical medications as needed.    -We also discussed other future treatment options:  MRI Right Hip - if not improving or more severe pain, limping    Follow Up: 6 - 8 weeks    Concerning signs and symptoms were reviewed.  The patient expressed understanding of this management plan and all questions were answered at this time.    Merly Guthrie MD [facility]  Sports Medicine Physician  [facility]      -----  Chief Complaint   Patient presents with     Right Hip - Pain       SUBJECTIVE  Shannon Sow is a/an 47 year old female who is seen as a self referral for evaluation of right hip pain.     The patient is seen by themselves.    Onset: 2 week(s) ago. Reports insidious onset without acute precipitating event.   State she had a \"big bike ride\" but no known incident.  Location of Pain: right hip; TFL, ASIS, Iliac crest, gluteus medius   Worsened by: pain comes and goes, laying on that side (feels like she is laying on something), walking, " nii cross legs, one episode of pressure on the foot causing pain going up the leg  Better with: ibuprofen, diclofenac  Treatments tried: ibuprofen and diclofenac  Associated symptoms: random pain, achy     Orthopedic/Surgical history: YES - Date: previously has had tight muscles, she has seen a physical therapy in the past (20 years ago)   Social History/Occupation:      No family history pertinent to patient's problem today.    REVIEW OF SYSTEMS:  Review of Systems  Skin: no bruising, no swelling  Musculoskeletal: as above  Neurologic: no numbness, paresthesias  Remainder of review of systems is negative including constitutional, CV, pulmonary, GI, except as noted in HPI or medical history.    OBJECTIVE:  /80   Pulse 74   Wt 72.6 kg (160 lb)   BMI 29.26 kg/m     General: healthy, alert and in no distress  HEENT: no scleral icterus or conjunctival erythema  Skin: no suspicious lesions or rash. No jaundice.  CV: distal perfusion intact  Resp: normal respiratory effort without conversational dyspnea   Psych: normal mood and affect  Gait: NORMAL  Neuro: Normal light sensory exam of lower extremity    Bilateral hip exam    Inspection:      no edema or ecchymosis in hip area    Tender:      TFL right side    Non Tender:      remainder of the hip area bilateral    ROM:     Full active and passive ROM  bilateral    Strength:      flexion 5/5 bilateral       abduction 4+/5 right       adduction 5/5 bilateral    Sensation:      grossly intact in hip and thigh    Special Tests:      neg (-) DELVIN right       neg (-) FADIR right       positive (+) Yolie right    RADIOLOGY:  I independently ordered, visualized and reviewed these images with the patient  AP Pelvis and right lateral XR views of hip reviewed: no acute bony abnormality, no significant degenerative change  - will follow official read      Review of the result(s) of each unique test - XR             Again, thank you for allowing me to  participate in the care of your patient.        Sincerely,        Merly Guthrie MD

## 2023-03-02 NOTE — PATIENT INSTRUCTIONS
Right hip pain mostly in TFL and IT Band, some IT Band tightness and gluteal weakness on that side.    Plan:  - Today's Plan of Care:  Rehab: Physical Therapy: [facility] - [phone]    Discussed activity considerations and other supportive care including Ice/Heat, OTC and other topical medications as needed.    -We also discussed other future treatment options:  MRI Right Hip - if not improving or more severe pain, limping    Follow Up: 6 - 8 weeks    If you have any further questions for your physician or physician s care team you can call [phone] and use option 3 to leave a voice message.

## 2023-03-02 NOTE — PROGRESS NOTES
"ASSESSMENT & PLAN    Shannon was seen today for pain.    Diagnoses and all orders for this visit:    Right hip pain  -     XR Pelvis w Hip RT 1 View  -     Physical Therapy Referral; Future    Iliotibial band syndrome of right side  -     Physical Therapy Referral; Future    Tensor fascia shelly syndrome  -     Physical Therapy Referral; Future      This issue is acute and Unchanged.    Right hip pain mostly in TFL and IT Band, some IT Band tightness and gluteal weakness on that side.    Plan:  - Today's Plan of Care:  Rehab: Physical Therapy: [facility] - [phone]    Discussed activity considerations and other supportive care including Ice/Heat, OTC and other topical medications as needed.    -We also discussed other future treatment options:  MRI Right Hip - if not improving or more severe pain, limping    Follow Up: 6 - 8 weeks    Concerning signs and symptoms were reviewed.  The patient expressed understanding of this management plan and all questions were answered at this time.    Merly Guthrie MD [facility]  Sports Medicine Physician  [facility]      -----  Chief Complaint   Patient presents with     Right Hip - Pain       SUBJECTIVE  Shannon Sow is a/an 47 year old female who is seen as a self referral for evaluation of right hip pain.     The patient is seen by themselves.    Onset: 2 week(s) ago. Reports insidious onset without acute precipitating event.   State she had a \"big bike ride\" but no known incident.  Location of Pain: right hip; TFL, ASIS, Iliac crest, gluteus medius   Worsened by: pain comes and goes, laying on that side (feels like she is laying on something), walking, nii cross legs, one episode of pressure on the foot causing pain going up the leg  Better with: ibuprofen, diclofenac  Treatments tried: ibuprofen and diclofenac  Associated symptoms: random pain, achy     Orthopedic/Surgical history: YES - Date: previously has had tight muscles, she has " seen a physical therapy in the past (20 years ago)   Social History/Occupation:      No family history pertinent to patient's problem today.    REVIEW OF SYSTEMS:  Review of Systems  Skin: no bruising, no swelling  Musculoskeletal: as above  Neurologic: no numbness, paresthesias  Remainder of review of systems is negative including constitutional, CV, pulmonary, GI, except as noted in HPI or medical history.    OBJECTIVE:  /80   Pulse 74   Wt 72.6 kg (160 lb)   BMI 29.26 kg/m     General: healthy, alert and in no distress  HEENT: no scleral icterus or conjunctival erythema  Skin: no suspicious lesions or rash. No jaundice.  CV: distal perfusion intact  Resp: normal respiratory effort without conversational dyspnea   Psych: normal mood and affect  Gait: NORMAL  Neuro: Normal light sensory exam of lower extremity    Bilateral hip exam    Inspection:      no edema or ecchymosis in hip area    Tender:      TFL right side    Non Tender:      remainder of the hip area bilateral    ROM:     Full active and passive ROM  bilateral    Strength:      flexion 5/5 bilateral       abduction 4+/5 right       adduction 5/5 bilateral    Sensation:      grossly intact in hip and thigh    Special Tests:      neg (-) DELVIN right       neg (-) FADIR right       positive (+) Yolie right    RADIOLOGY:  I independently ordered, visualized and reviewed these images with the patient  AP Pelvis and right lateral XR views of hip reviewed: no acute bony abnormality, no significant degenerative change  - will follow official read      Review of the result(s) of each unique test - XR

## 2023-03-03 ENCOUNTER — TELEPHONE (OUTPATIENT)
Dept: GASTROENTEROLOGY | Facility: CLINIC | Age: 48
End: 2023-03-03
Payer: COMMERCIAL

## 2023-03-03 NOTE — TELEPHONE ENCOUNTER
Caller: Shannon Sow    Reason for Reschedule/Cancellation (please be detailed, any staff messages or encounters to note?): Patient is type 1 diabetic and needs morning.      Prior to reschedule please review:    Ordering Provider:Jemma Anderson MD     Sedation per order:MODERATE    Does patient have any ASC Exclusions, please identify?: NO      Notes on Cancelled Procedure:    Procedure:Lower Endoscopy [Colonoscopy]     Date: MODERATE    Location:[facility]; [address] [place] [address], [address] [place] [address]    Surgeon: FRANCISCO JAVIER FIGUEROA        Rescheduled: Yes    Procedure: Lower Endoscopy [Colonoscopy]     Date: 5/24    Location:[facility]; [address] [place] [address], [address] [place] [address]    Surgeon: BIENVENIDO    Sedation Level Scheduled  MODERATE,  Reason for Sedation Level PER ORDER    Prep/Instructions updated and sent: RESENT VIA Postcron

## 2023-03-06 NOTE — TELEPHONE ENCOUNTER
PRIOR AUTHORIZATION DENIED    Medication: Wegovy    Denial Date: 3/1/2023    Denial Rational:     Appeal Information:

## 2023-03-06 NOTE — TELEPHONE ENCOUNTER
Wegovy denied, please look at denial letter for rational. Please advise on how you would like to proceed. If appealed please provide medical rational.

## 2023-03-07 NOTE — TELEPHONE ENCOUNTER
Medication Appeal Initiation    We have initiated an appeal for the requested medication:  Medication: Wegovy  Appeal Start Date:  3/7/2023  Insurance Company: [employer] [place] - Phone [phone] Fax [phone]  Comments:  Faxing appeal to [phone]

## 2023-03-07 NOTE — TELEPHONE ENCOUNTER
Letter complete, please route to plan.    Thanks!    Lebron Edwards, PharmD  Endocrine & Diabetes [facility] Pharmacist  [address] [place] [address]  Direct Voicemail: [phone]

## 2023-03-10 NOTE — TELEPHONE ENCOUNTER
Spoke to the insurance rep to check to see if they received the appeal, they gave me a new number since they don't [place] [employer] [place] appeals. Called the # [phone] and they stated after being on the phone with them for like 20 minutes that they only help appeals for patients [facility] with [facility]. Going to call back next week and see if I get a different answer

## 2023-03-28 NOTE — TELEPHONE ENCOUNTER
Called and finally got through to someone at [phone], they never received appeal information. Going to refax to [phone]

## 2023-03-29 NOTE — TELEPHONE ENCOUNTER
Rep from [employer] called to state that they received the appeal and that it is in progress but can take up to 30 days

## 2023-04-12 NOTE — TELEPHONE ENCOUNTER
Prior Authorization Approval    Authorization Effective Date: 2/7/2023  Authorization Expiration Date: 3/30/2024  Medication: Wegovy  Approved Dose/Quantity: 2ml/28 days   Reference #: [id number]   Insurance Company: [employer] [place] - Phone [phone] Fax [phone]  Expected CoPay: 815.58     CoPay Card Available:      Foundation Assistance Needed:    Which Pharmacy is filling the prescription (Not needed for infusion/clinic administered):    Pharmacy Notified:    Patient Notified:     letter of approval was mailed to the providers office

## 2023-04-13 ENCOUNTER — TELEPHONE (OUTPATIENT)
Dept: ENDOCRINOLOGY | Facility: CLINIC | Age: 48
End: 2023-04-13
Payer: COMMERCIAL

## 2023-04-13 NOTE — TELEPHONE ENCOUNTER
Called Patient.  Patient not in.  Left message.  Patient to look at co-pay savings card to see if the price is acceptable.  Prescription sent to [facility] [employer].  If this is acceptable- we will have the patient meet with our [facility] pharmacy team for teaching and upward titration.  Patient to let us know.

## 2023-04-15 ENCOUNTER — HEALTH MAINTENANCE LETTER (OUTPATIENT)
Age: 48
End: 2023-04-15

## 2023-05-09 ENCOUNTER — TELEPHONE (OUTPATIENT)
Dept: GASTROENTEROLOGY | Facility: CLINIC | Age: 48
End: 2023-05-09

## 2023-05-09 DIAGNOSIS — Z12.11 ENCOUNTER FOR SCREENING COLONOSCOPY: Primary | ICD-10-CM

## 2023-05-09 RX ORDER — BISACODYL 5 MG/1
TABLET, DELAYED RELEASE ORAL
Qty: 4 TABLET | Refills: 0 | Status: SHIPPED | OUTPATIENT
Start: 2023-05-09 | End: 2024-01-19

## 2023-05-09 NOTE — TELEPHONE ENCOUNTER
Attempted to contact patient regarding upcoming Colonoscopy  procedure on 5/24/23 for pre assessment questions. No answer.     No voicemail set-up, unable to leave a message.    Discuss Covid policy and designated  policy.    Pre op exam? N/A    Arrival time: 0740. Procedure time: 0840    [facility]; [address] [place] [address], [address] Floor, [place] [address]    Sedation type: Conscious sedation     NSAIDs? No NSAID medications per patient's medication list.  RN will verify with pre-assessment call.    Anticoagulants: No    Electronic implanted devices? No    Diabetic? Yes - Patient to hold oral diabetic medications day of procedure - Metformin per med list. Insulin on med list - patient to consult with prescribing provider regarding any recommended insulin dosage adjustments during procedure preparation.    Indication for procedure: screening colonoscopy    Bowel prep recommendation: Standard Golytely d/t DM    Prep instructions sent via vSocial.    Bowel prep script sent to  [facility] [id number] [facility] [employer] - [place], [place] - [address] [place] [address] JORGE STORMY Victor RN  Endoscopy Procedure Pre Assessment RN

## 2023-05-17 NOTE — TELEPHONE ENCOUNTER
Pre assessment questions completed for upcoming Colonoscopy  procedure scheduled on 5/24/2023    COVID policy reviewed.     Reviewed procedural arrival time 0740 and facility location [facility]; [address] [place] [address], [address], [place] [address]    Designated  policy reviewed. Instructed to have someone stay 6 hours post procedure.     NSAIDs? No    Anticoagulation/blood thinners? No    Electronic implanted devices? No    Diabetic? Yes. Oral medications.  Metformin (glucophage). Patient to hold oral diabetic medications day of procedure.    Reviewed procedure prep instructions.     Patient verbalized understanding and had no questions or concerns at this time.    Jazmyn Carias RN  Endoscopy Procedure Pre Assessment RN

## 2023-05-24 ENCOUNTER — HOSPITAL ENCOUNTER (OUTPATIENT)
Facility: AMBULATORY SURGERY CENTER | Age: 48
Discharge: HOME | End: 2023-05-24
Attending: INTERNAL MEDICINE | Admitting: INTERNAL MEDICINE
Payer: COMMERCIAL

## 2023-05-24 ENCOUNTER — TELEPHONE (OUTPATIENT)
Dept: ENDOCRINOLOGY | Facility: CLINIC | Age: 48
End: 2023-05-24

## 2023-05-24 VITALS
TEMPERATURE: 97.4 F | BODY MASS INDEX: 28.52 KG/M2 | WEIGHT: 155 LBS | SYSTOLIC BLOOD PRESSURE: 117 MMHG | HEART RATE: 89 BPM | HEIGHT: 62 IN | DIASTOLIC BLOOD PRESSURE: 64 MMHG | OXYGEN SATURATION: 100 % | RESPIRATION RATE: 20 BRPM

## 2023-05-24 LAB
COLONOSCOPY: NORMAL
GLUCOSE BLDC GLUCOMTR-MCNC: 159 MG/DL (ref 70–99)
HCG UR QL: NEGATIVE
INTERNAL QC OK POCT: NORMAL
POCT KIT EXPIRATION DATE: NORMAL
POCT KIT LOT NUMBER: NORMAL

## 2023-05-24 PROCEDURE — 45380 COLONOSCOPY AND BIOPSY: CPT | Mod: 33

## 2023-05-24 PROCEDURE — 88305 TISSUE EXAM BY PATHOLOGIST: CPT | Mod: TC | Performed by: INTERNAL MEDICINE

## 2023-05-24 PROCEDURE — 88305 TISSUE EXAM BY PATHOLOGIST: CPT | Mod: 26 | Performed by: PATHOLOGY

## 2023-05-24 PROCEDURE — 82962 GLUCOSE BLOOD TEST: CPT | Performed by: PATHOLOGY

## 2023-05-24 PROCEDURE — 81025 URINE PREGNANCY TEST: CPT | Performed by: PATHOLOGY

## 2023-05-24 RX ORDER — FLUMAZENIL 0.1 MG/ML
0.2 INJECTION, SOLUTION INTRAVENOUS
Status: ACTIVE | OUTPATIENT
Start: 2023-05-24 | End: 2023-05-24

## 2023-05-24 RX ORDER — FENTANYL CITRATE 50 UG/ML
INJECTION, SOLUTION INTRAMUSCULAR; INTRAVENOUS PRN
Status: DISCONTINUED | OUTPATIENT
Start: 2023-05-24 | End: 2023-05-24 | Stop reason: HOSPADM

## 2023-05-24 RX ORDER — NALOXONE HYDROCHLORIDE 0.4 MG/ML
0.4 INJECTION, SOLUTION INTRAMUSCULAR; INTRAVENOUS; SUBCUTANEOUS
Status: DISCONTINUED | OUTPATIENT
Start: 2023-05-24 | End: 2023-05-25 | Stop reason: HOSPADM

## 2023-05-24 RX ORDER — ONDANSETRON 2 MG/ML
4 INJECTION INTRAMUSCULAR; INTRAVENOUS EVERY 6 HOURS PRN
Status: DISCONTINUED | OUTPATIENT
Start: 2023-05-24 | End: 2023-05-25 | Stop reason: HOSPADM

## 2023-05-24 RX ORDER — NALOXONE HYDROCHLORIDE 0.4 MG/ML
0.2 INJECTION, SOLUTION INTRAMUSCULAR; INTRAVENOUS; SUBCUTANEOUS
Status: DISCONTINUED | OUTPATIENT
Start: 2023-05-24 | End: 2023-05-25 | Stop reason: HOSPADM

## 2023-05-24 RX ORDER — PROCHLORPERAZINE MALEATE 10 MG
10 TABLET ORAL EVERY 6 HOURS PRN
Status: DISCONTINUED | OUTPATIENT
Start: 2023-05-24 | End: 2023-05-25 | Stop reason: HOSPADM

## 2023-05-24 RX ORDER — ONDANSETRON 2 MG/ML
4 INJECTION INTRAMUSCULAR; INTRAVENOUS
Status: DISCONTINUED | OUTPATIENT
Start: 2023-05-24 | End: 2023-05-24 | Stop reason: HOSPADM

## 2023-05-24 RX ORDER — LIDOCAINE 40 MG/G
CREAM TOPICAL
Status: DISCONTINUED | OUTPATIENT
Start: 2023-05-24 | End: 2023-05-24 | Stop reason: HOSPADM

## 2023-05-24 RX ORDER — ONDANSETRON 4 MG/1
4 TABLET, ORALLY DISINTEGRATING ORAL EVERY 6 HOURS PRN
Status: DISCONTINUED | OUTPATIENT
Start: 2023-05-24 | End: 2023-05-25 | Stop reason: HOSPADM

## 2023-05-24 NOTE — H&P
Shannon [facility]  [id number]  female  47 year old      Reason for procedure/surgery: Screening    Patient Active Problem List   Diagnosis     DM type 1 (diabetes mellitus, type 1) (H)     Vitamin deficiency     URI (upper respiratory infection)     Cerebral salt-wasting syndrome     Hypertensive disease     Insulin pump in place     Recurrent major depressive disorder, in partial remission (H)     SAH (subarachnoid hemorrhage) (H)     Equinus contracture of ankle     Plantar fasciitis     Statin not prescribed at discharge       Past Surgical History:    Past Surgical History:   Procedure Laterality Date     C/SECTION, LOW TRANSVERSE         Past Medical History:   Past Medical History:   Diagnosis Date     Cerebral infarction (H)      Type 1 diabetes mellitus without complication (H)        Social History:   Social History     Tobacco Use     Smoking status: Never     Smokeless tobacco: Never   Vaping Use     Vaping status: Not on file   Substance Use Topics     Alcohol use: Yes     Comment: 3 glasses of wine a night       Family History:   Family History   Problem Relation Age of Onset     Breast Cancer Mother      Breast Cancer Maternal Aunt      Glaucoma No family hx of      Macular Degeneration No family hx of      Colon Cancer No family hx of        Allergies:   Allergies   Allergen Reactions     Nkda [No Known Drug Allergy]      Seasonal Allergies        Active Medications:   Current Outpatient Medications   Medication Sig Dispense Refill     bisacodyl (DULCOLAX) 5 MG EC tablet Take 2 tablets at 3 pm the day before your procedure. If your procedure is before 11 am, take 2 additional tablets at 11 pm. If your procedure is after 11 am, take 2 additional tablets at 6 am. For additional instructions refer to your colonoscopy prep instructions. 4 tablet 0     cholecalciferol (VITAMIN D) 1000 UNIT tablet Take 1 tablet (1,000 Units) by mouth daily 90 tablet 3     FLUoxetine (PROZAC) 40 MG capsule Take 1  capsule (40 mg) by mouth daily 90 capsule 3     ipratropium (ATROVENT) 0.03 % nasal spray Spray 2 sprays into both nostrils every 12 hours 30 mL 3     metFORMIN (GLUCOPHAGE XR) 500 MG 24 hr tablet Take 1 tablet (500 mg) by mouth daily (with dinner) 90 tablet 3     Multiple Vitamins-Minerals (MULTIVITAMIN OR) Take by mouth daily        NOVOLOG VIAL 100 UNIT/ML soln USE UP TO 80 UNITS DAILY IN PUMP 80 mL 1     polyethylene glycol (GOLYTELY) 236 g suspension The night before the exam at 6 pm drink an 8-ounce glass every 15 minutes until the jug is half empty. If you arrive before 11 AM: Drink the other half of the Golytely jug at 11 PM night before procedure. If you arrive after 11 AM: Drink the other half of the Golytely jug at 6 AM day of procedure. For additional instructions refer to your colonoscopy prep instructions. 4000 mL 0     blood glucose test strip Test up to 6 times daily 6 Box 4     Continuous Blood Gluc Sensor (FREESTYLE RADHA 2 SENSOR) MISC 1 applicator daily as needed (as needed) 6 each 3     Continuous Blood Gluc Sensor (FREESTYLE RADHA 3 SENSOR) MISC 1 Device every 14 days 6 each 3     insulin glargine (LANTUS PEN) 100 UNIT/ML pen Inject subcu  20 units if pump fails. 15 mL 0     insulin glargine (LANTUS SOLOSTAR) 100 UNIT/ML pen Take 20 units if pump fails. 3 mL 1     insulin pen needle (B-D U/F) 31G X 5 MM Use if pump fails 4-5 times daily 100 each 1     Semaglutide-Weight Management (WEGOVY) 0.25 MG/0.5ML pen Inject 0.25 mg Subcutaneous once a week 2 mL 0     STATIN NOT PRESCRIBED (INTENTIONAL) Please choose reason not prescribed, below         Systemic Review:   CONSTITUTIONAL: NEGATIVE for fever, chills, change in weight  ENT/MOUTH: NEGATIVE for ear, mouth and throat problems  RESP: NEGATIVE for significant cough or SOB  CV: NEGATIVE for chest pain, palpitations or peripheral edema    Physical Examination:   Vital Signs: /71   Pulse 73   Temp 97.4  F (36.3  C) (Temporal)   Resp 16    " 1.575 m (5' 2\")   Wt 70.3 kg (155 lb)   LMP 05/08/2023   SpO2 98%   BMI 28.35 kg/m    GENERAL: healthy, alert and no distress  NECK: no adenopathy, no asymmetry, masses, or scars  RESP: lungs clear to auscultation - no rales, rhonchi or wheezes  CV: regular rate and rhythm, normal S1 S2, no S3 or S4, no murmur, click or rub, no peripheral edema and peripheral pulses strong  ABDOMEN: soft, nontender, no hepatosplenomegaly, no masses and bowel sounds normal  MS: no gross musculoskeletal defects noted, no edema    ASA Classification: (II)  Mild systemic disease  Airway Exam: Mallampati Score: Class II (Complete visualization of uvula)    Plan: Appropriate to proceed as scheduled.      Edmond Min MD  5/24/2023    PCP:  Jemma Anderson    "

## 2023-05-25 LAB
PATH REPORT.COMMENTS IMP SPEC: NORMAL
PATH REPORT.COMMENTS IMP SPEC: NORMAL
PATH REPORT.FINAL DX SPEC: NORMAL
PATH REPORT.GROSS SPEC: NORMAL
PATH REPORT.MICROSCOPIC SPEC OTHER STN: NORMAL
PATH REPORT.RELEVANT HX SPEC: NORMAL
PHOTO IMAGE: NORMAL

## 2023-06-01 DIAGNOSIS — E10.9 TYPE 1 DIABETES MELLITUS WITHOUT COMPLICATION (H): ICD-10-CM

## 2023-06-01 RX ORDER — INSULIN ASPART 100 [IU]/ML
INJECTION, SOLUTION INTRAVENOUS; SUBCUTANEOUS
Qty: 80 ML | Refills: 2 | Status: SHIPPED | OUTPATIENT
Start: 2023-06-01 | End: 2024-01-19

## 2023-06-01 NOTE — TELEPHONE ENCOUNTER
NOVOLOG 100 UNIT/ML VIAL      Last Written Prescription Date:  12-13-22  Last Fill Quantity: 80 ml,   # refills: 1  Last Office Visit : 2-3-23  Future Office visit:  7-14-23    Routing refill request to provider for review/approval because:  Insulin - refilled per clinic

## 2023-06-01 NOTE — TELEPHONE ENCOUNTER
Short Acting Insulin Protocol Failed 06/01/2023 07:02 AM   Protocol Details  HgbA1C in past 3 or 6 months          Lab order in place. Pt has lab visit scheduled 05 June 23.

## 2023-06-02 ENCOUNTER — HEALTH MAINTENANCE LETTER (OUTPATIENT)
Age: 48
End: 2023-06-02

## 2023-06-05 ENCOUNTER — LAB (OUTPATIENT)
Dept: LAB | Facility: CLINIC | Age: 48
End: 2023-06-05
Payer: COMMERCIAL

## 2023-06-05 DIAGNOSIS — F41.9 ANXIETY: ICD-10-CM

## 2023-06-05 DIAGNOSIS — R73.9 HYPERGLYCEMIA: ICD-10-CM

## 2023-06-05 DIAGNOSIS — E10.9 TYPE 1 DIABETES MELLITUS WITHOUT COMPLICATION (H): ICD-10-CM

## 2023-06-05 LAB
ANION GAP SERPL CALCULATED.3IONS-SCNC: 11 MMOL/L (ref 7–15)
BUN SERPL-MCNC: 13.4 MG/DL (ref 6–20)
CALCIUM SERPL-MCNC: 8.5 MG/DL (ref 8.6–10)
CHLORIDE SERPL-SCNC: 104 MMOL/L (ref 98–107)
CHOLEST SERPL-MCNC: 195 MG/DL
CREAT SERPL-MCNC: 0.73 MG/DL (ref 0.51–0.95)
CREAT UR-MCNC: 122 MG/DL
DEPRECATED HCO3 PLAS-SCNC: 25 MMOL/L (ref 22–29)
GFR SERPL CREATININE-BSD FRML MDRD: >90 ML/MIN/1.73M2
GLUCOSE SERPL-MCNC: 138 MG/DL (ref 70–99)
HBA1C MFR BLD: 7.2 % (ref 0–5.6)
HDLC SERPL-MCNC: 89 MG/DL
LDLC SERPL CALC-MCNC: 95 MG/DL
MICROALBUMIN UR-MCNC: <12 MG/L
MICROALBUMIN/CREAT UR: NORMAL MG/G{CREAT}
NONHDLC SERPL-MCNC: 106 MG/DL
POTASSIUM SERPL-SCNC: 4 MMOL/L (ref 3.4–5.3)
SODIUM SERPL-SCNC: 140 MMOL/L (ref 136–145)
TRIGL SERPL-MCNC: 56 MG/DL
TSH SERPL DL<=0.005 MIU/L-ACNC: 0.54 UIU/ML (ref 0.3–4.2)

## 2023-06-05 PROCEDURE — 82306 VITAMIN D 25 HYDROXY: CPT

## 2023-06-05 PROCEDURE — 83036 HEMOGLOBIN GLYCOSYLATED A1C: CPT

## 2023-06-05 PROCEDURE — 84443 ASSAY THYROID STIM HORMONE: CPT

## 2023-06-05 PROCEDURE — 80048 BASIC METABOLIC PNL TOTAL CA: CPT

## 2023-06-05 PROCEDURE — 36415 COLL VENOUS BLD VENIPUNCTURE: CPT

## 2023-06-05 PROCEDURE — 82570 ASSAY OF URINE CREATININE: CPT

## 2023-06-05 PROCEDURE — 82043 UR ALBUMIN QUANTITATIVE: CPT

## 2023-06-05 PROCEDURE — 80061 LIPID PANEL: CPT

## 2023-06-08 ENCOUNTER — TELEPHONE (OUTPATIENT)
Dept: ENDOCRINOLOGY | Facility: CLINIC | Age: 48
End: 2023-06-08
Payer: COMMERCIAL

## 2023-06-08 LAB
DEPRECATED CALCIDIOL+CALCIFEROL SERPL-MC: <32 UG/L (ref 20–75)
VITAMIN D2 SERPL-MCNC: <5 UG/L
VITAMIN D3 SERPL-MCNC: 27 UG/L

## 2023-06-08 NOTE — TELEPHONE ENCOUNTER
Labs noted below  -  Dear Shannon    Here are your labs which look really good - in terms of the calcium, you can take an extra serving of calcium per days. Your urine and cholesterol and TSH and Vitamin D are normal - I look to see you in follow up!    If you have any questions, please feel free to contact my nurse at [phone] select option #3 for triage nurse  or  option #1 for scheduling related questions.    Regards    Jemma Anderson MD     Lab on 06/05/2023   Component Date Value Ref Range Status     25 OH Vitamin D2 06/05/2023 <5  ug/L Final     25 OH Vitamin D3 06/05/2023 27  ug/L Final     25 OH Vit D Total 06/05/2023 <32  20 - 75 ug/L Final    Season, race, dietary intake, and treatment affect the concentration of 25-hydroxy-Vitamin D. Values may decrease during winter months and increase during summer months. Values 20-29 ug/L may indicate Vitamin D insufficiency and values <20 ug/L may indicate Vitamin D deficiency.     TSH 06/05/2023 0.54  0.30 - 4.20 uIU/mL Final     Cholesterol 06/05/2023 195  <200 mg/dL Final     Triglycerides 06/05/2023 56  <150 mg/dL Final     Direct Measure HDL 06/05/2023 89  >=50 mg/dL Final     LDL Cholesterol Calculated 06/05/2023 95  <=100 mg/dL Final     Non HDL Cholesterol 06/05/2023 106  <130 mg/dL Final     Creatinine Urine mg/dL 06/05/2023 122.0  mg/dL Final    The reference ranges have not been established in urine creatinine. The results should be integrated into the clinical context for interpretation.     Albumin Urine mg/L 06/05/2023 <12.0  mg/L Final    The reference ranges have not been established in urine albumin. The results should be integrated into the clinical context for interpretation.     Albumin Urine mg/g Cr 06/05/2023    Final    Unable to calculate, urine albumin and/or urine creatinine is outside detectable limits.  Microalbuminuria is defined as an albumin:creatinine ratio of 17 to 299 for males and 25 to 299 for females. A ratio of albumin:creatinine  of 300 or higher is indicative of overt proteinuria.  Due to biologic variability, positive results should be confirmed by a second, first-morning random or 24-hour timed urine specimen. If there is discrepancy, a third specimen is recommended. When 2 out of 3 results are in the microalbuminuria range, this is evidence for incipient nephropathy and warrants increased efforts at glucose control, blood pressure control, and institution of therapy with an angiotensin-converting-enzyme (ACE) inhibitor (if the patient can tolerate it).       Sodium 06/05/2023 140  136 - 145 mmol/L Final     Potassium 06/05/2023 4.0  3.4 - 5.3 mmol/L Final     Chloride 06/05/2023 104  98 - 107 mmol/L Final     Carbon Dioxide (CO2) 06/05/2023 25  22 - 29 mmol/L Final     Anion Gap 06/05/2023 11  7 - 15 mmol/L Final     Urea Nitrogen 06/05/2023 13.4  6.0 - 20.0 mg/dL Final     Creatinine 06/05/2023 0.73  0.51 - 0.95 mg/dL Final     Calcium 06/05/2023 8.5 (L)  8.6 - 10.0 mg/dL Final     Glucose 06/05/2023 138 (H)  70 - 99 mg/dL Final     GFR Estimate 06/05/2023 >90  >60 mL/min/1.73m2 Final    eGFR calculated using 2021 CKD-EPI equation.     Hemoglobin A1C 06/05/2023 7.2 (H)  0.0 - 5.6 % Final    Normal <5.7%   Prediabetes 5.7-6.4%    Diabetes 6.5% or higher     Note: Adopted from ADA consensus guidelines.

## 2023-07-07 NOTE — PROGRESS NOTES
Outcome for 07/07/23 10:37 AM: Data uploaded on [employer]  Outcome for 07/07/23 10:37 AM: [employer] message sent  Shannon Walden MA  Outcome for 07/12/23 3:43 PM: Left Voicemail   Shannon Walden MA  Outcome for 07/13/23 12:27 PM: Per patient, will upload device before appointment  Carin Alvares MA   Outcome for 07/13/23 1:25 PM: Data obtained via [employer] and [employer] website  Carin Alvares MA      Typical Profile Active at the time of upload  Start Time Basal Rate Correction Factor Carb Ratio Target BG  Midnight 0.900 u/hr 1u:60 mg/dL 1u:12.0 g 120 mg/dL  6:00 AM 0.500 u/hr 1u:60 mg/dL 1u:5.0 g 120 mg/dL  9:00 AM 1.000 u/hr 1u:60 mg/dL 1u:5.0 g 120 mg/dL  2:00 PM 0.200 u/hr 1u:60 mg/dL 1u:5.0 g 120 mg/dL  7:00 PM 0.800 u/hr 1u:60 mg/dL 1u:5.0 g 120 mg/dL  9:00 PM 0.500 u/hr 1u:60 mg/dL 1u:12.0 g 120 mg/dL  Calculated Total Daily Basal 16.0 units  Duration of Insulin: 5:00 hours  Carbohydrates: On  Max Bolus: 10 units

## 2023-07-12 ENCOUNTER — TELEPHONE (OUTPATIENT)
Dept: ENDOCRINOLOGY | Facility: CLINIC | Age: 48
End: 2023-07-12
Payer: COMMERCIAL

## 2023-07-12 NOTE — TELEPHONE ENCOUNTER
Attempted to reach patient for upcoming appointment; tandem data needed. No answer, LM on VM to call office back.     Appointment with Jemma Anderson MD on 7/14/23    Shannon Walden MA

## 2023-07-13 NOTE — TELEPHONE ENCOUNTER
Outcome for 07/13/23 12:29 PM: Per patient, will upload device before appointment  Carin Alvares MA

## 2023-07-14 ENCOUNTER — VIRTUAL VISIT (OUTPATIENT)
Dept: ENDOCRINOLOGY | Facility: CLINIC | Age: 48
End: 2023-07-14
Payer: COMMERCIAL

## 2023-07-14 DIAGNOSIS — R73.9 HYPERGLYCEMIA: ICD-10-CM

## 2023-07-14 PROCEDURE — 99214 OFFICE O/P EST MOD 30 MIN: CPT | Mod: VID | Performed by: INTERNAL MEDICINE

## 2023-07-14 RX ORDER — METFORMIN HCL 500 MG
500 TABLET, EXTENDED RELEASE 24 HR ORAL
Qty: 90 TABLET | Refills: 3
Start: 2023-07-14 | End: 2023-10-24

## 2023-07-14 NOTE — LETTER
7/14/2023       RE: Shannon Sow  [address]  [place] [address]     Dear Colleague,    Thank you for referring your patient, Shannon Sow, to the [facility] [place] [facility]. Please see a copy of my visit note below.    Outcome for 07/07/23 10:37 AM: Data uploaded on [employer]  Outcome for 07/07/23 10:37 AM: Sel[employer] message sent  Shannon Walden MA  Outcome for 07/12/23 3:43 PM: Left Voicemail   Shannon Walden MA  Outcome for 07/13/23 12:27 PM: Per patient, will upload device before appointment  Carin Alvares MA   Outcome for 07/13/23 1:25 PM: Data obtained via [employer] and "[employer] website  Carin Alvares MA      Typical Profile Active at the time of upload  Start Time Basal Rate Correction Factor Carb Ratio Target BG  Midnight 0.900 u/hr 1u:60 mg/dL 1u:12.0 g 120 mg/dL  6:00 AM 0.500 u/hr 1u:60 mg/dL 1u:5.0 g 120 mg/dL  9:00 AM 1.000 u/hr 1u:60 mg/dL 1u:5.0 g 120 mg/dL  2:00 PM 0.200 u/hr 1u:60 mg/dL 1u:5.0 g 120 mg/dL  7:00 PM 0.800 u/hr 1u:60 mg/dL 1u:5.0 g 120 mg/dL  9:00 PM 0.500 u/hr 1u:60 mg/dL 1u:12.0 g 120 mg/dL  Calculated Total Daily Basal 16.0 units  Duration of Insulin: 5:00 hours  Carbohydrates: On  Max Bolus: 10 units                        Video-Visit Details    Type of service:  Video Visit    Virtual visit conducted by Sandra.      Originating Location (pt. Location): HOME    Distant Location (provider location):  Off site    Mode of Communication:  Video Conference via [employer]    Physician has received verbal consent for a Video Visit from the patient? YES    Start time 1230, stop time 1250, chart review, documentation time, coordination of care, 10 minutes.  Total time spent day of encounter 30 minutes.      [facility]  Endocrinology  Jemma Anderson MD  7/14/23    Chief Complaint:   Diabetes    History of Present Illness:   Shannon Mora Juanjose is a 47 year old  female with a history of diabetes who presents for follow up of diabetes.    #1 Type 1 diabetes mellitus  The patient was diagnosed with diabetes in 1992.  She was on Lantus and Humalog from approximately 2000 - May 2009 when she started on an insulin pump.  As of November 2013, she has been on a newer Medtronic pump with low glucose suspend.  She started using the Freestyle Azam CGMS system in February 2018.  Her A1c level in the past 5 years have generally been in the high 6% - mid 7% range.  March 2019 hemoglobin A1c was 7%. July 2019 A1c was 7.2%. November 2019 A1c 7.4%.   February 2020 A1c 7.0% .  December 2020 hemoglobin A1c of 7.8.  April 2021- pt was offered metformin and she declined September 2021  hemoglobin A1c of 7.4.  July 2022 hemoglobin A1c at 7.0    Interval history:. Although the patient has the T slim pump, the main barrier for her converting to control IQ is because of the Dexcom sensors, and extremely expensive at roughly $500 per month.  She is currently on the azam 3 and feels that this has been working well for her.  We had considered Wegovy but this was extremely expensive at $250 per month.  She also briefly tried metformin 500 mg extended release daily-reported that this improved her blood sugars but did not clearly help her appetite so she stopped.  June 2023 hemoglobin A1c at 7.2.    Basal rate:  Typical Profile Active at the time of upload  Start Time Basal Rate Correction Factor Carb Ratio Target BG  Midnight 0.900 u/hr 1u:60 mg/dL 1u:12.0 g 120 mg/dL  6:00 AM 0.500 u/hr 1u:60 mg/dL 1u:5.0 g 120 mg/dL  9:00 AM 1.000 u/hr 1u:60 mg/dL 1u:5.0 g 120 mg/dL  2:00 PM 0.200 u/hr 1u:60 mg/dL 1u:5.0 g 120 mg/dL  7:00 PM 0.800 u/hr 1u:60 mg/dL 1u:5.0 g 120 mg/dL  9:00 PM 0.500 u/hr 1u:60 mg/dL 1u:12.0 g 120 mg/dL  Calculated Total Daily Basal 16.0 units  Duration of Insulin: 5:00 hours  Carbohydrates: On  Max Bolus: 10 units    Blood Glucose Monitoring:  CGM review: Average glucose 194, 40%  within range, 58% above range, she tends to have hyperglycemia primarily with her evening meals    Diabetes monitoring and complications:  CAD: June 2023 LDL was 94  Last eye exam results: 11/20/2019, mild nonproliferative retinopathy-patient is overdue.  Per patient report, she had an eye exam in 2023 that was unremarkable  Microalbuminuria: Negative in June 2023  Neuropathy: No  HTN: No  On Statin: No  On Aspirin: No  Depression: Yes    #2 Subarachnoid hemorrhage  She was hospitalized at [facility] 8/2 - 8/9/18 for a subarachnoid hemorrhage after presenting with a sudden onset headache, nausea, and vomiting.  She had no complications. She saw Dr. Jacob in Neurology on 11/9/2018 who discussed with her that she is at low risk for any recurrent issues (2 - 5 % of recurrence).  She has no history of hypertension. She followed with Dr. Regan in Neurology on 11/20/2018 who concluded that her hemorrhage was likely non-aneurysmal in origin and found no other signs of ongoing symptoms.     Interval history: Reports she is doing fairly well.     #3 interest in weight loss  At previous visit the patient reported changing her diet and increasing her exercise.      Interval history: Weight stable.  Patient had considered Wegovy (February 2023) but this was extremely expensive at $250 per month.  Briefly tried metformin but then stopped.    #4 fatigue  This is her most pressing issue.  She reports significant fatigue.  She reports fatigue even when she wakes up in the morning.  She reports that she would wake up for an hour at night and then go back to sleep.  She does report significant stress in her life.  She is working with a therapist.  She feels that her Prozac dose is adequate.Extensive evaluation in December 2020 was unremarkable.    Interval history: Not discussed today.    #5 irregular menses  The patient reports some irregular menses.  She reports occasionally skipping her menses.  She is uncertain whether  she is having hot flashes.  She reports that she is not currently pregnant (has checked twice).  She is uncertain when her family members may have gone through menopause Evaluation in November 2022 was significant for an FSH of 14.5, LH of 25.7, estradiol of 338, prolactin of 8, and negative hCG.    Interval history: Continues to report irregular menses.      Review of Systems:   Pertinent items are noted in HPI.  All other systems are negative.    Active Medications:   Current Outpatient Medications   Medication Sig Dispense Refill    bisacodyl (DULCOLAX) 5 MG EC tablet Take 2 tablets at 3 pm the day before your procedure. If your procedure is before 11 am, take 2 additional tablets at 11 pm. If your procedure is after 11 am, take 2 additional tablets at 6 am. For additional instructions refer to your colonoscopy prep instructions. 4 tablet 0    blood glucose test strip Test up to 6 times daily 6 Box 4    cholecalciferol (VITAMIN D) 1000 UNIT tablet Take 1 tablet (1,000 Units) by mouth daily 90 tablet 3    Continuous Blood Gluc Sensor (FREESTYLE RADHA 2 SENSOR) MISC 1 applicator daily as needed (as needed) 6 each 3    Continuous Blood Gluc Sensor (FREESTYLE RADHA 3 SENSOR) MISC 1 Device every 14 days 6 each 3    FLUoxetine (PROZAC) 40 MG capsule Take 1 capsule (40 mg) by mouth daily 90 capsule 3    insulin glargine (LANTUS PEN) 100 UNIT/ML pen Inject subcu  20 units if pump fails. 15 mL 0    insulin glargine (LANTUS SOLOSTAR) 100 UNIT/ML pen Take 20 units if pump fails. 3 mL 1    insulin pen needle (B-D U/F) 31G X 5 MM Use if pump fails 4-5 times daily 100 each 1    ipratropium (ATROVENT) 0.03 % nasal spray Spray 2 sprays into both nostrils every 12 hours 30 mL 3    metFORMIN (GLUCOPHAGE XR) 500 MG 24 hr tablet Take 1 tablet (500 mg) by mouth daily (with dinner) 90 tablet 3    Multiple Vitamins-Minerals (MULTIVITAMIN OR) Take by mouth daily       NOVOLOG VIAL 100 UNIT/ML soln USE UP TO 80 UNITS DAILY IN PUMP 80 mL  "2    polyethylene glycol (GOLYTELY) 236 g suspension The night before the exam at 6 pm drink an 8-ounce glass every 15 minutes until the jug is half empty. If you arrive before 11 AM: Drink the other half of the Golytely jug at 11 PM night before procedure. If you arrive after 11 AM: Drink the other half of the Golytely jug at 6 AM day of procedure. For additional instructions refer to your colonoscopy prep instructions. 4000 mL 0    Semaglutide-Weight Management (WEGOVY) 0.25 MG/0.5ML pen Inject 0.25 mg Subcutaneous once a week 2 mL 0    STATIN NOT PRESCRIBED (INTENTIONAL) Please choose reason not prescribed, below          Allergies:   Nkda [no known drug allergies]   Seasonal allergies      Past Medical History:  Type I diabetes mellitus   Vitamin deficiency  Upper respiratory infection   Cerebral salt-wasting syndrome  Hypertensive disease\  Recurrent major depressive disorder  Subarachnoid hemorrhage      Past Surgical History:  History reviewed. No pertinent past surgical history.      Family History:   Breast cancer - mother      Social History:   Pt working from home      Physical Exam:     GENERAL: Healthy, alert and no distress\",\"EYES: Eyes grossly normal to inspection.  No discharge or erythema, or obvious scleral/conjunctival abnormalities.\",\"RESP: No audible wheeze, cough, or visible cyanosis.  No visible retractions or increased work of breathing.  \",\"SKIN: Visible skin clear. No significant rash, abnormal pigmentation or lesions.\",\"NEURO: Cranial nerves grossly intact.  Mentation and speech appropriate for age.\",\"PSYCH: Mentation appears normal, affect normal/bright, judgement and insight intact, normal speech and appearance well-groomed.     Data:  No visits with results within 1 Week(s) from this visit.   Latest known visit with results is:   Lab on 06/05/2023   Component Date Value Ref Range Status    25 OH Vitamin D2 06/05/2023 <5  ug/L Final    25 OH Vitamin D3 06/05/2023 27  ug/L Final    25 OH " Vit D Total 06/05/2023 <32  20 - 75 ug/L Final    Season, race, dietary intake, and treatment affect the concentration of 25-hydroxy-Vitamin D. Values may decrease during winter months and increase during summer months. Values 20-29 ug/L may indicate Vitamin D insufficiency and values <20 ug/L may indicate Vitamin D deficiency.    TSH 06/05/2023 0.54  0.30 - 4.20 uIU/mL Final    Cholesterol 06/05/2023 195  <200 mg/dL Final    Triglycerides 06/05/2023 56  <150 mg/dL Final    Direct Measure HDL 06/05/2023 89  >=50 mg/dL Final    LDL Cholesterol Calculated 06/05/2023 95  <=100 mg/dL Final    Non HDL Cholesterol 06/05/2023 106  <130 mg/dL Final    Creatinine Urine mg/dL 06/05/2023 122.0  mg/dL Final    The reference ranges have not been established in urine creatinine. The results should be integrated into the clinical context for interpretation.    Albumin Urine mg/L 06/05/2023 <12.0  mg/L Final    The reference ranges have not been established in urine albumin. The results should be integrated into the clinical context for interpretation.    Albumin Urine mg/g Cr 06/05/2023    Final    Unable to calculate, urine albumin and/or urine creatinine is outside detectable limits.  Microalbuminuria is defined as an albumin:creatinine ratio of 17 to 299 for males and 25 to 299 for females. A ratio of albumin:creatinine of 300 or higher is indicative of overt proteinuria.  Due to biologic variability, positive results should be confirmed by a second, first-morning random or 24-hour timed urine specimen. If there is discrepancy, a third specimen is recommended. When 2 out of 3 results are in the microalbuminuria range, this is evidence for incipient nephropathy and warrants increased efforts at glucose control, blood pressure control, and institution of therapy with an angiotensin-converting-enzyme (ACE) inhibitor (if the patient can tolerate it).      Sodium 06/05/2023 140  136 - 145 mmol/L Final    Potassium 06/05/2023 4.0   3.4 - 5.3 mmol/L Final    Chloride 06/05/2023 104  98 - 107 mmol/L Final    Carbon Dioxide (CO2) 06/05/2023 25  22 - 29 mmol/L Final    Anion Gap 06/05/2023 11  7 - 15 mmol/L Final    Urea Nitrogen 06/05/2023 13.4  6.0 - 20.0 mg/dL Final    Creatinine 06/05/2023 0.73  0.51 - 0.95 mg/dL Final    Calcium 06/05/2023 8.5 (L)  8.6 - 10.0 mg/dL Final    Glucose 06/05/2023 138 (H)  70 - 99 mg/dL Final    GFR Estimate 06/05/2023 >90  >60 mL/min/1.73m2 Final    eGFR calculated using 2021 CKD-EPI equation.    Hemoglobin A1C 06/05/2023 7.2 (H)  0.0 - 5.6 % Final    Normal <5.7%   Prediabetes 5.7-6.4%    Diabetes 6.5% or higher     Note: Adopted from ADA consensus guidelines.           Assessment and Plan:  #1 Type 1 diabetes mellitus   The patient is overall happy with her glycemic control.  We will have the patient add metformin 500 mg extended release to her program.  We will see if this might improve her overall hemoglobin A1c, possibly make her menses more regular, and facilitate slight weight loss.  Wegovy was cost prohibitive.  Pump settings adjusted as noted below.  We will call the patient in a few weeks to assess her tolerance of the metformin and to cut back on her insulin as appropriate.    Typical Profile Active at the time of upload  Start Time Basal Rate Correction Factor Carb Ratio Target BG  Midnight 0.800 u/hr 1u:60 mg/dL 1u:12.0 g 120 mg/dL  6:00 AM 0.500 u/hr 1u:50 mg/dL 1u:5.0 g 120 mg/dL  9:00 AM 1.000 u/hr 1u:50 mg/dL 1u:5.0 g 120 mg/dL  2:00 PM 0.200 u/hr 1u:60 mg/dL 1u:5.0 g 120 mg/dL  7:00 PM 1.2 u/hr 1u:50 mg/dL 1u:5.0 g 120 mg/dL  9:00 PM 0.900 u/hr 1u:50 mg/dL 1u:12.0 g 120 mg/dL    Duration of Insulin: 4 hours  Carbohydrates: On  Max Bolus: 10 units    #2 Subarachnoid hemorrhage  Doing well     #3 interest in weight loss  Wegovy has been cost prohibitive at $250 per month.  She is willing to restart metformin.  We will have patient restart metformin extended release 500 mg daily.  We will call  the patient in a few weeks.  Patient is aware that she may need to cut back on her insulin with metformin usage.    #4 fatigue  Not discussed at current visit.    #5 irregular menses  Continues to report irregular menses.  Not interested in OCP at this causes hyperglycemia    Return visit in 4 months

## 2023-07-14 NOTE — PATIENT INSTRUCTIONS
Take metfomin  mg daily with morning medications    No visits with results within 1 Week(s) from this visit.   Latest known visit with results is:   Lab on 06/05/2023   Component Date Value Ref Range Status    25 OH Vitamin D2 06/05/2023 <5  ug/L Final    25 OH Vitamin D3 06/05/2023 27  ug/L Final    25 OH Vit D Total 06/05/2023 <32  20 - 75 ug/L Final    Season, race, dietary intake, and treatment affect the concentration of 25-hydroxy-Vitamin D. Values may decrease during winter months and increase during summer months. Values 20-29 ug/L may indicate Vitamin D insufficiency and values <20 ug/L may indicate Vitamin D deficiency.    TSH 06/05/2023 0.54  0.30 - 4.20 uIU/mL Final    Cholesterol 06/05/2023 195  <200 mg/dL Final    Triglycerides 06/05/2023 56  <150 mg/dL Final    Direct Measure HDL 06/05/2023 89  >=50 mg/dL Final    LDL Cholesterol Calculated 06/05/2023 95  <=100 mg/dL Final    Non HDL Cholesterol 06/05/2023 106  <130 mg/dL Final    Creatinine Urine mg/dL 06/05/2023 122.0  mg/dL Final    The reference ranges have not been established in urine creatinine. The results should be integrated into the clinical context for interpretation.    Albumin Urine mg/L 06/05/2023 <12.0  mg/L Final    The reference ranges have not been established in urine albumin. The results should be integrated into the clinical context for interpretation.    Albumin Urine mg/g Cr 06/05/2023    Final    Unable to calculate, urine albumin and/or urine creatinine is outside detectable limits.  Microalbuminuria is defined as an albumin:creatinine ratio of 17 to 299 for males and 25 to 299 for females. A ratio of albumin:creatinine of 300 or higher is indicative of overt proteinuria.  Due to biologic variability, positive results should be confirmed by a second, first-morning random or 24-hour timed urine specimen. If there is discrepancy, a third specimen is recommended. When 2 out of 3 results are in the microalbuminuria range,  this is evidence for incipient nephropathy and warrants increased efforts at glucose control, blood pressure control, and institution of therapy with an angiotensin-converting-enzyme (ACE) inhibitor (if the patient can tolerate it).      Sodium 06/05/2023 140  136 - 145 mmol/L Final    Potassium 06/05/2023 4.0  3.4 - 5.3 mmol/L Final    Chloride 06/05/2023 104  98 - 107 mmol/L Final    Carbon Dioxide (CO2) 06/05/2023 25  22 - 29 mmol/L Final    Anion Gap 06/05/2023 11  7 - 15 mmol/L Final    Urea Nitrogen 06/05/2023 13.4  6.0 - 20.0 mg/dL Final    Creatinine 06/05/2023 0.73  0.51 - 0.95 mg/dL Final    Calcium 06/05/2023 8.5 (L)  8.6 - 10.0 mg/dL Final    Glucose 06/05/2023 138 (H)  70 - 99 mg/dL Final    GFR Estimate 06/05/2023 >90  >60 mL/min/1.73m2 Final    eGFR calculated using 2021 CKD-EPI equation.    Hemoglobin A1C 06/05/2023 7.2 (H)  0.0 - 5.6 % Final    Normal <5.7%   Prediabetes 5.7-6.4%    Diabetes 6.5% or higher     Note: Adopted from ADA consensus guidelines.

## 2023-07-14 NOTE — PROGRESS NOTES
Video-Visit Details    Type of service:  Video Visit    Virtual visit conducted by Sandra.      Originating Location (pt. Location): HOME    Distant Location (provider location):  Off site    Mode of Communication:  Video Conference via [employer]    Physician has received verbal consent for a Video Visit from the patient? YES    Start time 1230, stop time 1250, chart review, documentation time, coordination of care, 10 minutes.  Total time spent day of encounter 30 minutes.      [facility]  Endocrinology  Jemma Anderson MD  7/14/23    Chief Complaint:   Diabetes    History of Present Illness:   Shannon Sow is a 47 year old female with a history of diabetes who presents for follow up of diabetes.    #1 Type 1 diabetes mellitus  The patient was diagnosed with diabetes in 1992.  She was on Lantus and Humalog from approximately 2000 - May 2009 when she started on an insulin pump.  As of November 2013, she has been on a newer Medtronic pump with low glucose suspend.  She started using the Freestyle Azam CGMS system in February 2018.  Her A1c level in the past 5 years have generally been in the high 6% - mid 7% range.  March 2019 hemoglobin A1c was 7%. July 2019 A1c was 7.2%. November 2019 A1c 7.4%.   February 2020 A1c 7.0% .  December 2020 hemoglobin A1c of 7.8.  April 2021- pt was offered metformin and she declined September 2021  hemoglobin A1c of 7.4.  July 2022 hemoglobin A1c at 7.0    Interval history:. Although the patient has the T slim pump, the main barrier for her converting to control IQ is because of the Dexcom sensors, and extremely expensive at roughly $500 per month.  She is currently on the azam 3 and feels that this has been working well for her.  We had considered Wegovy but this was extremely expensive at $250 per month.  She also briefly tried metformin 500 mg extended release daily-reported that this improved her blood sugars but did not clearly help her appetite so she stopped.   June 2023 hemoglobin A1c at 7.2.    Basal rate:  Typical Profile Active at the time of upload  Start Time Basal Rate Correction Factor Carb Ratio Target BG  Midnight 0.900 u/hr 1u:60 mg/dL 1u:12.0 g 120 mg/dL  6:00 AM 0.500 u/hr 1u:60 mg/dL 1u:5.0 g 120 mg/dL  9:00 AM 1.000 u/hr 1u:60 mg/dL 1u:5.0 g 120 mg/dL  2:00 PM 0.200 u/hr 1u:60 mg/dL 1u:5.0 g 120 mg/dL  7:00 PM 0.800 u/hr 1u:60 mg/dL 1u:5.0 g 120 mg/dL  9:00 PM 0.500 u/hr 1u:60 mg/dL 1u:12.0 g 120 mg/dL  Calculated Total Daily Basal 16.0 units  Duration of Insulin: 5:00 hours  Carbohydrates: On  Max Bolus: 10 units    Blood Glucose Monitoring:  CGM review: Average glucose 194, 40% within range, 58% above range, she tends to have hyperglycemia primarily with her evening meals    Diabetes monitoring and complications:  CAD: June 2023 LDL was 94  Last eye exam results: 11/20/2019, mild nonproliferative retinopathy-patient is overdue.  Per patient report, she had an eye exam in 2023 that was unremarkable  Microalbuminuria: Negative in June 2023  Neuropathy: No  HTN: No  On Statin: No  On Aspirin: No  Depression: Yes    #2 Subarachnoid hemorrhage  She was hospitalized at [facility] 8/2 - 8/9/18 for a subarachnoid hemorrhage after presenting with a sudden onset headache, nausea, and vomiting.  She had no complications. She saw Dr. Jacob in Neurology on 11/9/2018 who discussed with her that she is at low risk for any recurrent issues (2 - 5 % of recurrence).  She has no history of hypertension. She followed with Dr. Regan in Neurology on 11/20/2018 who concluded that her hemorrhage was likely non-aneurysmal in origin and found no other signs of ongoing symptoms.     Interval history: Reports she is doing fairly well.     #3 interest in weight loss  At previous visit the patient reported changing her diet and increasing her exercise.      Interval history: Weight stable.  Patient had considered Wegovy (February 2023) but this was extremely expensive at  $250 per month.  Briefly tried metformin but then stopped.    #4 fatigue  This is her most pressing issue.  She reports significant fatigue.  She reports fatigue even when she wakes up in the morning.  She reports that she would wake up for an hour at night and then go back to sleep.  She does report significant stress in her life.  She is working with a therapist.  She feels that her Prozac dose is adequate.Extensive evaluation in December 2020 was unremarkable.    Interval history: Not discussed today.    #5 irregular menses  The patient reports some irregular menses.  She reports occasionally skipping her menses.  She is uncertain whether she is having hot flashes.  She reports that she is not currently pregnant (has checked twice).  She is uncertain when her family members may have gone through menopause Evaluation in November 2022 was significant for an FSH of 14.5, LH of 25.7, estradiol of 338, prolactin of 8, and negative hCG.    Interval history: Continues to report irregular menses.      Review of Systems:   Pertinent items are noted in HPI.  All other systems are negative.    Active Medications:   Current Outpatient Medications   Medication Sig Dispense Refill     bisacodyl (DULCOLAX) 5 MG EC tablet Take 2 tablets at 3 pm the day before your procedure. If your procedure is before 11 am, take 2 additional tablets at 11 pm. If your procedure is after 11 am, take 2 additional tablets at 6 am. For additional instructions refer to your colonoscopy prep instructions. 4 tablet 0     blood glucose test strip Test up to 6 times daily 6 Box 4     cholecalciferol (VITAMIN D) 1000 UNIT tablet Take 1 tablet (1,000 Units) by mouth daily 90 tablet 3     Continuous Blood Gluc Sensor (FREESTYLE RADHA 2 SENSOR) MISC 1 applicator daily as needed (as needed) 6 each 3     Continuous Blood Gluc Sensor (FREESTYLE RADHA 3 SENSOR) MISC 1 Device every 14 days 6 each 3     FLUoxetine (PROZAC) 40 MG capsule Take 1 capsule (40 mg) by  "mouth daily 90 capsule 3     insulin glargine (LANTUS PEN) 100 UNIT/ML pen Inject subcu  20 units if pump fails. 15 mL 0     insulin glargine (LANTUS SOLOSTAR) 100 UNIT/ML pen Take 20 units if pump fails. 3 mL 1     insulin pen needle (B-D U/F) 31G X 5 MM Use if pump fails 4-5 times daily 100 each 1     ipratropium (ATROVENT) 0.03 % nasal spray Spray 2 sprays into both nostrils every 12 hours 30 mL 3     metFORMIN (GLUCOPHAGE XR) 500 MG 24 hr tablet Take 1 tablet (500 mg) by mouth daily (with dinner) 90 tablet 3     Multiple Vitamins-Minerals (MULTIVITAMIN OR) Take by mouth daily        NOVOLOG VIAL 100 UNIT/ML soln USE UP TO 80 UNITS DAILY IN PUMP 80 mL 2     polyethylene glycol (GOLYTELY) 236 g suspension The night before the exam at 6 pm drink an 8-ounce glass every 15 minutes until the jug is half empty. If you arrive before 11 AM: Drink the other half of the Golytely jug at 11 PM night before procedure. If you arrive after 11 AM: Drink the other half of the Golytely jug at 6 AM day of procedure. For additional instructions refer to your colonoscopy prep instructions. 4000 mL 0     Semaglutide-Weight Management (WEGOVY) 0.25 MG/0.5ML pen Inject 0.25 mg Subcutaneous once a week 2 mL 0     STATIN NOT PRESCRIBED (INTENTIONAL) Please choose reason not prescribed, below          Allergies:   Nkda [no known drug allergies]   Seasonal allergies      Past Medical History:  Type I diabetes mellitus   Vitamin deficiency  Upper respiratory infection   Cerebral salt-wasting syndrome  Hypertensive disease\  Recurrent major depressive disorder  Subarachnoid hemorrhage      Past Surgical History:  History reviewed. No pertinent past surgical history.      Family History:   Breast cancer - mother      Social History:   Pt working from home      Physical Exam:     GENERAL: Healthy, alert and no distress\",\"EYES: Eyes grossly normal to inspection.  No discharge or erythema, or obvious scleral/conjunctival abnormalities.\",\"RESP: No " "audible wheeze, cough, or visible cyanosis.  No visible retractions or increased work of breathing.  \",\"SKIN: Visible skin clear. No significant rash, abnormal pigmentation or lesions.\",\"NEURO: Cranial nerves grossly intact.  Mentation and speech appropriate for age.\",\"PSYCH: Mentation appears normal, affect normal/bright, judgement and insight intact, normal speech and appearance well-groomed.     Data:  No visits with results within 1 Week(s) from this visit.   Latest known visit with results is:   Lab on 06/05/2023   Component Date Value Ref Range Status     25 OH Vitamin D2 06/05/2023 <5  ug/L Final     25 OH Vitamin D3 06/05/2023 27  ug/L Final     25 OH Vit D Total 06/05/2023 <32  20 - 75 ug/L Final    Season, race, dietary intake, and treatment affect the concentration of 25-hydroxy-Vitamin D. Values may decrease during winter months and increase during summer months. Values 20-29 ug/L may indicate Vitamin D insufficiency and values <20 ug/L may indicate Vitamin D deficiency.     TSH 06/05/2023 0.54  0.30 - 4.20 uIU/mL Final     Cholesterol 06/05/2023 195  <200 mg/dL Final     Triglycerides 06/05/2023 56  <150 mg/dL Final     Direct Measure HDL 06/05/2023 89  >=50 mg/dL Final     LDL Cholesterol Calculated 06/05/2023 95  <=100 mg/dL Final     Non HDL Cholesterol 06/05/2023 106  <130 mg/dL Final     Creatinine Urine mg/dL 06/05/2023 122.0  mg/dL Final    The reference ranges have not been established in urine creatinine. The results should be integrated into the clinical context for interpretation.     Albumin Urine mg/L 06/05/2023 <12.0  mg/L Final    The reference ranges have not been established in urine albumin. The results should be integrated into the clinical context for interpretation.     Albumin Urine mg/g Cr 06/05/2023    Final    Unable to calculate, urine albumin and/or urine creatinine is outside detectable limits.  Microalbuminuria is defined as an albumin:creatinine ratio of 17 to 299 for males " and 25 to 299 for females. A ratio of albumin:creatinine of 300 or higher is indicative of overt proteinuria.  Due to biologic variability, positive results should be confirmed by a second, first-morning random or 24-hour timed urine specimen. If there is discrepancy, a third specimen is recommended. When 2 out of 3 results are in the microalbuminuria range, this is evidence for incipient nephropathy and warrants increased efforts at glucose control, blood pressure control, and institution of therapy with an angiotensin-converting-enzyme (ACE) inhibitor (if the patient can tolerate it).       Sodium 06/05/2023 140  136 - 145 mmol/L Final     Potassium 06/05/2023 4.0  3.4 - 5.3 mmol/L Final     Chloride 06/05/2023 104  98 - 107 mmol/L Final     Carbon Dioxide (CO2) 06/05/2023 25  22 - 29 mmol/L Final     Anion Gap 06/05/2023 11  7 - 15 mmol/L Final     Urea Nitrogen 06/05/2023 13.4  6.0 - 20.0 mg/dL Final     Creatinine 06/05/2023 0.73  0.51 - 0.95 mg/dL Final     Calcium 06/05/2023 8.5 (L)  8.6 - 10.0 mg/dL Final     Glucose 06/05/2023 138 (H)  70 - 99 mg/dL Final     GFR Estimate 06/05/2023 >90  >60 mL/min/1.73m2 Final    eGFR calculated using 2021 CKD-EPI equation.     Hemoglobin A1C 06/05/2023 7.2 (H)  0.0 - 5.6 % Final    Normal <5.7%   Prediabetes 5.7-6.4%    Diabetes 6.5% or higher     Note: Adopted from ADA consensus guidelines.           Assessment and Plan:  #1 Type 1 diabetes mellitus   The patient is overall happy with her glycemic control.  We will have the patient add metformin 500 mg extended release to her program.  We will see if this might improve her overall hemoglobin A1c, possibly make her menses more regular, and facilitate slight weight loss.  Wegovy was cost prohibitive.  Pump settings adjusted as noted below.  We will call the patient in a few weeks to assess her tolerance of the metformin and to cut back on her insulin as appropriate.    Typical Profile Active at the time of upload  Start  Time Basal Rate Correction Factor Carb Ratio Target BG  Midnight 0.800 u/hr 1u:60 mg/dL 1u:12.0 g 120 mg/dL  6:00 AM 0.500 u/hr 1u:50 mg/dL 1u:5.0 g 120 mg/dL  9:00 AM 1.000 u/hr 1u:50 mg/dL 1u:5.0 g 120 mg/dL  2:00 PM 0.200 u/hr 1u:60 mg/dL 1u:5.0 g 120 mg/dL  7:00 PM 1.2 u/hr 1u:50 mg/dL 1u:5.0 g 120 mg/dL  9:00 PM 0.900 u/hr 1u:50 mg/dL 1u:12.0 g 120 mg/dL    Duration of Insulin: 4 hours  Carbohydrates: On  Max Bolus: 10 units    #2 Subarachnoid hemorrhage  Doing well     #3 interest in weight loss  Wegovy has been cost prohibitive at $250 per month.  She is willing to restart metformin.  We will have patient restart metformin extended release 500 mg daily.  We will call the patient in a few weeks.  Patient is aware that she may need to cut back on her insulin with metformin usage.    #4 fatigue  Not discussed at current visit.    #5 irregular menses  Continues to report irregular menses.  Not interested in OCP at this causes hyperglycemia    Return visit in 4 months

## 2023-07-14 NOTE — NURSING NOTE
Is the patient currently in the state of [place]? YES    Visit mode:VIDEO    If the visit is dropped, the patient can be reconnected by: VIDEO VISIT: Send to e-mail at: [email or web address]    Will anyone else be joining the visit? NO      How would you like to obtain your AVS? MyChart    Are changes needed to the allergy or medication list? YES: Pt states she is currently taking zyrtec for seasonal allergies. Pt states she is not using atrovent nasal spray. Pt states she is not taking metformin or wegovy.     Reason for visit: RECHECK        ATROVEN NASAL SPRAY, METFORMIN, WEGOVY

## 2023-08-09 ENCOUNTER — TELEPHONE (OUTPATIENT)
Dept: ENDOCRINOLOGY | Facility: CLINIC | Age: 48
End: 2023-08-09
Payer: COMMERCIAL

## 2023-08-09 NOTE — TELEPHONE ENCOUNTER
Called pt - pt reporting hypoglycemia due to metformin working too well  Changed pump settings as noted below    time      basal     correction   carb  12am     .8                   1:60         1:12  6am       .5                    1:50         1:10  9am        0.5                   1:60          1:10  2pm         .2                  1:60         1:10  7pm         1.2              1?50           1:10  9pm        0.8               1:50             1:12           -  time      basal     correction   carb  12am     .8                   1:60         1:12  6am       .5                    1:50         1:5  9am         1                   1:60          1:5  2pm         .2                  1:60        1:5  7pm         1.2              1?50          1:5  9pm         .9                1:50             1:12

## 2023-10-24 DIAGNOSIS — R73.9 HYPERGLYCEMIA: ICD-10-CM

## 2023-10-26 RX ORDER — METFORMIN HCL 500 MG
500 TABLET, EXTENDED RELEASE 24 HR ORAL
Qty: 90 TABLET | Refills: 2 | Status: SHIPPED | OUTPATIENT
Start: 2023-10-26 | End: 2024-01-19

## 2023-10-26 NOTE — TELEPHONE ENCOUNTER
metFORMIN (GLUCOPHAGE XR) 500 MG 24 hr tablet 90 tablet 3 7/14/2023     Above did not get sent to pharmacy.   Sent in minus one refill from original date.     Carey Cuadra RN

## 2023-11-29 ENCOUNTER — ANCILLARY PROCEDURE (OUTPATIENT)
Dept: MAMMOGRAPHY | Facility: CLINIC | Age: 48
End: 2023-11-29
Attending: INTERNAL MEDICINE
Payer: COMMERCIAL

## 2023-11-29 DIAGNOSIS — Z12.31 VISIT FOR SCREENING MAMMOGRAM: ICD-10-CM

## 2023-11-29 PROCEDURE — 77067 SCR MAMMO BI INCL CAD: CPT | Mod: TC | Performed by: STUDENT IN AN ORGANIZED HEALTH CARE EDUCATION/TRAINING PROGRAM

## 2023-11-30 ENCOUNTER — TELEPHONE (OUTPATIENT)
Dept: ENDOCRINOLOGY | Facility: CLINIC | Age: 48
End: 2023-11-30
Payer: COMMERCIAL

## 2023-11-30 NOTE — TELEPHONE ENCOUNTER
-  Dear Yamile    Mammogram is NEGATIVE!    If you have any questions, please feel free to contact my nurse at [phone] select option #3 for triage nurse  or  option #1 for scheduling related questions.    Regards    Jemma Anderson MD

## 2024-01-15 NOTE — PROGRESS NOTES
1/15/2024  PATIENT LAB/IMAGING STATUS : No pending lab orders   Outcome for 01/15/24 11:37 AM: Data obtained via [employer] website  Mallika Weir CMA

## 2024-01-19 ENCOUNTER — OFFICE VISIT (OUTPATIENT)
Dept: ENDOCRINOLOGY | Facility: CLINIC | Age: 49
End: 2024-01-19
Payer: COMMERCIAL

## 2024-01-19 VITALS
DIASTOLIC BLOOD PRESSURE: 84 MMHG | SYSTOLIC BLOOD PRESSURE: 126 MMHG | OXYGEN SATURATION: 98 % | WEIGHT: 157 LBS | HEIGHT: 62 IN | HEART RATE: 79 BPM | BODY MASS INDEX: 28.89 KG/M2

## 2024-01-19 DIAGNOSIS — E10.9 TYPE 1 DIABETES MELLITUS WITHOUT COMPLICATION (H): ICD-10-CM

## 2024-01-19 DIAGNOSIS — F41.9 ANXIETY: ICD-10-CM

## 2024-01-19 DIAGNOSIS — R73.9 HYPERGLYCEMIA: ICD-10-CM

## 2024-01-19 LAB — HBA1C MFR BLD: 7.7 %

## 2024-01-19 PROCEDURE — 90471 IMMUNIZATION ADMIN: CPT | Performed by: INTERNAL MEDICINE

## 2024-01-19 PROCEDURE — 99214 OFFICE O/P EST MOD 30 MIN: CPT | Mod: GC | Performed by: INTERNAL MEDICINE

## 2024-01-19 PROCEDURE — 83036 HEMOGLOBIN GLYCOSYLATED A1C: CPT | Performed by: PATHOLOGY

## 2024-01-19 PROCEDURE — 90686 IIV4 VACC NO PRSV 0.5 ML IM: CPT | Performed by: INTERNAL MEDICINE

## 2024-01-19 RX ORDER — FLUOXETINE 40 MG/1
40 CAPSULE ORAL DAILY
Qty: 90 CAPSULE | Refills: 3 | Status: SHIPPED | OUTPATIENT
Start: 2024-01-19

## 2024-01-19 RX ORDER — METFORMIN HCL 500 MG
1000 TABLET, EXTENDED RELEASE 24 HR ORAL
Qty: 180 TABLET | Refills: 2 | Status: SHIPPED | OUTPATIENT
Start: 2024-01-19 | End: 2024-02-02

## 2024-01-19 RX ORDER — INSULIN ASPART 100 [IU]/ML
INJECTION, SOLUTION INTRAVENOUS; SUBCUTANEOUS
Qty: 80 ML | Refills: 2 | Status: SHIPPED | OUTPATIENT
Start: 2024-01-19

## 2024-01-19 ASSESSMENT — PAIN SCALES - GENERAL: PAINLEVEL: MILD PAIN (2)

## 2024-01-19 NOTE — PROGRESS NOTES
This an INPERSON VISIT    [facility]  Endocrinology  1/19/24    Chief Complaint:   Diabetes    History of Present Illness:   Shannon Sow is a 48 year old female with a history of diabetes who presents for follow up of diabetes.    30 minutes spent on pt on the day of the encounter including documentation time, chart review, discussion with pt, and coordination of care     #1 Type 1 diabetes mellitus  The patient was diagnosed with diabetes in 1992.  She was on Lantus and Humalog from approximately 2000 - May 2009 when she started on an insulin pump.  As of November 2013, she has been on a newer Medtronic pump with low glucose suspend.  She started using the Freestyle Azam CGMS system in February 2018.  Her A1c level in the past 5 years have generally been in the high 6% - mid 7% range.  March 2019 hemoglobin A1c was 7%. July 2019 A1c was 7.2%. November 2019 A1c 7.4%.   February 2020 A1c 7.0% .  December 2020 hemoglobin A1c of 7.8.  April 2021- pt was offered metformin and she declined September 2021  hemoglobin A1c of 7.4.  July 2022 hemoglobin A1c at 7.0. June 2023 hemoglobin A1c at 7.2. Started on Metformin 500 mg June 2023. Previously tried to get wegovy and dexcom sensors, but unable to due to cost.     Interval history:. Patient on T slim pump and on Freestyle azam 3 . A1c in January 2024 7.7. Patient overall doing well since last visit. States initially had some hypoglycemia after starting metformin, but this improved after pump settings were changed. Has tolerated the metformin well. Currently has had high glucose levels after meals in the 300's. Noted possibly perimenopausal, and feels that hormone levels may be impacting her blood glucose levels. Additionally mentions having variable numbers when exercising and is working to adjust pump settings with exercising as able. No other diabetic concerns. No symptoms of neuropathy. No vision changes. Is working on exercising more often.     Basal  rate:  Typical Profile Active at the time of upload  Start Time Basal Rate Correction Factor Carb Ratio Target BG  Midnight 0.600 u/hr 1u:60 mg/dL 1u:12.0 g 120 mg/dL  6:00 AM 0.500 u/hr 1u:50 mg/dL 1u:10.0 g 120 mg/dL  2:00 PM 0.300 u/hr 1u:50 mg/dL 1u:10.0 g 120 mg/dL  7:00 PM 1.200 u/hr 1u:50 mg/dL 1u:10.0 g 120 mg/dL  9:00 PM 0.800 u/hr 1u:50 mg/dL 1u:12.0 g 120 mg/dL  Calculated Total Daily Basal 13.900 units  Duration of Insulin: 5:00 hours  Carbohydrates: On  Max Bolus: 12 units    Blood Glucose Monitoring:  CGM review: Average glucose 206, 39% within range, 59% above range, she tends to have hyperglycemia with morning and evening meals.     Diabetes monitoring and complications:  CAD: June 2023 LDL was 94  Last eye exam results: 11/20/2019, mild nonproliferative retinopathy.  Per patient report, she had an eye exam in 2023 that was unremarkable, has an appointment set up for 2024.   Microalbuminuria: Negative in June 2023  Neuropathy: No  HTN: No  On Statin: No  On Aspirin: No  Depression: Yes    #2 Subarachnoid hemorrhage  She was hospitalized at [facility] 8/2 - 8/9/18 for a subarachnoid hemorrhage after presenting with a sudden onset headache, nausea, and vomiting.  She had no complications. She saw Dr. Jacob in Neurology on 11/9/2018 who discussed with her that she is at low risk for any recurrent issues (2 - 5 % of recurrence).  She has no history of hypertension. She followed with Dr. Regan in Neurology on 11/20/2018 who concluded that her hemorrhage was likely non-aneurysmal in origin and found no other signs of ongoing symptoms.     Interval history: Doing well, no concerns.      #3 interest in weight loss  At previous visit the patient reported changing her diet and increasing her exercise.  In 2023, we had looked into cost of the Wegovy but this was not well covered.    Interval history: Weight stable. Has continued to work on adjusting her diet with reduced meals.  On metformin 500 mg  daily.    #4 fatigue  This is her most pressing issue.  She reports significant fatigue.  She reports fatigue even when she wakes up in the morning.  She reports that she would wake up for an hour at night and then go back to sleep.  She does report significant stress in her life.  She is working with a therapist.  She feels that her Prozac dose is adequate.Extensive evaluation in December 2020 was unremarkable.    Interval history: No current concerns    #5 irregular menses-last menses in September 2023  The patient reports some irregular menses.  She reports occasionally skipping her menses.  She is uncertain whether she is having hot flashes.  She reports that she is not currently pregnant (has checked twice).  She is uncertain when her family members may have gone through menopause Evaluation in November 2022 was significant for an FSH of 14.5, LH of 25.7, estradiol of 338, prolactin of 8, and negative hCG.    Interval history: Reports last menses in September 2023.  Having hot flashes. There is possible family history of early menopause, with her mother developing menopause symptoms in her mid to late 40's.     #6 Joint pain  Patient with persistent joint pain in the hips and upper extremity joints. Says this has been worse over the past year. No history of recent fractures. Has been occasionally using voltaren gel with some relief.     Review of Systems:   Pertinent items are noted in HPI.  All other systems are negative.    Active Medications:   Current Outpatient Medications   Medication Sig Dispense Refill    blood glucose test strip Test up to 6 times daily 6 Box 4    cholecalciferol (VITAMIN D) 1000 UNIT tablet Take 1 tablet (1,000 Units) by mouth daily 90 tablet 3    Continuous Blood Gluc Sensor (FREESTYLE RADHA 3 SENSOR) MISC 1 Device every 14 days 6 each 3    FLUoxetine (PROZAC) 40 MG capsule Take 1 capsule (40 mg) by mouth daily 90 capsule 3    insulin glargine (LANTUS PEN) 100 UNIT/ML pen Inject  "subcu  20 units if pump fails. 15 mL 0    insulin glargine (LANTUS SOLOSTAR) 100 UNIT/ML pen Take 20 units if pump fails. 3 mL 1    insulin pen needle (B-D U/F) 31G X 5 MM Use if pump fails 4-5 times daily 100 each 1    ipratropium (ATROVENT) 0.03 % nasal spray Spray 2 sprays into both nostrils every 12 hours (Patient not taking: Reported on 7/14/2023) 30 mL 3    metFORMIN (GLUCOPHAGE XR) 500 MG 24 hr tablet Take 1 tablet (500 mg) by mouth daily (with dinner) 90 tablet 2    Multiple Vitamins-Minerals (MULTIVITAMIN OR) Take by mouth daily       NOVOLOG VIAL 100 UNIT/ML soln USE UP TO 80 UNITS DAILY IN PUMP 80 mL 2    STATIN NOT PRESCRIBED (INTENTIONAL) Please choose reason not prescribed, below          Allergies:   Nkda [no known drug allergies]   Seasonal allergies      Past Medical History:  Type I diabetes mellitus   Vitamin deficiency  Upper respiratory infection   Cerebral salt-wasting syndrome  Hypertensive disease  Recurrent major depressive disorder  Subarachnoid hemorrhage      Past Surgical History:  History reviewed. No pertinent past surgical history.      Family History:   Breast cancer - mother      Social History:   Pt working from home      Physical Exam:   Blood pressure 126/84, pulse 79, height 1.575 m (5' 2\"), weight 71.2 kg (157 lb), SpO2 98%.   Constitutional: Well appearing. No acute distress.  HENT: Normocephalic, atraumatic.   Eyes: Normal conjunctivae, anicteric.   Cardiovascular: Regular rate and rhythm. Normal S1, S2. No murmurs appreciated.  Pulmonary: Normal respiratory effort. Clear lungs bilaterally. No rales, rhonchi, or wheezes.  Abdominal: No distention.   Musculoskeletal: No edema present.  Skin: Skin is warm, no rash appreciated.  Neurological: CN II-XII grossly intact. Normal sensation of bilateral feet.   Psychiatric: Normal affect     Data:    Office Visit on 01/19/2024   Component Date Value Ref Range Status    Afinion Hemoglobin A1c POCT 01/19/2024 7.7 (H)  <=5.7 % Final    " Normal <5.7%   Prediabetes 5.7-6.4%     Diabetes 6.5% or higher       Note: Adopted from ADA consensus guidelines.         Assessment and Plan:  #1 Type 1 diabetes mellitus   For glycemic control is reasonable, however I think she would benefit from a closed-loop system.  Her t:slim pump was recently approved to be integrated with the flores 2+ sensor.  We will look into the cost of this sensor and also have patient meet with diabetes education to see if her pump can be integrated.  For now, given her continued hyperglycemia episodes will plan to increase metformin to 1000 mg daily.  No changes to pump settings as noted below.  Flu shot today.    Typical Profile Active at the time of upload  Start Time Basal Rate Correction Factor Carb Ratio Target BG  Midnight 0.600 u/hr 1u:60 mg/dL 1u:12.0 g 120 mg/dL  6:00 AM 0.500 u/hr 1u:50 mg/dL 1u:10.0 g 120 mg/dL  2:00 PM 0.300 u/hr 1u:50 mg/dL 1u:10.0 g 120 mg/dL  7:00 PM 1.200 u/hr 1u:50 mg/dL 1u:10.0 g 120 mg/dL  9:00 PM 0.800 u/hr 1u:50 mg/dL 1u:12.0 g 120 mg/dL    Duration of Insulin: 5:00 hours  Carbohydrates: On  Max Bolus: 12 units    #2 Subarachnoid hemorrhage  Doing well, no current issues.      #3 interest in weight loss  Continue working on eating health, and continue to exercise.  We will increase the patient to metformin at 1000 mg daily.    #4 fatigue  No current concerns    #5 irregular menses  Continues to report irregular menses, with hot flashes. There is concern for menopause. Did offer getting lab work up to further assess with FSH, LH, and estrogen. At this time patient would prefer to wait, and can consider lab work up at next visit.     #6 Joint pain  Patient with worsening joint pain over the past year, does have a history of joint pain. Some relief with voltaren gel. No fractures. Discussed patient can try glucosamine chondroitin, or tumeric to help with joint pain.     Follow up as scheduled on 7/12/24.    Carol Ann Nielsen MD PGY-2    I have seen  and examined the patient and agree with the resident's plan of care as noted.  The diabetes education made.  Flu shot given.  The priority will be looking to the flores 2+ sensor coverage with integration with her t:slim pump.  January 19, 2024    Dr. Jemma Anderson [phone]

## 2024-01-23 ENCOUNTER — TELEPHONE (OUTPATIENT)
Dept: ENDOCRINOLOGY | Facility: CLINIC | Age: 49
End: 2024-01-23
Payer: COMMERCIAL

## 2024-01-23 NOTE — TELEPHONE ENCOUNTER
----- Message from Arminda Graves sent at 1/23/2024  7:04 AM CST -----  Regarding: RE: need estimate of cost  [facility] has not confirmed a time yet.  There has been rumors of spring.  DME providers will receive them first followed by retail pharmacies.     Arminda Graves [facility]  Specialty Clinic Liaison Lead  [facility]  [email or web address]   [email or web address]   Phone: [phone]  Fax: [phone]       ----- Message -----  From: Jemma Anderson MD  Sent: 1/22/2024   5:19 PM CST  To: Arminda Graves  Subject: RE: need estimate of cost                        Ok - please let me know  ----- Message -----  From: Arminda Graves  Sent: 1/22/2024   7:13 AM CST  To: Jemma Anderson MD; #  Subject: RE: need estimate of cost                        Good Morning Jemma-  The Azam 2 Plus is not available to us yet for pricing.  I will do some checking to see when this will happen and keep you updated.       Arminda Graves [facility]  Specialty Clinic Liaison Lead  [facility]  [email or web address]   [email or web address]   Phone: [phone]  Fax: [phone]       ----- Message -----  From: Jemma Anderson MD  Sent: 1/20/2024   8:12 AM CST  To: Pharmacy Liaison [facility] [place]  Subject: need estimate of cost                            Pt needs estimate of cost of the azam 2 PLUS to link with the tandem pump - already on azam 3 - thanks!

## 2024-02-02 ENCOUNTER — TELEPHONE (OUTPATIENT)
Dept: ENDOCRINOLOGY | Facility: CLINIC | Age: 49
End: 2024-02-02
Payer: COMMERCIAL

## 2024-02-02 ENCOUNTER — TELEPHONE (OUTPATIENT)
Dept: ENDOCRINOLOGY | Facility: CLINIC | Age: 49
End: 2024-02-02

## 2024-02-02 DIAGNOSIS — R73.9 HYPERGLYCEMIA: ICD-10-CM

## 2024-02-02 RX ORDER — METFORMIN HCL 500 MG
TABLET, EXTENDED RELEASE 24 HR ORAL
Qty: 270 TABLET | Refills: 3 | Status: SHIPPED | OUTPATIENT
Start: 2024-02-02 | End: 2024-02-09

## 2024-02-02 NOTE — TELEPHONE ENCOUNTER
----- Message from Jemma Anderson MD sent at 1/19/2024  1:31 PM CST -----  How is pt doing on the metformin at 1000 mg daily

## 2024-02-02 NOTE — TELEPHONE ENCOUNTER
Her Blood sugars are back to normal with the metformin 1000 mg and no intolerance to the medication . Eileen Garcia RN on 2/2/2024 at 11:58 AM     [As Noted in HPI] : as noted in HPI [Negative] : Heme/Lymph

## 2024-02-03 ENCOUNTER — HEALTH MAINTENANCE LETTER (OUTPATIENT)
Age: 49
End: 2024-02-03

## 2024-02-09 ENCOUNTER — TELEPHONE (OUTPATIENT)
Dept: ENDOCRINOLOGY | Facility: CLINIC | Age: 49
End: 2024-02-09
Payer: COMMERCIAL

## 2024-02-09 DIAGNOSIS — R73.9 HYPERGLYCEMIA: ICD-10-CM

## 2024-02-09 RX ORDER — METFORMIN HCL 500 MG
TABLET, EXTENDED RELEASE 24 HR ORAL
Qty: 180 TABLET | Refills: 3
Start: 2024-02-09 | End: 2024-04-25

## 2024-02-09 NOTE — TELEPHONE ENCOUNTER
Patient reports nausea with metformin 2 tablets daily (1000 mg) and prefer not to increase to 3 tablets daily.  That would be fine.  She is losing weight and reports that her blood sugars are better.

## 2024-02-20 ENCOUNTER — TELEPHONE (OUTPATIENT)
Dept: ENDOCRINOLOGY | Facility: CLINIC | Age: 49
End: 2024-02-20
Payer: COMMERCIAL

## 2024-02-20 NOTE — TELEPHONE ENCOUNTER
Pt reports that she is doing well on metformin XR at 1000 mg daily - will continue with current program (had previously indicated that she didn't want to change dose)

## 2024-03-10 ENCOUNTER — MYC REFILL (OUTPATIENT)
Dept: PHARMACY | Facility: CLINIC | Age: 49
End: 2024-03-10
Payer: COMMERCIAL

## 2024-03-10 DIAGNOSIS — E10.9 TYPE 1 DIABETES MELLITUS WITHOUT COMPLICATION (H): Primary | ICD-10-CM

## 2024-03-10 RX ORDER — BLOOD-GLUCOSE SENSOR
1 EACH MISCELLANEOUS
Qty: 6 EACH | Refills: 3 | Status: SHIPPED | OUTPATIENT
Start: 2024-03-10

## 2024-04-19 ENCOUNTER — ANCILLARY PROCEDURE (OUTPATIENT)
Dept: GENERAL RADIOLOGY | Facility: CLINIC | Age: 49
End: 2024-04-19
Attending: PHYSICIAN ASSISTANT
Payer: COMMERCIAL

## 2024-04-19 ENCOUNTER — OFFICE VISIT (OUTPATIENT)
Dept: URGENT CARE | Facility: URGENT CARE | Age: 49
End: 2024-04-19
Payer: COMMERCIAL

## 2024-04-19 VITALS
OXYGEN SATURATION: 99 % | RESPIRATION RATE: 21 BRPM | TEMPERATURE: 98.4 F | WEIGHT: 150.4 LBS | HEART RATE: 65 BPM | HEIGHT: 62 IN | BODY MASS INDEX: 27.68 KG/M2

## 2024-04-19 DIAGNOSIS — S99.922A INJURY OF LEFT FOOT, INITIAL ENCOUNTER: ICD-10-CM

## 2024-04-19 DIAGNOSIS — S93.602A FOOT SPRAIN, LEFT, INITIAL ENCOUNTER: Primary | ICD-10-CM

## 2024-04-19 PROCEDURE — 73630 X-RAY EXAM OF FOOT: CPT | Mod: TC | Performed by: RADIOLOGY

## 2024-04-19 PROCEDURE — 99213 OFFICE O/P EST LOW 20 MIN: CPT | Performed by: PHYSICIAN ASSISTANT

## 2024-04-19 ASSESSMENT — PAIN SCALES - GENERAL: PAINLEVEL: SEVERE PAIN (7)

## 2024-04-19 NOTE — PROGRESS NOTES
Assessment & Plan     Foot sprain, left, initial encounter    -No fracture on the x-ray  -Patient was provided with a walking boot for comfort and support  -Patient to follow-up with orthopedics for further evaluation and treatment  -Icing and analgesics as needed for swelling and pain  - Ankle/Foot Bracing Supplies Order Walking Boot; Left; Non-pneumatic; Short  - Orthopedic  Referral    Injury of left foot, initial encounter    - XR Foot Left G/E 3 Views  - Orthopedic  Referral     Results for orders placed or performed in visit on 04/19/24   XR Foot Left G/E 3 Views     Status: None    Narrative    XR FOOT LEFT G/E 3 VIEWS   4/19/2024 1:51 PM     HISTORY:  Pain since injury of left foot, initial encounter    Comparison: None.      Impression    IMPRESSION: Normal.    MARCE PUENTES MD         SYSTEM ID:  [id number]       Patient Instructions   Icing will help with the swelling and pain   Walking boot for comfort and support when ambulating  Follow-up with orthopedics for further evaluation  Physical therapy exercises to help reduce stiffness    Return for Follow up with Orthopedics.    At the end of the encounter, I discussed results, diagnosis, medications. Discussed red flags for immediate return to clinic/ER, as well as indications for follow up if no improvement. Patient understood and agreed to plan. Patient was stable for discharge.    Marbella Ortega is a 48 year old female who presents to clinic today for the following health issues:  Chief Complaint   Patient presents with    Urgent Care     Pt. Stated that had rupture left tendon happen yesterday morning.      HPI  Patient reports left foot injury which happened this morning.  Patient was starting a motorized scooter and was using the left foot when she heard a pop.  Patient was unable to bear weight initially.  She can now partially bear weight.  She reports left foot pain on the plantar surface.  She has been icing which  "helps.    Review of Systems   All other systems reviewed and are negative.      Problem List:  2022-04: Statin not prescribed at discharge  2021-11: Equinus contracture of ankle  2021-11: Plantar fasciitis  2018-08: Cerebral salt-wasting syndrome  2018-08: Hypertensive disease  2018-08: Insulin pump in place  2018-08: Recurrent major depressive disorder, in partial remission   (H24)  2018-08: SAH (subarachnoid hemorrhage) (H)  2015-04: URI (upper respiratory infection)  2015-02: Vitamin deficiency  2012-08: DM type 1 (diabetes mellitus, type 1) (H)  2012-07: Diabetes mellitus, type 2 (H)      Past Medical History:   Diagnosis Date    Cerebral infarction (H)     Type 1 diabetes mellitus without complication (H)        Social History     Tobacco Use    Smoking status: Never    Smokeless tobacco: Never   Substance Use Topics    Alcohol use: Yes     Comment: 3 glasses of wine a night           Objective    Pulse 65   Temp 98.4  F (36.9  C) (Temporal)   Resp 21   Ht 1.575 m (5' 2\")   Wt 68.2 kg (150 lb 6.4 oz)   SpO2 99%   BMI 27.51 kg/m    Physical Exam  Vitals and nursing note reviewed.   Constitutional:       Appearance: Normal appearance.   Cardiovascular:      Rate and Rhythm: Normal rate and regular rhythm.   Pulmonary:      Effort: Pulmonary effort is normal.      Breath sounds: Normal breath sounds.   Musculoskeletal:         General: Normal range of motion.      Cervical back: Normal range of motion and neck supple.        Feet:    Feet:      Comments: Erythema, tenderness with palpation   Skin:     General: Skin is warm and dry.      Findings: No rash.   Neurological:      General: No focal deficit present.      Mental Status: She is alert and oriented to person, place, and time.      Sensory: Sensation is intact.      Motor: Motor function is intact.      Gait: Gait abnormal.   Psychiatric:         Mood and Affect: Mood normal.         Behavior: Behavior normal.              Ritu Mathis PA-C    " Detail Level: Detailed

## 2024-04-19 NOTE — PATIENT INSTRUCTIONS
Icing will help with the swelling and pain   Walking boot for comfort and support when ambulating  Follow-up with orthopedics for further evaluation  Physical therapy exercises to help reduce stiffness

## 2024-04-25 ENCOUNTER — MYC REFILL (OUTPATIENT)
Dept: ENDOCRINOLOGY | Facility: CLINIC | Age: 49
End: 2024-04-25
Payer: COMMERCIAL

## 2024-04-25 DIAGNOSIS — R73.9 HYPERGLYCEMIA: ICD-10-CM

## 2024-04-25 RX ORDER — METFORMIN HCL 500 MG
TABLET, EXTENDED RELEASE 24 HR ORAL
Qty: 180 TABLET | Refills: 3 | Status: SHIPPED | OUTPATIENT
Start: 2024-04-25 | End: 2024-07-12

## 2024-04-29 ENCOUNTER — OFFICE VISIT (OUTPATIENT)
Dept: PODIATRY | Facility: CLINIC | Age: 49
End: 2024-04-29
Attending: PHYSICIAN ASSISTANT
Payer: COMMERCIAL

## 2024-04-29 VITALS — HEART RATE: 80 BPM | OXYGEN SATURATION: 99 %

## 2024-04-29 DIAGNOSIS — S93.692A RUPTURE OF PLANTAR FASCIA OF LEFT FOOT, INITIAL ENCOUNTER: Primary | ICD-10-CM

## 2024-04-29 PROCEDURE — 99243 OFF/OP CNSLTJ NEW/EST LOW 30: CPT | Performed by: PODIATRIST

## 2024-04-29 ASSESSMENT — PAIN SCALES - GENERAL: PAINLEVEL: MODERATE PAIN (4)

## 2024-04-29 NOTE — PROGRESS NOTES
FOOT AND ANKLE SURGERY/PODIATRY CONSULT NOTE         ASSESSMENT:   Plantar fasciitis left foot  Rule out rupture of the plantar fascia      TREATMENT:  I recommended the patient ambulate in the cam boot for 4 to 6 weeks.  She may then return to normal shoe gear.  If her pain persist I recommend she return to the clinic at which time I will recommend an MRI of the left foot.  I informed the patient that with her long history of plantar fasciitis she could have concomitant symptoms of plantar fasciitis in addition to possible tear of the plantar fascia.        HPI: I was asked to see Shannon Sow today to evaluate and treat severe foot pain.  The patient stated that approximately 1 week ago she felt a pop on the bottom of her foot which extended from her heel into the arch.  Following the injury she sustained significant swelling and bruising in the arch of her foot.  She was unable to weight-bear because of the pain.  She was subsequently seen by her primary care physician.  She had an x-ray taken.  The x-rays were negative for any fractures or dislocations.  The patient described the pain as a aching type pain while resting and a sharp pain with weightbearing.  When she first gets out of bed in the mornings the pain is more pronounced.  She indicated that she has had plantar fasciitis for several years.  She has tried over-the-counter inserts.  She takes NSAIDs for pain.  Her swelling and bruising have improved.  Her pain has not improved since the time of the injury.  The patient was seen in consultation at the request of Ritu Mathis PA-C for evaluation and treatment of left foot pain.     Past Medical History:   Diagnosis Date    Cerebral infarction (H)     Type 1 diabetes mellitus without complication (H)        Social History     Socioeconomic History    Marital status:      Spouse name: Not on file    Number of children: Not on file    Years of education: Not on file    Highest education  level: Not on file   Occupational History    Occupation: college proffessor: aritecture and interior design   Tobacco Use    Smoking status: Never    Smokeless tobacco: Never   Vaping Use    Vaping status: Never Used   Substance and Sexual Activity    Alcohol use: Yes     Comment: 3 glasses of wine a night    Drug use: No    Sexual activity: Yes     Partners: Male     Birth control/protection: None   Other Topics Concern    Not on file   Social History Narrative    Not on file     Social Determinants of Health     Financial Resource Strain: High Risk (1/1/2022)    [facility]    Financial Resource Strain     Difficulty of Paying Living Expenses: Not on file     Difficulty of Paying Living Expenses: Not on file   Food Insecurity: Not on file   Transportation Needs: Not on file   Physical Activity: Not on file   Stress: Not on file   Social Connections: Unknown (1/1/2022)    [facility]    Social Connections     Frequency of Communication with Friends and Family: Not on file   Interpersonal Safety: Not on file   Housing Stability: Not on file          Allergies   Allergen Reactions    Nkda [No Known Drug Allergy]     Seasonal Allergies           Current Outpatient Medications:     blood glucose test strip, Test up to 6 times daily, Disp: 6 Box, Rfl: 4    cholecalciferol (VITAMIN D) 1000 UNIT tablet, Take 1 tablet (1,000 Units) by mouth daily, Disp: 90 tablet, Rfl: 3    Continuous Blood Gluc Sensor (FREESTYLE RADHA 3 SENSOR) MISC, 1 Device every 14 days, Disp: 6 each, Rfl: 3    FLUoxetine (PROZAC) 40 MG capsule, Take 1 capsule (40 mg) by mouth daily, Disp: 90 capsule, Rfl: 3    insulin aspart (NOVOLOG VIAL) 100 UNITS/ML vial, 80 untis daily in pump, Disp: 80 mL, Rfl: 2    insulin glargine (LANTUS PEN) 100 UNIT/ML pen, Inject subcu  20  units if pump fails., Disp: 15 mL, Rfl: 0    insulin pen needle (B-D U/F) 31G X 5 MM, Use if pump fails 4-5 times daily, Disp: 100 each, Rfl: 1    ipratropium (ATROVENT) 0.03 % nasal spray, Spray 2 sprays into both nostrils every 12 hours (Patient not taking: Reported on 7/14/2023), Disp: 30 mL, Rfl: 3    metFORMIN (GLUCOPHAGE XR) 500 MG 24 hr tablet, Take 2  tablets daily, Disp: 180 tablet, Rfl: 3    Multiple Vitamins-Minerals (MULTIVITAMIN OR), Take by mouth daily , Disp: , Rfl:     STATIN NOT PRESCRIBED (INTENTIONAL), Please choose reason not prescribed, below, Disp: , Rfl:      Family History   Problem Relation Age of Onset    Breast Cancer Mother     Breast Cancer Maternal Aunt     Glaucoma No family hx of     Macular Degeneration No family hx of     Colon Cancer No family hx of         Social History     Socioeconomic History    Marital status:      Spouse name: Not on file    Number of children: Not on file    Years of education: Not on file    Highest education level: Not on file   Occupational History    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   Tobacco Use    Smoking status: Never    Smokeless tobacco: Never   Vaping Use    Vaping status: Never Used   Substance and Sexual Activity    Alcohol use: Yes     Comment: 3 glasses of wine a night    Drug use: No    Sexual activity: Yes     Partners: Male     Birth control/protection: None   Other Topics Concern    Not on file   Social History Narrative    Not on file     Social Determinants of Health     Financial Resource Strain: High Risk (1/1/2022)    Received from [facility], [facility]    Financial Resource Strain     Difficulty of Paying Living Expenses: Not on file     Difficulty of Paying Living Expenses: Not on file   Food Insecurity: Not on file   Transportation Needs: Not on file   Physical Activity: Not on file   Stress: Not on file   Social Connections:  Unknown (1/1/2022)    Received from [facility], [facility]    Social Connections     Frequency of Communication with Friends and Family: Not on file   Interpersonal Safety: Not on file   Housing Stability: Not on file        Review of Systems - Patient denies fever, chills, rash, wound, stiffness, numbness, weakness, heart burn, blood in stool, chest pain with activity, calf pain when walking, shortness of breath with activity, chronic cough, easy bleeding/bruising, swelling of ankles, excessive thirst, fatigue, depression, anxiety.  Patient admits to left foot pain.      OBJECTIVE:  Appearance: alert, well appearing, and in no distress.    There were no vitals taken for this visit.     There is no height or weight on file to calculate BMI.     General appearance: Patient is alert and fully cooperative with history & exam.  No sign of distress is noted during the visit.  Psychiatric: Affect is pleasant & appropriate.  Patient appears motivated to improve health.  Respiratory: Breathing is regular & unlabored while sitting.  HEENT: Hearing is intact to spoken word.  Speech is clear.  No gross evidence of visual impairment that would impact ambulation.    Vascular: Dorsalis pedis and posterior tibial pulses are palpable. There is pedal hair growth bilaterally.  CFT < 3 sec from anterior tibial surface to distal digits bilaterally. There is no appreciable edema noted.  Dermatologic: Turgor and texture are within normal limits. No coloration or temperature changes. No primary or secondary lesions noted.  Neurologic: All epicritic and proprioceptive sensations are grossly intact bilaterally.  Musculoskeletal: All active and passive ankle, subtalar, midtarsal, and 1st MPJ range of motion are grossly intact without pain or crepitus. Manual muscle strength is within normal limits bilaterally. All dorsiflexors, plantarflexors, invertors, evertors are intact  bilaterally. Tenderness present to the plantar medial aspect of the left heel on palpation. Tenderness to the plantar aspect of the left foot with range of motion. Calf is soft/non-tender without warmth/induration    Imaging:       No images are attached to the encounter or orders placed in the encounter.     XR Foot Left G/E 3 Views    Result Date: 4/19/2024  XR FOOT LEFT G/E 3 VIEWS   4/19/2024 1:51 PM HISTORY:  Pain since injury of left foot, initial encounter Comparison: None.     IMPRESSION: Normal. MARCE PUENTES MD   SYSTEM ID:  [id number]     XR Foot Left G/E 3 Views    Result Date: 4/19/2024  XR FOOT LEFT G/E 3 VIEWS   4/19/2024 1:51 PM HISTORY:  Pain since injury of left foot, initial encounter Comparison: None.     IMPRESSION: Normal. MARCE PUENTES MD   SYSTEM ID:  [id number]               Jerson Simmons DPM  [facility] Foot & Ankle Surgery/Podiatry

## 2024-04-29 NOTE — Clinical Note
"    4/29/2024         RE: Shannon Sow  [address]  [place] [address]        Dear Colleague,    Thank you for referring your patient, Shannon Sow, to the [facility]. Please see a copy of my visit note below.    FOOT AND ANKLE SURGERY/PODIATRY CONSULT NOTE         ASSESSMENT:   ***      TREATMENT:  ***        HPI: I was asked to see Shannon Sow today  ***.  The patient was seen in consultation at the request of *** for ***.     {PAST MEDICAL HISTORY:259616177::\"***\"}    {SOCIAL HISTORY:[id number]::\"***\"}       Allergies   Allergen Reactions     Nkda [No Known Drug Allergy]      Seasonal Allergies           Current Outpatient Medications:      blood glucose test strip, Test up to 6 times daily, Disp: 6 Box, Rfl: 4     cholecalciferol (VITAMIN D) 1000 UNIT tablet, Take 1 tablet (1,000 Units) by mouth daily, Disp: 90 tablet, Rfl: 3     Continuous Blood Gluc Sensor (FREESTYLE RADHA 3 SENSOR) MISC, 1 Device every 14 days, Disp: 6 each, Rfl: 3     FLUoxetine (PROZAC) 40 MG capsule, Take 1 capsule (40 mg) by mouth daily, Disp: 90 capsule, Rfl: 3     insulin aspart (NOVOLOG VIAL) 100 UNITS/ML vial, 80 untis daily in pump, Disp: 80 mL, Rfl: 2     insulin glargine (LANTUS PEN) 100 UNIT/ML pen, Inject subcu  20 units if pump fails., Disp: 15 mL, Rfl: 0     insulin pen needle (B-D U/F) 31G X 5 MM, Use if pump fails 4-5 times daily, Disp: 100 each, Rfl: 1     ipratropium (ATROVENT) 0.03 % nasal spray, Spray 2 sprays into both nostrils every 12 hours (Patient not taking: Reported on 7/14/2023), Disp: 30 mL, Rfl: 3     metFORMIN (GLUCOPHAGE XR) 500 MG 24 hr tablet, Take 2  tablets daily, Disp: 180 tablet, Rfl: 3     Multiple Vitamins-Minerals (MULTIVITAMIN OR), Take by mouth daily , Disp: , Rfl:      STATIN NOT PRESCRIBED (INTENTIONAL), Please choose reason not prescribed, below, Disp: , Rfl:      Family History   Problem Relation Age of Onset     Breast " Cancer Mother      Breast Cancer Maternal Aunt      Glaucoma No family hx of      Macular Degeneration No family hx of      Colon Cancer No family hx of         Social History     Socioeconomic History     Marital status:      Spouse name: Not on file     Number of children: Not on file     Years of education: Not on file     Highest education level: Not on file   Occupational History     Occupation: college proffessor: aritecture and interior design   Tobacco Use     Smoking status: Never     Smokeless tobacco: Never   Vaping Use     Vaping status: Never Used   Substance and Sexual Activity     Alcohol use: Yes     Comment: 3 glasses of wine a night     Drug use: No     Sexual activity: Yes     Partners: Male     Birth control/protection: None   Other Topics Concern     Not on file   Social History Narrative     Not on file     Social Determinants of Health     Financial Resource Strain: High Risk (1/1/2022)    [facility]    Financial Resource Strain      Difficulty of Paying Living Expenses: Not on file      Difficulty of Paying Living Expenses: Not on file   Food Insecurity: Not on file   Transportation Needs: Not on file   Physical Activity: Not on file   Stress: Not on file   Social Connections: Unknown (1/1/2022)    [facility] [facility], [facility]    Social Connections      Frequency of Communication with Friends and Family: Not on file   Interpersonal Safety: Not on file   Housing Stability: Not on file        Review of Systems - Patient denies fever, chills, rash, wound, stiffness, limping, numbness, weakness, heart burn, blood in stool, chest pain with activity, calf pain when walking, shortness of breath with activity, chronic cough, easy bleeding/bruising, swelling of ankles, excessive thirst, fatigue, depression, anxiety.  Patient  "admits to ***.      OBJECTIVE:  Appearance: {appearance:926172::\"alert, well appearing, and in no distress\"}.    There were no vitals taken for this visit.     There is no height or weight on file to calculate BMI.     General appearance: Patient is alert and fully cooperative with history & exam.  No sign of distress is noted during the visit.  Psychiatric: Affect is pleasant & appropriate.  Patient appears motivated to improve health.  Respiratory: Breathing is regular & unlabored while sitting.  HEENT: Hearing is intact to spoken word.  Speech is clear.  No gross evidence of visual impairment that would impact ambulation.    Vascular: Dorsalis pedis and posterior tibial pulses are palpable. There is pedal hair growth ***.  CFT < 3 sec from anterior tibial surface to distal digits ***. There is no appreciable edema noted.  Dermatologic: Turgor and texture are within normal limits. No coloration or temperature changes. No primary or secondary lesions noted.  Neurologic: All epicritic and proprioceptive sensations are grossly intact ***.  Musculoskeletal: All active and passive ankle, subtalar, midtarsal, and 1st MPJ range of motion are grossly intact without pain or crepitus, with the exception of ***. Manual muscle strength is ***. All dorsiflexors, plantarflexors, invertors, evertors are intact ***. Tenderness present to *** on palpation. Tenderness to *** with range of motion. Calf is soft/non-tender with/without warmth/induration    Imaging:     {Imaging order/result:63165}  No images are attached to the encounter or orders placed in the encounter.     XR Foot Left G/E 3 Views    Result Date: 4/19/2024  XR FOOT LEFT G/E 3 VIEWS   4/19/2024 1:51 PM HISTORY:  Pain since injury of left foot, initial encounter Comparison: None.     IMPRESSION: Normal. MARCE PUENTES MD   SYSTEM ID:  [id number]     XR Foot Left G/E 3 Views    Result Date: 4/19/2024  XR FOOT LEFT G/E 3 VIEWS   4/19/2024 1:51 PM HISTORY:  Pain " since injury of left foot, initial encounter Comparison: None.     IMPRESSION: Normal. MARCE PUENTES MD   SYSTEM ID:  [id number]         TREATMENT:  ***    Jerson Simmons DPM  [facility] Foot & Ankle Surgery/Podiatry         Again, thank you for allowing me to participate in the care of your patient.        Sincerely,        Jerson Cameron DPM

## 2024-06-22 ENCOUNTER — HEALTH MAINTENANCE LETTER (OUTPATIENT)
Age: 49
End: 2024-06-22

## 2024-06-28 ENCOUNTER — MYC MEDICAL ADVICE (OUTPATIENT)
Dept: ENDOCRINOLOGY | Facility: CLINIC | Age: 49
End: 2024-06-28
Payer: COMMERCIAL

## 2024-06-28 NOTE — PROGRESS NOTES
Outcome for 06/28/24 11:45 AM: Data uploaded on [employer]can Washington LPN   Outcome for 06/28/24 11:46 AM: [employer] message sent  Sherry Washington LPN   Outcome for 07/10/24 11:22 AM: Per patient, will upload device before appointment  Sherry Washington LPN   Outcome for 07/11/24 11:53 AM: Data obtained via [employer] website  Carin Alvares MA

## 2024-07-10 ENCOUNTER — TELEPHONE (OUTPATIENT)
Dept: ENDOCRINOLOGY | Facility: CLINIC | Age: 49
End: 2024-07-10
Payer: COMMERCIAL

## 2024-07-10 NOTE — TELEPHONE ENCOUNTER
Spoke with patient in regards to obtaining tandem data for appointment scheduled on 7/12/24. Patient will upload prior to appointment.     Sherry Washington LPN 07/10/24 11:21 AM

## 2024-07-12 ENCOUNTER — VIRTUAL VISIT (OUTPATIENT)
Dept: ENDOCRINOLOGY | Facility: CLINIC | Age: 49
End: 2024-07-12
Payer: COMMERCIAL

## 2024-07-12 DIAGNOSIS — N95.1 MENOPAUSAL SYNDROME (HOT FLASHES): Primary | ICD-10-CM

## 2024-07-12 DIAGNOSIS — R73.9 HYPERGLYCEMIA: ICD-10-CM

## 2024-07-12 DIAGNOSIS — E10.9 TYPE 1 DIABETES MELLITUS WITHOUT COMPLICATION (H): ICD-10-CM

## 2024-07-12 PROCEDURE — 99215 OFFICE O/P EST HI 40 MIN: CPT | Mod: 95 | Performed by: INTERNAL MEDICINE

## 2024-07-12 RX ORDER — GABAPENTIN 100 MG/1
100 CAPSULE ORAL AT BEDTIME
Qty: 30 CAPSULE | Refills: 1 | Status: SHIPPED | OUTPATIENT
Start: 2024-07-12 | End: 2024-07-31

## 2024-07-12 RX ORDER — METFORMIN HCL 500 MG
TABLET, EXTENDED RELEASE 24 HR ORAL
Qty: 180 TABLET | Refills: 3 | Status: SHIPPED | OUTPATIENT
Start: 2024-07-12

## 2024-07-12 NOTE — TELEPHONE ENCOUNTER
"----- Message from Ellen BENOIT sent at 7/12/2024  7:49 AM CDT -----  Regarding: RE: urgent  I believe it is only available through a DME supplier right now ([employer]ma[employer] Rowland)   but am checking with our Tandem team to make sure that is still the case.  ----- Message -----  From: Jemma Anderson MD  Sent: 7/12/2024   7:20 AM CDT  To: Ellen Horne RN  Subject: RE: urgent                                       .  Just checking, is the azam plus easily available at this point?  ----- Message -----  From: Ellen Horne RN  Sent: 7/12/2024   7:15 AM CDT  To: Jemma Anderson MD  Subject: RE: urgent                                       The user has to be running the 7.8 version of software on their Tandem CIQ pump.  The way the patient can easily tell is to look in their CGM menu and there will be \"Choose sensor type\" and Azam 2 Plus will be an option.  ----- Message -----  From: Jemam Anderson MD  Sent: 7/11/2024   8:11 PM CDT  To: [facility]  Subject: urgent                                           Is the t-slim pump linked to the azam yet? Is this available  "

## 2024-07-12 NOTE — LETTER
Patient:  Shannon Sow  :   1975  MRN:     [id number]        Ms.Jessica CAROLINA Sow  [address] [place] [address]        2024    To Whom It May Concern    I am the endocrinologist caring for this patient.  This patient has diabetes and has to carry insulin (including pen needles, pump, insulin, lancets) which is medically indicated. Please call [phone] if you have any questions.     Regards,   Jemma Anderson MD

## 2024-07-12 NOTE — LETTER
7/12/2024       RE: Shannon Sow  [address] [place] [address]     Dear Colleague,    Thank you for referring your patient, Shannon Sow, to the [facility] [place] at [facility]. Please see a copy of my visit note below.    Outcome for 06/28/24 11:45 AM: Data uploaded on [employer]  Sherry Washington LPN   Outcome for 06/28/24 11:46 AM: [employer] message sent  Sherry Washington LPN   Outcome for 07/10/24 11:22 AM: Per patient, will upload device before appointment  Sherry Washington LPN   Outcome for 07/11/24 11:53 AM: Data obtained via [employer] website  Carin Alvares MA                        Video-Visit Details    Type of service:  Video Visit    Virtual visit conducted by Sandra.      Originating Location (pt. Location): HOME    Distant Location (provider location):  Off site    Mode of Communication:  Video Conference via [employer]    Physician has received verbal consent for a Video Visit from the patient? YES      Jemma Anderson MD     Start time: 3:00, stop time 330, chart review, documentation time, coordination of care, 10 minutes.  Total time spent day of the encounter 40 minutes.    7/12/24    Chief Complaint:   Diabetes    History of Present Illness:   Shannon Sow is a 48 year old female with a history of diabetes who presents for follow up of diabetes.    #1 Type 1 diabetes mellitus  The patient was diagnosed with diabetes in 1992.  She was on Lantus and Humalog from approximately 2000 - May 2009 when she started on an insulin pump.  As of November 2013, she has been on a newer Medtronic pump with low glucose suspend.  She started using the Freestyle Azam CGMS system in February 2018.  Her A1c level in the past 5 years have generally been in the high 6% - mid 7% range.  March 2019 hemoglobin A1c was 7%. July 2019 A1c was 7.2%. November 2019 A1c 7.4%.   February 2020 A1c 7.0% .   December 2020 hemoglobin A1c of 7.8.  April 2021- pt was offered metformin and she declined September 2021  hemoglobin A1c of 7.4.  July 2022 hemoglobin A1c at 7.0. June 2023 hemoglobin A1c at 7.2. Started on Metformin 500 mg June 2023. Previously tried to get wegovy and dexcom sensors, but unable to due to cost. A1c in January 2024 7.7.    Interval history:. Patient on T slim pump and on Freestyle flores 3 .  Apparently freestyle flores plus is now available through mail order pharmacy.  The patient is also hoping to change over to the t:slim Mobi.  Has been on metformin since January 2024, most recently increased to 1000 mg daily February 2024.  On this program, the patient lost 8 pounds, up which she had gained 4 pounds back due to her recent injury.  However she does tolerate the metformin and would prefer to stay at this program for now.  She is willing to have refills for the metformin at 1000 mg daily.    Total daily insulin roughly 27.6 units, 49% basal, 51% bolus    Pump settings:  Midnight to 6 AM: 0.6  6 AM to 2 PM: 0.5  2 PM to 7 PM: 0.3  7 PM to 9 PM: 1.2  9 PM to midnight: 0.8    Correction factor:  Midnight to 6 AM: 1 per 60  6 AM to midnight: 1 per 50    Carb ratio:  Midnight to 6 AM: 1 per 12  6 AM to 9 PM: 1 per 10  9 PM to midnight: 1 per 12    Blood Glucose Monitoring:  CGM reviewed.  48% within range, 48% above range, 4% low.  Average glucose 184, estimated hemoglobin A1c is 7.7.  Noted hyper glycemia primarily with meals.    Diabetes monitoring and complications:  CAD: June 2023 LDL was 94  Last eye exam results: 11/20/2019, mild nonproliferative retinopathy.  Per patient report, she had an eye exam in 2023 that was unremarkable, needs eye appointment for 2024  Microalbuminuria: Negative in June 2023  Neuropathy: No  HTN: No  On Statin: No  On Aspirin: No  Depression: Yes    #2 Subarachnoid hemorrhage  She was hospitalized at [facility] 8/2 - 8/9/18 for a subarachnoid hemorrhage after  presenting with a sudden onset headache, nausea, and vomiting.  She had no complications. She saw Dr. Jacob in Neurology on 11/9/2018 who discussed with her that she is at low risk for any recurrent issues (2 - 5 % of recurrence).  She has no history of hypertension. She followed with Dr. Regan in Neurology on 11/20/2018 who concluded that her hemorrhage was likely non-aneurysmal in origin and found no other signs of ongoing symptoms.     Interval history: Doing well, no concerns.      #3 interest in weight loss  At previous visit the patient reported changing her diet and increasing her exercise.  In 2023, we had looked into cost of the Wegovy but this was not well covered.    Interval history: Weight stable. Has continued to work on adjusting her diet with reduced meals.  On metformin 1000 mg daily since February 2024, had lost 8 pounds and then gained this back due to her recent injury.    #4 fatigue  This is her most pressing issue.  She reports significant fatigue.  She reports fatigue even when she wakes up in the morning.  She reports that she would wake up for an hour at night and then go back to sleep.  She does report significant stress in her life.  She is working with a therapist.  She feels that her Prozac dose is adequate.Extensive evaluation in December 2020 was unremarkable.    Interval history: Not discussed today    #5 irregular menses-last menses in September 2023  The patient reports some irregular menses.  She reports occasionally skipping her menses.  She is uncertain whether she is having hot flashes.  She reports that she is not currently pregnant (has checked twice).  She is uncertain when her family members may have gone through menopause Evaluation in November 2022 was significant for an FSH of 14.5, LH of 25.7, estradiol of 338, prolactin of 8, and negative hCG.  Reports that her mother developed menopausal symptoms in her 40s.    Interval history: Reports last menses in September  "2023.  Having hot flashes.  Reports that this is bearable.  However she is having anxiety, and muscle aches.  Anticipates seeing a \"menopausal specialist\"     #6 Joint pain  Patient with persistent joint pain in the hips and upper extremity joints. Says this has been worse over the past year. No history of recent fractures. Has been occasionally using voltaren gel with some relief.     Interval history: The patient reports diffuse muscle pain which she attributes to menopause.    Review of Systems:   Pertinent items are noted in HPI.  All other systems are negative.    Active Medications:   Current Outpatient Medications   Medication Sig Dispense Refill    blood glucose test strip Test up to 6 times daily 6 Box 4    cholecalciferol (VITAMIN D) 1000 UNIT tablet Take 1 tablet (1,000 Units) by mouth daily 90 tablet 3    Continuous Blood Gluc Sensor (FREESTYLE RADHA 3 SENSOR) MISC 1 Device every 14 days 6 each 3    FLUoxetine (PROZAC) 40 MG capsule Take 1 capsule (40 mg) by mouth daily 90 capsule 3    insulin aspart (NOVOLOG VIAL) 100 UNITS/ML vial 80 untis daily in pump 80 mL 2    insulin glargine (LANTUS PEN) 100 UNIT/ML pen Inject subcu  20 units if pump fails. 15 mL 0    insulin pen needle (B-D U/F) 31G X 5 MM Use if pump fails 4-5 times daily 100 each 1    ipratropium (ATROVENT) 0.03 % nasal spray Spray 2 sprays into both nostrils every 12 hours (Patient not taking: Reported on 7/14/2023) 30 mL 3    metFORMIN (GLUCOPHAGE XR) 500 MG 24 hr tablet Take 2  tablets daily 180 tablet 3    Multiple Vitamins-Minerals (MULTIVITAMIN OR) Take by mouth daily       STATIN NOT PRESCRIBED (INTENTIONAL) Please choose reason not prescribed, below          Allergies:   Nkda [no known drug allergies]   Seasonal allergies      Past Medical History:  Type I diabetes mellitus   Vitamin deficiency  Upper respiratory infection   Cerebral salt-wasting syndrome  Hypertensive disease  Recurrent major depressive disorder  Subarachnoid " "hemorrhage      Past Surgical History:  History reviewed. No pertinent past surgical history.      Family History:   Breast cancer - mother      Social History:   Pt working from home      Physical Exam:   GENERAL: Healthy, alert and no distress\",\"EYES: Eyes grossly normal to inspection.  No discharge or erythema, or obvious scleral/conjunctival abnormalities.\",\"RESP: No audible wheeze, cough, or visible cyanosis.  No visible retractions or increased work of breathing.  \",\"SKIN: Visible skin clear. No significant rash, abnormal pigmentation or lesions.\",\"NEURO: Cranial nerves grossly intact.  Mentation and speech appropriate for age.\",\"PSYCH: Mentation appears normal, affect normal/bright, judgement and insight intact, normal speech and appearance well-groomed.     Data:    Office Visit on 01/19/2024   Component Date Value Ref Range Status    Afinion Hemoglobin A1c POCT 01/19/2024 7.7 (H)  <=5.7 % Final    Normal <5.7%   Prediabetes 5.7-6.4%     Diabetes 6.5% or higher       Note: Adopted from ADA consensus guidelines.         Assessment and Plan:  #1 Type 1 diabetes mellitus   For glycemic control is reasonable, however I think she would benefit from a closed-loop system.  Her t:slim pump was recently approved to be integrated with the flores 2+ sensor.  This is covered through [employer] or [employer].  She will look into see if this may be an option for her.  Will have our diabetes educators see when she might get her t:slim mobi.  Will continue with metformin at 1000 mg daily-prescription sent to pharmacy.  Will update pump settings as noted below.  If she cannot get the flores plus through a medical supply company, she will likely be able to get this through commercial pharmacies later this year.    Pump settings:  Midnight to 6 AM: 0.6  6 AM to 2 PM: 0.5  2 PM to 5 PM: 0.3  5 PM to 9 PM: 1.4  9 PM to midnight: 0.8    Correction factor:  Midnight to 6 AM: 1 per 50  6 AM to 2 PM: 1 per 45  2 PM to 5 PM: 1 per 45  5 PM to " "9 PM: 1 per 45  9 PM to midnight: 1 per 50    Carb ratio:  Midnight to 6 AM: 1 per 12  6 AM to 9 PM: 1 per 10  9 PM to midnight: 1 per 12    #2 Subarachnoid hemorrhage  Doing well, no current issues.      #3 interest in weight loss  On metformin 1000 mg daily and has lost weight.  She would like to remain at this dose which I think is reasonable.    #4 fatigue  Not discussed.    #5 irregular menses-absent menses since September 2023  The patient reports absent menses at September 2023 with hot flashes.  I am supportive of her seeing a \"menopausal specialist.\"  Will have patient do labs below as well as have her try gabapentin at 100 mg nightly.    #6 Joint pain  No significant improvement with glucosamine/chondroitin, turmeric, or Voltaren.  Will have patient do labs below    Labs below in 1 month, return visit in 6 months and again in 9 months.      Orders Placed This Encounter   Procedures    CK total    25 Hydroxyvitamin D2 and D3    TSH    Lipid Profile    Albumin Random Urine Quantitative with Creat Ratio    Basic metabolic panel    Hemoglobin A1c    Tissue transglutaminase payal IgA and IgG    Vitamin B12    CRP inflammation    Erythrocyte sedimentation rate auto    Follicle stimulating hormone    Luteinizing Hormone    Estradiol    Adult Eye  Referral          Again, thank you for allowing me to participate in the care of your patient.      Sincerely,    Jemma Anderson MD    "

## 2024-07-12 NOTE — NURSING NOTE
Current patient location: Home    Is the patient currently in the state of [place]? YES    Visit mode:VIDEO    If the visit is dropped, the patient can be reconnected by: VIDEO VISIT: Text to cell phone:   Telephone Information:   Mobile [phone]       Will anyone else be joining the visit? NO  (If patient encounters technical issues they should call [phone] :[phone])    How would you like to obtain your AVS? MyChart    Are changes needed to the allergy or medication list? Shannon reported no changes to e-check in information for visit. VF did not review e-check in information again with Shannon due to this.      Are refills needed on medications prescribed by this physician? NO    Reason for visit: RECHECK    Sharda CROOK

## 2024-07-12 NOTE — PROGRESS NOTES
Video-Visit Details    Type of service:  Video Visit    Virtual visit conducted by Sandra.      Originating Location (pt. Location): HOME    Distant Location (provider location):  Off site    Mode of Communication:  Video Conference via [facility]    Physician has received verbal consent for a Video Visit from the patient? YES      Jemma Anderson MD     Start time: 3:00, stop time 330, chart review, documentation time, coordination of care, 10 minutes.  Total time spent day of the encounter 40 minutes.    7/12/24    Chief Complaint:   Diabetes    History of Present Illness:   Shannon Sow is a 48 year old female with a history of diabetes who presents for follow up of diabetes.    #1 Type 1 diabetes mellitus  The patient was diagnosed with diabetes in 1992.  She was on Lantus and Humalog from approximately 2000 - May 2009 when she started on an insulin pump.  As of November 2013, she has been on a newer Medtronic pump with low glucose suspend.  She started using the Freestyle Azam CGMS system in February 2018.  Her A1c level in the past 5 years have generally been in the high 6% - mid 7% range.  March 2019 hemoglobin A1c was 7%. July 2019 A1c was 7.2%. November 2019 A1c 7.4%.   February 2020 A1c 7.0% .  December 2020 hemoglobin A1c of 7.8.  April 2021- pt was offered metformin and she declined September 2021  hemoglobin A1c of 7.4.  July 2022 hemoglobin A1c at 7.0. June 2023 hemoglobin A1c at 7.2. Started on Metformin 500 mg June 2023. Previously tried to get wegovy and dexcom sensors, but unable to due to cost. A1c in January 2024 7.7.    Interval history:. Patient on T slim pump and on Freestyle azam 3 .  Apparently freestyle azam plus is now available through mail order pharmacy.  The patient is also hoping to change over to the t:slim Mobi.  Has been on metformin since January 2024, most recently increased to 1000 mg daily February 2024.  On this program, the patient lost 8 pounds, up which  she had gained 4 pounds back due to her recent injury.  However she does tolerate the metformin and would prefer to stay at this program for now.  She is willing to have refills for the metformin at 1000 mg daily.    Total daily insulin roughly 27.6 units, 49% basal, 51% bolus    Pump settings:  Midnight to 6 AM: 0.6  6 AM to 2 PM: 0.5  2 PM to 7 PM: 0.3  7 PM to 9 PM: 1.2  9 PM to midnight: 0.8    Correction factor:  Midnight to 6 AM: 1 per 60  6 AM to midnight: 1 per 50    Carb ratio:  Midnight to 6 AM: 1 per 12  6 AM to 9 PM: 1 per 10  9 PM to midnight: 1 per 12    Blood Glucose Monitoring:  CGM reviewed.  48% within range, 48% above range, 4% low.  Average glucose 184, estimated hemoglobin A1c is 7.7.  Noted hyper glycemia primarily with meals.    Diabetes monitoring and complications:  CAD: June 2023 LDL was 94  Last eye exam results: 11/20/2019, mild nonproliferative retinopathy.  Per patient report, she had an eye exam in 2023 that was unremarkable, needs eye appointment for 2024  Microalbuminuria: Negative in June 2023  Neuropathy: No  HTN: No  On Statin: No  On Aspirin: No  Depression: Yes    #2 Subarachnoid hemorrhage  She was hospitalized at [facility] 8/2 - 8/9/18 for a subarachnoid hemorrhage after presenting with a sudden onset headache, nausea, and vomiting.  She had no complications. She saw Dr. Jacob in Neurology on 11/9/2018 who discussed with her that she is at low risk for any recurrent issues (2 - 5 % of recurrence).  She has no history of hypertension. She followed with Dr. Regan in Neurology on 11/20/2018 who concluded that her hemorrhage was likely non-aneurysmal in origin and found no other signs of ongoing symptoms.     Interval history: Doing well, no concerns.      #3 interest in weight loss  At previous visit the patient reported changing her diet and increasing her exercise.  In 2023, we had looked into cost of the Wegovy but this was not well covered.    Interval history:  "Weight stable. Has continued to work on adjusting her diet with reduced meals.  On metformin 1000 mg daily since February 2024, had lost 8 pounds and then gained this back due to her recent injury.    #4 fatigue  This is her most pressing issue.  She reports significant fatigue.  She reports fatigue even when she wakes up in the morning.  She reports that she would wake up for an hour at night and then go back to sleep.  She does report significant stress in her life.  She is working with a therapist.  She feels that her Prozac dose is adequate.Extensive evaluation in December 2020 was unremarkable.    Interval history: Not discussed today    #5 irregular menses-last menses in September 2023  The patient reports some irregular menses.  She reports occasionally skipping her menses.  She is uncertain whether she is having hot flashes.  She reports that she is not currently pregnant (has checked twice).  She is uncertain when her family members may have gone through menopause Evaluation in November 2022 was significant for an FSH of 14.5, LH of 25.7, estradiol of 338, prolactin of 8, and negative hCG.  Reports that her mother developed menopausal symptoms in her 40s.    Interval history: Reports last menses in September 2023.  Having hot flashes.  Reports that this is bearable.  However she is having anxiety, and muscle aches.  Anticipates seeing a \"menopausal specialist\"     #6 Joint pain  Patient with persistent joint pain in the hips and upper extremity joints. Says this has been worse over the past year. No history of recent fractures. Has been occasionally using voltaren gel with some relief.     Interval history: The patient reports diffuse muscle pain which she attributes to menopause.    Review of Systems:   Pertinent items are noted in HPI.  All other systems are negative.    Active Medications:   Current Outpatient Medications   Medication Sig Dispense Refill    blood glucose test strip Test up to 6 times " "daily 6 Box 4    cholecalciferol (VITAMIN D) 1000 UNIT tablet Take 1 tablet (1,000 Units) by mouth daily 90 tablet 3    Continuous Blood Gluc Sensor (FREESTYLE RADHA 3 SENSOR) MISC 1 Device every 14 days 6 each 3    FLUoxetine (PROZAC) 40 MG capsule Take 1 capsule (40 mg) by mouth daily 90 capsule 3    insulin aspart (NOVOLOG VIAL) 100 UNITS/ML vial 80 untis daily in pump 80 mL 2    insulin glargine (LANTUS PEN) 100 UNIT/ML pen Inject subcu  20 units if pump fails. 15 mL 0    insulin pen needle (B-D U/F) 31G X 5 MM Use if pump fails 4-5 times daily 100 each 1    ipratropium (ATROVENT) 0.03 % nasal spray Spray 2 sprays into both nostrils every 12 hours (Patient not taking: Reported on 7/14/2023) 30 mL 3    metFORMIN (GLUCOPHAGE XR) 500 MG 24 hr tablet Take 2  tablets daily 180 tablet 3    Multiple Vitamins-Minerals (MULTIVITAMIN OR) Take by mouth daily       STATIN NOT PRESCRIBED (INTENTIONAL) Please choose reason not prescribed, below          Allergies:   Nkda [no known drug allergies]   Seasonal allergies      Past Medical History:  Type I diabetes mellitus   Vitamin deficiency  Upper respiratory infection   Cerebral salt-wasting syndrome  Hypertensive disease  Recurrent major depressive disorder  Subarachnoid hemorrhage      Past Surgical History:  History reviewed. No pertinent past surgical history.      Family History:   Breast cancer - mother      Social History:   Pt working from home      Physical Exam:   GENERAL: Healthy, alert and no distress\",\"EYES: Eyes grossly normal to inspection.  No discharge or erythema, or obvious scleral/conjunctival abnormalities.\",\"RESP: No audible wheeze, cough, or visible cyanosis.  No visible retractions or increased work of breathing.  \",\"SKIN: Visible skin clear. No significant rash, abnormal pigmentation or lesions.\",\"NEURO: Cranial nerves grossly intact.  Mentation and speech appropriate for age.\",\"PSYCH: Mentation appears normal, affect normal/bright, judgement and " "insight intact, normal speech and appearance well-groomed.     Data:    Office Visit on 01/19/2024   Component Date Value Ref Range Status    Afinion Hemoglobin A1c POCT 01/19/2024 7.7 (H)  <=5.7 % Final    Normal <5.7%   Prediabetes 5.7-6.4%     Diabetes 6.5% or higher       Note: Adopted from ADA consensus guidelines.         Assessment and Plan:  #1 Type 1 diabetes mellitus   For glycemic control is reasonable, however I think she would benefit from a closed-loop system.  Her t:slim pump was recently approved to be integrated with the flores 2+ sensor.  This is covered through [employer] or [email or web address].  She will look into see if this may be an option for her.  Will have our diabetes educators see when she might get her t:slim mobi.  Will continue with metformin at 1000 mg daily-prescription sent to pharmacy.  Will update pump settings as noted below.  If she cannot get the flores plus through a medical supply company, she will likely be able to get this through commercial pharmacies later this year.    Pump settings:  Midnight to 6 AM: 0.6  6 AM to 2 PM: 0.5  2 PM to 5 PM: 0.3  5 PM to 9 PM: 1.4  9 PM to midnight: 0.8    Correction factor:  Midnight to 6 AM: 1 per 50  6 AM to 2 PM: 1 per 45  2 PM to 5 PM: 1 per 45  5 PM to 9 PM: 1 per 45  9 PM to midnight: 1 per 50    Carb ratio:  Midnight to 6 AM: 1 per 12  6 AM to 9 PM: 1 per 10  9 PM to midnight: 1 per 12    #2 Subarachnoid hemorrhage  Doing well, no current issues.      #3 interest in weight loss  On metformin 1000 mg daily and has lost weight.  She would like to remain at this dose which I think is reasonable.    #4 fatigue  Not discussed.    #5 irregular menses-absent menses since September 2023  The patient reports absent menses at September 2023 with hot flashes.  I am supportive of her seeing a \"menopausal specialist.\"  Will have patient do labs below as well as have her try gabapentin at 100 mg nightly.    #6 Joint pain  No significant improvement with " glucosamine/chondroitin, turmeric, or Voltaren.  Will have patient do labs below    Labs below in 1 month, return visit in 6 months and again in 9 months.      Orders Placed This Encounter   Procedures    CK total    25 Hydroxyvitamin D2 and D3    TSH    Lipid Profile    Albumin Random Urine Quantitative with Creat Ratio    Basic metabolic panel    Hemoglobin A1c    Tissue transglutaminase payal IgA and IgG    Vitamin B12    CRP inflammation    Erythrocyte sedimentation rate auto    Follicle stimulating hormone    Luteinizing Hormone    Estradiol    Adult Eye  Referral

## 2024-07-26 ENCOUNTER — TELEPHONE (OUTPATIENT)
Dept: ENDOCRINOLOGY | Facility: CLINIC | Age: 49
End: 2024-07-26

## 2024-07-26 ENCOUNTER — LAB (OUTPATIENT)
Dept: LAB | Facility: CLINIC | Age: 49
End: 2024-07-26
Payer: COMMERCIAL

## 2024-07-26 DIAGNOSIS — N95.1 MENOPAUSAL SYNDROME (HOT FLASHES): ICD-10-CM

## 2024-07-26 DIAGNOSIS — E10.9 TYPE 1 DIABETES MELLITUS WITHOUT COMPLICATION (H): ICD-10-CM

## 2024-07-26 DIAGNOSIS — R73.9 HYPERGLYCEMIA: ICD-10-CM

## 2024-07-26 LAB
ANION GAP SERPL CALCULATED.3IONS-SCNC: 12 MMOL/L (ref 7–15)
BUN SERPL-MCNC: 17.6 MG/DL (ref 6–20)
CALCIUM SERPL-MCNC: 9.3 MG/DL (ref 8.8–10.4)
CHLORIDE SERPL-SCNC: 101 MMOL/L (ref 98–107)
CHOLEST SERPL-MCNC: 210 MG/DL
CK SERPL-CCNC: 53 U/L (ref 26–192)
CREAT SERPL-MCNC: 0.77 MG/DL (ref 0.51–0.95)
CREAT UR-MCNC: 223 MG/DL
CRP SERPL-MCNC: <3 MG/L
EGFRCR SERPLBLD CKD-EPI 2021: >90 ML/MIN/1.73M2
ERYTHROCYTE [SEDIMENTATION RATE] IN BLOOD BY WESTERGREN METHOD: 6 MM/HR (ref 0–20)
ESTRADIOL SERPL-MCNC: 38 PG/ML
FASTING STATUS PATIENT QL REPORTED: NO
FASTING STATUS PATIENT QL REPORTED: NO
FSH SERPL IRP2-ACNC: 69.9 MIU/ML
GLUCOSE SERPL-MCNC: 192 MG/DL (ref 70–99)
HBA1C MFR BLD: 7.1 % (ref 0–5.6)
HCO3 SERPL-SCNC: 27 MMOL/L (ref 22–29)
HDLC SERPL-MCNC: 95 MG/DL
LDLC SERPL CALC-MCNC: 104 MG/DL
LH SERPL-ACNC: 49.4 MIU/ML
MICROALBUMIN UR-MCNC: 18.3 MG/L
MICROALBUMIN/CREAT UR: 8.21 MG/G CR (ref 0–25)
NONHDLC SERPL-MCNC: 115 MG/DL
POTASSIUM SERPL-SCNC: 4.5 MMOL/L (ref 3.4–5.3)
SODIUM SERPL-SCNC: 140 MMOL/L (ref 135–145)
TRIGL SERPL-MCNC: 53 MG/DL
TSH SERPL DL<=0.005 MIU/L-ACNC: 0.7 UIU/ML (ref 0.3–4.2)
VIT B12 SERPL-MCNC: 560 PG/ML (ref 232–1245)

## 2024-07-26 PROCEDURE — 85652 RBC SED RATE AUTOMATED: CPT

## 2024-07-26 PROCEDURE — 82306 VITAMIN D 25 HYDROXY: CPT

## 2024-07-26 PROCEDURE — 84443 ASSAY THYROID STIM HORMONE: CPT

## 2024-07-26 PROCEDURE — 82670 ASSAY OF TOTAL ESTRADIOL: CPT

## 2024-07-26 PROCEDURE — 86140 C-REACTIVE PROTEIN: CPT

## 2024-07-26 PROCEDURE — 82043 UR ALBUMIN QUANTITATIVE: CPT

## 2024-07-26 PROCEDURE — 82570 ASSAY OF URINE CREATININE: CPT

## 2024-07-26 PROCEDURE — 82550 ASSAY OF CK (CPK): CPT

## 2024-07-26 PROCEDURE — 86364 TISS TRNSGLTMNASE EA IG CLAS: CPT

## 2024-07-26 PROCEDURE — 83001 ASSAY OF GONADOTROPIN (FSH): CPT

## 2024-07-26 PROCEDURE — 80061 LIPID PANEL: CPT

## 2024-07-26 PROCEDURE — 80048 BASIC METABOLIC PNL TOTAL CA: CPT

## 2024-07-26 PROCEDURE — 82607 VITAMIN B-12: CPT

## 2024-07-26 PROCEDURE — 36415 COLL VENOUS BLD VENIPUNCTURE: CPT

## 2024-07-26 PROCEDURE — 83036 HEMOGLOBIN GLYCOSYLATED A1C: CPT

## 2024-07-26 PROCEDURE — 83002 ASSAY OF GONADOTROPIN (LH): CPT

## 2024-07-26 NOTE — TELEPHONE ENCOUNTER
Patient confirmed scheduled appointment:  Date: 4/4/25   Time: 11 am   Visit type: return diabetes   Provider: gregorio   Location: [facility]  Testing/imaging: NA   Additional notes: Spoke to pt and scheduled the 38 week follow-up appt per [facility]I notes from 7/12 visit   Check Out Comments: Also keep January 2025 appt, I will see in April or May 2025 - pt will do labs in 1 month   Disposition: Return in about 38 weeks (around 4/4/2025).     (Other appts were already scheduled)    Lolita Menchaca on 7/26/2024 at 2:18 PM

## 2024-07-31 ENCOUNTER — TELEPHONE (OUTPATIENT)
Dept: ENDOCRINOLOGY | Facility: CLINIC | Age: 49
End: 2024-07-31
Payer: COMMERCIAL

## 2024-07-31 DIAGNOSIS — N95.1 MENOPAUSAL SYNDROME (HOT FLASHES): ICD-10-CM

## 2024-07-31 LAB
DEPRECATED CALCIDIOL+CALCIFEROL SERPL-MC: <33 UG/L (ref 20–75)
TTG IGA SER-ACNC: <0.2 U/ML
TTG IGG SER-ACNC: <0.6 U/ML
VITAMIN D2 SERPL-MCNC: <5 UG/L
VITAMIN D3 SERPL-MCNC: 28 UG/L

## 2024-07-31 RX ORDER — GABAPENTIN 100 MG/1
100 CAPSULE ORAL AT BEDTIME
Qty: 90 CAPSULE | Refills: 3 | Status: SHIPPED | OUTPATIENT
Start: 2024-07-31

## 2024-07-31 NOTE — TELEPHONE ENCOUNTER
Called pt-results discussed with patient.  Absent menses for 1 year in the setting of elevated FSH LH and low estrogen supportive of menopause.  She is currently on gabapentin.  She will let me know if she is interested in HRT.    -  Dear Shannon    Here are your lab results.  As per our discussion, everything looks good with your hemoglobin A1c at 7.1.  Because you do have a high FSH and LH, and low estrogen, I do think you are in menopause.  The gabapentin will help with the hot flashes.  Please let me know if you think you might want some temporary estrogen (we will get this topically) which will couple with progesterone to potentially help.    If you have any questions, please feel free to contact my nurse at [phone] select option #3 for triage nurse  or  option #1 for scheduling related questions.    Regards    Jemma Anderson MD     Lab on 07/26/2024   Component Date Value Ref Range Status    CK 07/26/2024 53  26 - 192 U/L Final    25 OH Vitamin D2 07/26/2024 <5  ug/L Final    25 OH Vitamin D3 07/26/2024 28  ug/L Final    25 OH Vit D Total 07/26/2024 <33  20 - 75 ug/L Final    Season, race, dietary intake, and treatment affect the concentration of 25-hydroxy-Vitamin D. Values may decrease during winter months and increase during summer months. Values 20-29 ug/L may indicate Vitamin D insufficiency and values <20 ug/L may indicate Vitamin D deficiency.    TSH 07/26/2024 0.70  0.30 - 4.20 uIU/mL Final    Cholesterol 07/26/2024 210 (H)  <200 mg/dL Final    Triglycerides 07/26/2024 53  <150 mg/dL Final    Direct Measure HDL 07/26/2024 95  >=50 mg/dL Final    LDL Cholesterol Calculated 07/26/2024 104 (H)  <=100 mg/dL Final    Non HDL Cholesterol 07/26/2024 115  <130 mg/dL Final    Patient Fasting > 8hrs? 07/26/2024 No   Final    Creatinine Urine mg/dL 07/26/2024 223.0  mg/dL Final    The reference ranges have not been established in urine creatinine. The results should be integrated into the clinical context for  interpretation.    Albumin Urine mg/L 07/26/2024 18.3  mg/L Final    The reference ranges have not been established in urine albumin. The results should be integrated into the clinical context for interpretation.    Albumin Urine mg/g Cr 07/26/2024 8.21  0.00 - 25.00 mg/g Cr Final    Microalbuminuria is defined as an albumin:creatinine ratio of 17 to 299 for males and 25 to 299 for females. A ratio of albumin:creatinine of 300 or higher is indicative of overt proteinuria.  Due to biologic variability, positive results should be confirmed by a second, first-morning random or 24-hour timed urine specimen. If there is discrepancy, a third specimen is recommended. When 2 out of 3 results are in the microalbuminuria range, this is evidence for incipient nephropathy and warrants increased efforts at glucose control, blood pressure control, and institution of therapy with an angiotensin-converting-enzyme (ACE) inhibitor (if the patient can tolerate it).      Sodium 07/26/2024 140  135 - 145 mmol/L Final    Potassium 07/26/2024 4.5  3.4 - 5.3 mmol/L Final    Chloride 07/26/2024 101  98 - 107 mmol/L Final    Carbon Dioxide (CO2) 07/26/2024 27  22 - 29 mmol/L Final    Anion Gap 07/26/2024 12  7 - 15 mmol/L Final    Urea Nitrogen 07/26/2024 17.6  6.0 - 20.0 mg/dL Final    Creatinine 07/26/2024 0.77  0.51 - 0.95 mg/dL Final    GFR Estimate 07/26/2024 >90  >60 mL/min/1.73m2 Final    eGFR calculated using 2021 CKD-EPI equation.    Calcium 07/26/2024 9.3  8.8 - 10.4 mg/dL Final    Reference intervals for this test were updated on 7/16/2024 to reflect our healthy population more accurately. There may be differences in the flagging of prior results with similar values performed with this method. Those prior results can be interpreted in the context of the updated reference intervals.    Glucose 07/26/2024 192 (H)  70 - 99 mg/dL Final    Patient Fasting > 8hrs? 07/26/2024 No   Final    Hemoglobin A1C 07/26/2024 7.1 (H)  0.0 - 5.6 %  Final    Normal <5.7%   Prediabetes 5.7-6.4%    Diabetes 6.5% or higher     Note: Adopted from ADA consensus guidelines.    Tissue Transglutaminase Antibody I* 07/26/2024 <0.2  <7.0 U/mL Final    Negative- The tTG-IgA assay has limited utility for patients with decreased levels of IgA. Screening for celiac disease should include IgA testing to rule out selective IgA deficiency and to guide selection and interpretation of serological testing. tTG-IgG testing may be positive in celiac disease patients with IgA deficiency.    Tissue Transglutaminase Antibody I* 07/26/2024 <0.6  <7.0 U/mL Final    Negative    Vitamin B12 07/26/2024 560  232 - 1,245 pg/mL Final    CRP Inflammation 07/26/2024 <3.00  <5.00 mg/L Final    Erythrocyte Sedimentation Rate 07/26/2024 6  0 - 20 mm/hr Final    FSH 07/26/2024 69.9  mIU/mL Final    19 years and older:   Follicular phase: 3.5-12.5 mIU/mL   Ovulation phase: 4.7-21.5 mIU/mL   Luteal phase: 1.7-7.7 mIU/mL   Postmenopause: 25.8-134.8 mIU/mL       Luteinizing Hormone 07/26/2024 49.4  mIU/mL Final    FEMALE:  Age  0 - 6 mo:  <0.1-8.2 mIU/mL  6 mo - 11 years: <0.1-1.3 mIU/mL   11 - 14 years: <0.1-10 mIU/mL   14 - 19 years: 0.4-25 mIU/mL     19 years and older:   Follicular Phase: 2.4-12.6 mIU/mL  Ovulation Phase: 14.0-95.6 mIU/mL  Luteal Phase: 1.0-11.4  mIU/mL  Postmenopausal: 7.7-58.5 mIU/mL      Estradiol 07/26/2024 38  pg/mL Final    Healthy Men:   11.3-43.2 pg/mL    Healthy Postmenopausal Women:  Postmenopause: <5-138 pg/mL    Healthy Pregnant Women:  1st trimester: 154-3243 pg/mL  2nd trimester: 1561-56699 pg/mL  3rd trimester: 8525->13965 pg/mL    Healthy Women Cycle Phase:  Follicular: 30.9-90.4 pg/mL  Ovulation: 60.4-533 pg/mL  Luteal: 60.4-232 pg/mL    Healthy Women Cycle Sub-Phase:  Early Follicular: 20.5-62.8 pg/mL  Intermediate Follicular: 26-79.8 pg/mL  Late Follicular: 49.5-233 pg/mL  Ovulation: 60.4-602 pg/mL  Early Luteal: 51.1-179 pg/mL  Intermediate Luteal: 66.5-305  pg/mL  Late Luteal: 30.2-222 pg/mL

## 2024-08-28 ENCOUNTER — TELEPHONE (OUTPATIENT)
Dept: ENDOCRINOLOGY | Facility: CLINIC | Age: 49
End: 2024-08-28
Payer: COMMERCIAL

## 2024-09-11 ENCOUNTER — OFFICE VISIT (OUTPATIENT)
Dept: OPHTHALMOLOGY | Facility: CLINIC | Age: 49
End: 2024-09-11
Payer: COMMERCIAL

## 2024-09-11 DIAGNOSIS — N95.1 MENOPAUSAL SYNDROME (HOT FLASHES): ICD-10-CM

## 2024-09-11 DIAGNOSIS — E10.9 TYPE 1 DIABETES MELLITUS WITHOUT COMPLICATION (H): ICD-10-CM

## 2024-09-11 DIAGNOSIS — R73.9 HYPERGLYCEMIA: ICD-10-CM

## 2024-09-11 DIAGNOSIS — H26.9 CORTICAL CATARACT OF BOTH EYES: ICD-10-CM

## 2024-09-11 DIAGNOSIS — H52.13 MYOPIA OF BOTH EYES: ICD-10-CM

## 2024-09-11 DIAGNOSIS — E10.9 TYPE 1 DIABETES MELLITUS WITHOUT RETINOPATHY (H): Primary | ICD-10-CM

## 2024-09-11 DIAGNOSIS — G43.109 MIGRAINE AURA WITHOUT HEADACHE: ICD-10-CM

## 2024-09-11 PROCEDURE — 92015 DETERMINE REFRACTIVE STATE: CPT | Performed by: OPTOMETRIST

## 2024-09-11 PROCEDURE — 92014 COMPRE OPH EXAM EST PT 1/>: CPT | Performed by: OPTOMETRIST

## 2024-09-11 ASSESSMENT — TONOMETRY
IOP_METHOD: ICARE
OD_IOP_MMHG: 15
OS_IOP_MMHG: 15

## 2024-09-11 ASSESSMENT — CONF VISUAL FIELD
OD_SUPERIOR_TEMPORAL_RESTRICTION: 0
OD_SUPERIOR_NASAL_RESTRICTION: 0
OD_INFERIOR_NASAL_RESTRICTION: 0
OS_SUPERIOR_NASAL_RESTRICTION: 0
OS_SUPERIOR_TEMPORAL_RESTRICTION: 0
METHOD: COUNTING FINGERS
OD_NORMAL: 1
OS_NORMAL: 1
OS_INFERIOR_TEMPORAL_RESTRICTION: 0
OD_INFERIOR_TEMPORAL_RESTRICTION: 0
OS_INFERIOR_NASAL_RESTRICTION: 0

## 2024-09-11 ASSESSMENT — REFRACTION_WEARINGRX
OD_SPHERE: -1.25
OS_SPHERE: -0.75
OS_CYLINDER: SPHERE
OD_CYLINDER: SPHERE
OS_ADD: +1.50
OD_ADD: +1.50

## 2024-09-11 ASSESSMENT — VISUAL ACUITY
CORRECTION_TYPE: GLASSES
OD_CC+: +2
OD_CC: 20/20
OS_CC+: -2
METHOD: SNELLEN - LINEAR
OS_CC: 20/20

## 2024-09-11 ASSESSMENT — CUP TO DISC RATIO
OD_RATIO: 0.45
OS_RATIO: 0.45

## 2024-09-11 ASSESSMENT — EXTERNAL EXAM - RIGHT EYE: OD_EXAM: NORMAL

## 2024-09-11 ASSESSMENT — REFRACTION_MANIFEST
OS_SPHERE: -0.50
OS_CYLINDER: SPHERE
OD_CYLINDER: SPHERE
OD_SPHERE: -1.00
OS_ADD: +1.50
OD_ADD: +1.50

## 2024-09-11 ASSESSMENT — SLIT LAMP EXAM - LIDS
COMMENTS: NORMAL
COMMENTS: NORMAL

## 2024-09-11 ASSESSMENT — EXTERNAL EXAM - LEFT EYE: OS_EXAM: NORMAL

## 2024-09-11 NOTE — PROGRESS NOTES
History  HPI       Diabetic Eye Exam    Diabetes characteristics include on insulin.  Duration of 1.5 years.             Comments    Diabetes is managed by Dr. Jemma Anderson.  Vision is stable.  Needing larger reading glasses for doing fine near work.  One day if closed eyes or looked around would have a floater that would no go away that lasted about 15 minutes and then has not recurrence.  Denies pain.      Lab Results       Component                Value               Date                       A1C                      7.1                 07/26/2024                 A1C                      7.7                 01/19/2024                 A1C                      7.2                 06/05/2023                 A1C                      7.2                 11/11/2022                 A1C                      7.0                 07/18/2022                 A1C                      7.4                 09/10/2021                 A1C                      7.8                 12/24/2020                 A1C                      7.2                 11/09/2018                 A1C                      7.5                 11/01/2013                 A1C                      7.5                 07/26/2013                 A1C                      7.4                 03/01/2013                      Last edited by Teresa Teran on 9/11/2024  3:00 PM.          Assessment/Plan  (E10.9) Type 1 diabetes mellitus without retinopathy (H)  (primary encounter diagnosis)  (R73.9) Hyperglycemia  (E10.9) Type 1 diabetes mellitus without complication (H)  Comment: No retinopathy  Plan:  Educated patient on clinical findings and the importance of continued management with primary care physician. Continue management as directed and return to clinic in 1 year for dilated exam, or sooner, as needed. Copy of chart sent to Dr. Anderson.    (H52.13) Myopia of both eyes  Comment: Myopia both eyes with presbyopia  Plan: REFRACTION         Dispensed spectacle  prescription for full time wear. Monitor annually.    (H26.9) Cortical cataract of both eyes  Comment: Not visually significant  Plan:  No treatment indicated at this time. Monitor annually.    (G43.109) Migraine aura without headache  Comment: Reports one episode of bilateral scintillating scotoma without headache  Plan:  No treatment indicated at this time. If symptoms worsen, recommended discussing with primary care provider. Monitor annually.    Return to clinic in 1 year for comprehensive eye exam.    Complete documentation of historical and exam elements from today's encounter can  be found in the full encounter summary report (not reduplicated in this progress  note). I personally obtained the chief complaint(s) and history of present illness. I  confirmed and edited as necessary the review of systems, past medical/surgical  history, family history, social history, and examination findings as documented by  others; and I examined the patient myself. I personally reviewed the relevant tests,  images, and reports as documented above. I formulated and edited as necessary the  assessment and plan and discussed the findings and management plan with the  patient and family.    Job Gonzalez, JACY, FAAO

## 2024-09-11 NOTE — NURSING NOTE
Chief Complaints and History of Present Illnesses   Patient presents with    Diabetic Eye Exam     Chief Complaint(s) and History of Present Illness(es)       Diabetic Eye Exam              Diabetes Type: on insulin    Duration: 1.5 years              Comments    Diabetes is managed by Dr. Jemma Anderson.  Vision is stable.  Needing larger reading glasses for doing fine near work.  One day if closed eyes or looked around would have a floater that would no go away that lasted about 15 minutes and then has not recurrence.  Denies pain.      Lab Results       Component                Value               Date                       A1C                      7.1                 07/26/2024                 A1C                      7.7                 01/19/2024                 A1C                      7.2                 06/05/2023                 A1C                      7.2                 11/11/2022                 A1C                      7.0                 07/18/2022                 A1C                      7.4                 09/10/2021                 A1C                      7.8                 12/24/2020                 A1C                      7.2                 11/09/2018                 A1C                      7.5                 11/01/2013                 A1C                      7.5                 07/26/2013                 A1C                      7.4                 03/01/2013

## 2024-09-30 ENCOUNTER — TELEPHONE (OUTPATIENT)
Dept: ENDOCRINOLOGY | Facility: CLINIC | Age: 49
End: 2024-09-30
Payer: COMMERCIAL

## 2024-09-30 DIAGNOSIS — E10.9 TYPE 1 DIABETES MELLITUS WITHOUT COMPLICATION (H): Primary | ICD-10-CM

## 2024-09-30 NOTE — TELEPHONE ENCOUNTER
Call patient.  Patient on tandem Mobi for 2 weeks.  Feels like this has been going well.  Reports that insurance does not cover the Dexcom G6 or G7.  I will have pharmacy relook into this.  She reports hypoglycemia at night and hypoglycemia morning.  Pump settings as noted below: Will also refer patient to diabetes education.    Basal rate:  Midnight to 6 AM: 0.5 units/h  6 AM to 2 PM: 0.5 units/h  2 PM to 5 PM: 0.6 units/h  5 PM to 9 PM: 1.1 units/h  9 PM to midnight: 0.7 units/h    Carb coverage:  Midnight to 6 AM: 1 per 12  6 AM to 2 PM: 1 per 9  2 PM to 5 PM: 1 per 9  5 PM to 9 PM: 1 per 9  9 PM to midnight: 1 per 12    Insulin sensitivity  Midnight to 6 AM: 1 per 50  6 AM to 2 PM: 1 per 40  2 PM to 5 PM: 1 per 40  5 PM to 9 PM: 1 per 40  9 PM to midnight: 1 per 50    -    REVISED SETTINGS    Basal rate:  Midnight to 6 AM: 0.7 units/h  6 AM to 2 PM: 0.5 units/h  2 PM to 5 PM: 0.6 units/h  5 PM to 9 PM: 1.1 units/h  9 PM to midnight: 0.7 units/h    Carb coverage:  Midnight to 6 AM: 1 per 12  6 AM to 2 PM: 1 per 9  2 PM to 5 PM: 1 per 9  5 PM to 9 PM: 1 per 9  9 PM to midnight: 1 per 12    Insulin sensitivity  Midnight to 6 AM: 1 per 50  6 AM to 2 PM: 1 per 50  2 PM to 5 PM: 1 per 40  5 PM to 9 PM: 1 per 40  9 PM to midnight: 1 per 50

## 2024-10-01 ENCOUNTER — TELEPHONE (OUTPATIENT)
Dept: EDUCATION SERVICES | Facility: CLINIC | Age: 49
End: 2024-10-01
Payer: COMMERCIAL

## 2024-10-01 NOTE — TELEPHONE ENCOUNTER
Patient confirmed scheduled appointment:  Date: 10/11  Time: 9:30  Visit type: diabetic education  Provider: Siena Schofield  Location: virtual  Testing/imaging:   Additional notes: Patient stated she should be in [place] for the visit. Patient preferred scheduling on a Friday       Lorraine Farr RN  Clinic [id number] hours ago (7:07 AM)     DM  CDE referral is in place. Please help schedule first available visit. Thank you.     Divina David on 10/1/2024 at 9:13 AM

## 2024-10-08 ENCOUNTER — TELEPHONE (OUTPATIENT)
Dept: ENDOCRINOLOGY | Facility: CLINIC | Age: 49
End: 2024-10-08
Payer: COMMERCIAL

## 2024-10-08 NOTE — TELEPHONE ENCOUNTER
Called pt - dexcom g7 is free.  She will let me know if she is interested.     ----- Message from Jacob Singh sent at 10/8/2024  8:13 AM CDT -----  Regarding: RE: urgent  Dr. Anderson    Cost of both sensors $0.    Jacob Singh [facility]  Specialty Pharm[facility]  [facility]  [email or web address]   [email or web address]  Phone: # [phone]  Fax: [phone]  ----- Message -----  From: Jose Quinonez  Sent: 10/8/2024   7:49 AM CDT  To: Joann Wallace  Subject: FW: urgent                                         ----- Message -----  From: Jemma Anderson MD  Sent: 10/8/2024  12:00 AM CDT  To: [facility]  Subject: FW: urgent                                       Cost of dexcom g6 or g7?  ----- Message -----  From: Luz Mann  Sent: 9/30/2024   5:35 PM CDT  To: Jemma Anderson MD  Subject: FW: urgent                                       Dexcom G6 : $0  Dexcom G6 Sensor: REFILL PAYABLE ON OR AFTER 10-07-24    Dexcom G7 : $0  Dexcom G7 Sensor: REFILL PAYABLE ON OR AFTER 10-07-24    Thank You!    Luz Mann [facility]  [email or web address]  Phone: [phone]  Fax: [phone]  ----- Message -----  From: Jemma Anderson MD  Sent: 9/30/2024   5:06 PM CDT  To: [facility]  Subject: urgent                                           Cost of dexcom g7 or dexcom g7 for pt?

## 2024-10-11 ENCOUNTER — TELEPHONE (OUTPATIENT)
Dept: ENDOCRINOLOGY | Facility: CLINIC | Age: 49
End: 2024-10-11

## 2024-10-11 ENCOUNTER — VIRTUAL VISIT (OUTPATIENT)
Dept: EDUCATION SERVICES | Facility: CLINIC | Age: 49
End: 2024-10-11
Payer: COMMERCIAL

## 2024-10-11 DIAGNOSIS — E10.9 TYPE 1 DIABETES MELLITUS WITHOUT COMPLICATION (H): ICD-10-CM

## 2024-10-11 PROCEDURE — G0108 DIAB MANAGE TRN  PER INDIV: HCPCS | Mod: 95 | Performed by: DIETITIAN, REGISTERED

## 2024-10-11 NOTE — PATIENT INSTRUCTIONS
Nolan Ortega,    It was a pleasure meeting with you today!    Here is a summary of our visit:  *check with insurance about Dexcom G6 or G7.      *bolus 15 minutes before breakfast  *consider replacing jelly with peanut butter at breakfast  *aim for veggies first at meals (add a salad if veggies have added fats so that you can keep the higher calorie food portions smaller).    Changes we made to your pump settings:  *Weakened carb ratio for breakfast  *Weakened correction insulin in the afternoon  *increased afternoon basal rate  *Lowered evening basal rate    New Insulin pump settings:  Time           Basal Rate (units/hr)  12:00 AM       0.700  7:30 (changed from 7:00) AM        0.500  1:00 PM (changed from 2 pm)       0.700  4:00 PM  (changed from 5 pm)      0.900  9:00 PM        0.700    Time           Correction Factor (units:mg/dL)  12:00 AM       1:50  7:00 AM        1:50  2:00 PM        1:50  5:00 PM        1:50  9:00 PM        1:50    Time           Carb Ratio (units:grams)  12:00 AM       1:15.0  7:00 AM        1:9.0  2:00 PM        1:9.0  5:00 PM        1:9.0  9:00 PM        1:12.0      Follow Up Plan:    Virtual Visit in 2 weeks.  Feel free to send a [employer] message with questions or concerns in the meantime.    Thank you,    Siena Schofield RDN, JOEN, [facility]  Dietitian and Diabetes Education - Endocrinology  [facility]  [address] [place] [address]  Phone: [phone]  Email: [email or web address]    Contact information:   To schedule a diabetes education appointment:[phone].  For questions or concerns, please send a Evrihart message or call the clinic at [phone].    For more urgent concerns that do not require 911, please call [phone] after hours/weekends and ask to speak with the Endocrinologist on call.       Please let us know if you are having low blood sugars less than 70 or over 300 mg/dL.  Do not wait until your next appointment if  this is happening.

## 2024-10-11 NOTE — NURSING NOTE
Current patient location: [place]    Is the patient currently in the state of [place]? YES    Visit mode:VIDEO    If the visit is dropped, the patient can be reconnected by: VIDEO VISIT: Send to e-mail at: [email or web address]    Will anyone else be joining the visit? NO  (If patient encounters technical issues they should call [phone])    Are changes needed to the allergy or medication list? Shannon reported no changes to e-check in information for visit. GEOFF did not review e-check in information again with Shannon due to this.       Are refills needed on medications prescribed by this physician? Unknown, new patient    Rooming Documentation:  Not applicable    Reason for visit: Consult    Sharda CROOK

## 2024-10-11 NOTE — PROGRESS NOTES
Changed to Tandem Mobi- noticing sugars are lower.           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 Anderson, endocrinologist      REPORTS:    Tandem Mobi Pump + Azam Sensor.  Unable to use Control IQ.  Insurance does not cover Dexcom sensor.            Insulin Pump Information     TDD:  30 units.  54% Basal / 46% Bolus.  Reports no change in total daily dose when changing pumps.            ASSESSMENT  - Shannon noticed lower sugars when changing from the Tandem tslim X2 to the Mobi.    - Sugars show a pattern of dropping low after breakfast and in the late afternoon after taking a correction.  - Sugars show a pattern of rising mid-afternoon.      Shannon desires weight loss.  She has started Metformin to help with appetite suppression.  She is working on smaller portions and less boredom eating.  Her  is a  and likes to 'plate her food', making dinner portions challenging.    PLAN    *check with insurance about Dexcom G6 or G7.      *bolus 15 minutes before breakfast  *consider replacing jelly with peanut butter at breakfast  *aim for veggies first at meals (add a salad if veggies have added fats so that you can keep the higher calorie food portions smaller).    Changes we made to your pump settings:  *Weakened carb ratio for breakfast  *Weakened correction insulin in the afternoon  *increased afternoon basal rate  *Lowered evening basal rate    New Insulin pump settings:  Time           Basal Rate (units/hr)  12:00 AM       0.700  7:30 (changed from 7:00) AM        0.500  1:00 PM (changed from 2 pm)       0.700  4:00 PM  (changed from 5 pm)      0.900  9:00 PM        0.700    Time           Correction Factor (units:mg/dL)  12:00 AM       1:50  7:00 AM        1:50  2:00 PM        1:50  5:00 PM        1:50  9:00 PM        1:50    Time           Carb Ratio (units:grams)  12:00 AM        1:15.0  7:00 AM        1:9.0  2:00 PM        1:9.0  5:00 PM        1:9.0  9:00 PM        1:12.0      Follow-up: virtual visit in 2 weeks.    INTERVENTION    Healthy Eating: Plate planning method and Weight Management and sensor report interpretation and insulin pump adjustments.       SUBJECTIVE/OBJECTIVE:  Presents for: Individual review  Accompanied by: Self  Diabetes education in the past 24mo: No  Diabetes type: Type 1  Date of diagnosis: 32 years ago, 17 years old.  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  Trying not to snack at night.)  Symptom course: Stable  Disease course: Getting harder to manage       Patient Problem List and Family Medical History reviewed for relevant medical history, current medical status, and diabetes risk factors.    Labs:  Lab Results   Component Value Date    A1C 7.1 07/26/2024    A1C 7.8 12/24/2020    HEMOGLOBINA1 7.4 11/15/2019     Lab Results   Component Value Date     07/26/2024     05/24/2023     10/26/2021     12/24/2020       Healthy Eating:  Cultural/Church diet restrictions?: No  How many times a week on average do you eat food made away from home (restaurant/take-out)?: 2  Meals include: Breakfast, Dinner, Afternoon Snack  Breakfast: 6am:  Raisen Toast with jam/butter or cold cereal  Lunch: Noon - 12 pm:  Cheese & crackers OR fruit OR hard boiled eggs or almonds  Dinner: 7:30 - 9pm.  4-6 oz pork/beef + pasta/rice + veggie w/ added butter.  (tends to be heavier in fat)  Snacks: 4 pm:  Dumplings or chicken wings.  Other:  is a   Beverages:  Water, Coffee, Diet soda, Alcohol    Being Active:  Exercise:: Yes (Yoga in the morning.  Walks a lot at work - 5500 steps +)  Days per week of moderate to strenuous exercise (like a brisk walk): 7  On average, minutes per day of exercise at this level: 20  Exercise Minutes per Week: 140  Barrier to exercise: None    Monitoring:  Blood Glucose Meter: CGM  Times checking blood sugar at home (number): Other (see Comments)  Please elaborate:: Cgm  Times checking blood sugar at home (per): Day    Jacobo is connected to our healthcare account.    Taking Medications:  Diabetes Medication(s)       Biguanides       metFORMIN (GLUCOPHAGE XR) 500 MG 24 hr tablet Take 2  tablets daily       Insulin       insulin aspart (NOVOLOG VIAL) 100 UNITS/ML vial 80 untis daily in pump     insulin glargine (LANTUS PEN) 100 UNIT/ML pen Inject subcu  20 units if pump fails.            Current Treatments: Insulin Pump, Oral Medication (taken by mouth)    Problem Solving:     Low blood sugars daily.    Reducing Risks:  Has dilated eye exam at least once a year?: Yes  Sees dentist every 6 months?: Yes  Feet checked by healthcare provider in the last year?: Yes    Healthy Coping:  Healthy Coping Assessed Today: Yes (Stress at work and home.  Sleeps more.  Maybe more wine with dinner.)  Informal Support system:: Children, Family, Friends, Spouse    Siena Schofield RDN, JOE, [facility]  Dietitian and Diabetes  - Endocrinology  [facility]      Time Spent: 60 minutes  Encounter Type: Individual    Any diabetes medication dose changes were made via the CDE Protocol per the patient's primary care provider. A copy of this encounter was shared with the provider.

## 2024-10-11 NOTE — TELEPHONE ENCOUNTER
MyChart sent.  Patient reports Dexcom sensors cost $350 per month before deductible met.  Azam 2 plus not available.  Prefers to remain on azam 3 at this point.

## 2024-10-25 ENCOUNTER — VIRTUAL VISIT (OUTPATIENT)
Dept: EDUCATION SERVICES | Facility: CLINIC | Age: 49
End: 2024-10-25
Payer: COMMERCIAL

## 2024-10-25 DIAGNOSIS — E10.9 TYPE 1 DIABETES MELLITUS WITHOUT COMPLICATION (H): Primary | ICD-10-CM

## 2024-10-25 PROCEDURE — 99207 PR NO BILLABLE SERVICE THIS VISIT: CPT | Mod: 95 | Performed by: DIETITIAN, REGISTERED

## 2024-10-25 NOTE — PATIENT INSTRUCTIONS
Nolan Ortega,    It was a pleasure meeting with you today!    Here is a summary of our visit:    *continue to bolus 15 minutes before breakfast  *continue to aim for veggies first at meals (add a salad if veggies have added fats so that you can keep the higher calorie food portions smaller).    Changes we made to your pump settings:  *Weakened carb ratio  *Weakened correction insulin  *Weakened basal rate, except for mid-morning, we slightly increased basal rate.    New Insulin pump settings:  Time           Basal Rate (units/hr)  12:00 AM       0.625  7:30 AM         0.550  11:00 AM (changed from 1 pm)       0.625  4:00 PM        0.625   9:00 PM        0.625    Time           Correction Factor (units:mg/dL)  12:00 AM       1:56    Time           Carb Ratio (units:grams)  12:00 AM       1:15.0  7:00 AM        1:9.0  2:00 PM        1:12.0  5:00 PM        1:12.0  9:00 PM        1:12.0    Follow Up Plan:    Virtual Visit in 2 weeks    Thank you,    Siena Schofield RDN, JOEN, [facility]  Dietitian and Diabetes Education - Endocrinology  [facility]  [address] [place] [address]  Phone: [phone]  Email: [email or web address]    Contact information:   To schedule a diabetes education appointment:[phone].  For questions or concerns, please send a [employer] message or call the clinic at [phone].    For more urgent concerns that do not require [phone], please call [phone] after hours/weekends and ask to speak with the Endocrinologist on call.       Please let us know if you are having low blood sugars less than 70 or over 300 mg/dL.  Do not wait until your next appointment if this is happening.

## 2024-10-25 NOTE — NURSING NOTE
Current patient location: [address] [place] [address]    Is the patient currently in the state of [place]? YES    Visit mode:VIDEO    If the visit is dropped, the patient can be reconnected by: VIDEO VISIT: Text to cell phone:   Telephone Information:   Mobile [phone]       Will anyone else be joining the visit? NO  (If patient encounters technical issues they should call [phone]    Are changes needed to the allergy or medication list? No, Pt stated no changes to allergies, and Pt stated no med changes    Are refills needed on medications prescribed by this physician? NO    Rooming Documentation:  Not applicable    Reason for visit: TONY CROOK

## 2024-10-25 NOTE — PROGRESS NOTES
Virtual Visit Details    Type of service:  Video Visit     Originating Location (pt. Location): Home    Distant Location (provider location):  Off-site  Platform used for Video Visit: Monse    DIABETES SELF-MANAGEMENT EDUCATION & SUPPORT    Presents for: Individual review    Referred by: Dr. Anderson, endocrinologist    Changed from Tandem X2 to Tandem Mobi- noticing sugars are lower.      REPORTS:    Tandem Mobi Pump + Azam Sensor.  Unable to use Control IQ.  Insurance does not cover Dexcom sensor.            Insulin Pump Information          SHANNON CARR  YOB: 1975  Last upload date: Oct 25, 2024, 2:11 PM  Tandem Mobi system (#[id number])      Pump Profile settings - standard  Active at upload    Time           Basal Rate (units/hr)  12:00 AM       0.700  7:30 AM        0.500  1:00 PM        0.700  4:00 PM        0.900  9:00 PM        0.700  Total Daily Basal: 16.413 units    Time           Correction Factor (units:mg/dL)  12:00 AM       1:50  7:30 AM        1:50  1:00 PM        1:50  4:00 PM        1:50  9:00 PM        1:50    Time           Carb Ratio (units:grams)  12:00 AM       1:15.0  7:30 AM        1:9.0  1:00 PM        1:9.0  4:00 PM        1:9.0  9:00 PM        1:12.0    Time           Target BG (mg/dL)  12:00 AM       120  7:30 AM        120  1:00 PM        120  4:00 PM        120  9:00 PM        120    Insulin Duration: 5 hr  Carbohydrates: On        ASSESSMENT  - Shannon noticed lower sugars when changing from the Tandem tslim X2 to the Mobi.    - 2 weeks ago we reduced breakfast insulin, afternoon/evening correction insulin and evening basal rate while increasing afternoon basal rate  - Recent reports show improved sugars, yet, sugars continue to yo-yo from low to high.  According to calculations, Shannon is likely on too much insulin overall.  Will reduce her insulin to achieve less hypoglycemia and tweak settings from there.    Shannon desires weight loss.  She has started  Metformin to help with appetite suppression.  She is working on smaller portions and less boredom eating.  Her  is a  and likes to 'plate her food', making dinner portions challenging.    PLAN    *continue to bolus 15 minutes before breakfast  *continue to aim for veggies first at meals (add a salad if veggies have added fats so that you can keep the higher calorie food portions smaller).    Changes we made to your pump settings:  *Weakened carb ratio  *Weakened correction insulin  *Weakened basal rate, except for mid-morning, we slightly increased basal rate.    New Insulin pump settings:  Time           Basal Rate (units/hr)  12:00 AM       0.625  7:30 AM         0.550  11:00 AM (changed from 1 pm)       0.625  4:00 PM        0.625   9:00 PM        0.625    Time           Correction Factor (units:mg/dL)  12:00 AM       1:56    Time           Carb Ratio (units:grams)  12:00 AM       1:15.0  7:00 AM        1:9.0  2:00 PM        1:12.0  5:00 PM        1:12.0  9:00 PM        1:12.0      Follow-up: virtual visit in 2 weeks.    INTERVENTION    Sensor report interpretation and insulin pump adjustments.       SUBJECTIVE/OBJECTIVE:  Presents for: Individual review  Accompanied by: Self  Diabetes education in the past 24mo: No  Diabetes type: Type 1  Date of diagnosis: 32 years ago, 17 years old.  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  Trying not to snack at night.)  Symptom course: Stable  Disease course: Getting harder to manage       Patient Problem List and Family Medical History reviewed for relevant medical history,  current medical status, and diabetes risk factors.    Labs:  Lab Results   Component Value Date    A1C 7.1 07/26/2024    A1C 7.8 12/24/2020    HEMOGLOBINA1 7.4 11/15/2019     Lab Results   Component Value Date     07/26/2024     05/24/2023     10/26/2021     12/24/2020       Healthy Eating:  Cultural/[employer] diet restrictions?: No  How many times a week on average do you eat food made away from home (restaurant/take-out)?: 2  Meals include: Breakfast, Dinner, Afternoon Snack  Breakfast: 6am:  Raisen Toast with jam/butter or cold cereal >> changed jam to fruit spread, not excited about peanut butter alone.  Lunch: Noon - 12 pm:  Cheese & crackers OR fruit OR hard boiled eggs or almonds  Dinner: 7:30 - 9pm.  4-6 oz pork/beef + pasta/rice + veggie w/ added butter.  (tends to be heavier in fat)  Snacks: 4 pm:  Dumplings or chicken wings.  Other:  is a   Beverages: Water, Coffee, Diet soda, Alcohol    Being Active:  Exercise:: Yes (Yoga in the morning.  Walks a lot at work - 5500 steps +)  Days per week of moderate to strenuous exercise (like a brisk walk): 7  On average, minutes per day of exercise at this level: 20  Exercise Minutes per Week: 140  Barrier to exercise: None    Monitoring:  Blood Glucose Meter: CGM  Times checking blood sugar at home (number): Other (see Comments)  Please elaborate:: Cgm  Times checking blood sugar at home (per): Day    Jacobo is connected to our healthcare account.    Taking Medications:  Diabetes Medication(s)       Biguanides       metFORMIN (GLUCOPHAGE XR) 500 MG 24 hr tablet Take 2  tablets daily       Insulin       insulin aspart (NOVOLOG VIAL) 100 UNITS/ML vial 80 untis daily in pump     insulin glargine (LANTUS PEN) 100 UNIT/ML pen Inject subcu  20 units if pump fails.            Current Treatments: Insulin Pump, Oral Medication (taken by mouth)    Problem Solving:     Low blood sugars daily.    Reducing Risks:  Has dilated eye exam at  least once a year?: Yes  Sees dentist every 6 months?: Yes  Feet checked by healthcare provider in the last year?: Yes    Healthy Coping:  Healthy Coping Assessed Today: Yes (Stress at work and home.  Sleeps more.  Maybe more wine with dinner.)  Informal Support system:: Children, Family, Friends, Spouse    Siena Schofield RDN, JOE, [facility]  Dietitian and Diabetes  - Endocrinology  [facility]      Time Spent: 20 minutes  Encounter Type: Individual    Any diabetes medication dose changes were made via the CDE Protocol per the patient's primary care provider. A copy of this encounter was shared with the provider.

## 2024-11-07 ENCOUNTER — VIRTUAL VISIT (OUTPATIENT)
Dept: EDUCATION SERVICES | Facility: CLINIC | Age: 49
End: 2024-11-07
Payer: COMMERCIAL

## 2024-11-07 DIAGNOSIS — E10.9 TYPE 1 DIABETES MELLITUS WITHOUT COMPLICATION (H): Primary | ICD-10-CM

## 2024-11-07 PROCEDURE — 99207 PR NO BILLABLE SERVICE THIS VISIT: CPT | Mod: 95 | Performed by: DIETITIAN, REGISTERED

## 2024-11-07 ASSESSMENT — PAIN SCALES - GENERAL: PAINLEVEL_OUTOF10: NO PAIN (0)

## 2024-11-07 NOTE — NURSING NOTE
Current patient location: Patient declined to provide     Is the patient currently in the state of [place]? YES    Visit mode:VIDEO    If the visit is dropped, the patient can be reconnected by: VIDEO VISIT: Text to cell phone:   Telephone Information:   Mobile [phone]       Will anyone else be joining the visit? NO  (If patient encounters technical issues they should call [phone])    Are changes needed to the allergy or medication list? No    Are refills needed on medications prescribed by this physician? NO    Rooming Documentation:  Questionnaire(s) not done per department protocol    Reason for visit: Diabetes Education    Shelby Kocher VVF

## 2024-11-07 NOTE — PROGRESS NOTES
Virtual Visit Details    Type of service:  Video Visit     Originating Location (pt. Location): Home    Distant Location (provider location):  On-site  Platform used for Video Visit: Monse      DIABETES SELF-MANAGEMENT EDUCATION & SUPPORT    Presents for: Individual review    Referred by: Dr. Anderson, endocrinologist    Changed from Tandem X2 to Tandem Mobi- noticing sugars are lower.  Have been reducing insulin and noticing less extreme swings in sugars.    REPORTS:    Tandem Mobi Pump + Azam Sensor.  Unable to use Control IQ.  Insurance does not cover Dexcom sensor nor Azam 2 plus sensor.    B:  6am  L:  after 2 pm  D:  7-9 pm.        Insulin Pump Information                  ASSESSMENT  - Shannon noticed lower sugars when changing from the Tandem tslim X2 to the Mobi.    - 2 weeks ago we weakened carb ratio, correction and decreased basal rate, except for mid-morning, we increased basal rate.  - Reports reveal mid-morning rise, likely due to a carbohydrate/caffeine beverage that Shannon doesn't always take insulin for.  - TIR improved from 60% to 66% and sugars <70 mg/dl improved from 6% to 3% however glucose variability remains high at 39%.  According to calculations, Shannon is likely on too much correction and mealtime insulin.  Shannon reports going low after evening meal.  Reports reveal dropping low after corrections.  Will lower these settings (see plan).    Shannon desires weight loss.  She has started Metformin to help with appetite suppression.  She is working on smaller portions and less boredom eating.  Her  is a  and likes to 'plate her food', making dinner portions challenging.    PLAN    *Bolus for mid-morning liquid IV beverage.      Changes we made to your pump settings:  *Weakened carb ratio at dinner  *Weakened correction insulin    New Insulin pump settings:  Time           Basal Rate (units/hr) - stayed the same  12:00 AM       0.625  7:30 AM         0.550  11:00 AM (changed from 1  pm)       0.625  4:00 PM        0.625   9:00 PM        0.625    Time           Correction Factor (units:mg/dL)  12:00 AM       1:56 >> 1:65 mg/dl    Time           Carb Ratio (units:grams)  12:00 AM       1:15.0  7:00 AM        1:9.0  2:00 PM        1:12.0  5:00 PM        1:12.0 >> 1:15  9:00 PM        1:12.0 >>1:15      Follow-up: virtual visit in 2 weeks.    INTERVENTION    Sensor report interpretation and insulin pump adjustments.       SUBJECTIVE/OBJECTIVE:  Presents for: Individual review  Accompanied by: Self  Diabetes education in the past 24mo: No  Diabetes type: Type 1  Date of diagnosis: 32 years ago, 17 years old.  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  Trying not to snack at night.)  Symptom course: Stable  Disease course: Getting harder to manage       Patient Problem List and Family Medical History reviewed for relevant medical history, current medical status, and diabetes risk factors.    Labs:  Lab Results   Component Value Date    A1C 7.1 07/26/2024    A1C 7.8 12/24/2020    HEMOGLOBINA1 7.4 11/15/2019     Lab Results   Component Value Date     07/26/2024     05/24/2023     10/26/2021     12/24/2020       Healthy Eating:  Cultural/[employer] diet restrictions?: No  How many times a week on average do you eat food made away from home (restaurant/take-out)?: 2  Meals include: Breakfast, Dinner, Afternoon Snack  Breakfast: 6am:  Raisen Toast with jam/butter or cold cereal >> changed jam to fruit spread, not excited about peanut butter alone.  AM:  liquid IV - 10 grams carb +  caffeine.  Lunch: Noon - 12 pm:  Cheese & crackers OR fruit OR hard boiled eggs or almonds  Dinner: 7:30 - 9pm.  4-6 oz pork/beef + pasta/rice + veggie w/ added butter.  (tends to be heavier in fat)  Snacks: 4 pm:  Dumplings or chicken wings.  Other:  is a   Beverages: Water, Coffee, Diet soda, Alcohol    Being Active:  Exercise:: Yes (Yoga in the morning.  Walks a lot at work - 5500 steps +)  Days per week of moderate to strenuous exercise (like a brisk walk): 7  On average, minutes per day of exercise at this level: 20  Exercise Minutes per Week: 140  Barrier to exercise: None  Tries to use a temp basal with exercise.    Monitoring:  Blood Glucose Meter: CGM  Times checking blood sugar at home (number): Other (see Comments)  Please elaborate:: Cgm  Times checking blood sugar at home (per): Day    Jacobo is connected to our healthcare account.    Taking Medications:  Diabetes Medication(s)       Biguanides       metFORMIN (GLUCOPHAGE XR) 500 MG 24 hr tablet Take 2  tablets daily       Insulin       insulin aspart (NOVOLOG VIAL) 100 UNITS/ML vial 80 untis daily in pump     insulin glargine (LANTUS PEN) 100 UNIT/ML pen Inject subcu  20 units if pump fails.            Current Treatments: Insulin Pump, Oral Medication (taken by mouth)    Problem Solving:     Low blood sugars daily.    Reducing Risks:  Has dilated eye exam at least once a year?: Yes  Sees dentist every 6 months?: Yes  Feet checked by healthcare provider in the last year?: Yes    Healthy Coping:  Healthy Coping Assessed Today: Yes (Stress at work and home.  Sleeps more.  Maybe more wine with dinner.)  Informal Support system:: Children, Family, Friends, Spouse    Siena Schofield RDN, JOE, [facility]  Dietitian and Diabetes  - Endocrinology  [facility]      Time Spent: 20 minutes  Encounter Type: Individual    Any diabetes medication dose changes were made via the CDE Protocol per the patient's  primary care provider. A copy of this encounter was shared with the provider.

## 2024-11-07 NOTE — PATIENT INSTRUCTIONS
Nolan Ortega,    It was a pleasure meeting with you today!    Here is a summary of our visit:  *Bolus for mid-morning liquid IV beverage.      Changes we made to your pump settings:  *Weakened carb ratio at dinner  *Weakened correction insulin    New Insulin pump settings:  Time           Basal Rate (units/hr) - stayed the same  12:00 AM       0.625  7:30 AM         0.550  11:00 AM (changed from 1 pm)       0.625  4:00 PM        0.625   9:00 PM        0.625    Time           Correction Factor (units:mg/dL)  12:00 AM       1:56 >> 1:65 mg/dl    Time           Carb Ratio (units:grams)  12:00 AM       1:15.0  7:00 AM        1:9.0  2:00 PM        1:12.0  5:00 PM        1:12.0 >> 1:15  9:00 PM        1:12.0 >>1:15      Follow-up: virtual visit in 2 weeks.    Thank you,    Siena Schofield RDN, LDN, [facility]  Dietitian and Diabetes Education - Endocrinology  [place] [address]  Phone: [phone]  Email: [email or web address]    Contact information:   To schedule a diabetes education appointment:[phone].  For questions or concerns, please send a [employer] message or call the clinic at [phone].    For more urgent concerns that do not require 911, please call [phone] after hours/weekends and ask to speak with the Endocrinologist on call.       Please let us know if you are having low blood sugars less than 70 or over 300 mg/dL.  Do not wait until your next appointment if this is happening.

## 2024-11-18 ENCOUNTER — TELEPHONE (OUTPATIENT)
Dept: ENDOCRINOLOGY | Facility: CLINIC | Age: 49
End: 2024-11-18
Payer: COMMERCIAL

## 2024-11-18 NOTE — TELEPHONE ENCOUNTER
----- Message from Alena Greenberg sent at 11/18/2024 10:27 AM CST -----  Still only available through big [employer] companies like [employer], etc, unfortunately.    -Alena  ----- Message -----  From: Jemma Anderson MD  Sent: 11/18/2024   9:57 AM CST  To: Alena Greenberg RD, LD    Is this commercially available now? Or still hard to get?  ----- Message -----  From: Alena Greenberg RD, LD  Sent: 11/18/2024   9:56 AM CST  To: Jemma Anderson MD    No, only the [employer] Azam 2 Plus links to Tandem pump at this time.  ----- Message -----  From: Jemma Anderson MD  Sent: 11/18/2024   9:53 AM CST  To: [facility] Cert[facility] Diabetes Educ[facility]    Just checking - is the azam 3+ linked to the tandem pump?  ----- Message -----  From: Jemma Anderson MD  Sent: 11/15/2024  12:00 AM CST  To: Jemma Anderson MD    Did azam + show up in commemrical pharamcy

## 2024-11-19 ENCOUNTER — TELEPHONE (OUTPATIENT)
Dept: ENDOCRINOLOGY | Facility: CLINIC | Age: 49
End: 2024-11-19
Payer: COMMERCIAL

## 2024-11-19 NOTE — TELEPHONE ENCOUNTER
"Info noted below    --  Hi Dr. Anderson,     The azam 3/3 plus does not work with control IQ technology. Only dexcom G6, G7 and Azam 2 Plus do. According to her last visit with Siena Schofield RD CDCES \"Tandem Mobi Pump + Azam Sensor.  Unable to use Control IQ.  Insurance does not cover Dexcom sensor nor Azam 2 plus sensor.\"     She has been working with Siena to adjust her manual settings and has follow up with Siena in three days.     Thank you,   Jazmyn Montalvo, HAKEEM, LD, CDE  11/19/2024   9:59 AM   "

## 2024-12-03 ENCOUNTER — ANCILLARY PROCEDURE (OUTPATIENT)
Dept: MAMMOGRAPHY | Facility: CLINIC | Age: 49
End: 2024-12-03
Attending: INTERNAL MEDICINE
Payer: COMMERCIAL

## 2024-12-03 DIAGNOSIS — Z12.31 VISIT FOR SCREENING MAMMOGRAM: ICD-10-CM

## 2024-12-03 PROCEDURE — 77063 BREAST TOMOSYNTHESIS BI: CPT | Mod: TC | Performed by: RADIOLOGY

## 2024-12-03 PROCEDURE — 77067 SCR MAMMO BI INCL CAD: CPT | Mod: TC | Performed by: RADIOLOGY

## 2025-01-29 ENCOUNTER — TELEPHONE (OUTPATIENT)
Dept: ENDOCRINOLOGY | Facility: CLINIC | Age: 50
End: 2025-01-29
Payer: COMMERCIAL

## 2025-01-30 ENCOUNTER — TELEPHONE (OUTPATIENT)
Dept: EDUCATION SERVICES | Facility: CLINIC | Age: 50
End: 2025-01-30
Payer: COMMERCIAL

## 2025-01-30 NOTE — TELEPHONE ENCOUNTER
Patient reports tolerating metformin with reduced appetite.  Refills provided.  Patient currently taking 3 tablets daily (1500 mg) daily.

## 2025-01-30 NOTE — TELEPHONE ENCOUNTER
Attempted to contact patient to let her know that Siena FIGUEROA will not be able to attend their scheduled 1/31 appt. No answer. LVM advising pt to call back for rescheduling. Maria Guadalupet msg sent as well.

## 2025-02-10 ENCOUNTER — MYC MEDICAL ADVICE (OUTPATIENT)
Dept: ENDOCRINOLOGY | Facility: CLINIC | Age: 50
End: 2025-02-10

## 2025-02-10 DIAGNOSIS — E10.9 TYPE 1 DIABETES MELLITUS WITHOUT COMPLICATION (H): Primary | ICD-10-CM

## 2025-02-17 ENCOUNTER — TELEPHONE (OUTPATIENT)
Dept: ENDOCRINOLOGY | Facility: CLINIC | Age: 50
End: 2025-02-17
Payer: COMMERCIAL

## 2025-02-17 DIAGNOSIS — E10.69 TYPE 1 DIABETES MELLITUS WITH OTHER SPECIFIED COMPLICATION (H): Primary | ICD-10-CM

## 2025-02-17 RX ORDER — BLOOD-GLUCOSE SENSOR
1 EACH MISCELLANEOUS SEE ADMIN INSTRUCTIONS
Qty: 6 EACH | Refills: 5 | Status: SHIPPED | OUTPATIENT
Start: 2025-02-17

## 2025-02-20 ENCOUNTER — VIRTUAL VISIT (OUTPATIENT)
Dept: EDUCATION SERVICES | Facility: CLINIC | Age: 50
End: 2025-02-20
Payer: COMMERCIAL

## 2025-02-20 DIAGNOSIS — E10.69 TYPE 1 DIABETES MELLITUS WITH OTHER SPECIFIED COMPLICATION (H): ICD-10-CM

## 2025-02-20 NOTE — PROGRESS NOTES
Virtual Visit Details    Type of service:  Video Visit     Originating Location (pt. Location): Home    Distant Location (provider location):  Off-site  Platform used for Video Visit: Monse      DIABETES SELF-MANAGEMENT EDUCATION & SUPPORT    Presents for: Individual review    Referred by: Dr. Anderson, endocrinologist    Changed from Tandem X2 to Tandem Mobi in Fall 2024.      REPORTS:    Tandem Mobi Pump in manual mode + Azam Sensor.  (Has a prescription for Azam 2+ sensors but pharmacy will not release rx until April 2025)    B:  6am  L:  after 2 pm  D:  7-9 pm.            Insulin Pump Information            Pump Profile settings - standard        ASSESSMENT  Initially Shannon was experiencing frequent low sugars when she transitioned from the Tandem X2 to the Tandem Mobi pump.  Over the past few months we've been adjusting her pump settings and as a result she is experiencing less hypoglycemia overall.     - TIR: 44%.  Low 2%. High 38%.   Average glucose 187 mg/dl. Glucose variability improved from 38% to 32%.      -Overall, sugars are following into a pattern rather than haphazard with the most notable pattern of high after meal sugars. Will strengthen carb ratios for all meals except for after breakfast where her sugars naturally drop.  High sugars may also be due to work stress or sometimes Shannon notices sugars high on day 3 of her infusion set.     Shannon desires weight loss. Her Metformin was increased from 1000 mg to 1500 mg a day.  She is noticing more appetite suppression and her clothes are fitting looser.   She is working on increasing her exercise.      PLAN  *Charge your Mobi pump daily.  Possibly when you shower.  *Bring an extra vial of insulin, infusion set and reservoir to work so that you can change your infusion set right away if you notice your sugars are not responding to your insulin.  *Continue increasing your exercise with yoga in the morning and biking - great job!  *Today we  strengthened your carb ratio as follows:  12am:  16  6am:  17  8:30 am (new time):  17 >> 16  1 pm:  16 >> 15  6 pm:  15 >> 14  9 pm:  16    Follow Up Plan:    Virtual visit in 2 months.  We will connect your Azam 2+ sensor to your pump.  Have your Azam 2+ sensor with you at the visit, but wait to start the sensor until we meet.    INTERVENTION    Sensor report interpretation and insulin pump adjustments.     SUBJECTIVE/OBJECTIVE:  Presents for: Individual review  Accompanied by: Self  Diabetes education in the past 24mo: No  Diabetes type: Type 1  Date of diagnosis: 32 years ago, 17 years old.  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    Diabetes Symptoms & Complications:  Diabetes Related Symptoms: None  Weight trend: Increasing (going through menopause.  Trying not to snack at night.)  Symptom course: Stable  Disease course: Getting harder to manage       Patient Problem List and Family Medical History reviewed for relevant medical history, current medical status, and diabetes risk factors.    Labs:  Lab Results   Component Value Date    A1C 7.1 07/26/2024    A1C 7.8 12/24/2020    HEMOGLOBINA1 7.4 11/15/2019     Lab Results   Component Value Date     07/26/2024     05/24/2023     10/26/2021     12/24/2020       Healthy Eating:  Cultural/Gnosticist diet restrictions?: No  How many times a week on average do you eat food made away from home (restaurant/take-out)?: 2  Meals include: Breakfast, Dinner, Afternoon Snack  Breakfast: 6am:  Raisen Toast with jam/butter or cold cereal >> changed jam to fruit spread, not excited about peanut butter alone.  Also  boluses for 2 gummy vitamins.  AM:  liquid IV - 10 grams carb + caffeine.  Lunch: Noon - 12 pm:  Cheese & crackers OR fruit OR hard boiled eggs or almonds  Dinner: 7:30 - 9pm.  4-6 oz pork/beef + pasta/rice + veggie w/ added butter.  (tends to be heavier in fat)  Snacks: 4 pm:  Dumplings or chicken wings.  Other:  is a   Beverages: Water, Coffee, Diet soda, Alcohol    Being Active:  Exercise:: Yes (Yoga in the morning, 10-20 min.  Walks a lot at work - 5500 steps +, new exercise bike - once or twice a week.)  Days per week of moderate to strenuous exercise (like a brisk walk): 7  On average, minutes per day of exercise at this level: 20  Exercise Minutes per Week: 140  Barrier to exercise: None  Tries to use a temp basal with exercise.    Monitoring:  Blood Glucose Meter: CGM, Azam 3    Lilbre is connected to our healthcare account.    Taking Medications:  Diabetes Medication(s)       Biguanides       metFORMIN (GLUCOPHAGE XR) 500 MG 24 hr tablet Take 3  tablets daily       Insulin       insulin aspart (NOVOLOG VIAL) 100 UNITS/ML vial 80 untis daily in pump     insulin glargine (LANTUS PEN) 100 UNIT/ML pen Inject subcu  20 units if pump fails.            Current Treatments: Insulin Pump, Oral Medication (taken by mouth)    Problem Solving:    Stress and high sugars.    Reducing Risks:  Has dilated eye exam at least once a year?: Yes  Sees dentist every 6 months?: Yes  Feet checked by healthcare provider in the last year?: Yes    Healthy Coping:  Healthy Coping Assessed Today: Yes (Stress at work and home.  Sleeps more.  Maybe more wine with dinner.)  Informal Support system:: Children, Family, Friends, Spouse    Siena Schofield RDN, JOE, [facility]  Dietitian and Diabetes  - Endocrinology  [facility]      Time Spent: 30 minutes  Encounter Type: Individual    Any diabetes medication dose changes were made via the CDE Protocol per the patient's primary care  provider. A copy of this encounter was shared with the provider.

## 2025-02-20 NOTE — PATIENT INSTRUCTIONS
Nolan Ortega,    It was a pleasure meeting with you today!    Here is a summary of our visit:    *Charge your Mobi pump daily.  Possibly when you shower.  *Bring an extra vial of insulin, infusion set and reservoir to work so that you can change your infusion set right away if you notice your sugars are not responding to your insulin.  *Continue increasing your exercise with yoga in the morning and biking - great job!  *Today we strengthened your carb ratio as follows:  12am:  16  6am:  17  8:30 am (new time):  17 >> 16  1 pm:  16 >> 15  6 pm:  15 >> 14  9 pm:  16    Follow Up Plan:    Virtual visit in 2 months.  We will connect your Azam 2+ sensor to your pump.  Have your Azam 2+ sensor with you at the visit, but wait to start the sensor until we meet.    You can reply to this message with any diabetes related questions or concerns.    Thank you,    Siena Schofield RDN, JOEN, [facility]  Dietitian and Diabetes Education - Endocrinology  [facility]  [address] [place] [address]  Phone: [phone]  Email: [email or web address]    Contact information:   To schedule a diabetes education appointment:[phone].  For questions or concerns, please send a Reframe It message or call the clinic at [phone].    For more urgent concerns that do not require [phone], please call [phone] after hours/weekends and ask to speak with the Endocrinologist on call.       Please let us know if you are having low blood sugars less than 70 or over 300 mg/dL.  Do not wait until your next appointment if this is happening.

## 2025-02-20 NOTE — NURSING NOTE
Current patient location: [address] [place] [address]    Is the patient currently in the state of [place]? YES    Visit mode: VIDEO    If the visit is dropped, the patient can be reconnected by:VIDEO VISIT: Text to cell phone:   Telephone Information:   Mobile [phone]       Will anyone else be joining the visit? NO  (If patient encounters technical issues they should call [phone]    Are changes needed to the allergy or medication list? No and Pt stated no med changes    Are refills needed on medications prescribed by this physician? NO    Rooming Documentation:  Not applicable    Reason for visit: Diabetes Education    Alena CROOK

## 2025-03-24 ENCOUNTER — LAB (OUTPATIENT)
Dept: LAB | Facility: CLINIC | Age: 50
End: 2025-03-24
Payer: COMMERCIAL

## 2025-03-24 DIAGNOSIS — N95.1 MENOPAUSAL SYNDROME (HOT FLASHES): ICD-10-CM

## 2025-03-24 DIAGNOSIS — F41.9 ANXIETY: ICD-10-CM

## 2025-03-24 DIAGNOSIS — E10.9 TYPE 1 DIABETES MELLITUS WITHOUT COMPLICATION (H): ICD-10-CM

## 2025-03-24 DIAGNOSIS — R73.9 HYPERGLYCEMIA: ICD-10-CM

## 2025-03-24 LAB
ANION GAP SERPL CALCULATED.3IONS-SCNC: 12 MMOL/L (ref 7–15)
BUN SERPL-MCNC: 13.2 MG/DL (ref 6–20)
CALCIUM SERPL-MCNC: 9.5 MG/DL (ref 8.8–10.4)
CHLORIDE SERPL-SCNC: 101 MMOL/L (ref 98–107)
CHOLEST SERPL-MCNC: 217 MG/DL
CREAT SERPL-MCNC: 0.83 MG/DL (ref 0.51–0.95)
EGFRCR SERPLBLD CKD-EPI 2021: 87 ML/MIN/1.73M2
EST. AVERAGE GLUCOSE BLD GHB EST-MCNC: 154 MG/DL
FASTING STATUS PATIENT QL REPORTED: NO
FASTING STATUS PATIENT QL REPORTED: NO
GLUCOSE SERPL-MCNC: 110 MG/DL (ref 70–99)
HBA1C MFR BLD: 7 % (ref 0–5.6)
HCO3 SERPL-SCNC: 27 MMOL/L (ref 22–29)
HDLC SERPL-MCNC: 98 MG/DL
LDLC SERPL CALC-MCNC: 111 MG/DL
NONHDLC SERPL-MCNC: 119 MG/DL
POTASSIUM SERPL-SCNC: 4.1 MMOL/L (ref 3.4–5.3)
SODIUM SERPL-SCNC: 140 MMOL/L (ref 135–145)
TRIGL SERPL-MCNC: 38 MG/DL
TSH SERPL DL<=0.005 MIU/L-ACNC: 1 UIU/ML (ref 0.3–4.2)

## 2025-03-24 PROCEDURE — 36415 COLL VENOUS BLD VENIPUNCTURE: CPT

## 2025-03-24 PROCEDURE — 82043 UR ALBUMIN QUANTITATIVE: CPT

## 2025-03-24 PROCEDURE — 82306 VITAMIN D 25 HYDROXY: CPT

## 2025-03-24 PROCEDURE — 82570 ASSAY OF URINE CREATININE: CPT

## 2025-03-24 PROCEDURE — 83036 HEMOGLOBIN GLYCOSYLATED A1C: CPT

## 2025-03-24 PROCEDURE — 80061 LIPID PANEL: CPT

## 2025-03-24 PROCEDURE — 84443 ASSAY THYROID STIM HORMONE: CPT

## 2025-03-24 PROCEDURE — 80048 BASIC METABOLIC PNL TOTAL CA: CPT

## 2025-03-25 LAB
CREAT UR-MCNC: 53 MG/DL
MICROALBUMIN UR-MCNC: <12 MG/L
MICROALBUMIN/CREAT UR: NORMAL MG/G{CREAT}

## 2025-03-29 LAB
DEPRECATED CALCIDIOL+CALCIFEROL SERPL-MC: <41 UG/L (ref 20–75)
VITAMIN D2 SERPL-MCNC: <5 UG/L
VITAMIN D3 SERPL-MCNC: 36 UG/L

## 2025-03-31 ENCOUNTER — TELEPHONE (OUTPATIENT)
Dept: ENDOCRINOLOGY | Facility: CLINIC | Age: 50
End: 2025-03-31
Payer: COMMERCIAL

## 2025-03-31 NOTE — TELEPHONE ENCOUNTER
Pt has upcmoing visit    -  Dear Shannon    Here are your labs which show an improved hemoglobin A1c at 7.0.  This is better than previous values.  Everything else is quite normal including your vitamin D.  We will talk more at your return visit.    If you have any questions, please feel free to contact my nurse at [phone] select option #3 for triage nurse  or  option #1 for scheduling related questions.    Regards    Jemma Anderson MD     No visits with results within 1 Week(s) from this visit.   Latest known visit with results is:   Lab on 03/24/2025   Component Date Value Ref Range Status    25 OH Vitamin D2 03/24/2025 <5  ug/L Final    25 OH Vitamin D3 03/24/2025 36  ug/L Final    25 OH Vit D Total 03/24/2025 <41  20 - 75 ug/L Final    Season, race, dietary intake, and treatment affect the concentration of 25-hydroxy-Vitamin D. Values may decrease during winter months and increase during summer months. Values 20-29 ug/L may indicate Vitamin D insufficiency and values <20 ug/L may indicate Vitamin D deficiency.    TSH 03/24/2025 1.00  0.30 - 4.20 uIU/mL Final    Cholesterol 03/24/2025 217 (H)  <200 mg/dL Final    Triglycerides 03/24/2025 38  <150 mg/dL Final    Direct Measure HDL 03/24/2025 98  >=50 mg/dL Final    LDL Cholesterol Calculated 03/24/2025 111 (H)  <100 mg/dL Final    Non HDL Cholesterol 03/24/2025 119  <130 mg/dL Final    Patient Fasting > 8hrs? 03/24/2025 No   Final    Creatinine Urine mg/dL 03/24/2025 53.0  mg/dL Final    The reference ranges have not been established in urine creatinine. The results should be integrated into the clinical context for interpretation.    Albumin Urine mg/L 03/24/2025 <12.0  mg/L Final    The reference ranges have not been established in urine albumin. The results should be integrated into the clinical context for interpretation.    Albumin Urine mg/g Cr 03/24/2025    Final    Unable to calculate, urine albumin and/or urine creatinine is outside detectable  limits.  Microalbuminuria is defined as an albumin:creatinine ratio of 17 to 299 for males and 25 to 299 for females. A ratio of albumin:creatinine of 300 or higher is indicative of overt proteinuria.  Due to biologic variability, positive results should be confirmed by a second, first-morning random or 24-hour timed urine specimen. If there is discrepancy, a third specimen is recommended. When 2 out of 3 results are in the microalbuminuria range, this is evidence for incipient nephropathy and warrants increased efforts at glucose control, blood pressure control, and institution of therapy with an angiotensin-converting-enzyme (ACE) inhibitor (if the patient can tolerate it).      Sodium 03/24/2025 140  135 - 145 mmol/L Final    Potassium 03/24/2025 4.1  3.4 - 5.3 mmol/L Final    Chloride 03/24/2025 101  98 - 107 mmol/L Final    Carbon Dioxide (CO2) 03/24/2025 27  22 - 29 mmol/L Final    Anion Gap 03/24/2025 12  7 - 15 mmol/L Final    Urea Nitrogen 03/24/2025 13.2  6.0 - 20.0 mg/dL Final    Creatinine 03/24/2025 0.83  0.51 - 0.95 mg/dL Final    GFR Estimate 03/24/2025 87  >60 mL/min/1.73m2 Final    eGFR calculated using 2021 CKD-EPI equation.    Calcium 03/24/2025 9.5  8.8 - 10.4 mg/dL Final    Glucose 03/24/2025 110 (H)  70 - 99 mg/dL Final    Patient Fasting > 8hrs? 03/24/2025 No   Final    Estimated Average Glucose 03/24/2025 154 (H)  <117 mg/dL Final    Hemoglobin A1C 03/24/2025 7.0 (H)  0.0 - 5.6 % Final    Normal <5.7%   Prediabetes 5.7-6.4%    Diabetes 6.5% or higher     Note: Adopted from ADA consensus guidelines.

## 2025-04-04 DIAGNOSIS — N95.1 MENOPAUSAL SYNDROME (HOT FLASHES): ICD-10-CM

## 2025-04-07 ENCOUNTER — TELEPHONE (OUTPATIENT)
Dept: ENDOCRINOLOGY | Facility: CLINIC | Age: 50
End: 2025-04-07
Payer: COMMERCIAL

## 2025-04-07 DIAGNOSIS — N95.1 MENOPAUSAL SYNDROME (HOT FLASHES): ICD-10-CM

## 2025-04-07 RX ORDER — GABAPENTIN 100 MG/1
CAPSULE ORAL
Qty: 90 CAPSULE | Refills: 3 | OUTPATIENT
Start: 2025-04-07

## 2025-04-07 RX ORDER — GABAPENTIN 100 MG/1
CAPSULE ORAL
Qty: 90 CAPSULE | Refills: 3 | Status: SHIPPED | OUTPATIENT
Start: 2025-04-07

## 2025-04-07 NOTE — TELEPHONE ENCOUNTER
Capsule cannot be split- we will let pt know  --  Interface, Eprescribing  Jemma Anderson MD  Caller: Unspecified (3 days ago, 11:37 AM)  Pharmacy requested follow up on 4/7/2025.          Previous Messages       ----- Message -----  From: Lorraine Farr RN  Sent: 4/7/2025   7:59 AM CDT  To: Jemma Anderson MD    ----- Message from Lorraine Farr RN sent at 4/7/2025  7:59 AM CDT -----  Pharmacy comment: Alternative Requested:THIS DOSE ONLY COMES IN CAPSULES WHICH CANNOT BE SPLIT, PLEASE SEND UPDATED SCRIPT.  Pharmacy comment: Alternative Requested:THIS DOSE ONLY COMES IN CAPSULES WHICH CANNOT BE SPLIT, PLEASE SEND UPDATED SCRIPT.

## 2025-04-28 SDOH — HEALTH STABILITY: PHYSICAL HEALTH: ON AVERAGE, HOW MANY MINUTES DO YOU ENGAGE IN EXERCISE AT THIS LEVEL?: 20 MIN

## 2025-04-28 SDOH — HEALTH STABILITY: PHYSICAL HEALTH: ON AVERAGE, HOW MANY DAYS PER WEEK DO YOU ENGAGE IN MODERATE TO STRENUOUS EXERCISE (LIKE A BRISK WALK)?: 2 DAYS

## 2025-04-28 ASSESSMENT — SOCIAL DETERMINANTS OF HEALTH (SDOH): HOW OFTEN DO YOU GET TOGETHER WITH FRIENDS OR RELATIVES?: TWICE A WEEK

## 2025-05-01 ENCOUNTER — OFFICE VISIT (OUTPATIENT)
Dept: FAMILY MEDICINE | Facility: CLINIC | Age: 50
End: 2025-05-01
Payer: COMMERCIAL

## 2025-05-01 VITALS
BODY MASS INDEX: 28.32 KG/M2 | DIASTOLIC BLOOD PRESSURE: 60 MMHG | SYSTOLIC BLOOD PRESSURE: 96 MMHG | TEMPERATURE: 97.1 F | OXYGEN SATURATION: 98 % | HEIGHT: 62 IN | WEIGHT: 153.9 LBS | RESPIRATION RATE: 14 BRPM | HEART RATE: 76 BPM

## 2025-05-01 DIAGNOSIS — Z80.3 FAMILY HISTORY OF MALIGNANT NEOPLASM OF BREAST: ICD-10-CM

## 2025-05-01 DIAGNOSIS — F33.41 RECURRENT MAJOR DEPRESSIVE DISORDER, IN PARTIAL REMISSION: ICD-10-CM

## 2025-05-01 DIAGNOSIS — D23.5 DERMOID CYST OF SKIN OF BACK: ICD-10-CM

## 2025-05-01 DIAGNOSIS — Z78.0 POSTMENOPAUSAL STATUS: ICD-10-CM

## 2025-05-01 DIAGNOSIS — K13.0 CYST OF LIP: ICD-10-CM

## 2025-05-01 DIAGNOSIS — Z12.4 CERVICAL CANCER SCREENING: ICD-10-CM

## 2025-05-01 DIAGNOSIS — Z00.00 ROUTINE GENERAL MEDICAL EXAMINATION AT A HEALTH CARE FACILITY: Primary | ICD-10-CM

## 2025-05-01 PROBLEM — Z86.79 HISTORY OF SUBARACHNOID HEMORRHAGE: Status: ACTIVE | Noted: 2018-08-07

## 2025-05-01 PROBLEM — E87.1 CEREBRAL SALT-WASTING SYNDROME: Status: RESOLVED | Noted: 2018-08-07 | Resolved: 2025-05-01

## 2025-05-01 PROBLEM — I10 HYPERTENSIVE DISEASE: Status: RESOLVED | Noted: 2018-08-07 | Resolved: 2025-05-01

## 2025-05-01 ASSESSMENT — PATIENT HEALTH QUESTIONNAIRE - PHQ9
SUM OF ALL RESPONSES TO PHQ QUESTIONS 1-9: 4
10. IF YOU CHECKED OFF ANY PROBLEMS, HOW DIFFICULT HAVE THESE PROBLEMS MADE IT FOR YOU TO DO YOUR WORK, TAKE CARE OF THINGS AT HOME, OR GET ALONG WITH OTHER PEOPLE: NOT DIFFICULT AT ALL
SUM OF ALL RESPONSES TO PHQ QUESTIONS 1-9: 4

## 2025-05-01 ASSESSMENT — PAIN SCALES - GENERAL: PAINLEVEL_OUTOF10: NO PAIN (0)

## 2025-05-01 NOTE — PATIENT INSTRUCTIONS
Things to Know About Hormone Therapy     Menopause is a normal life event for women - it is not an illness or medical condition.  Every person's experience of menopause is different.  Some women have many symptoms that interfere with life, and others may not experience any charge other than their period stops.    Many women with symptoms often suffer in silence and do not realize how effective and safe hormone therapy can be to improving their symptoms and quality of life.  Most common symptoms  Hot flashes  Night Sweats  Painful sex and/or dry vagina    The most effective way to treat hot flashes, night sweats and painful dry vagina is with hormone therapy.      There are three categories of hormone therapy    If you are still having periods, and it is safe for you, a low dose birth control pill with both estrogen and progesterone can work very well to provide contraception and relieve symptoms.      Estrogen Therapy or ET  means estrogen only therapy.  Estrogen is the hormone that provides the most menopausal symptom relief.  It is a much lower dose than used in a low dose birth control pill.  Estrogen only therapy is only for women who had had their uterus removed with a surgery.    Estrogen Progesterone Therapy or EPT means taking both estrogen and progesterone.  The progesterone protects the uterus from developing uterine cancer from estrogen alone.  This can be from taking a pill, having a Kyleena or Mirena IUD or using a combined product      There are two basic ways to use hormone therapy:    Systemic which means to circulate in the blood stream to all parts of the body.  This is given either as an oral tablet, a patch, a vaginal ring, or a vaginal cream.  Systemic hormones are used to treat hot flashes and night sweats     Local which means the product only affects a specific or localized area of the body.  This can be given as a cream, a ring or a tablet and is used to treat vaginal symptoms of menopause.   Less risk because the blood level of estrogen and progesterone is not increasing    Craig time for using systemic hormone therapy    You are having hot flashes and/or night sweats  You are within 10 years of menopause and under age 60    Benefits of using systemic hormone therapy may include    Effectively treats hot flashes, night sweats and vaginal dryness and pain  Helps prevent bone loss  May improve mood swings  May improve sex drive or low libido   Helps reduce risk of future cardiovascular disease, type 2 Diabetes, osteoarthritis, and dementia when used within 10 yrs of menopause     Risks of using systemic hormone therapy     Increased risk of developing a clot, rare, and less increase when using a patch  Increased risk of breast cancer, about the same increase in risks as having a glass or two of wine each night or being overweight.  When using Prometrium, the same progesterone hormone the ovary makes, there is less of an increase in risk     Side Effects     You may have vaginal bleeding again initially, if bleeding persists beyond 6 months please let us know so we can screen you for uterine cancer  Breast tenderness     For more information about Menopause    A book Hot and Bothered by Melvi Ruiz  A book The [place] [facility] by Dr Jazmyn Cooley  A podcast Sonja Sotelo 'From PMS to Menopause: How to Hack Your Hormones'   The book The New Rules of Menopause; A [facility] Guide to Perimenopause and Beyond by Dr Betsy Dawson  The North American Menopause Society   The [facility] - Menopause       References:   Menopause Practice; A Clinician's Guide, 6th Edition, The North American Menopause Society   The New Rules of Menopause; A [facility] Guide to Perimenopause and Beyond, Betsy Dawson M.D., M.B.A., Director of [facility]     Compiled by Iraida RICKS CNM, MSCP (Menopause Society Certified Practitioner) 1/9/2024   Patient Education     Info About My Practice:    Thank you for coming to see me today! Here are a couple of pieces of information about my schedule and communication practices.     I am not in the clinic on Tuesdays. Non-urgent calls and messages received on Tuesdays will be addressed as soon as I am able when I am back in the office on the next business day. Urgent calls will be addressed by a covering clinician.       If lab work was done today as part of your evaluation you will generally be contacted via Beneq, mail, or phone with the results within 7 days. I encourage you to sign up for ShopIt ([email or web address]). If there is an alarming/concerning result we will contact you by phone. Lab results come back at varying times, I generally wait until all labs are resulted before making comments on results, but if any labs look concerning, I will reach out sooner. Please note, labs are automatically released to Beneq once available, but it may take a couple of days for my interpretation note to appear.      I try very hard to respond to medical messages with 2 business days of receiving them. Occasionally it takes me longer if I am trying to figure out the best way to respond and need to seek guidance, do some research or dig deeper into your medical history to come up with a helpful response.      If you need refills please contact your pharmacist. They will send a refill request to me to review. Please allow 3-5 business days for us to respond to all refill requests.      Please call or send a medical message with any questions or concerns. I do ask that all patients who are taking chronic medications for conditions that I am managing schedule an in-person visit with me at least once a year. Otherwise, I am happy to see you for acute concerns or other questions via in-person or virtual visit. If you are not able to get an appointment with me in the timeframe you desire, please ask to speak with one of the [place] nurses who may  be able to access a sooner appointment.      Thank you for trusting me to be part of your healthcare team!    Gadiel Wood PA-C  [facility]    Preventive Care Advice   This is general advice given by our system to help you stay healthy. However, your care team may have specific advice just for you. Please talk to your care team about your preventive care needs.  Nutrition  Eat 5 or more servings of fruits and vegetables each day.  Try wheat bread, brown rice and whole grain pasta (instead of white bread, rice, and pasta).  Get enough calcium and vitamin D. Check the label on foods and aim for 100% of the RDA (recommended daily allowance).  Lifestyle  Exercise at least 150 minutes each week  (30 minutes a day, 5 days a week).  Do muscle strengthening activities 2 days a week. These help control your weight and prevent disease.  No smoking.  Wear sunscreen to prevent skin cancer.  Have a dental exam and cleaning every 6 months.  Yearly exams  See your health care team every year to talk about:  Any changes in your health.  Any medicines your care team has prescribed.  Preventive care, family planning, and ways to prevent chronic diseases.  Shots (vaccines)   HPV shots (up to age 26), if you've never had them before.  Hepatitis B shots (up to age 59), if you've never had them before.  COVID-19 shot: Get this shot when it's due.  Flu shot: Get a flu shot every year.  Tetanus shot: Get a tetanus shot every 10 years.  Pneumococcal, hepatitis A, and RSV shots: Ask your care team if you need these based on your risk.  Shingles shot (for age 50 and up)  General health tests  Diabetes screening:  Starting at age 35, Get screened for diabetes at least every 3 years.  If you are younger than age 35, ask your care team if you should be screened for diabetes.  Cholesterol test: At age 39, start having a cholesterol test every 5 years, or more often if advised.  Bone density scan (DEXA): At age 50,  ask your care team if you should have this scan for osteoporosis (brittle bones).  Hepatitis C: Get tested at least once in your life.  STIs (sexually transmitted infections)  Before age 24: Ask your care team if you should be screened for STIs.  After age 24: Get screened for STIs if you're at risk. You are at risk for STIs (including HIV) if:  You are sexually active with more than one person.  You don't use condoms every time.  You or a partner was diagnosed with a sexually transmitted infection.  If you are at risk for HIV, ask about PrEP medicine to prevent HIV.  Get tested for HIV at least once in your life, whether you are at risk for HIV or not.  Cancer screening tests  Cervical cancer screening: If you have a cervix, begin getting regular cervical cancer screening tests starting at age 21.  Breast cancer scan (mammogram): If you've ever had breasts, begin having regular mammograms starting at age 40. This is a scan to check for breast cancer.  Colon cancer screening: It is important to start screening for colon cancer at age 45.  Have a colonoscopy test every 10 years (or more often if you're at risk) Or, ask your provider about stool tests like a FIT test every year or Cologuard test every 3 years.  To learn more about your testing options, visit:   .  For help making a decision, visit:   https://bit.ly/jk11502.  Prostate cancer screening test: If you have a prostate, ask your care team if a prostate cancer screening test (PSA) at age 55 is right for you.  Lung cancer screening: If you are a current or former smoker ages 50 to 80, ask your care team if ongoing lung cancer screenings are right for you.  For informational purposes only. Not to replace the advice of your health care provider. Copyright   2023 [employer]. All rights reserved. Clinically reviewed by the [facility] Transitions Program. [employer] 552134 - REV 01/24.  9 Ways to Cut Back on Drinking  Maybe you've found  "yourself drinking more alcohol than you'd prefer. If you want to cut back, here are some ideas to try.    Think before you drink.  Do you really want a drink, or is it just a habit? If you're used to having a drink at a certain time, try doing something else then.     Look for substitutes.  Find some no-alcohol drinks that you enjoy, like flavored seltzer water, tea with honey, or tonic with a slice of lime. Or try alcohol-free beer or \"virgin\" cocktails (without the alcohol).     Drink more water.  Use water to quench your thirst. Drink a glass of water before you have any alcohol. Have another glass along with every drink or between drinks.     Shrink your drink.  For example, have a bottle of beer instead of a pint. Use a smaller glass for wine. Choose drinks with lower alcohol content (ABV%). Or use less liquor and more mixer in cocktails.     Slow down.  It's easy to drink quickly and without thinking about it. Pay attention, and make each drink last longer.     Do the math.  Total up how much you spend on alcohol each month. How much is that a year? If you cut back, what could you do with the money you save?     Take a break.  Choose a day or two each week when you won't drink at all. Notice how you feel on those days, physically and emotionally. How did you sleep? Do you feel better? Over time, add more break days.     Count calories.  Would you like to lose some weight? For some people that's a good motivator for cutting back. Figure out how many calories are in each drink. How many does that add up to in a day? In a week? In a month?     Practice saying no.  Be ready when someone offers you a drink. Try: \"Thanks, I've had enough.\" Or \"Thanks, but I'm cutting back.\" Or \"No, thanks. I feel better when I drink less.\"   Current as of: August 20, 2024  Content Version: 14.4    5725-9484 [employer].   Care instructions adapted under license by your healthcare professional. If you have questions about " a medical condition or this instruction, always ask your healthcare professional. [employer] disclaims any warranty or liability for your use of this information.

## 2025-05-01 NOTE — NURSING NOTE
Prior to immunization administration, verified patients identity using patient s name and date of birth. Please see Immunization Activity for additional information.     Screening Questionnaire for Adult Immunization    Are you sick today?   No   Do you have allergies to medications, food, a vaccine component or latex?   No   Have you ever had a serious reaction after receiving a vaccination?   No   Do you have a long-term health problem with heart, lung, kidney, or metabolic disease (e.g., diabetes), asthma, a blood disorder, no spleen, complement component deficiency, a cochlear implant, or a spinal fluid leak?  Are you on long-term aspirin therapy?   No   Do you have cancer, leukemia, HIV/AIDS, or any other immune system problem?   No   Do you have a parent, brother, or sister with an immune system problem?   No   In the past 3 months, have you taken medications that affect  your immune system, such as prednisone, other steroids, or anticancer drugs; drugs for the treatment of rheumatoid arthritis, Crohn s disease, or psoriasis; or have you had radiation treatments?   No   Have you had a seizure, or a brain or other nervous system problem?   No   During the past year, have you received a transfusion of blood or blood    products, or been given immune (gamma) globulin or antiviral drug?   No   For women: Are you pregnant or is there a chance you could become       pregnant during the next month?   No   Have you received any vaccinations in the past 4 weeks?   No     Immunization questionnaire answers were all negative.      Patient instructed to remain in clinic for 15 minutes afterwards, and to report any adverse reactions.     Screening performed by Krissy Dumont MA on 5/1/2025 at 4:27 PM.

## 2025-05-01 NOTE — PROGRESS NOTES
Preventive Care Visit  [facility]  Gadiel Wood PA-C, Physician Assistant - Medical  May 1, 2025      Assessment & Plan     Routine general medical examination at a health care facility  Family history of malignant neoplasm of breast  Immunizations: Tdap - given today.    STI Screening: Declined.   BP: 90/60 - no dizziness or other abnormal symptoms.   Hearing and Vision: No concerns   Preventative Labs: Full panel of labs done with Dr. Anderson (Endocrinology) recently - all within normal limits given diagnosis of diabetes. Recommended she ask for a liver panel with next blood draw given history of alcohol use and possible family history of hepatobiliary disease in mom (patient can't remember name of disease).   Skin Cancer: Recommended regular self exams and utilization of sunscreen SPF 50 when outside with reapplication every 2 hours.  Colon Cancer: Next due 5/2033.  Lung Cancer: Never smoker, N/A.  Osteoporosis: Not due.   Cervical Cancer Screening: Due - pap smear completed today.   Breast Cancer Screening:   - Mammo due 12/2025.   - Recommended regular self breast exams.     Discussed getting at least 150 minutes of aerobic exercise weekly, healthy diet with focus on 4-5 serving of fruits/veggies, lean meat, and reducing saturated fats/sugars, and getting 7-8 hours of sleep nightly (should feel rested when waking up or not getting enough).      - TDAP 10-64Y (ADACEL,BOOSTRIX)  - REVIEW OF HEALTH MAINTENANCE PROTOCOL ORDERS      Cervical cancer screening  - HPV and Gynecologic Cytology Panel - Recommended Age 30 - 65 Years    Cyst of lip and lower back  Mucous cyst of right lower lip and suspected epidermoid cyst of right lower back. Discussed with patient and offered referral to dermatology given lip cyst is most bothersome. She would like to hold off for now but will contact me via [employer] if she wants me to place this referral sometime over the next year.     Depression   Stable;  "managed well with Prozac 40 mg daily.  Can request refill from me via MyChart when needed. If worsening in irritability symptoms, recommended follow-up to discuss medication regimen.    Insomnia  Stable; managed well with gabapentin 100 mg at bedtime.  Can request refill from me via MyChart when needed.    Post-menopausal symptoms  Treat sleep and mood as above. Discussed treatment options including MHT. Provided detailed handout to patient. However, she states that symptoms are mild at this time and she would like to defer any treatment and just observe.  Will schedule an appointment if she wishes to discuss this in the future.    Patient has been advised of split billing requirements and indicates understanding: Yes        BMI  Estimated body mass index is 28.15 kg/m  as calculated from the following:    Height as of this encounter: 1.575 m (5' 2\").    Weight as of this encounter: 69.8 kg (153 lb 14.4 oz).   Weight management plan: Discussed healthy diet and exercise guidelines    Counseling  Appropriate preventive services were addressed with this patient via screening, questionnaire, or discussion as appropriate for fall prevention, nutrition, physical activity, Tobacco-use cessation, social engagement, weight loss and cognition.  Checklist reviewing preventive services available has been given to the patient.  Reviewed patient's diet, addressing concerns and/or questions.   She is at risk for lack of exercise and has been provided with information to increase physical activity for the benefit of her well-being.   The patient reports drinking more than 3 alcoholic drinks per day and/or more than 7 drhnks per week. The patient was counseled and given information about possible harmful effects of excessive alcohol intake.      The longitudinal plan of care for the diagnosis(es)/condition(s) as documented were addressed during this visit. Due to the added complexity in care, I will continue to support Shannon in " the subsequent management and with ongoing continuity of care.      Marbella Ortega is a 49 year old, presenting for the following:  Physical (Pt stated that she has a bump on her back and she will like to get it looked at, she will also like to discuss about menopause. ) and Diabetes (Foot exam )        5/1/2025     3:33 PM   Additional Questions   Roomed by Yesica Ricardo   Accompanied by Self          HPI  Here for preventative visit.     Post-menopausal symptoms: Hot flashes, day and night sweats, brain fog, and difficulty sleeping. LMP September 2023. Gabapentin 100mg at bedtime for sleep prescribed by endocrinologist which helps. History of depression, on prozac 40mg daily, historically well controlled. With menopause, she has been more irritable and hard to let things go sometimes. Mild anxiety. No suicidal thoughts or plan.     History of depression managed well with prozac 40mg daily. She is having some remittent irritability as noted above dates that overall her depression is well-controlled.  Denies any suicidal thoughts or plan.  PHQ-9 of 4.  Distally, she does have some difficulty with sleep which has been managed well with gabapentin 100mg at bedtime.     Otherwise, she notes having a lump of the lower right back and lip that she wants looked at today.  The lip lump is intermittently painful and mildly red.  She did have a cyst on the inside of her mouth at the same location in the past.  No other skin concerns.           Advance Care Planning    Patient states has Health Care Directive and will send to [employer].        4/28/2025   General Health   How would you rate your overall physical health? Good   Feel stress (tense, anxious, or unable to sleep) Only a little   (!) STRESS CONCERN      4/28/2025   Nutrition   Three or more servings of calcium each day? Yes   Diet: Regular (no restrictions)   How many servings of fruit and vegetables per day? (!) 2-3   How many sweetened beverages each day?  0-1         4/28/2025   Exercise   Days per week of moderate/strenous exercise 2 days   Average minutes spent exercising at this level 20 min   (!) EXERCISE CONCERN      4/28/2025   Social Factors   Frequency of gathering with friends or relatives Twice a week   Worry food won't last until get money to buy more No   Food not last or not have enough money for food? No   Do you have housing? (Housing is defined as stable permanent housing and does not include staying outside in a car, in a tent, in an abandoned building, in an overnight shelter, or couch-surfing.) Yes   Are you worried about losing your housing? No   Lack of transportation? No   Unable to get utilities (heat,electricity)? No         4/28/2025   Dental   Dentist two times every year? Yes       Today's PHQ-9 Score:       5/1/2025     3:20 PM   PHQ-9 SCORE   PHQ-9 Total Score MyChart 4 (Minimal depression)   PHQ-9 Total Score 4        Patient-reported         4/28/2025   Substance Use   Alcohol more than 3/day or more than 7/wk Yes   How often do you have a drink containing alcohol 4 or more times a week   How many alcohol drinks on typical day 1 or 2   How often do you have 5+ drinks at one occasion Monthly   Audit 2/3 Score 2   How often not able to stop drinking once started Never   How often failed to do what normally expected Never   How often needed first drink in am after a heavy drinking session Never   How often feeling of guilt or remorse after drinking Less than monthly   How often unable to remember what happened the night before Never   Have you or someone else been injured because of your drinking No   Has anyone been concerned or suggested you cut down on drinking Yes, but not in the last year   TOTAL SCORE - AUDIT 9   Do you use any other substances recreationally? No     Social History     Tobacco Use    Smoking status: Never    Smokeless tobacco: Never   Vaping Use    Vaping status: Never Used   Substance Use Topics    Alcohol use: Yes  "    Comment: 3 glasses of wine a night    Drug use: No           12/3/2024   LAST FHS-7 RESULTS   1st degree relative breast or ovarian cancer Yes   Any relative bilateral breast cancer No   Any male have breast cancer No   Any ONE woman have BOTH breast AND ovarian cancer No   Any woman with breast cancer before 50yrs No   2 or more relatives with breast AND/OR ovarian cancer Yes   2 or more relatives with breast AND/OR bowel cancer No                4/28/2025   STI Screening   New sexual partner(s) since last STI/HIV test? No     History of abnormal Pap smear: No - age 30- 64 PAP with HPV every 5 years recommended       ASCVD Risk   The ASCVD Risk score (Juaquin MAHAJAN, et al., 2019) failed to calculate for the following reasons:    Risk score cannot be calculated because patient has a medical history suggesting prior/existing ASCVD       Reviewed and updated as needed this visit by Provider   Tobacco  Allergies  Meds  Problems  Med Hx  Surg Hx  Fam Hx     Sexual Activity                   Objective    Exam  BP 96/60 (BP Location: Right arm, Patient Position: Sitting, Cuff Size: Adult Regular)   Pulse 76   Temp 97.1  F (36.2  C) (Temporal)   Resp 14   Ht 1.575 m (5' 2\")   Wt 69.8 kg (153 lb 14.4 oz)   LMP 05/08/2023   SpO2 98%   Breastfeeding No   BMI 28.15 kg/m     Estimated body mass index is 28.15 kg/m  as calculated from the following:    Height as of this encounter: 1.575 m (5' 2\").    Weight as of this encounter: 69.8 kg (153 lb 14.4 oz).    Physical Exam    GENERAL: alert and no distress  EYES: Eyes grossly normal to inspection, PERRL and conjunctivae and sclerae normal  HENT: ear canals and TM's normal, nose and mouth without ulcers or lesions  NECK: no adenopathy, no asymmetry, masses, or scars. No thyromegaly or nodules palpated.  RESP: lungs clear to auscultation - no rales, rhonchi or wheezes  CV: regular rate and rhythm, normal S1 S2, no S3 or S4, no murmur, click or rub, no " peripheral edema  ABDOMEN: soft, nontender, no hepatosplenomegaly, no masses and bowel sounds normal  MS: no gross musculoskeletal defects noted, no edema  SKIN: Mucous cyst of right lower lip and suspected epidermoid cyst of right lower back; otherwise no suspicious lesions or rashes  NEURO: Normal strength and tone, mentation intact and speech normal  PSYCH: mentation appears normal, affect normal/bright        Signed Electronically by: Gadiel Wood PA-C    Answers submitted by the patient for this visit:  Patient Health Questionnaire (Submitted on 5/1/2025)  If you checked off any problems, how difficult have these problems made it for you to do your work, take care of things at home, or get along with other people?: Not difficult at all  PHQ9 TOTAL SCORE: 4

## 2025-05-07 LAB
BKR AP ASSOCIATED HPV REPORT: NORMAL
BKR LAB AP GYN ADEQUACY: NORMAL
BKR LAB AP GYN INTERPRETATION: NORMAL
BKR LAB AP PREVIOUS ABNORMAL: NORMAL
PATH REPORT.COMMENTS IMP SPEC: NORMAL
PATH REPORT.COMMENTS IMP SPEC: NORMAL
PATH REPORT.RELEVANT HX SPEC: NORMAL

## 2025-05-09 ENCOUNTER — RESULTS FOLLOW-UP (OUTPATIENT)
Dept: OBGYN | Facility: CLINIC | Age: 50
End: 2025-05-09

## (undated) DEVICE — SUCTION MANIFOLD NEPTUNE 2 SYS 1 PORT 702-025-000

## (undated) DEVICE — KIT ENDO TURNOVER/PROCEDURE CARRY-ON 101822

## (undated) DEVICE — SNARE CAPIVATOR ROUND COLD SNR BX10 M00561101

## (undated) DEVICE — TUBING SUCTION MEDI-VAC 1/4"X20' N620A

## (undated) DEVICE — KIT ENDO FIRST STEP DISINFECTANT 200ML W/POUCH EP-4

## (undated) DEVICE — SOL WATER IRRIG 500ML BOTTLE 2F7113

## (undated) DEVICE — SPECIMEN CONTAINER 3OZ W/FORMALIN 59901

## (undated) DEVICE — GOWN IMPERVIOUS 2XL BLUE

## (undated) DEVICE — ENDO FORCEP BX CAPTURA PRO SPIKE G50696

## (undated) RX ORDER — FENTANYL CITRATE 50 UG/ML
INJECTION, SOLUTION INTRAMUSCULAR; INTRAVENOUS
Status: DISPENSED
Start: 2023-05-24

## (undated) RX ORDER — DIPHENHYDRAMINE HYDROCHLORIDE 50 MG/ML
INJECTION INTRAMUSCULAR; INTRAVENOUS
Status: DISPENSED
Start: 2023-05-24